# Patient Record
Sex: FEMALE | URBAN - METROPOLITAN AREA
[De-identification: names, ages, dates, MRNs, and addresses within clinical notes are randomized per-mention and may not be internally consistent; named-entity substitution may affect disease eponyms.]

---

## 2019-05-30 ENCOUNTER — APPOINTMENT (RX ONLY)
Dept: URBAN - METROPOLITAN AREA CLINIC 4 | Facility: CLINIC | Age: 84
Setting detail: DERMATOLOGY
End: 2019-05-30

## 2019-05-30 DIAGNOSIS — D22 MELANOCYTIC NEVI: ICD-10-CM

## 2019-05-30 DIAGNOSIS — L82.1 OTHER SEBORRHEIC KERATOSIS: ICD-10-CM

## 2019-05-30 DIAGNOSIS — Z71.89 OTHER SPECIFIED COUNSELING: ICD-10-CM

## 2019-05-30 DIAGNOSIS — D18.0 HEMANGIOMA: ICD-10-CM

## 2019-05-30 DIAGNOSIS — L57.0 ACTINIC KERATOSIS: ICD-10-CM

## 2019-05-30 DIAGNOSIS — L82.0 INFLAMED SEBORRHEIC KERATOSIS: ICD-10-CM

## 2019-05-30 DIAGNOSIS — Z85.828 PERSONAL HISTORY OF OTHER MALIGNANT NEOPLASM OF SKIN: ICD-10-CM

## 2019-05-30 DIAGNOSIS — L57.8 OTHER SKIN CHANGES DUE TO CHRONIC EXPOSURE TO NONIONIZING RADIATION: ICD-10-CM

## 2019-05-30 DIAGNOSIS — L81.4 OTHER MELANIN HYPERPIGMENTATION: ICD-10-CM

## 2019-05-30 PROBLEM — D18.01 HEMANGIOMA OF SKIN AND SUBCUTANEOUS TISSUE: Status: ACTIVE | Noted: 2019-05-30

## 2019-05-30 PROBLEM — D48.5 NEOPLASM OF UNCERTAIN BEHAVIOR OF SKIN: Status: ACTIVE | Noted: 2019-05-30

## 2019-05-30 PROBLEM — D22.5 MELANOCYTIC NEVI OF TRUNK: Status: ACTIVE | Noted: 2019-05-30

## 2019-05-30 PROCEDURE — ? LIQUID NITROGEN

## 2019-05-30 PROCEDURE — 17004 DESTROY PREMAL LESIONS 15/>: CPT

## 2019-05-30 PROCEDURE — 11103 TANGNTL BX SKIN EA SEP/ADDL: CPT

## 2019-05-30 PROCEDURE — 11102 TANGNTL BX SKIN SINGLE LES: CPT | Mod: 59

## 2019-05-30 PROCEDURE — 99203 OFFICE O/P NEW LOW 30 MIN: CPT | Mod: 25

## 2019-05-30 PROCEDURE — ? BIOPSY BY SHAVE METHOD

## 2019-05-30 PROCEDURE — ? COUNSELING

## 2019-05-30 ASSESSMENT — LOCATION DETAILED DESCRIPTION DERM
LOCATION DETAILED: LEFT NASAL SIDEWALL
LOCATION DETAILED: LEFT INFERIOR CENTRAL MALAR CHEEK
LOCATION DETAILED: RIGHT INFERIOR CENTRAL MALAR CHEEK
LOCATION DETAILED: LEFT DISTAL DORSAL FOREARM
LOCATION DETAILED: LEFT UPPER CUTANEOUS LIP
LOCATION DETAILED: LEFT ULNAR DORSAL HAND
LOCATION DETAILED: LEFT DISTAL PRETIBIAL REGION
LOCATION DETAILED: LEFT CENTRAL MALAR CHEEK
LOCATION DETAILED: LEFT INFERIOR LATERAL MALAR CHEEK
LOCATION DETAILED: RIGHT POSTERIOR ANKLE
LOCATION DETAILED: UPPER STERNUM
LOCATION DETAILED: RIGHT RADIAL DORSAL HAND
LOCATION DETAILED: RIGHT PROXIMAL DORSAL FOREARM
LOCATION DETAILED: LEFT INFERIOR MEDIAL MIDBACK
LOCATION DETAILED: LEFT PROXIMAL DORSAL FOREARM
LOCATION DETAILED: NASAL DORSUM
LOCATION DETAILED: INFERIOR THORACIC SPINE
LOCATION DETAILED: RIGHT CENTRAL MALAR CHEEK
LOCATION DETAILED: LEFT RADIAL DORSAL HAND
LOCATION DETAILED: RIGHT SUPERIOR LATERAL MALAR CHEEK
LOCATION DETAILED: RIGHT DISTAL DORSAL FOREARM
LOCATION DETAILED: LEFT LATERAL SUPERIOR CHEST
LOCATION DETAILED: LEFT SUPERIOR LATERAL MALAR CHEEK
LOCATION DETAILED: LEFT KNEE
LOCATION DETAILED: RIGHT SUPERIOR MEDIAL MIDBACK
LOCATION DETAILED: LEFT LATERAL PROXIMAL PRETIBIAL REGION
LOCATION DETAILED: STERNAL NOTCH
LOCATION DETAILED: RIGHT UPPER CUTANEOUS LIP
LOCATION DETAILED: LEFT MEDIAL SUPERIOR CHEST
LOCATION DETAILED: LEFT PROXIMAL PRETIBIAL REGION

## 2019-05-30 ASSESSMENT — LOCATION ZONE DERM
LOCATION ZONE: NOSE
LOCATION ZONE: ARM
LOCATION ZONE: LEG
LOCATION ZONE: FACE
LOCATION ZONE: HAND
LOCATION ZONE: LIP
LOCATION ZONE: TRUNK

## 2019-05-30 ASSESSMENT — LOCATION SIMPLE DESCRIPTION DERM
LOCATION SIMPLE: LEFT KNEE
LOCATION SIMPLE: LEFT NOSE
LOCATION SIMPLE: CHEST
LOCATION SIMPLE: RIGHT LIP
LOCATION SIMPLE: RIGHT CHEEK
LOCATION SIMPLE: UPPER BACK
LOCATION SIMPLE: LEFT HAND
LOCATION SIMPLE: NOSE
LOCATION SIMPLE: LEFT LOWER BACK
LOCATION SIMPLE: LEFT FOREARM
LOCATION SIMPLE: LEFT CHEEK
LOCATION SIMPLE: LEFT PRETIBIAL REGION
LOCATION SIMPLE: RIGHT LOWER BACK
LOCATION SIMPLE: RIGHT ANKLE
LOCATION SIMPLE: LEFT LIP
LOCATION SIMPLE: RIGHT HAND
LOCATION SIMPLE: RIGHT FOREARM

## 2019-05-30 NOTE — PROCEDURE: BIOPSY BY SHAVE METHOD
Destruction After The Procedure: No
Consent: Written consent was obtained and risks were reviewed including but not limited to scarring, infection, bleeding, scabbing, incomplete removal, nerve damage and allergy to anesthesia.
Lab: [id number]
Size Of Lesion In Cm: 1
Curettage Text: The wound bed was treated with curettage after the biopsy was performed.
Notification Instructions: Patient will be notified of biopsy results. However, patient instructed to call the office if not contacted within 2 weeks.
Biopsy Type: H and E
Electrodesiccation Text: The wound bed was treated with electrodesiccation after the biopsy was performed.
Wound Care: Aquaphor
Lab Facility: 
Anesthesia Type: 1% lidocaine with epinephrine
X Size Of Lesion In Cm: 0
Cryotherapy Text: The wound bed was treated with cryotherapy after the biopsy was performed.
Depth Of Biopsy: dermis
Was A Bandage Applied: Yes
Type Of Destruction Used: Curettage
Electrodesiccation And Curettage Text: The wound bed was treated with electrodesiccation and curettage after the biopsy was performed.
Billing Type: Third-Party Bill
Hemostasis: Aluminum Chloride and Electrocautery
Silver Nitrate Text: The wound bed was treated with silver nitrate after the biopsy was performed.
Post-Care Instructions: I reviewed with the patient in detail post-care instructions. Patient is to keep the biopsy site dry overnight, and then apply bacitracin twice daily until healed. Patient may apply hydrogen peroxide soaks to remove any crusting.
Detail Level: Detailed
Biopsy Method: 15 blade
Dressing: Band-Aid
Size Of Lesion In Cm: 1.1
Size Of Lesion In Cm: 2
Size Of Lesion In Cm: 1.4

## 2019-05-30 NOTE — PROCEDURE: LIQUID NITROGEN
Render Note In Bullet Format When Appropriate: No
Consent: The patient's consent was obtained including but not limited to risks of crusting, scabbing, blistering, scarring, darker or lighter pigmentary change, recurrence, incomplete removal and infection.
Detail Level: Detailed
Duration Of Freeze Thaw-Cycle (Seconds): 0
Post-Care Instructions: I reviewed with the patient in detail post-care instructions. Patient is to wear sunprotection, and avoid picking at any of the treated lesions. Pt may apply Vaseline  or Aquaphor to crusted or scabbing areas.

## 2019-05-30 NOTE — HPI: FULL BODY SKIN EXAMINATION
How Severe Are Your Spot(S)?: mild
What Is The Reason For Today's Visit?: Full Body Skin Examination
What Is The Reason For Today's Visit? (Being Monitored For X): concerning skin lesions on an annual basis
Additional History: Patient last seen a Dermatologist in December. History of sunburns.

## 2019-07-08 ENCOUNTER — APPOINTMENT (RX ONLY)
Dept: URBAN - METROPOLITAN AREA CLINIC 4 | Facility: CLINIC | Age: 84
Setting detail: DERMATOLOGY
End: 2019-07-08

## 2019-07-08 DIAGNOSIS — L57.0 ACTINIC KERATOSIS: ICD-10-CM

## 2019-07-08 PROBLEM — C44.722 SQUAMOUS CELL CARCINOMA OF SKIN OF RIGHT LOWER LIMB, INCLUDING HIP: Status: ACTIVE | Noted: 2019-07-08

## 2019-07-08 PROCEDURE — 17003 DESTRUCT PREMALG LES 2-14: CPT

## 2019-07-08 PROCEDURE — 12032 INTMD RPR S/A/T/EXT 2.6-7.5: CPT | Mod: 59

## 2019-07-08 PROCEDURE — 11602 EXC TR-EXT MAL+MARG 1.1-2 CM: CPT

## 2019-07-08 PROCEDURE — 17000 DESTRUCT PREMALG LESION: CPT | Mod: 59

## 2019-07-08 PROCEDURE — ? DEFER

## 2019-07-08 PROCEDURE — ? LIQUID NITROGEN

## 2019-07-08 PROCEDURE — ? EXCISION

## 2019-07-08 ASSESSMENT — LOCATION SIMPLE DESCRIPTION DERM
LOCATION SIMPLE: RIGHT FOREARM
LOCATION SIMPLE: LEFT SHOULDER

## 2019-07-08 ASSESSMENT — LOCATION ZONE DERM: LOCATION ZONE: ARM

## 2019-07-08 ASSESSMENT — LOCATION DETAILED DESCRIPTION DERM
LOCATION DETAILED: LEFT ANTERIOR SHOULDER
LOCATION DETAILED: RIGHT PROXIMAL DORSAL FOREARM

## 2019-07-08 NOTE — PROCEDURE: DEFER
Introduction Text (Please End With A Colon): The following procedure was deferred:
Detail Level: Detailed
Instructions (Optional): Excision scheduled 7/23/19 with Dr. ALBARADO.
Procedure To Be Performed At Next Visit: Excision
Instructions (Optional): MOHS scheduled with Dr. Payne for 8/5/19
Procedure To Be Performed At Next Visit: Mohs surgery

## 2019-07-08 NOTE — PROCEDURE: EXCISION
Fusiform Excision Additional Text (Leave Blank If You Do Not Want): The margin was drawn around the clinically apparent lesion.  A fusiform shape was then drawn on the skin incorporating the lesion and margins.  Incisions were then made along these lines to the appropriate tissue plane and the lesion was extirpated.
Render Path Notes In Note?: no
Biopsy Photograph Reviewed: Yes
Complex Repair And A-T Advancement Flap Text: The defect edges were debeveled with a #15 scalpel blade.  The primary defect was closed partially with a complex linear closure.  Given the location of the remaining defect, shape of the defect and the proximity to free margins an A-T advancement flap was deemed most appropriate for complete closure of the defect.  Using a sterile surgical marker, an appropriate advancement flap was drawn incorporating the defect and placing the expected incisions within the relaxed skin tension lines where possible.    The area thus outlined was incised deep to adipose tissue with a #15 scalpel blade.  The skin margins were undermined to an appropriate distance in all directions utilizing iris scissors.
Cheek Interpolation Flap Text: A decision was made to reconstruct the defect utilizing an interpolation axial flap and a staged reconstruction.  A telfa template was made of the defect.  This telfa template was then used to outline the Cheek Interpolation flap.  The donor area for the pedicle flap was then injected with anesthesia.  The flap was excised through the skin and subcutaneous tissue down to the layer of the underlying musculature.  The interpolation flap was carefully excised within this deep plane to maintain its blood supply.  The edges of the donor site were undermined.   The donor site was closed in a primary fashion.  The pedicle was then rotated into position and sutured.  Once the tube was sutured into place, adequate blood supply was confirmed with blanching and refill.  The pedicle was then wrapped with xeroform gauze and dressed appropriately with a telfa and gauze bandage to ensure continued blood supply and protect the attached pedicle.
Complex Repair And Xenograft Text: The defect edges were debeveled with a #15 scalpel blade.  The primary defect was closed partially with a complex linear closure.  Given the location of the defect, shape of the defect and the proximity to free margins a xenograft was deemed most appropriate to repair the remaining defect.  The graft was trimmed to fit the size of the remaining defect.  The graft was then placed in the primary defect, oriented appropriately, and sutured into place.
Wound Care: Vaseline
Banner Transposition Flap Text: The defect edges were debeveled with a #15 scalpel blade.  Given the location of the defect and the proximity to free margins a Banner transposition flap was deemed most appropriate.  Using a sterile surgical marker, an appropriate flap drawn around the defect. The area thus outlined was incised deep to adipose tissue with a #15 scalpel blade.  The skin margins were undermined to an appropriate distance in all directions utilizing iris scissors.
Double O-Z Flap Text: The defect edges were debeveled with a #15 scalpel blade.  Given the location of the defect, shape of the defect and the proximity to free margins a Double O-Z flap was deemed most appropriate.  Using a sterile surgical marker, an appropriate transposition flap was drawn incorporating the defect and placing the expected incisions within the relaxed skin tension lines where possible. The area thus outlined was incised deep to adipose tissue with a #15 scalpel blade.  The skin margins were undermined to an appropriate distance in all directions utilizing iris scissors.
Lab: [id number]
Curvilinear Excision Additional Text (Leave Blank If You Do Not Want): The margin was drawn around the clinically apparent lesion.  A curvilinear shape was then drawn on the skin incorporating the lesion and margins.  Incisions were then made along these lines to the appropriate tissue plane and the lesion was extirpated.
Surgeon Performing The Repair (Optional): Nika
Complex Repair And Modified Advancement Flap Text: The defect edges were debeveled with a #15 scalpel blade.  The primary defect was closed partially with a complex linear closure.  Given the location of the remaining defect, shape of the defect and the proximity to free margins a modified advancement flap was deemed most appropriate for complete closure of the defect.  Using a sterile surgical marker, an appropriate advancement flap was drawn incorporating the defect and placing the expected incisions within the relaxed skin tension lines where possible.    The area thus outlined was incised deep to adipose tissue with a #15 scalpel blade.  The skin margins were undermined to an appropriate distance in all directions utilizing iris scissors.
Z Plasty Text: The lesion was extirpated to the level of the fat with a #15 scalpel blade.  Given the location of the defect, shape of the defect and the proximity to free margins a Z-plasty was deemed most appropriate for repair.  Using a sterile surgical marker, the appropriate transposition arms of the Z-plasty were drawn incorporating the defect and placing the expected incisions within the relaxed skin tension lines where possible.    The area thus outlined was incised deep to adipose tissue with a #15 scalpel blade.  The skin margins were undermined to an appropriate distance in all directions utilizing iris scissors.  The opposing transposition arms were then transposed into place in opposite direction and anchored with interrupted buried subcutaneous sutures.
Complex Repair And Tissue Cultured Epidermal Autograft Text: The defect edges were debeveled with a #15 scalpel blade.  The primary defect was closed partially with a complex linear closure.  Given the location of the defect, shape of the defect and the proximity to free margins an tissue cultured epidermal autograft was deemed most appropriate to repair the remaining defect.  The graft was trimmed to fit the size of the remaining defect.  The graft was then placed in the primary defect, oriented appropriately, and sutured into place.
Ear Star Wedge Flap Text: The defect edges were debeveled with a #15 blade scalpel.  Given the location of the defect and the proximity to free margins (helical rim) an ear star wedge flap was deemed most appropriate.  Using a sterile surgical marker, the appropriate flap was drawn incorporating the defect and placing the expected incisions between the helical rim and antihelix where possible.  The area thus outlined was incised through and through with a #15 scalpel blade.
O-Z Flap Text: The defect edges were debeveled with a #15 scalpel blade.  Given the location of the defect, shape of the defect and the proximity to free margins an O-Z flap was deemed most appropriate.  Using a sterile surgical marker, an appropriate transposition flap was drawn incorporating the defect and placing the expected incisions within the relaxed skin tension lines where possible. The area thus outlined was incised deep to adipose tissue with a #15 scalpel blade.  The skin margins were undermined to an appropriate distance in all directions utilizing iris scissors.
Double M-Plasty Intermediate Repair Preamble Text (Leave Blank If You Do Not Want): Undermining was performed with blunt dissection.
Complex Repair And Double Advancement Flap Text: The defect edges were debeveled with a #15 scalpel blade.  The primary defect was closed partially with a complex linear closure.  Given the location of the remaining defect, shape of the defect and the proximity to free margins a double advancement flap was deemed most appropriate for complete closure of the defect.  Using a sterile surgical marker, an appropriate advancement flap was drawn incorporating the defect and placing the expected incisions within the relaxed skin tension lines where possible.    The area thus outlined was incised deep to adipose tissue with a #15 scalpel blade.  The skin margins were undermined to an appropriate distance in all directions utilizing iris scissors.
Complex Repair And Dermal Autograft Text: The defect edges were debeveled with a #15 scalpel blade.  The primary defect was closed partially with a complex linear closure.  Given the location of the defect, shape of the defect and the proximity to free margins an dermal autograft was deemed most appropriate to repair the remaining defect.  The graft was trimmed to fit the size of the remaining defect.  The graft was then placed in the primary defect, oriented appropriately, and sutured into place.
W Plasty Text: The lesion was extirpated to the level of the fat with a #15 scalpel blade.  Given the location of the defect, shape of the defect and the proximity to free margins a W-plasty was deemed most appropriate for repair.  Using a sterile surgical marker, the appropriate transposition arms of the W-plasty were drawn incorporating the defect and placing the expected incisions within the relaxed skin tension lines where possible.    The area thus outlined was incised deep to adipose tissue with a #15 scalpel blade.  The skin margins were undermined to an appropriate distance in all directions utilizing iris scissors.  The opposing transposition arms were then transposed into place in opposite direction and anchored with interrupted buried subcutaneous sutures.
Bilateral Helical Rim Advancement Flap Text: The defect edges were debeveled with a #15 blade scalpel.  Given the location of the defect and the proximity to free margins (helical rim) a bilateral helical rim advancement flap was deemed most appropriate.  Using a sterile surgical marker, the appropriate advancement flaps were drawn incorporating the defect and placing the expected incisions between the helical rim and antihelix where possible.  The area thus outlined was incised through and through with a #15 scalpel blade.  With a skin hook and iris scissors, the flaps were gently and sharply undermined and freed up.
O-L Flap Text: The defect edges were debeveled with a #15 scalpel blade.  Given the location of the defect, shape of the defect and the proximity to free margins an O-L flap was deemed most appropriate.  Using a sterile surgical marker, an appropriate advancement flap was drawn incorporating the defect and placing the expected incisions within the relaxed skin tension lines where possible.    The area thus outlined was incised deep to adipose tissue with a #15 scalpel blade.  The skin margins were undermined to an appropriate distance in all directions utilizing iris scissors.
Complex Repair And Single Advancement Flap Text: The defect edges were debeveled with a #15 scalpel blade.  The primary defect was closed partially with a complex linear closure.  Given the location of the remaining defect, shape of the defect and the proximity to free margins a single advancement flap was deemed most appropriate for complete closure of the defect.  Using a sterile surgical marker, an appropriate advancement flap was drawn incorporating the defect and placing the expected incisions within the relaxed skin tension lines where possible.    The area thus outlined was incised deep to adipose tissue with a #15 scalpel blade.  The skin margins were undermined to an appropriate distance in all directions utilizing iris scissors.
H Plasty Text: Given the location of the defect, shape of the defect and the proximity to free margins a H-plasty was deemed most appropriate for repair.  Using a sterile surgical marker, the appropriate advancement arms of the H-plasty were drawn incorporating the defect and placing the expected incisions within the relaxed skin tension lines where possible. The area thus outlined was incised deep to adipose tissue with a #15 scalpel blade. The skin margins were undermined to an appropriate distance in all directions utilizing iris scissors.  The opposing advancement arms were then advanced into place in opposite direction and anchored with interrupted buried subcutaneous sutures.
Helical Rim Advancement Flap Text: The defect edges were debeveled with a #15 blade scalpel.  Given the location of the defect and the proximity to free margins (helical rim) a double helical rim advancement flap was deemed most appropriate.  Using a sterile surgical marker, the appropriate advancement flaps were drawn incorporating the defect and placing the expected incisions between the helical rim and antihelix where possible.  The area thus outlined was incised through and through with a #15 scalpel blade.  With a skin hook and iris scissors, the flaps were gently and sharply undermined and freed up.
Complex Repair And Epidermal Autograft Text: The defect edges were debeveled with a #15 scalpel blade.  The primary defect was closed partially with a complex linear closure.  Given the location of the defect, shape of the defect and the proximity to free margins an epidermal autograft was deemed most appropriate to repair the remaining defect.  The graft was trimmed to fit the size of the remaining defect.  The graft was then placed in the primary defect, oriented appropriately, and sutured into place.
Intermediate / Complex Repair - Final Wound Length In Cm: 4.7
O-T Advancement Flap Text: The defect edges were debeveled with a #15 scalpel blade.  Given the location of the defect, shape of the defect and the proximity to free margins an O-T advancement flap was deemed most appropriate.  Using a sterile surgical marker, an appropriate advancement flap was drawn incorporating the defect and placing the expected incisions within the relaxed skin tension lines where possible.    The area thus outlined was incised deep to adipose tissue with a #15 scalpel blade.  The skin margins were undermined to an appropriate distance in all directions utilizing iris scissors.
Partial Purse String (Simple) Text: Given the location of the defect and the characteristics of the surrounding skin a simple purse string closure was deemed most appropriate.  Undermining was performed circumferentially around the surgical defect.  A purse string suture was then placed and tightened. Wound tension of the circular defect prevented complete closure of the wound.
V-Y Plasty Text: The defect edges were debeveled with a #15 scalpel blade.  Given the location of the defect, shape of the defect and the proximity to free margins an V-Y advancement flap was deemed most appropriate.  Using a sterile surgical marker, an appropriate advancement flap was drawn incorporating the defect and placing the expected incisions within the relaxed skin tension lines where possible.    The area thus outlined was incised deep to adipose tissue with a #15 scalpel blade.  The skin margins were undermined to an appropriate distance in all directions utilizing iris scissors.
Complex Repair And Split-Thickness Skin Graft Text: The defect edges were debeveled with a #15 scalpel blade.  The primary defect was closed partially with a complex linear closure.  Given the location of the defect, shape of the defect and the proximity to free margins a split thickness skin graft was deemed most appropriate to repair the remaining defect.  The graft was trimmed to fit the size of the remaining defect.  The graft was then placed in the primary defect, oriented appropriately, and sutured into place.
Bi-Rhombic Flap Text: The defect edges were debeveled with a #15 scalpel blade.  Given the location of the defect and the proximity to free margins a bi-rhombic flap was deemed most appropriate.  Using a sterile surgical marker, an appropriate rhombic flap was drawn incorporating the defect. The area thus outlined was incised deep to adipose tissue with a #15 scalpel blade.  The skin margins were undermined to an appropriate distance in all directions utilizing iris scissors.
A-T Advancement Flap Text: The defect edges were debeveled with a #15 scalpel blade.  Given the location of the defect, shape of the defect and the proximity to free margins an A-T advancement flap was deemed most appropriate.  Using a sterile surgical marker, an appropriate advancement flap was drawn incorporating the defect and placing the expected incisions within the relaxed skin tension lines where possible.    The area thus outlined was incised deep to adipose tissue with a #15 scalpel blade.  The skin margins were undermined to an appropriate distance in all directions utilizing iris scissors.
Anesthesia Type: 1% lidocaine with 1:100,000 epinephrine and a 1:10 solution of 8.4% sodium bicarbonate
Epidermal Closure: running cuticular
Partial Purse String (Intermediate) Text: Given the location of the defect and the characteristics of the surrounding skin an intermediate purse string closure was deemed most appropriate.  Undermining was performed circumferentially around the surgical defect.  A purse string suture was then placed and tightened. Wound tension of the circular defect prevented complete closure of the wound.
Billing Type: Third-Party Bill
Skin Substitute Text: The defect edges were debeveled with a #15 scalpel blade.  Given the location of the defect, shape of the defect and the proximity to free margins a skin substitute graft was deemed most appropriate.  The graft material was trimmed to fit the size of the defect. The graft was then placed in the primary defect and oriented appropriately.
Complex Repair And Ftsg Text: The defect edges were debeveled with a #15 scalpel blade.  The primary defect was closed partially with a complex linear closure.  Given the location of the defect, shape of the defect and the proximity to free margins a full thickness skin graft was deemed most appropriate to repair the remaining defect.  The graft was trimmed to fit the size of the remaining defect.  The graft was then placed in the primary defect, oriented appropriately, and sutured into place.
S Plasty Text: Given the location and shape of the defect, and the orientation of relaxed skin tension lines, an S-plasty was deemed most appropriate for repair.  Using a sterile surgical marker, the appropriate outline of the S-plasty was drawn, incorporating the defect and placing the expected incisions within the relaxed skin tension lines where possible.  The area thus outlined was incised deep to adipose tissue with a #15 scalpel blade.  The skin margins were undermined to an appropriate distance in all directions utilizing iris scissors. The skin flaps were advanced over the defect.  The opposing margins were then approximated with interrupted buried subcutaneous sutures.
Rhomboid Transposition Flap Text: The defect edges were debeveled with a #15 scalpel blade.  Given the location of the defect and the proximity to free margins a rhomboid transposition flap was deemed most appropriate.  Using a sterile surgical marker, an appropriate rhomboid flap was drawn incorporating the defect.    The area thus outlined was incised deep to adipose tissue with a #15 scalpel blade.  The skin margins were undermined to an appropriate distance in all directions utilizing iris scissors.
Crescentic Advancement Flap Text: The defect edges were debeveled with a #15 scalpel blade.  Given the location of the defect and the proximity to free margins a crescentic advancement flap was deemed most appropriate.  Using a sterile surgical marker, the appropriate advancement flap was drawn incorporating the defect and placing the expected incisions within the relaxed skin tension lines where possible.    The area thus outlined was incised deep to adipose tissue with a #15 scalpel blade.  The skin margins were undermined to an appropriate distance in all directions utilizing iris scissors.
Purse String (Simple) Text: Given the location of the defect and the characteristics of the surrounding skin a purse string simple closure was deemed most appropriate.  Undermining was performed circumferentially around the surgical defect.  A purse string suture was then placed and tightened.
Repair Type: Intermediate
Double M-Plasty Complex Repair Preamble Text (Leave Blank If You Do Not Want): Extensive wide undermining was performed.
Information: Selecting Yes will display possible errors in your note based on the variables you have selected. This validation is only offered as a suggestion for you. PLEASE NOTE THAT THE VALIDATION TEXT WILL BE REMOVED WHEN YOU FINALIZE YOUR NOTE. IF YOU WANT TO FAX A PRELIMINARY NOTE YOU WILL NEED TO TOGGLE THIS TO 'NO' IF YOU DO NOT WANT IT IN YOUR FAXED NOTE.
Double O-Z Plasty Text: The defect edges were debeveled with a #15 scalpel blade.  Given the location of the defect, shape of the defect and the proximity to free margins a Double O-Z plasty (double transposition flap) was deemed most appropriate.  Using a sterile surgical marker, the appropriate transposition flaps were drawn incorporating the defect and placing the expected incisions within the relaxed skin tension lines where possible. The area thus outlined was incised deep to adipose tissue with a #15 scalpel blade.  The skin margins were undermined to an appropriate distance in all directions utilizing iris scissors.  Hemostasis was achieved with electrocautery.  The flaps were then transposed into place, one clockwise and the other counterclockwise, and anchored with interrupted buried subcutaneous sutures.
Rhombic Flap Text: The defect edges were debeveled with a #15 scalpel blade.  Given the location of the defect and the proximity to free margins a rhombic flap was deemed most appropriate.  Using a sterile surgical marker, an appropriate rhombic flap was drawn incorporating the defect.    The area thus outlined was incised deep to adipose tissue with a #15 scalpel blade.  The skin margins were undermined to an appropriate distance in all directions utilizing iris scissors.
Complex Repair And Dorsal Nasal Flap Text: The defect edges were debeveled with a #15 scalpel blade.  The primary defect was closed partially with a complex linear closure.  Given the location of the remaining defect, shape of the defect and the proximity to free margins a dorsal nasal flap was deemed most appropriate for complete closure of the defect.  Using a sterile surgical marker, an appropriate flap was drawn incorporating the defect and placing the expected incisions within the relaxed skin tension lines where possible.    The area thus outlined was incised deep to adipose tissue with a #15 scalpel blade.  The skin margins were undermined to an appropriate distance in all directions utilizing iris scissors.
Dermal Autograft Text: The defect edges were debeveled with a #15 scalpel blade.  Given the location of the defect, shape of the defect and the proximity to free margins a dermal autograft was deemed most appropriate.  Using a sterile surgical marker, the primary defect shape was transferred to the donor site. The area thus outlined was incised deep to adipose tissue with a #15 scalpel blade.  The harvested graft was then trimmed of adipose and epidermal tissue until only dermis was left.  The skin graft was then placed in the primary defect and oriented appropriately.
Anesthesia Volume In Cc: 6
Chonodrocutaneous Helical Advancement Flap Text: The defect edges were debeveled with a #15 scalpel blade.  Given the location of the defect and the proximity to free margins a chondrocutaneous helical advancement flap was deemed most appropriate.  Using a sterile surgical marker, the appropriate advancement flap was drawn incorporating the defect and placing the expected incisions within the relaxed skin tension lines where possible.    The area thus outlined was incised deep to adipose tissue with a #15 scalpel blade.  The skin margins were undermined to an appropriate distance in all directions utilizing iris scissors.
Purse String (Intermediate) Text: Given the location of the defect and the characteristics of the surrounding skin a purse string intermediate closure was deemed most appropriate.  Undermining was performed circumfirentially around the surgical defect.  A purse string suture was then placed and tightened.
O-Z Plasty Text: The defect edges were debeveled with a #15 scalpel blade.  Given the location of the defect, shape of the defect and the proximity to free margins an O-Z plasty (double transposition flap) was deemed most appropriate.  Using a sterile surgical marker, the appropriate transposition flaps were drawn incorporating the defect and placing the expected incisions within the relaxed skin tension lines where possible.    The area thus outlined was incised deep to adipose tissue with a #15 scalpel blade.  The skin margins were undermined to an appropriate distance in all directions utilizing iris scissors.  Hemostasis was achieved with electrocautery.  The flaps were then transposed into place, one clockwise and the other counterclockwise, and anchored with interrupted buried subcutaneous sutures.
Melolabial Transposition Flap Text: The defect edges were debeveled with a #15 scalpel blade.  Given the location of the defect and the proximity to free margins a melolabial flap was deemed most appropriate.  Using a sterile surgical marker, an appropriate melolabial transposition flap was drawn incorporating the defect.    The area thus outlined was incised deep to adipose tissue with a #15 scalpel blade.  The skin margins were undermined to an appropriate distance in all directions utilizing iris scissors.
Complex Repair And Z Plasty Text: The defect edges were debeveled with a #15 scalpel blade.  The primary defect was closed partially with a complex linear closure.  Given the location of the remaining defect, shape of the defect and the proximity to free margins a Z plasty was deemed most appropriate for complete closure of the defect.  Using a sterile surgical marker, an appropriate advancement flap was drawn incorporating the defect and placing the expected incisions within the relaxed skin tension lines where possible.    The area thus outlined was incised deep to adipose tissue with a #15 scalpel blade.  The skin margins were undermined to an appropriate distance in all directions utilizing iris scissors.
Burow's Advancement Flap Text: The defect edges were debeveled with a #15 scalpel blade.  Given the location of the defect and the proximity to free margins a Burow's advancement flap was deemed most appropriate.  Using a sterile surgical marker, the appropriate advancement flap was drawn incorporating the defect and placing the expected incisions within the relaxed skin tension lines where possible.    The area thus outlined was incised deep to adipose tissue with a #15 scalpel blade.  The skin margins were undermined to an appropriate distance in all directions utilizing iris scissors.
Epidermal Autograft Text: The defect edges were debeveled with a #15 scalpel blade.  Given the location of the defect, shape of the defect and the proximity to free margins an epidermal autograft was deemed most appropriate.  Using a sterile surgical marker, the primary defect shape was transferred to the donor site. The epidermal graft was then harvested.  The skin graft was then placed in the primary defect and oriented appropriately.
Skin Substitute Units (Will Override Primary Defect Units If Greater Than 0): 0
Xenograft Text: The defect edges were debeveled with a #15 scalpel blade.  Given the location of the defect, shape of the defect and the proximity to free margins a xenograft was deemed most appropriate.  The graft was then trimmed to fit the size of the defect.  The graft was then placed in the primary defect and oriented appropriately.
Complex Repair And W Plasty Text: The defect edges were debeveled with a #15 scalpel blade.  The primary defect was closed partially with a complex linear closure.  Given the location of the remaining defect, shape of the defect and the proximity to free margins a W plasty was deemed most appropriate for complete closure of the defect.  Using a sterile surgical marker, an appropriate advancement flap was drawn incorporating the defect and placing the expected incisions within the relaxed skin tension lines where possible.    The area thus outlined was incised deep to adipose tissue with a #15 scalpel blade.  The skin margins were undermined to an appropriate distance in all directions utilizing iris scissors.
O-T Plasty Text: The defect edges were debeveled with a #15 scalpel blade.  Given the location of the defect, shape of the defect and the proximity to free margins an O-T plasty was deemed most appropriate.  Using a sterile surgical marker, an appropriate O-T plasty was drawn incorporating the defect and placing the expected incisions within the relaxed skin tension lines where possible.    The area thus outlined was incised deep to adipose tissue with a #15 scalpel blade.  The skin margins were undermined to an appropriate distance in all directions utilizing iris scissors.
Muscle Hinge Flap Text: The defect edges were debeveled with a #15 scalpel blade.  Given the size, depth and location of the defect and the proximity to free margins a muscle hinge flap was deemed most appropriate.  Using a sterile surgical marker, an appropriate hinge flap was drawn incorporating the defect. The area thus outlined was incised with a #15 scalpel blade.  The skin margins were undermined to an appropriate distance in all directions utilizing iris scissors.
Composite Graft Text: The defect edges were debeveled with a #15 scalpel blade.  Given the location of the defect, shape of the defect, the proximity to free margins and the fact the defect was full thickness a composite graft was deemed most appropriate.  The defect was outline and then transferred to the donor site.  A full thickness graft was then excised from the donor site. The graft was then placed in the primary defect, oriented appropriately and then sutured into place.  The secondary defect was then repaired using a primary closure.
Advancement Flap (Double) Text: The defect edges were debeveled with a #15 scalpel blade.  Given the location of the defect and the proximity to free margins a double advancement flap was deemed most appropriate.  Using a sterile surgical marker, the appropriate advancement flaps were drawn incorporating the defect and placing the expected incisions within the relaxed skin tension lines where possible.    The area thus outlined was incised deep to adipose tissue with a #15 scalpel blade.  The skin margins were undermined to an appropriate distance in all directions utilizing iris scissors.
Tissue Cultured Epidermal Autograft Text: The defect edges were debeveled with a #15 scalpel blade.  Given the location of the defect, shape of the defect and the proximity to free margins a tissue cultured epidermal autograft was deemed most appropriate.  The graft was then trimmed to fit the size of the defect.  The graft was then placed in the primary defect and oriented appropriately.
Epidermal Closure Graft Donor Site (Optional): simple interrupted
Excision Method: Fusiform
Complex Repair And Double M Plasty Text: The defect edges were debeveled with a #15 scalpel blade.  The primary defect was closed partially with a complex linear closure.  Given the location of the remaining defect, shape of the defect and the proximity to free margins a double M plasty was deemed most appropriate for complete closure of the defect.  Using a sterile surgical marker, an appropriate advancement flap was drawn incorporating the defect and placing the expected incisions within the relaxed skin tension lines where possible.    The area thus outlined was incised deep to adipose tissue with a #15 scalpel blade.  The skin margins were undermined to an appropriate distance in all directions utilizing iris scissors.
Keystone Flap Text: The defect edges were debeveled with a #15 scalpel blade.  Given the location of the defect, shape of the defect a keystone flap was deemed most appropriate.  Using a sterile surgical marker, an appropriate keystone flap was drawn incorporating the defect, outlining the appropriate donor tissue and placing the expected incisions within the relaxed skin tension lines where possible. The area thus outlined was incised deep to adipose tissue with a #15 scalpel blade.  The skin margins were undermined to an appropriate distance in all directions around the primary defect and laterally outward around the flap utilizing iris scissors.
Transposition Flap Text: The defect edges were debeveled with a #15 scalpel blade.  Given the location of the defect and the proximity to free margins a transposition flap was deemed most appropriate.  Using a sterile surgical marker, an appropriate transposition flap was drawn incorporating the defect.    The area thus outlined was incised deep to adipose tissue with a #15 scalpel blade.  The skin margins were undermined to an appropriate distance in all directions utilizing iris scissors.
Advancement Flap (Single) Text: The defect edges were debeveled with a #15 scalpel blade.  Given the location of the defect and the proximity to free margins a single advancement flap was deemed most appropriate.  Using a sterile surgical marker, an appropriate advancement flap was drawn incorporating the defect and placing the expected incisions within the relaxed skin tension lines where possible.    The area thus outlined was incised deep to adipose tissue with a #15 scalpel blade.  The skin margins were undermined to an appropriate distance in all directions utilizing iris scissors.
Size Of Margin In Cm: 0.3
Cartilage Graft Text: The defect edges were debeveled with a #15 scalpel blade.  Given the location of the defect, shape of the defect, the fact the defect involved a full thickness cartilage defect a cartilage graft was deemed most appropriate.  An appropriate donor site was identified, cleansed, and anesthetized. The cartilage graft was then harvested and transferred to the recipient site, oriented appropriately and then sutured into place.  The secondary defect was then repaired using a primary closure.
Anesthesia Type: 0.5% lidocaine with 1:200,000 epinephrine and a 1:10 solution of 4.2% sodium bicarbonate
Star Wedge Flap Text: The defect edges were debeveled with a #15 scalpel blade.  Given the location of the defect, shape of the defect and the proximity to free margins a star wedge flap was deemed most appropriate.  Using a sterile surgical marker, an appropriate rotation flap was drawn incorporating the defect and placing the expected incisions within the relaxed skin tension lines where possible. The area thus outlined was incised deep to adipose tissue with a #15 scalpel blade.  The skin margins were undermined to an appropriate distance in all directions utilizing iris scissors.
Complex Repair And M Plasty Text: The defect edges were debeveled with a #15 scalpel blade.  The primary defect was closed partially with a complex linear closure.  Given the location of the remaining defect, shape of the defect and the proximity to free margins an M plasty was deemed most appropriate for complete closure of the defect.  Using a sterile surgical marker, an appropriate advancement flap was drawn incorporating the defect and placing the expected incisions within the relaxed skin tension lines where possible.    The area thus outlined was incised deep to adipose tissue with a #15 scalpel blade.  The skin margins were undermined to an appropriate distance in all directions utilizing iris scissors.
No Repair - Repaired With Adjacent Surgical Defect Text (Leave Blank If You Do Not Want): After the excision the defect was repaired concurrently with another surgical defect which was in close approximation.
Split-Thickness Skin Graft Text: The defect edges were debeveled with a #15 scalpel blade.  Given the location of the defect, shape of the defect and the proximity to free margins a split thickness skin graft was deemed most appropriate.  Using a sterile surgical marker, the primary defect shape was transferred to the donor site. The split thickness graft was then harvested.  The skin graft was then placed in the primary defect and oriented appropriately.
Island Pedicle Flap-Requiring Vessel Identification Text: The defect edges were debeveled with a #15 scalpel blade.  Given the location of the defect, shape of the defect and the proximity to free margins an island pedicle advancement flap was deemed most appropriate.  Using a sterile surgical marker, an appropriate advancement flap was drawn, based on the axial vessel mentioned above, incorporating the defect, outlining the appropriate donor tissue and placing the expected incisions within the relaxed skin tension lines where possible.    The area thus outlined was incised deep to adipose tissue with a #15 scalpel blade.  The skin margins were undermined to an appropriate distance in all directions around the primary defect and laterally outward around the island pedicle utilizing iris scissors.  There was minimal undermining beneath the pedicle flap.
Suture Removal: 15 days
Detail Level: Detailed
Epidermal Sutures: 4-0 Nylon
Complex Repair And V-Y Plasty Text: The defect edges were debeveled with a #15 scalpel blade.  The primary defect was closed partially with a complex linear closure.  Given the location of the remaining defect, shape of the defect and the proximity to free margins a V-Y plasty was deemed most appropriate for complete closure of the defect.  Using a sterile surgical marker, an appropriate advancement flap was drawn incorporating the defect and placing the expected incisions within the relaxed skin tension lines where possible.    The area thus outlined was incised deep to adipose tissue with a #15 scalpel blade.  The skin margins were undermined to an appropriate distance in all directions utilizing iris scissors.
Spiral Flap Text: The defect edges were debeveled with a #15 scalpel blade.  Given the location of the defect, shape of the defect and the proximity to free margins a spiral flap was deemed most appropriate.  Using a sterile surgical marker, an appropriate rotation flap was drawn incorporating the defect and placing the expected incisions within the relaxed skin tension lines where possible. The area thus outlined was incised deep to adipose tissue with a #15 scalpel blade.  The skin margins were undermined to an appropriate distance in all directions utilizing iris scissors.
Ftsg Text: The defect edges were debeveled with a #15 scalpel blade.  Given the location of the defect, shape of the defect and the proximity to free margins a full thickness skin graft was deemed most appropriate.  Using a sterile surgical marker, the primary defect shape was transferred to the donor site. The area thus outlined was incised deep to adipose tissue with a #15 scalpel blade.  The harvested graft was then trimmed of adipose tissue until only dermis and epidermis was left.  The skin margins of the secondary defect were undermined to an appropriate distance in all directions utilizing iris scissors.  The secondary defect was closed with interrupted buried subcutaneous sutures.  The skin edges were then re-apposed with running  sutures.  The skin graft was then placed in the primary defect and oriented appropriately.
Post-Care Instructions: I reviewed with the patient in detail post-care instructions. Patient is not to engage in any heavy lifting, exercise, or swimming for the next 14 days. Should the patient develop any fevers, chills, bleeding, severe pain patient will contact the office immediately.
Repair Performed By Another Provider Text (Leave Blank If You Do Not Want): After the tissue was excised the defect was repaired by another provider.
Pre-Excision Curettage Text (Leave Blank If You Do Not Want): Prior to drawing the surgical margin the visible lesion was removed with electrodesiccation and curettage to clearly define the lesion size.
Double Island Pedicle Flap Text: The defect edges were debeveled with a #15 scalpel blade.  Given the location of the defect, shape of the defect and the proximity to free margins a double island pedicle advancement flap was deemed most appropriate.  Using a sterile surgical marker, an appropriate advancement flap was drawn incorporating the defect, outlining the appropriate donor tissue and placing the expected incisions within the relaxed skin tension lines where possible.    The area thus outlined was incised deep to adipose tissue with a #15 scalpel blade.  The skin margins were undermined to an appropriate distance in all directions around the primary defect and laterally outward around the island pedicle utilizing iris scissors.  There was minimal undermining beneath the pedicle flap.
Size Of Lesion In Cm: 1.2
Complex Repair And Transposition Flap Text: The defect edges were debeveled with a #15 scalpel blade.  The primary defect was closed partially with a complex linear closure.  Given the location of the remaining defect, shape of the defect and the proximity to free margins a transposition flap was deemed most appropriate for complete closure of the defect.  Using a sterile surgical marker, an appropriate advancement flap was drawn incorporating the defect and placing the expected incisions within the relaxed skin tension lines where possible.    The area thus outlined was incised deep to adipose tissue with a #15 scalpel blade.  The skin margins were undermined to an appropriate distance in all directions utilizing iris scissors.
Rotation Flap Text: The defect edges were debeveled with a #15 scalpel blade.  Given the location of the defect, shape of the defect and the proximity to free margins a rotation flap was deemed most appropriate.  Using a sterile surgical marker, an appropriate rotation flap was drawn incorporating the defect and placing the expected incisions within the relaxed skin tension lines where possible.    The area thus outlined was incised deep to adipose tissue with a #15 scalpel blade.  The skin margins were undermined to an appropriate distance in all directions utilizing iris scissors.
Lip Wedge Excision Repair Text: Given the location of the defect and the proximity to free margins a full thickness wedge repair was deemed most appropriate.  Using a sterile surgical marker, the appropriate repair was drawn incorporating the defect and placing the expected incisions perpendicular to the vermilion border.  The vermilion border was also meticulously outlined to ensure appropriate reapproximation during the repair.  The area thus outlined was incised through and through with a #15 scalpel blade.  The muscularis and dermis were reaproximated with deep sutures following hemostasis. Care was taken to realign the vermilion border before proceeding with the superficial closure.  Once the vermilion was realigned the superfical and mucosal closure was finished.
Positioning (Leave Blank If You Do Not Want): The patient was placed in a comfortable position exposing the surgical site.
Alar Island Pedicle Flap Text: The defect edges were debeveled with a #15 scalpel blade.  Given the location of the defect, shape of the defect and the proximity to the alar rim an island pedicle advancement flap was deemed most appropriate.  Using a sterile surgical marker, an appropriate advancement flap was drawn incorporating the defect, outlining the appropriate donor tissue and placing the expected incisions within the nasal ala running parallel to the alar rim. The area thus outlined was incised with a #15 scalpel blade.  The skin margins were undermined minimally to an appropriate distance in all directions around the primary defect and laterally outward around the island pedicle utilizing iris scissors.  There was minimal undermining beneath the pedicle flap.
Complex Repair And Rhombic Flap Text: The defect edges were debeveled with a #15 scalpel blade.  The primary defect was closed partially with a complex linear closure.  Given the location of the remaining defect, shape of the defect and the proximity to free margins a rhombic flap was deemed most appropriate for complete closure of the defect.  Using a sterile surgical marker, an appropriate advancement flap was drawn incorporating the defect and placing the expected incisions within the relaxed skin tension lines where possible.    The area thus outlined was incised deep to adipose tissue with a #15 scalpel blade.  The skin margins were undermined to an appropriate distance in all directions utilizing iris scissors.
Perilesional Excision Additional Text (Leave Blank If You Do Not Want): The margin was drawn around the clinically apparent lesion. Incisions were then made along these lines to the appropriate tissue plane and the lesion was extirpated.
Paramedian Forehead Flap Text: A decision was made to reconstruct the defect utilizing an interpolation axial flap and a staged reconstruction.  A telfa template was made of the defect.  This telfa template was then used to outline the paramedian forehead pedicle flap.  The donor area for the pedicle flap was then injected with anesthesia.  The flap was excised through the skin and subcutaneous tissue down to the layer of the underlying musculature.  The pedicle flap was carefully excised within this deep plane to maintain its blood supply.  The edges of the donor site were undermined.   The donor site was closed in a primary fashion.  The pedicle was then rotated into position and sutured.  Once the tube was sutured into place, adequate blood supply was confirmed with blanching and refill.  The pedicle was then wrapped with xeroform gauze and dressed appropriately with a telfa and gauze bandage to ensure continued blood supply and protect the attached pedicle.
Island Pedicle Flap With Canthal Suspension Text: The defect edges were debeveled with a #15 scalpel blade.  Given the location of the defect, shape of the defect and the proximity to free margins an island pedicle advancement flap was deemed most appropriate.  Using a sterile surgical marker, an appropriate advancement flap was drawn incorporating the defect, outlining the appropriate donor tissue and placing the expected incisions within the relaxed skin tension lines where possible. The area thus outlined was incised deep to adipose tissue with a #15 scalpel blade.  The skin margins were undermined to an appropriate distance in all directions around the primary defect and laterally outward around the island pedicle utilizing iris scissors.  There was minimal undermining beneath the pedicle flap. A suspension suture was placed in the canthal tendon to prevent tension and prevent ectropion.
Hatchet Flap Text: The defect edges were debeveled with a #15 scalpel blade.  Given the location of the defect, shape of the defect and the proximity to free margins a hatchet flap was deemed most appropriate.  Using a sterile surgical marker, an appropriate hatchet flap was drawn incorporating the defect and placing the expected incisions within the relaxed skin tension lines where possible.    The area thus outlined was incised deep to adipose tissue with a #15 scalpel blade.  The skin margins were undermined to an appropriate distance in all directions utilizing iris scissors.
Complex Repair And Rotation Flap Text: The defect edges were debeveled with a #15 scalpel blade.  The primary defect was closed partially with a complex linear closure.  Given the location of the remaining defect, shape of the defect and the proximity to free margins a rotation flap was deemed most appropriate for complete closure of the defect.  Using a sterile surgical marker, an appropriate advancement flap was drawn incorporating the defect and placing the expected incisions within the relaxed skin tension lines where possible.    The area thus outlined was incised deep to adipose tissue with a #15 scalpel blade.  The skin margins were undermined to an appropriate distance in all directions utilizing iris scissors.
Posterior Auricular Interpolation Flap Text: A decision was made to reconstruct the defect utilizing an interpolation axial flap and a staged reconstruction.  A telfa template was made of the defect.  This telfa template was then used to outline the posterior auricular interpolation flap.  The donor area for the pedicle flap was then injected with anesthesia.  The flap was excised through the skin and subcutaneous tissue down to the layer of the underlying musculature.  The pedicle flap was carefully excised within this deep plane to maintain its blood supply.  The edges of the donor site were undermined.   The donor site was closed in a primary fashion.  The pedicle was then rotated into position and sutured.  Once the tube was sutured into place, adequate blood supply was confirmed with blanching and refill.  The pedicle was then wrapped with xeroform gauze and dressed appropriately with a telfa and gauze bandage to ensure continued blood supply and protect the attached pedicle.
Consent was obtained from the patient. The risks and benefits to therapy were discussed in detail. Specifically, the risks of infection, scarring, bleeding, prolonged wound healing, incomplete removal, allergy to anesthesia, nerve injury and recurrence were addressed. Prior to the procedure, the treatment site was clearly identified and confirmed by the patient. All components of Universal Protocol/PAUSE Rule completed.
Excisional Biopsy Additional Text (Leave Blank If You Do Not Want): The margin was drawn around the clinically apparent lesion. An elliptical shape was then drawn on the skin incorporating the lesion and margins.  Incisions were then made along these lines to the appropriate tissue plane and the lesion was extirpated.
Number Of Deep Sutures (Optional): 2
Island Pedicle Flap Text: The defect edges were debeveled with a #15 scalpel blade.  Given the location of the defect, shape of the defect and the proximity to free margins an island pedicle advancement flap was deemed most appropriate.  Using a sterile surgical marker, an appropriate advancement flap was drawn incorporating the defect, outlining the appropriate donor tissue and placing the expected incisions within the relaxed skin tension lines where possible.    The area thus outlined was incised deep to adipose tissue with a #15 scalpel blade.  The skin margins were undermined to an appropriate distance in all directions around the primary defect and laterally outward around the island pedicle utilizing iris scissors.  There was minimal undermining beneath the pedicle flap.
Mucosal Advancement Flap Text: Given the location of the defect, shape of the defect and the proximity to free margins a mucosal advancement flap was deemed most appropriate. Incisions were made with a 15 blade scalpel in the appropriate fashion along the cutaneous vermilion border and the mucosal lip. The remaining actinically damaged mucosal tissue was excised.  The mucosal advancement flap was then elevated to the gingival sulcus with care taken to preserve the neurovascular structures and advanced into the primary defect. Care was taken to ensure that precise realignment of the vermilion border was achieved.
Complex Repair And Melolabial Flap Text: The defect edges were debeveled with a #15 scalpel blade.  The primary defect was closed partially with a complex linear closure.  Given the location of the remaining defect, shape of the defect and the proximity to free margins a melolabial flap was deemed most appropriate for complete closure of the defect.  Using a sterile surgical marker, an appropriate advancement flap was drawn incorporating the defect and placing the expected incisions within the relaxed skin tension lines where possible.    The area thus outlined was incised deep to adipose tissue with a #15 scalpel blade.  The skin margins were undermined to an appropriate distance in all directions utilizing iris scissors.
Estimated Blood Loss (Cc): minimal
Slit Excision Additional Text (Leave Blank If You Do Not Want): A linear line was drawn on the skin overlying the lesion. An incision was made slowly until the lesion was visualized.  Once visualized, the lesion was removed with blunt dissection.
Mastoid Interpolation Flap Text: A decision was made to reconstruct the defect utilizing an interpolation axial flap and a staged reconstruction.  A telfa template was made of the defect.  This telfa template was then used to outline the mastoid interpolation flap.  The donor area for the pedicle flap was then injected with anesthesia.  The flap was excised through the skin and subcutaneous tissue down to the layer of the underlying musculature.  The pedicle flap was carefully excised within this deep plane to maintain its blood supply.  The edges of the donor site were undermined.   The donor site was closed in a primary fashion.  The pedicle was then rotated into position and sutured.  Once the tube was sutured into place, adequate blood supply was confirmed with blanching and refill.  The pedicle was then wrapped with xeroform gauze and dressed appropriately with a telfa and gauze bandage to ensure continued blood supply and protect the attached pedicle.
Dorsal Nasal Flap Text: The defect edges were debeveled with a #15 scalpel blade.  Given the location of the defect and the proximity to free margins a dorsal nasal flap was deemed most appropriate.  Using a sterile surgical marker, an appropriate dorsal nasal flap was drawn around the defect.    The area thus outlined was incised deep to adipose tissue with a #15 scalpel blade.  The skin margins were undermined to an appropriate distance in all directions utilizing iris scissors.
Modified Advancement Flap Text: The defect edges were debeveled with a #15 scalpel blade.  Given the location of the defect, shape of the defect and the proximity to free margins a modified advancement flap was deemed most appropriate.  Using a sterile surgical marker, an appropriate advancement flap was drawn incorporating the defect and placing the expected incisions within the relaxed skin tension lines where possible.    The area thus outlined was incised deep to adipose tissue with a #15 scalpel blade.  The skin margins were undermined to an appropriate distance in all directions utilizing iris scissors.
Scalpel Size: 15 blade
Previous Accession (Optional): [id number]
Complex Repair And Bilobe Flap Text: The defect edges were debeveled with a #15 scalpel blade.  The primary defect was closed partially with a complex linear closure.  Given the location of the remaining defect, shape of the defect and the proximity to free margins a bilobe flap was deemed most appropriate for complete closure of the defect.  Using a sterile surgical marker, an appropriate advancement flap was drawn incorporating the defect and placing the expected incisions within the relaxed skin tension lines where possible.    The area thus outlined was incised deep to adipose tissue with a #15 scalpel blade.  The skin margins were undermined to an appropriate distance in all directions utilizing iris scissors.
Melolabial Interpolation Flap Text: A decision was made to reconstruct the defect utilizing an interpolation axial flap and a staged reconstruction.  A telfa template was made of the defect.  This telfa template was then used to outline the melolabial interpolation flap.  The donor area for the pedicle flap was then injected with anesthesia.  The flap was excised through the skin and subcutaneous tissue down to the layer of the underlying musculature.  The pedicle flap was carefully excised within this deep plane to maintain its blood supply.  The edges of the donor site were undermined.   The donor site was closed in a primary fashion.  The pedicle was then rotated into position and sutured.  Once the tube was sutured into place, adequate blood supply was confirmed with blanching and refill.  The pedicle was then wrapped with xeroform gauze and dressed appropriately with a telfa and gauze bandage to ensure continued blood supply and protect the attached pedicle.
Saucerization Excision Additional Text (Leave Blank If You Do Not Want): The margin was drawn around the clinically apparent lesion.  Incisions were then made along these lines, in a tangential fashion, to the appropriate tissue plane and the lesion was extirpated.
Trilobed Flap Text: The defect edges were debeveled with a #15 scalpel blade.  Given the location of the defect and the proximity to free margins a trilobed flap was deemed most appropriate.  Using a sterile surgical marker, an appropriate trilobed flap drawn around the defect.    The area thus outlined was incised deep to adipose tissue with a #15 scalpel blade.  The skin margins were undermined to an appropriate distance in all directions utilizing iris scissors.
Hemostasis: Electrocautery
Mercedes Flap Text: The defect edges were debeveled with a #15 scalpel blade.  Given the location of the defect, shape of the defect and the proximity to free margins a Mercedes flap was deemed most appropriate.  Using a sterile surgical marker, an appropriate advancement flap was drawn incorporating the defect and placing the expected incisions within the relaxed skin tension lines where possible. The area thus outlined was incised deep to adipose tissue with a #15 scalpel blade.  The skin margins were undermined to an appropriate distance in all directions utilizing iris scissors.
Complex Repair And O-L Flap Text: The defect edges were debeveled with a #15 scalpel blade.  The primary defect was closed partially with a complex linear closure.  Given the location of the remaining defect, shape of the defect and the proximity to free margins an O-L flap was deemed most appropriate for complete closure of the defect.  Using a sterile surgical marker, an appropriate flap was drawn incorporating the defect and placing the expected incisions within the relaxed skin tension lines where possible.    The area thus outlined was incised deep to adipose tissue with a #15 scalpel blade.  The skin margins were undermined to an appropriate distance in all directions utilizing iris scissors.
Excision Depth: adipose tissue
Dressing: pressure dressing
Interpolation Flap Text: A decision was made to reconstruct the defect utilizing an interpolation axial flap and a staged reconstruction.  A telfa template was made of the defect.  This telfa template was then used to outline the interpolation flap.  The donor area for the pedicle flap was then injected with anesthesia.  The flap was excised through the skin and subcutaneous tissue down to the layer of the underlying musculature.  The interpolation flap was carefully excised within this deep plane to maintain its blood supply.  The edges of the donor site were undermined.   The donor site was closed in a primary fashion.  The pedicle was then rotated into position and sutured.  Once the tube was sutured into place, adequate blood supply was confirmed with blanching and refill.  The pedicle was then wrapped with xeroform gauze and dressed appropriately with a telfa and gauze bandage to ensure continued blood supply and protect the attached pedicle.
Path Notes (To The Dermatopathologist): Please check margins.
Bilobed Transposition Flap Text: The defect edges were debeveled with a #15 scalpel blade.  Given the location of the defect and the proximity to free margins a bilobed transposition flap was deemed most appropriate.  Using a sterile surgical marker, an appropriate bilobe flap drawn around the defect.    The area thus outlined was incised deep to adipose tissue with a #15 scalpel blade.  The skin margins were undermined to an appropriate distance in all directions utilizing iris scissors.
Graft Donor Site Bandage (Optional-Leave Blank If You Don't Want In Note): Steri-strips and a pressure bandage were applied to the donor site.
Advancement-Rotation Flap Text: The defect edges were debeveled with a #15 scalpel blade.  Given the location of the defect, shape of the defect and the proximity to free margins an advancement-rotation flap was deemed most appropriate.  Using a sterile surgical marker, an appropriate flap was drawn incorporating the defect and placing the expected incisions within the relaxed skin tension lines where possible. The area thus outlined was incised deep to adipose tissue with a #15 scalpel blade.  The skin margins were undermined to an appropriate distance in all directions utilizing iris scissors.
Complex Repair And O-T Advancement Flap Text: The defect edges were debeveled with a #15 scalpel blade.  The primary defect was closed partially with a complex linear closure.  Given the location of the remaining defect, shape of the defect and the proximity to free margins an O-T advancement flap was deemed most appropriate for complete closure of the defect.  Using a sterile surgical marker, an appropriate advancement flap was drawn incorporating the defect and placing the expected incisions within the relaxed skin tension lines where possible.    The area thus outlined was incised deep to adipose tissue with a #15 scalpel blade.  The skin margins were undermined to an appropriate distance in all directions utilizing iris scissors.
Elliptical Excision Additional Text (Leave Blank If You Do Not Want): The margin was drawn around the clinically apparent lesion.  An elliptical shape was then drawn on the skin incorporating the lesion and margins.  Incisions were then made along these lines to the appropriate tissue plane and the lesion was extirpated.
Complex Repair And Skin Substitute Graft Text: The defect edges were debeveled with a #15 scalpel blade.  The primary defect was closed partially with a complex linear closure.  Given the location of the remaining defect, shape of the defect and the proximity to free margins a skin substitute graft was deemed most appropriate to repair the remaining defect.  The graft was trimmed to fit the size of the remaining defect.  The graft was then placed in the primary defect, oriented appropriately, and sutured into place.
Cheek-To-Nose Interpolation Flap Text: A decision was made to reconstruct the defect utilizing an interpolation axial flap and a staged reconstruction.  A telfa template was made of the defect.  This telfa template was then used to outline the Cheek-To-Nose Interpolation flap.  The donor area for the pedicle flap was then injected with anesthesia.  The flap was excised through the skin and subcutaneous tissue down to the layer of the underlying musculature.  The interpolation flap was carefully excised within this deep plane to maintain its blood supply.  The edges of the donor site were undermined.   The donor site was closed in a primary fashion.  The pedicle was then rotated into position and sutured.  Once the tube was sutured into place, adequate blood supply was confirmed with blanching and refill.  The pedicle was then wrapped with xeroform gauze and dressed appropriately with a telfa and gauze bandage to ensure continued blood supply and protect the attached pedicle.
Lab Facility: 
Bilobed Flap Text: The defect edges were debeveled with a #15 scalpel blade.  Given the location of the defect and the proximity to free margins a bilobe flap was deemed most appropriate.  Using a sterile surgical marker, an appropriate bilobe flap drawn around the defect.    The area thus outlined was incised deep to adipose tissue with a #15 scalpel blade.  The skin margins were undermined to an appropriate distance in all directions utilizing iris scissors.
Deep Sutures: 4-0 PGA
V-Y Flap Text: The defect edges were debeveled with a #15 scalpel blade.  Given the location of the defect, shape of the defect and the proximity to free margins a V-Y flap was deemed most appropriate.  Using a sterile surgical marker, an appropriate advancement flap was drawn incorporating the defect and placing the expected incisions within the relaxed skin tension lines where possible.    The area thus outlined was incised deep to adipose tissue with a #15 scalpel blade.  The skin margins were undermined to an appropriate distance in all directions utilizing iris scissors.

## 2019-07-08 NOTE — PROCEDURE: LIQUID NITROGEN
Duration Of Freeze Thaw-Cycle (Seconds): 5
Number Of Freeze-Thaw Cycles: 1 freeze-thaw cycle
Render Note In Bullet Format When Appropriate: No
Detail Level: Detailed
Post-Care Instructions: I reviewed with the patient in detail post-care instructions. Patient is to wear sunprotection, and avoid picking at any of the treated lesions. Pt may apply Vaseline to crusted or scabbing areas.
Consent: The patient's consent was obtained including but not limited to risks of crusting, scabbing, blistering, scarring, darker or lighter pigmentary change, recurrence, incomplete removal and infection.

## 2019-07-23 ENCOUNTER — APPOINTMENT (RX ONLY)
Dept: URBAN - METROPOLITAN AREA CLINIC 4 | Facility: CLINIC | Age: 84
Setting detail: DERMATOLOGY
End: 2019-07-23

## 2019-07-23 DIAGNOSIS — Z48.02 ENCOUNTER FOR REMOVAL OF SUTURES: ICD-10-CM

## 2019-07-23 PROBLEM — C44.722 SQUAMOUS CELL CARCINOMA OF SKIN OF RIGHT LOWER LIMB, INCLUDING HIP: Status: ACTIVE | Noted: 2019-07-23

## 2019-07-23 PROCEDURE — ? DEFER

## 2019-07-23 PROCEDURE — ? EXCISION

## 2019-07-23 PROCEDURE — 12032 INTMD RPR S/A/T/EXT 2.6-7.5: CPT

## 2019-07-23 PROCEDURE — ? SUTURE REMOVAL (NO GLOBAL PERIOD)

## 2019-07-23 PROCEDURE — 11602 EXC TR-EXT MAL+MARG 1.1-2 CM: CPT

## 2019-07-23 ASSESSMENT — LOCATION DETAILED DESCRIPTION DERM: LOCATION DETAILED: RIGHT MEDIAL POPLITEAL SKIN

## 2019-07-23 ASSESSMENT — LOCATION SIMPLE DESCRIPTION DERM: LOCATION SIMPLE: RIGHT POPLITEAL SKIN

## 2019-07-23 ASSESSMENT — LOCATION ZONE DERM: LOCATION ZONE: LEG

## 2019-07-23 NOTE — PROCEDURE: EXCISION
Second Skin Substitute Units (Will Override Primary Defect Units If Greater Than 0): 0
Complex Repair And Dermal Autograft Text: The defect edges were debeveled with a #15 scalpel blade.  The primary defect was closed partially with a complex linear closure.  Given the location of the defect, shape of the defect and the proximity to free margins an dermal autograft was deemed most appropriate to repair the remaining defect.  The graft was trimmed to fit the size of the remaining defect.  The graft was then placed in the primary defect, oriented appropriately, and sutured into place.
Intermediate Repair Preamble Text (Leave Blank If You Do Not Want): Undermining was performed with blunt dissection.
Melolabial Interpolation Flap Text: A decision was made to reconstruct the defect utilizing an interpolation axial flap and a staged reconstruction.  A telfa template was made of the defect.  This telfa template was then used to outline the melolabial interpolation flap.  The donor area for the pedicle flap was then injected with anesthesia.  The flap was excised through the skin and subcutaneous tissue down to the layer of the underlying musculature.  The pedicle flap was carefully excised within this deep plane to maintain its blood supply.  The edges of the donor site were undermined.   The donor site was closed in a primary fashion.  The pedicle was then rotated into position and sutured.  Once the tube was sutured into place, adequate blood supply was confirmed with blanching and refill.  The pedicle was then wrapped with xeroform gauze and dressed appropriately with a telfa and gauze bandage to ensure continued blood supply and protect the attached pedicle.
Complex Repair And Double Advancement Flap Text: The defect edges were debeveled with a #15 scalpel blade.  The primary defect was closed partially with a complex linear closure.  Given the location of the remaining defect, shape of the defect and the proximity to free margins a double advancement flap was deemed most appropriate for complete closure of the defect.  Using a sterile surgical marker, an appropriate advancement flap was drawn incorporating the defect and placing the expected incisions within the relaxed skin tension lines where possible.    The area thus outlined was incised deep to adipose tissue with a #15 scalpel blade.  The skin margins were undermined to an appropriate distance in all directions utilizing iris scissors.
Biopsy Photograph Reviewed: Yes
Scalpel Size: 15 blade
Mercedes Flap Text: The defect edges were debeveled with a #15 scalpel blade.  Given the location of the defect, shape of the defect and the proximity to free margins a Mercedes flap was deemed most appropriate.  Using a sterile surgical marker, an appropriate advancement flap was drawn incorporating the defect and placing the expected incisions within the relaxed skin tension lines where possible. The area thus outlined was incised deep to adipose tissue with a #15 scalpel blade.  The skin margins were undermined to an appropriate distance in all directions utilizing iris scissors.
Trilobed Flap Text: The defect edges were debeveled with a #15 scalpel blade.  Given the location of the defect and the proximity to free margins a trilobed flap was deemed most appropriate.  Using a sterile surgical marker, an appropriate trilobed flap drawn around the defect.    The area thus outlined was incised deep to adipose tissue with a #15 scalpel blade.  The skin margins were undermined to an appropriate distance in all directions utilizing iris scissors.
Complex Repair Preamble Text (Leave Blank If You Do Not Want): Extensive wide undermining was performed.
Saucerization Excision Additional Text (Leave Blank If You Do Not Want): The margin was drawn around the clinically apparent lesion.  Incisions were then made along these lines, in a tangential fashion, to the appropriate tissue plane and the lesion was extirpated.
Excisional Biopsy Additional Text (Leave Blank If You Do Not Want): The margin was drawn around the clinically apparent lesion. An elliptical shape was then drawn on the skin incorporating the lesion and margins.  Incisions were then made along these lines to the appropriate tissue plane and the lesion was extirpated.
Render The Repair Note As A Separate Paragraph: No
Mucosal Advancement Flap Text: Given the location of the defect, shape of the defect and the proximity to free margins a mucosal advancement flap was deemed most appropriate. Incisions were made with a 15 blade scalpel in the appropriate fashion along the cutaneous vermilion border and the mucosal lip. The remaining actinically damaged mucosal tissue was excised.  The mucosal advancement flap was then elevated to the gingival sulcus with care taken to preserve the neurovascular structures and advanced into the primary defect. Care was taken to ensure that precise realignment of the vermilion border was achieved.
Complex Repair And Modified Advancement Flap Text: The defect edges were debeveled with a #15 scalpel blade.  The primary defect was closed partially with a complex linear closure.  Given the location of the remaining defect, shape of the defect and the proximity to free margins a modified advancement flap was deemed most appropriate for complete closure of the defect.  Using a sterile surgical marker, an appropriate advancement flap was drawn incorporating the defect and placing the expected incisions within the relaxed skin tension lines where possible.    The area thus outlined was incised deep to adipose tissue with a #15 scalpel blade.  The skin margins were undermined to an appropriate distance in all directions utilizing iris scissors.
Complex Repair And Tissue Cultured Epidermal Autograft Text: The defect edges were debeveled with a #15 scalpel blade.  The primary defect was closed partially with a complex linear closure.  Given the location of the defect, shape of the defect and the proximity to free margins an tissue cultured epidermal autograft was deemed most appropriate to repair the remaining defect.  The graft was trimmed to fit the size of the remaining defect.  The graft was then placed in the primary defect, oriented appropriately, and sutured into place.
Dorsal Nasal Flap Text: The defect edges were debeveled with a #15 scalpel blade.  Given the location of the defect and the proximity to free margins a dorsal nasal flap was deemed most appropriate.  Using a sterile surgical marker, an appropriate dorsal nasal flap was drawn around the defect.    The area thus outlined was incised deep to adipose tissue with a #15 scalpel blade.  The skin margins were undermined to an appropriate distance in all directions utilizing iris scissors.
Mastoid Interpolation Flap Text: A decision was made to reconstruct the defect utilizing an interpolation axial flap and a staged reconstruction.  A telfa template was made of the defect.  This telfa template was then used to outline the mastoid interpolation flap.  The donor area for the pedicle flap was then injected with anesthesia.  The flap was excised through the skin and subcutaneous tissue down to the layer of the underlying musculature.  The pedicle flap was carefully excised within this deep plane to maintain its blood supply.  The edges of the donor site were undermined.   The donor site was closed in a primary fashion.  The pedicle was then rotated into position and sutured.  Once the tube was sutured into place, adequate blood supply was confirmed with blanching and refill.  The pedicle was then wrapped with xeroform gauze and dressed appropriately with a telfa and gauze bandage to ensure continued blood supply and protect the attached pedicle.
Slit Excision Additional Text (Leave Blank If You Do Not Want): A linear line was drawn on the skin overlying the lesion. An incision was made slowly until the lesion was visualized.  Once visualized, the lesion was removed with blunt dissection.
Modified Advancement Flap Text: The defect edges were debeveled with a #15 scalpel blade.  Given the location of the defect, shape of the defect and the proximity to free margins a modified advancement flap was deemed most appropriate.  Using a sterile surgical marker, an appropriate advancement flap was drawn incorporating the defect and placing the expected incisions within the relaxed skin tension lines where possible.    The area thus outlined was incised deep to adipose tissue with a #15 scalpel blade.  The skin margins were undermined to an appropriate distance in all directions utilizing iris scissors.
Hatchet Flap Text: The defect edges were debeveled with a #15 scalpel blade.  Given the location of the defect, shape of the defect and the proximity to free margins a hatchet flap was deemed most appropriate.  Using a sterile surgical marker, an appropriate hatchet flap was drawn incorporating the defect and placing the expected incisions within the relaxed skin tension lines where possible.    The area thus outlined was incised deep to adipose tissue with a #15 scalpel blade.  The skin margins were undermined to an appropriate distance in all directions utilizing iris scissors.
Previous Accession (Optional): [id number]
Island Pedicle Flap With Canthal Suspension Text: The defect edges were debeveled with a #15 scalpel blade.  Given the location of the defect, shape of the defect and the proximity to free margins an island pedicle advancement flap was deemed most appropriate.  Using a sterile surgical marker, an appropriate advancement flap was drawn incorporating the defect, outlining the appropriate donor tissue and placing the expected incisions within the relaxed skin tension lines where possible. The area thus outlined was incised deep to adipose tissue with a #15 scalpel blade.  The skin margins were undermined to an appropriate distance in all directions around the primary defect and laterally outward around the island pedicle utilizing iris scissors.  There was minimal undermining beneath the pedicle flap. A suspension suture was placed in the canthal tendon to prevent tension and prevent ectropion.
Perilesional Excision Additional Text (Leave Blank If You Do Not Want): The margin was drawn around the clinically apparent lesion. Incisions were then made along these lines to the appropriate tissue plane and the lesion was extirpated.
Complex Repair And A-T Advancement Flap Text: The defect edges were debeveled with a #15 scalpel blade.  The primary defect was closed partially with a complex linear closure.  Given the location of the remaining defect, shape of the defect and the proximity to free margins an A-T advancement flap was deemed most appropriate for complete closure of the defect.  Using a sterile surgical marker, an appropriate advancement flap was drawn incorporating the defect and placing the expected incisions within the relaxed skin tension lines where possible.    The area thus outlined was incised deep to adipose tissue with a #15 scalpel blade.  The skin margins were undermined to an appropriate distance in all directions utilizing iris scissors.
Complex Repair And Xenograft Text: The defect edges were debeveled with a #15 scalpel blade.  The primary defect was closed partially with a complex linear closure.  Given the location of the defect, shape of the defect and the proximity to free margins a xenograft was deemed most appropriate to repair the remaining defect.  The graft was trimmed to fit the size of the remaining defect.  The graft was then placed in the primary defect, oriented appropriately, and sutured into place.
Island Pedicle Flap Text: The defect edges were debeveled with a #15 scalpel blade.  Given the location of the defect, shape of the defect and the proximity to free margins an island pedicle advancement flap was deemed most appropriate.  Using a sterile surgical marker, an appropriate advancement flap was drawn incorporating the defect, outlining the appropriate donor tissue and placing the expected incisions within the relaxed skin tension lines where possible.    The area thus outlined was incised deep to adipose tissue with a #15 scalpel blade.  The skin margins were undermined to an appropriate distance in all directions around the primary defect and laterally outward around the island pedicle utilizing iris scissors.  There was minimal undermining beneath the pedicle flap.
Posterior Auricular Interpolation Flap Text: A decision was made to reconstruct the defect utilizing an interpolation axial flap and a staged reconstruction.  A telfa template was made of the defect.  This telfa template was then used to outline the posterior auricular interpolation flap.  The donor area for the pedicle flap was then injected with anesthesia.  The flap was excised through the skin and subcutaneous tissue down to the layer of the underlying musculature.  The pedicle flap was carefully excised within this deep plane to maintain its blood supply.  The edges of the donor site were undermined.   The donor site was closed in a primary fashion.  The pedicle was then rotated into position and sutured.  Once the tube was sutured into place, adequate blood supply was confirmed with blanching and refill.  The pedicle was then wrapped with xeroform gauze and dressed appropriately with a telfa and gauze bandage to ensure continued blood supply and protect the attached pedicle.
Alar Island Pedicle Flap Text: The defect edges were debeveled with a #15 scalpel blade.  Given the location of the defect, shape of the defect and the proximity to the alar rim an island pedicle advancement flap was deemed most appropriate.  Using a sterile surgical marker, an appropriate advancement flap was drawn incorporating the defect, outlining the appropriate donor tissue and placing the expected incisions within the nasal ala running parallel to the alar rim. The area thus outlined was incised with a #15 scalpel blade.  The skin margins were undermined minimally to an appropriate distance in all directions around the primary defect and laterally outward around the island pedicle utilizing iris scissors.  There was minimal undermining beneath the pedicle flap.
Lip Wedge Excision Repair Text: Given the location of the defect and the proximity to free margins a full thickness wedge repair was deemed most appropriate.  Using a sterile surgical marker, the appropriate repair was drawn incorporating the defect and placing the expected incisions perpendicular to the vermilion border.  The vermilion border was also meticulously outlined to ensure appropriate reapproximation during the repair.  The area thus outlined was incised through and through with a #15 scalpel blade.  The muscularis and dermis were reaproximated with deep sutures following hemostasis. Care was taken to realign the vermilion border before proceeding with the superficial closure.  Once the vermilion was realigned the superfical and mucosal closure was finished.
Rotation Flap Text: The defect edges were debeveled with a #15 scalpel blade.  Given the location of the defect, shape of the defect and the proximity to free margins a rotation flap was deemed most appropriate.  Using a sterile surgical marker, an appropriate rotation flap was drawn incorporating the defect and placing the expected incisions within the relaxed skin tension lines where possible.    The area thus outlined was incised deep to adipose tissue with a #15 scalpel blade.  The skin margins were undermined to an appropriate distance in all directions utilizing iris scissors.
Complex Repair And Skin Substitute Graft Text: The defect edges were debeveled with a #15 scalpel blade.  The primary defect was closed partially with a complex linear closure.  Given the location of the remaining defect, shape of the defect and the proximity to free margins a skin substitute graft was deemed most appropriate to repair the remaining defect.  The graft was trimmed to fit the size of the remaining defect.  The graft was then placed in the primary defect, oriented appropriately, and sutured into place.
Paramedian Forehead Flap Text: A decision was made to reconstruct the defect utilizing an interpolation axial flap and a staged reconstruction.  A telfa template was made of the defect.  This telfa template was then used to outline the paramedian forehead pedicle flap.  The donor area for the pedicle flap was then injected with anesthesia.  The flap was excised through the skin and subcutaneous tissue down to the layer of the underlying musculature.  The pedicle flap was carefully excised within this deep plane to maintain its blood supply.  The edges of the donor site were undermined.   The donor site was closed in a primary fashion.  The pedicle was then rotated into position and sutured.  Once the tube was sutured into place, adequate blood supply was confirmed with blanching and refill.  The pedicle was then wrapped with xeroform gauze and dressed appropriately with a telfa and gauze bandage to ensure continued blood supply and protect the attached pedicle.
Complex Repair And O-T Advancement Flap Text: The defect edges were debeveled with a #15 scalpel blade.  The primary defect was closed partially with a complex linear closure.  Given the location of the remaining defect, shape of the defect and the proximity to free margins an O-T advancement flap was deemed most appropriate for complete closure of the defect.  Using a sterile surgical marker, an appropriate advancement flap was drawn incorporating the defect and placing the expected incisions within the relaxed skin tension lines where possible.    The area thus outlined was incised deep to adipose tissue with a #15 scalpel blade.  The skin margins were undermined to an appropriate distance in all directions utilizing iris scissors.
Complex Repair And Bilobe Flap Text: The defect edges were debeveled with a #15 scalpel blade.  The primary defect was closed partially with a complex linear closure.  Given the location of the remaining defect, shape of the defect and the proximity to free margins a bilobe flap was deemed most appropriate for complete closure of the defect.  Using a sterile surgical marker, an appropriate advancement flap was drawn incorporating the defect and placing the expected incisions within the relaxed skin tension lines where possible.    The area thus outlined was incised deep to adipose tissue with a #15 scalpel blade.  The skin margins were undermined to an appropriate distance in all directions utilizing iris scissors.
Epidermal Closure: running cuticular
Double Island Pedicle Flap Text: The defect edges were debeveled with a #15 scalpel blade.  Given the location of the defect, shape of the defect and the proximity to free margins a double island pedicle advancement flap was deemed most appropriate.  Using a sterile surgical marker, an appropriate advancement flap was drawn incorporating the defect, outlining the appropriate donor tissue and placing the expected incisions within the relaxed skin tension lines where possible.    The area thus outlined was incised deep to adipose tissue with a #15 scalpel blade.  The skin margins were undermined to an appropriate distance in all directions around the primary defect and laterally outward around the island pedicle utilizing iris scissors.  There was minimal undermining beneath the pedicle flap.
Ftsg Text: The defect edges were debeveled with a #15 scalpel blade.  Given the location of the defect, shape of the defect and the proximity to free margins a full thickness skin graft was deemed most appropriate.  Using a sterile surgical marker, the primary defect shape was transferred to the donor site. The area thus outlined was incised deep to adipose tissue with a #15 scalpel blade.  The harvested graft was then trimmed of adipose tissue until only dermis and epidermis was left.  The skin margins of the secondary defect were undermined to an appropriate distance in all directions utilizing iris scissors.  The secondary defect was closed with interrupted buried subcutaneous sutures.  The skin edges were then re-apposed with running  sutures.  The skin graft was then placed in the primary defect and oriented appropriately.
Repair Performed By Another Provider Text (Leave Blank If You Do Not Want): After the tissue was excised the defect was repaired by another provider.
Path Notes (To The Dermatopathologist): Please check margins.
Suture Removal: 14 days
Spiral Flap Text: The defect edges were debeveled with a #15 scalpel blade.  Given the location of the defect, shape of the defect and the proximity to free margins a spiral flap was deemed most appropriate.  Using a sterile surgical marker, an appropriate rotation flap was drawn incorporating the defect and placing the expected incisions within the relaxed skin tension lines where possible. The area thus outlined was incised deep to adipose tissue with a #15 scalpel blade.  The skin margins were undermined to an appropriate distance in all directions utilizing iris scissors.
Complex Repair And O-L Flap Text: The defect edges were debeveled with a #15 scalpel blade.  The primary defect was closed partially with a complex linear closure.  Given the location of the remaining defect, shape of the defect and the proximity to free margins an O-L flap was deemed most appropriate for complete closure of the defect.  Using a sterile surgical marker, an appropriate flap was drawn incorporating the defect and placing the expected incisions within the relaxed skin tension lines where possible.    The area thus outlined was incised deep to adipose tissue with a #15 scalpel blade.  The skin margins were undermined to an appropriate distance in all directions utilizing iris scissors.
Advancement Flap (Single) Text: The defect edges were debeveled with a #15 scalpel blade.  Given the location of the defect and the proximity to free margins a single advancement flap was deemed most appropriate.  Using a sterile surgical marker, an appropriate advancement flap was drawn incorporating the defect and placing the expected incisions within the relaxed skin tension lines where possible.    The area thus outlined was incised deep to adipose tissue with a #15 scalpel blade.  The skin margins were undermined to an appropriate distance in all directions utilizing iris scissors.
Split-Thickness Skin Graft Text: The defect edges were debeveled with a #15 scalpel blade.  Given the location of the defect, shape of the defect and the proximity to free margins a split thickness skin graft was deemed most appropriate.  Using a sterile surgical marker, the primary defect shape was transferred to the donor site. The split thickness graft was then harvested.  The skin graft was then placed in the primary defect and oriented appropriately.
Complex Repair And Melolabial Flap Text: The defect edges were debeveled with a #15 scalpel blade.  The primary defect was closed partially with a complex linear closure.  Given the location of the remaining defect, shape of the defect and the proximity to free margins a melolabial flap was deemed most appropriate for complete closure of the defect.  Using a sterile surgical marker, an appropriate advancement flap was drawn incorporating the defect and placing the expected incisions within the relaxed skin tension lines where possible.    The area thus outlined was incised deep to adipose tissue with a #15 scalpel blade.  The skin margins were undermined to an appropriate distance in all directions utilizing iris scissors.
Epidermal Closure Graft Donor Site (Optional): simple interrupted
Star Wedge Flap Text: The defect edges were debeveled with a #15 scalpel blade.  Given the location of the defect, shape of the defect and the proximity to free margins a star wedge flap was deemed most appropriate.  Using a sterile surgical marker, an appropriate rotation flap was drawn incorporating the defect and placing the expected incisions within the relaxed skin tension lines where possible. The area thus outlined was incised deep to adipose tissue with a #15 scalpel blade.  The skin margins were undermined to an appropriate distance in all directions utilizing iris scissors.
Anesthesia Type: 0.5% lidocaine with 1:200,000 epinephrine and a 1:10 solution of 8.4% sodium bicarbonate
Island Pedicle Flap-Requiring Vessel Identification Text: The defect edges were debeveled with a #15 scalpel blade.  Given the location of the defect, shape of the defect and the proximity to free margins an island pedicle advancement flap was deemed most appropriate.  Using a sterile surgical marker, an appropriate advancement flap was drawn, based on the axial vessel mentioned above, incorporating the defect, outlining the appropriate donor tissue and placing the expected incisions within the relaxed skin tension lines where possible.    The area thus outlined was incised deep to adipose tissue with a #15 scalpel blade.  The skin margins were undermined to an appropriate distance in all directions around the primary defect and laterally outward around the island pedicle utilizing iris scissors.  There was minimal undermining beneath the pedicle flap.
No Repair - Repaired With Adjacent Surgical Defect Text (Leave Blank If You Do Not Want): After the excision the defect was repaired concurrently with another surgical defect which was in close approximation.
Advancement Flap (Double) Text: The defect edges were debeveled with a #15 scalpel blade.  Given the location of the defect and the proximity to free margins a double advancement flap was deemed most appropriate.  Using a sterile surgical marker, the appropriate advancement flaps were drawn incorporating the defect and placing the expected incisions within the relaxed skin tension lines where possible.    The area thus outlined was incised deep to adipose tissue with a #15 scalpel blade.  The skin margins were undermined to an appropriate distance in all directions utilizing iris scissors.
Anesthesia Volume In Cc: 9
Muscle Hinge Flap Text: The defect edges were debeveled with a #15 scalpel blade.  Given the size, depth and location of the defect and the proximity to free margins a muscle hinge flap was deemed most appropriate.  Using a sterile surgical marker, an appropriate hinge flap was drawn incorporating the defect. The area thus outlined was incised with a #15 scalpel blade.  The skin margins were undermined to an appropriate distance in all directions utilizing iris scissors.
Consent was obtained from the patient. The risks and benefits to therapy were discussed in detail. Specifically, the risks of infection, scarring, bleeding, prolonged wound healing, incomplete removal, allergy to anesthesia, nerve injury and recurrence were addressed. Prior to the procedure, the treatment site was clearly identified and confirmed by the patient. All components of Universal Protocol/PAUSE Rule completed.
Cartilage Graft Text: The defect edges were debeveled with a #15 scalpel blade.  Given the location of the defect, shape of the defect, the fact the defect involved a full thickness cartilage defect a cartilage graft was deemed most appropriate.  An appropriate donor site was identified, cleansed, and anesthetized. The cartilage graft was then harvested and transferred to the recipient site, oriented appropriately and then sutured into place.  The secondary defect was then repaired using a primary closure.
Complex Repair And Rotation Flap Text: The defect edges were debeveled with a #15 scalpel blade.  The primary defect was closed partially with a complex linear closure.  Given the location of the remaining defect, shape of the defect and the proximity to free margins a rotation flap was deemed most appropriate for complete closure of the defect.  Using a sterile surgical marker, an appropriate advancement flap was drawn incorporating the defect and placing the expected incisions within the relaxed skin tension lines where possible.    The area thus outlined was incised deep to adipose tissue with a #15 scalpel blade.  The skin margins were undermined to an appropriate distance in all directions utilizing iris scissors.
Transposition Flap Text: The defect edges were debeveled with a #15 scalpel blade.  Given the location of the defect and the proximity to free margins a transposition flap was deemed most appropriate.  Using a sterile surgical marker, an appropriate transposition flap was drawn incorporating the defect.    The area thus outlined was incised deep to adipose tissue with a #15 scalpel blade.  The skin margins were undermined to an appropriate distance in all directions utilizing iris scissors.
Keystone Flap Text: The defect edges were debeveled with a #15 scalpel blade.  Given the location of the defect, shape of the defect a keystone flap was deemed most appropriate.  Using a sterile surgical marker, an appropriate keystone flap was drawn incorporating the defect, outlining the appropriate donor tissue and placing the expected incisions within the relaxed skin tension lines where possible. The area thus outlined was incised deep to adipose tissue with a #15 scalpel blade.  The skin margins were undermined to an appropriate distance in all directions around the primary defect and laterally outward around the flap utilizing iris scissors.
Size Of Lesion In Cm: 0.8
Melolabial Transposition Flap Text: The defect edges were debeveled with a #15 scalpel blade.  Given the location of the defect and the proximity to free margins a melolabial flap was deemed most appropriate.  Using a sterile surgical marker, an appropriate melolabial transposition flap was drawn incorporating the defect.    The area thus outlined was incised deep to adipose tissue with a #15 scalpel blade.  The skin margins were undermined to an appropriate distance in all directions utilizing iris scissors.
Size Of Margin In Cm: 0.3
O-Z Plasty Text: The defect edges were debeveled with a #15 scalpel blade.  Given the location of the defect, shape of the defect and the proximity to free margins an O-Z plasty (double transposition flap) was deemed most appropriate.  Using a sterile surgical marker, the appropriate transposition flaps were drawn incorporating the defect and placing the expected incisions within the relaxed skin tension lines where possible.    The area thus outlined was incised deep to adipose tissue with a #15 scalpel blade.  The skin margins were undermined to an appropriate distance in all directions utilizing iris scissors.  Hemostasis was achieved with electrocautery.  The flaps were then transposed into place, one clockwise and the other counterclockwise, and anchored with interrupted buried subcutaneous sutures.
Burow's Advancement Flap Text: The defect edges were debeveled with a #15 scalpel blade.  Given the location of the defect and the proximity to free margins a Burow's advancement flap was deemed most appropriate.  Using a sterile surgical marker, the appropriate advancement flap was drawn incorporating the defect and placing the expected incisions within the relaxed skin tension lines where possible.    The area thus outlined was incised deep to adipose tissue with a #15 scalpel blade.  The skin margins were undermined to an appropriate distance in all directions utilizing iris scissors.
Complex Repair And Rhombic Flap Text: The defect edges were debeveled with a #15 scalpel blade.  The primary defect was closed partially with a complex linear closure.  Given the location of the remaining defect, shape of the defect and the proximity to free margins a rhombic flap was deemed most appropriate for complete closure of the defect.  Using a sterile surgical marker, an appropriate advancement flap was drawn incorporating the defect and placing the expected incisions within the relaxed skin tension lines where possible.    The area thus outlined was incised deep to adipose tissue with a #15 scalpel blade.  The skin margins were undermined to an appropriate distance in all directions utilizing iris scissors.
O-T Plasty Text: The defect edges were debeveled with a #15 scalpel blade.  Given the location of the defect, shape of the defect and the proximity to free margins an O-T plasty was deemed most appropriate.  Using a sterile surgical marker, an appropriate O-T plasty was drawn incorporating the defect and placing the expected incisions within the relaxed skin tension lines where possible.    The area thus outlined was incised deep to adipose tissue with a #15 scalpel blade.  The skin margins were undermined to an appropriate distance in all directions utilizing iris scissors.
Composite Graft Text: The defect edges were debeveled with a #15 scalpel blade.  Given the location of the defect, shape of the defect, the proximity to free margins and the fact the defect was full thickness a composite graft was deemed most appropriate.  The defect was outline and then transferred to the donor site.  A full thickness graft was then excised from the donor site. The graft was then placed in the primary defect, oriented appropriately and then sutured into place.  The secondary defect was then repaired using a primary closure.
Dermal Autograft Text: The defect edges were debeveled with a #15 scalpel blade.  Given the location of the defect, shape of the defect and the proximity to free margins a dermal autograft was deemed most appropriate.  Using a sterile surgical marker, the primary defect shape was transferred to the donor site. The area thus outlined was incised deep to adipose tissue with a #15 scalpel blade.  The harvested graft was then trimmed of adipose and epidermal tissue until only dermis was left.  The skin graft was then placed in the primary defect and oriented appropriately.
Complex Repair And V-Y Plasty Text: The defect edges were debeveled with a #15 scalpel blade.  The primary defect was closed partially with a complex linear closure.  Given the location of the remaining defect, shape of the defect and the proximity to free margins a V-Y plasty was deemed most appropriate for complete closure of the defect.  Using a sterile surgical marker, an appropriate advancement flap was drawn incorporating the defect and placing the expected incisions within the relaxed skin tension lines where possible.    The area thus outlined was incised deep to adipose tissue with a #15 scalpel blade.  The skin margins were undermined to an appropriate distance in all directions utilizing iris scissors.
Excision Method: Fusiform
Rhombic Flap Text: The defect edges were debeveled with a #15 scalpel blade.  Given the location of the defect and the proximity to free margins a rhombic flap was deemed most appropriate.  Using a sterile surgical marker, an appropriate rhombic flap was drawn incorporating the defect.    The area thus outlined was incised deep to adipose tissue with a #15 scalpel blade.  The skin margins were undermined to an appropriate distance in all directions utilizing iris scissors.
Double O-Z Plasty Text: The defect edges were debeveled with a #15 scalpel blade.  Given the location of the defect, shape of the defect and the proximity to free margins a Double O-Z plasty (double transposition flap) was deemed most appropriate.  Using a sterile surgical marker, the appropriate transposition flaps were drawn incorporating the defect and placing the expected incisions within the relaxed skin tension lines where possible. The area thus outlined was incised deep to adipose tissue with a #15 scalpel blade.  The skin margins were undermined to an appropriate distance in all directions utilizing iris scissors.  Hemostasis was achieved with electrocautery.  The flaps were then transposed into place, one clockwise and the other counterclockwise, and anchored with interrupted buried subcutaneous sutures.
Wound Care: Vaseline
Chonodrocutaneous Helical Advancement Flap Text: The defect edges were debeveled with a #15 scalpel blade.  Given the location of the defect and the proximity to free margins a chondrocutaneous helical advancement flap was deemed most appropriate.  Using a sterile surgical marker, the appropriate advancement flap was drawn incorporating the defect and placing the expected incisions within the relaxed skin tension lines where possible.    The area thus outlined was incised deep to adipose tissue with a #15 scalpel blade.  The skin margins were undermined to an appropriate distance in all directions utilizing iris scissors.
Epidermal Autograft Text: The defect edges were debeveled with a #15 scalpel blade.  Given the location of the defect, shape of the defect and the proximity to free margins an epidermal autograft was deemed most appropriate.  Using a sterile surgical marker, the primary defect shape was transferred to the donor site. The epidermal graft was then harvested.  The skin graft was then placed in the primary defect and oriented appropriately.
Complex Repair And Transposition Flap Text: The defect edges were debeveled with a #15 scalpel blade.  The primary defect was closed partially with a complex linear closure.  Given the location of the remaining defect, shape of the defect and the proximity to free margins a transposition flap was deemed most appropriate for complete closure of the defect.  Using a sterile surgical marker, an appropriate advancement flap was drawn incorporating the defect and placing the expected incisions within the relaxed skin tension lines where possible.    The area thus outlined was incised deep to adipose tissue with a #15 scalpel blade.  The skin margins were undermined to an appropriate distance in all directions utilizing iris scissors.
S Plasty Text: Given the location and shape of the defect, and the orientation of relaxed skin tension lines, an S-plasty was deemed most appropriate for repair.  Using a sterile surgical marker, the appropriate outline of the S-plasty was drawn, incorporating the defect and placing the expected incisions within the relaxed skin tension lines where possible.  The area thus outlined was incised deep to adipose tissue with a #15 scalpel blade.  The skin margins were undermined to an appropriate distance in all directions utilizing iris scissors. The skin flaps were advanced over the defect.  The opposing margins were then approximated with interrupted buried subcutaneous sutures.
Information: Selecting Yes will display possible errors in your note based on the variables you have selected. This validation is only offered as a suggestion for you. PLEASE NOTE THAT THE VALIDATION TEXT WILL BE REMOVED WHEN YOU FINALIZE YOUR NOTE. IF YOU WANT TO FAX A PRELIMINARY NOTE YOU WILL NEED TO TOGGLE THIS TO 'NO' IF YOU DO NOT WANT IT IN YOUR FAXED NOTE.
Dressing: pressure dressing
Curvilinear Excision Additional Text (Leave Blank If You Do Not Want): The margin was drawn around the clinically apparent lesion.  A curvilinear shape was then drawn on the skin incorporating the lesion and margins.  Incisions were then made along these lines to the appropriate tissue plane and the lesion was extirpated.
Crescentic Advancement Flap Text: The defect edges were debeveled with a #15 scalpel blade.  Given the location of the defect and the proximity to free margins a crescentic advancement flap was deemed most appropriate.  Using a sterile surgical marker, the appropriate advancement flap was drawn incorporating the defect and placing the expected incisions within the relaxed skin tension lines where possible.    The area thus outlined was incised deep to adipose tissue with a #15 scalpel blade.  The skin margins were undermined to an appropriate distance in all directions utilizing iris scissors.
Deep Sutures: 4-0 Polysorb
Billing Type: Third-Party Bill
Rhomboid Transposition Flap Text: The defect edges were debeveled with a #15 scalpel blade.  Given the location of the defect and the proximity to free margins a rhomboid transposition flap was deemed most appropriate.  Using a sterile surgical marker, an appropriate rhomboid flap was drawn incorporating the defect.    The area thus outlined was incised deep to adipose tissue with a #15 scalpel blade.  The skin margins were undermined to an appropriate distance in all directions utilizing iris scissors.
Post-Care Instructions: I reviewed with the patient in detail post-care instructions. Patient is not to engage in any heavy lifting, exercise, or swimming for the next 14 days. Should the patient develop any fevers, chills, bleeding, severe pain patient will contact the office immediately.
V-Y Plasty Text: The defect edges were debeveled with a #15 scalpel blade.  Given the location of the defect, shape of the defect and the proximity to free margins an V-Y advancement flap was deemed most appropriate.  Using a sterile surgical marker, an appropriate advancement flap was drawn incorporating the defect and placing the expected incisions within the relaxed skin tension lines where possible.    The area thus outlined was incised deep to adipose tissue with a #15 scalpel blade.  The skin margins were undermined to an appropriate distance in all directions utilizing iris scissors.
Tissue Cultured Epidermal Autograft Text: The defect edges were debeveled with a #15 scalpel blade.  Given the location of the defect, shape of the defect and the proximity to free margins a tissue cultured epidermal autograft was deemed most appropriate.  The graft was then trimmed to fit the size of the defect.  The graft was then placed in the primary defect and oriented appropriately.
Hemostasis: Electrocautery
Bi-Rhombic Flap Text: The defect edges were debeveled with a #15 scalpel blade.  Given the location of the defect and the proximity to free margins a bi-rhombic flap was deemed most appropriate.  Using a sterile surgical marker, an appropriate rhombic flap was drawn incorporating the defect. The area thus outlined was incised deep to adipose tissue with a #15 scalpel blade.  The skin margins were undermined to an appropriate distance in all directions utilizing iris scissors.
Anesthesia Volume In Cc: 6
Fusiform Excision Additional Text (Leave Blank If You Do Not Want): The margin was drawn around the clinically apparent lesion.  A fusiform shape was then drawn on the skin incorporating the lesion and margins.  Incisions were then made along these lines to the appropriate tissue plane and the lesion was extirpated.
Skin Substitute Text: The defect edges were debeveled with a #15 scalpel blade.  Given the location of the defect, shape of the defect and the proximity to free margins a skin substitute graft was deemed most appropriate.  The graft material was trimmed to fit the size of the defect. The graft was then placed in the primary defect and oriented appropriately.
Complex Repair And M Plasty Text: The defect edges were debeveled with a #15 scalpel blade.  The primary defect was closed partially with a complex linear closure.  Given the location of the remaining defect, shape of the defect and the proximity to free margins an M plasty was deemed most appropriate for complete closure of the defect.  Using a sterile surgical marker, an appropriate advancement flap was drawn incorporating the defect and placing the expected incisions within the relaxed skin tension lines where possible.    The area thus outlined was incised deep to adipose tissue with a #15 scalpel blade.  The skin margins were undermined to an appropriate distance in all directions utilizing iris scissors.
A-T Advancement Flap Text: The defect edges were debeveled with a #15 scalpel blade.  Given the location of the defect, shape of the defect and the proximity to free margins an A-T advancement flap was deemed most appropriate.  Using a sterile surgical marker, an appropriate advancement flap was drawn incorporating the defect and placing the expected incisions within the relaxed skin tension lines where possible.    The area thus outlined was incised deep to adipose tissue with a #15 scalpel blade.  The skin margins were undermined to an appropriate distance in all directions utilizing iris scissors.
Complex Repair And W Plasty Text: The defect edges were debeveled with a #15 scalpel blade.  The primary defect was closed partially with a complex linear closure.  Given the location of the remaining defect, shape of the defect and the proximity to free margins a W plasty was deemed most appropriate for complete closure of the defect.  Using a sterile surgical marker, an appropriate advancement flap was drawn incorporating the defect and placing the expected incisions within the relaxed skin tension lines where possible.    The area thus outlined was incised deep to adipose tissue with a #15 scalpel blade.  The skin margins were undermined to an appropriate distance in all directions utilizing iris scissors.
H Plasty Text: Given the location of the defect, shape of the defect and the proximity to free margins a H-plasty was deemed most appropriate for repair.  Using a sterile surgical marker, the appropriate advancement arms of the H-plasty were drawn incorporating the defect and placing the expected incisions within the relaxed skin tension lines where possible. The area thus outlined was incised deep to adipose tissue with a #15 scalpel blade. The skin margins were undermined to an appropriate distance in all directions utilizing iris scissors.  The opposing advancement arms were then advanced into place in opposite direction and anchored with interrupted buried subcutaneous sutures.
Xenograft Text: The defect edges were debeveled with a #15 scalpel blade.  Given the location of the defect, shape of the defect and the proximity to free margins a xenograft was deemed most appropriate.  The graft was then trimmed to fit the size of the defect.  The graft was then placed in the primary defect and oriented appropriately.
O-T Advancement Flap Text: The defect edges were debeveled with a #15 scalpel blade.  Given the location of the defect, shape of the defect and the proximity to free margins an O-T advancement flap was deemed most appropriate.  Using a sterile surgical marker, an appropriate advancement flap was drawn incorporating the defect and placing the expected incisions within the relaxed skin tension lines where possible.    The area thus outlined was incised deep to adipose tissue with a #15 scalpel blade.  The skin margins were undermined to an appropriate distance in all directions utilizing iris scissors.
Helical Rim Advancement Flap Text: The defect edges were debeveled with a #15 blade scalpel.  Given the location of the defect and the proximity to free margins (helical rim) a double helical rim advancement flap was deemed most appropriate.  Using a sterile surgical marker, the appropriate advancement flaps were drawn incorporating the defect and placing the expected incisions between the helical rim and antihelix where possible.  The area thus outlined was incised through and through with a #15 scalpel blade.  With a skin hook and iris scissors, the flaps were gently and sharply undermined and freed up.
Repair Type: Intermediate
Complex Repair And Double M Plasty Text: The defect edges were debeveled with a #15 scalpel blade.  The primary defect was closed partially with a complex linear closure.  Given the location of the remaining defect, shape of the defect and the proximity to free margins a double M plasty was deemed most appropriate for complete closure of the defect.  Using a sterile surgical marker, an appropriate advancement flap was drawn incorporating the defect and placing the expected incisions within the relaxed skin tension lines where possible.    The area thus outlined was incised deep to adipose tissue with a #15 scalpel blade.  The skin margins were undermined to an appropriate distance in all directions utilizing iris scissors.
Elliptical Excision Additional Text (Leave Blank If You Do Not Want): The margin was drawn around the clinically apparent lesion.  An elliptical shape was then drawn on the skin incorporating the lesion and margins.  Incisions were then made along these lines to the appropriate tissue plane and the lesion was extirpated.
Lab: [id number]
Intermediate / Complex Repair - Final Wound Length In Cm: 3.3
Estimated Blood Loss (Cc): minimal
Purse String (Intermediate) Text: Given the location of the defect and the characteristics of the surrounding skin a purse string intermediate closure was deemed most appropriate.  Undermining was performed circumfirentially around the surgical defect.  A purse string suture was then placed and tightened.
Number Of Deep Sutures (Optional): 2
Complex Repair And Z Plasty Text: The defect edges were debeveled with a #15 scalpel blade.  The primary defect was closed partially with a complex linear closure.  Given the location of the remaining defect, shape of the defect and the proximity to free margins a Z plasty was deemed most appropriate for complete closure of the defect.  Using a sterile surgical marker, an appropriate advancement flap was drawn incorporating the defect and placing the expected incisions within the relaxed skin tension lines where possible.    The area thus outlined was incised deep to adipose tissue with a #15 scalpel blade.  The skin margins were undermined to an appropriate distance in all directions utilizing iris scissors.
Bilateral Helical Rim Advancement Flap Text: The defect edges were debeveled with a #15 blade scalpel.  Given the location of the defect and the proximity to free margins (helical rim) a bilateral helical rim advancement flap was deemed most appropriate.  Using a sterile surgical marker, the appropriate advancement flaps were drawn incorporating the defect and placing the expected incisions between the helical rim and antihelix where possible.  The area thus outlined was incised through and through with a #15 scalpel blade.  With a skin hook and iris scissors, the flaps were gently and sharply undermined and freed up.
W Plasty Text: The lesion was extirpated to the level of the fat with a #15 scalpel blade.  Given the location of the defect, shape of the defect and the proximity to free margins a W-plasty was deemed most appropriate for repair.  Using a sterile surgical marker, the appropriate transposition arms of the W-plasty were drawn incorporating the defect and placing the expected incisions within the relaxed skin tension lines where possible.    The area thus outlined was incised deep to adipose tissue with a #15 scalpel blade.  The skin margins were undermined to an appropriate distance in all directions utilizing iris scissors.  The opposing transposition arms were then transposed into place in opposite direction and anchored with interrupted buried subcutaneous sutures.
O-L Flap Text: The defect edges were debeveled with a #15 scalpel blade.  Given the location of the defect, shape of the defect and the proximity to free margins an O-L flap was deemed most appropriate.  Using a sterile surgical marker, an appropriate advancement flap was drawn incorporating the defect and placing the expected incisions within the relaxed skin tension lines where possible.    The area thus outlined was incised deep to adipose tissue with a #15 scalpel blade.  The skin margins were undermined to an appropriate distance in all directions utilizing iris scissors.
Double O-Z Flap Text: The defect edges were debeveled with a #15 scalpel blade.  Given the location of the defect, shape of the defect and the proximity to free margins a Double O-Z flap was deemed most appropriate.  Using a sterile surgical marker, an appropriate transposition flap was drawn incorporating the defect and placing the expected incisions within the relaxed skin tension lines where possible. The area thus outlined was incised deep to adipose tissue with a #15 scalpel blade.  The skin margins were undermined to an appropriate distance in all directions utilizing iris scissors.
Banner Transposition Flap Text: The defect edges were debeveled with a #15 scalpel blade.  Given the location of the defect and the proximity to free margins a Banner transposition flap was deemed most appropriate.  Using a sterile surgical marker, an appropriate flap drawn around the defect. The area thus outlined was incised deep to adipose tissue with a #15 scalpel blade.  The skin margins were undermined to an appropriate distance in all directions utilizing iris scissors.
Lab Facility: 
Complex Repair And Dorsal Nasal Flap Text: The defect edges were debeveled with a #15 scalpel blade.  The primary defect was closed partially with a complex linear closure.  Given the location of the remaining defect, shape of the defect and the proximity to free margins a dorsal nasal flap was deemed most appropriate for complete closure of the defect.  Using a sterile surgical marker, an appropriate flap was drawn incorporating the defect and placing the expected incisions within the relaxed skin tension lines where possible.    The area thus outlined was incised deep to adipose tissue with a #15 scalpel blade.  The skin margins were undermined to an appropriate distance in all directions utilizing iris scissors.
Z Plasty Text: The lesion was extirpated to the level of the fat with a #15 scalpel blade.  Given the location of the defect, shape of the defect and the proximity to free margins a Z-plasty was deemed most appropriate for repair.  Using a sterile surgical marker, the appropriate transposition arms of the Z-plasty were drawn incorporating the defect and placing the expected incisions within the relaxed skin tension lines where possible.    The area thus outlined was incised deep to adipose tissue with a #15 scalpel blade.  The skin margins were undermined to an appropriate distance in all directions utilizing iris scissors.  The opposing transposition arms were then transposed into place in opposite direction and anchored with interrupted buried subcutaneous sutures.
Purse String (Simple) Text: Given the location of the defect and the characteristics of the surrounding skin a purse string simple closure was deemed most appropriate.  Undermining was performed circumferentially around the surgical defect.  A purse string suture was then placed and tightened.
O-Z Flap Text: The defect edges were debeveled with a #15 scalpel blade.  Given the location of the defect, shape of the defect and the proximity to free margins an O-Z flap was deemed most appropriate.  Using a sterile surgical marker, an appropriate transposition flap was drawn incorporating the defect and placing the expected incisions within the relaxed skin tension lines where possible. The area thus outlined was incised deep to adipose tissue with a #15 scalpel blade.  The skin margins were undermined to an appropriate distance in all directions utilizing iris scissors.
Ear Star Wedge Flap Text: The defect edges were debeveled with a #15 blade scalpel.  Given the location of the defect and the proximity to free margins (helical rim) an ear star wedge flap was deemed most appropriate.  Using a sterile surgical marker, the appropriate flap was drawn incorporating the defect and placing the expected incisions between the helical rim and antihelix where possible.  The area thus outlined was incised through and through with a #15 scalpel blade.
Bilobed Flap Text: The defect edges were debeveled with a #15 scalpel blade.  Given the location of the defect and the proximity to free margins a bilobe flap was deemed most appropriate.  Using a sterile surgical marker, an appropriate bilobe flap drawn around the defect.    The area thus outlined was incised deep to adipose tissue with a #15 scalpel blade.  The skin margins were undermined to an appropriate distance in all directions utilizing iris scissors.
Cheek-To-Nose Interpolation Flap Text: A decision was made to reconstruct the defect utilizing an interpolation axial flap and a staged reconstruction.  A telfa template was made of the defect.  This telfa template was then used to outline the Cheek-To-Nose Interpolation flap.  The donor area for the pedicle flap was then injected with anesthesia.  The flap was excised through the skin and subcutaneous tissue down to the layer of the underlying musculature.  The interpolation flap was carefully excised within this deep plane to maintain its blood supply.  The edges of the donor site were undermined.   The donor site was closed in a primary fashion.  The pedicle was then rotated into position and sutured.  Once the tube was sutured into place, adequate blood supply was confirmed with blanching and refill.  The pedicle was then wrapped with xeroform gauze and dressed appropriately with a telfa and gauze bandage to ensure continued blood supply and protect the attached pedicle.
V-Y Flap Text: The defect edges were debeveled with a #15 scalpel blade.  Given the location of the defect, shape of the defect and the proximity to free margins a V-Y flap was deemed most appropriate.  Using a sterile surgical marker, an appropriate advancement flap was drawn incorporating the defect and placing the expected incisions within the relaxed skin tension lines where possible.    The area thus outlined was incised deep to adipose tissue with a #15 scalpel blade.  The skin margins were undermined to an appropriate distance in all directions utilizing iris scissors.
Complex Repair And Ftsg Text: The defect edges were debeveled with a #15 scalpel blade.  The primary defect was closed partially with a complex linear closure.  Given the location of the defect, shape of the defect and the proximity to free margins a full thickness skin graft was deemed most appropriate to repair the remaining defect.  The graft was trimmed to fit the size of the remaining defect.  The graft was then placed in the primary defect, oriented appropriately, and sutured into place.
Positioning (Leave Blank If You Do Not Want): The patient was placed in a comfortable position exposing the surgical site.
Cheek Interpolation Flap Text: A decision was made to reconstruct the defect utilizing an interpolation axial flap and a staged reconstruction.  A telfa template was made of the defect.  This telfa template was then used to outline the Cheek Interpolation flap.  The donor area for the pedicle flap was then injected with anesthesia.  The flap was excised through the skin and subcutaneous tissue down to the layer of the underlying musculature.  The interpolation flap was carefully excised within this deep plane to maintain its blood supply.  The edges of the donor site were undermined.   The donor site was closed in a primary fashion.  The pedicle was then rotated into position and sutured.  Once the tube was sutured into place, adequate blood supply was confirmed with blanching and refill.  The pedicle was then wrapped with xeroform gauze and dressed appropriately with a telfa and gauze bandage to ensure continued blood supply and protect the attached pedicle.
Excision Depth: adipose tissue
Graft Donor Site Bandage (Optional-Leave Blank If You Don't Want In Note): Steri-strips and a pressure bandage were applied to the donor site.
Partial Purse String (Intermediate) Text: Given the location of the defect and the characteristics of the surrounding skin an intermediate purse string closure was deemed most appropriate.  Undermining was performed circumferentially around the surgical defect.  A purse string suture was then placed and tightened. Wound tension of the circular defect prevented complete closure of the wound.
Complex Repair And Single Advancement Flap Text: The defect edges were debeveled with a #15 scalpel blade.  The primary defect was closed partially with a complex linear closure.  Given the location of the remaining defect, shape of the defect and the proximity to free margins a single advancement flap was deemed most appropriate for complete closure of the defect.  Using a sterile surgical marker, an appropriate advancement flap was drawn incorporating the defect and placing the expected incisions within the relaxed skin tension lines where possible.    The area thus outlined was incised deep to adipose tissue with a #15 scalpel blade.  The skin margins were undermined to an appropriate distance in all directions utilizing iris scissors.
Detail Level: Detailed
Complex Repair And Epidermal Autograft Text: The defect edges were debeveled with a #15 scalpel blade.  The primary defect was closed partially with a complex linear closure.  Given the location of the defect, shape of the defect and the proximity to free margins an epidermal autograft was deemed most appropriate to repair the remaining defect.  The graft was trimmed to fit the size of the remaining defect.  The graft was then placed in the primary defect, oriented appropriately, and sutured into place.
Epidermal Sutures: 4-0 Nylon
Bilobed Transposition Flap Text: The defect edges were debeveled with a #15 scalpel blade.  Given the location of the defect and the proximity to free margins a bilobed transposition flap was deemed most appropriate.  Using a sterile surgical marker, an appropriate bilobe flap drawn around the defect.    The area thus outlined was incised deep to adipose tissue with a #15 scalpel blade.  The skin margins were undermined to an appropriate distance in all directions utilizing iris scissors.
Surgeon Performing The Repair (Optional): Nika
Interpolation Flap Text: A decision was made to reconstruct the defect utilizing an interpolation axial flap and a staged reconstruction.  A telfa template was made of the defect.  This telfa template was then used to outline the interpolation flap.  The donor area for the pedicle flap was then injected with anesthesia.  The flap was excised through the skin and subcutaneous tissue down to the layer of the underlying musculature.  The interpolation flap was carefully excised within this deep plane to maintain its blood supply.  The edges of the donor site were undermined.   The donor site was closed in a primary fashion.  The pedicle was then rotated into position and sutured.  Once the tube was sutured into place, adequate blood supply was confirmed with blanching and refill.  The pedicle was then wrapped with xeroform gauze and dressed appropriately with a telfa and gauze bandage to ensure continued blood supply and protect the attached pedicle.
Pre-Excision Curettage Text (Leave Blank If You Do Not Want): Prior to drawing the surgical margin the visible lesion was removed with electrodesiccation and curettage to clearly define the lesion size.
Advancement-Rotation Flap Text: The defect edges were debeveled with a #15 scalpel blade.  Given the location of the defect, shape of the defect and the proximity to free margins an advancement-rotation flap was deemed most appropriate.  Using a sterile surgical marker, an appropriate flap was drawn incorporating the defect and placing the expected incisions within the relaxed skin tension lines where possible. The area thus outlined was incised deep to adipose tissue with a #15 scalpel blade.  The skin margins were undermined to an appropriate distance in all directions utilizing iris scissors.
Partial Purse String (Simple) Text: Given the location of the defect and the characteristics of the surrounding skin a simple purse string closure was deemed most appropriate.  Undermining was performed circumferentially around the surgical defect.  A purse string suture was then placed and tightened. Wound tension of the circular defect prevented complete closure of the wound.
Complex Repair And Split-Thickness Skin Graft Text: The defect edges were debeveled with a #15 scalpel blade.  The primary defect was closed partially with a complex linear closure.  Given the location of the defect, shape of the defect and the proximity to free margins a split thickness skin graft was deemed most appropriate to repair the remaining defect.  The graft was trimmed to fit the size of the remaining defect.  The graft was then placed in the primary defect, oriented appropriately, and sutured into place.

## 2019-07-23 NOTE — PROCEDURE: DEFER
Detail Level: Detailed
Procedure To Be Performed At Next Visit: Mohs surgery
Introduction Text (Please End With A Colon): The following procedure was deferred:
Instructions (Optional): MOHS scheduled with Dr. Payne for 8/5/19. \\nS/R can be done at this time.

## 2019-08-05 ENCOUNTER — APPOINTMENT (RX ONLY)
Dept: URBAN - METROPOLITAN AREA CLINIC 36 | Facility: CLINIC | Age: 84
Setting detail: DERMATOLOGY
End: 2019-08-05

## 2019-08-05 DIAGNOSIS — Z48.02 ENCOUNTER FOR REMOVAL OF SUTURES: ICD-10-CM

## 2019-08-05 PROBLEM — D48.5 NEOPLASM OF UNCERTAIN BEHAVIOR OF SKIN: Status: ACTIVE | Noted: 2019-08-05

## 2019-08-05 PROBLEM — C44.722 SQUAMOUS CELL CARCINOMA OF SKIN OF RIGHT LOWER LIMB, INCLUDING HIP: Status: ACTIVE | Noted: 2019-08-05

## 2019-08-05 PROCEDURE — ? MOHS SURGERY

## 2019-08-05 PROCEDURE — ? SUTURE REMOVAL (GLOBAL PERIOD)

## 2019-08-05 PROCEDURE — ? BIOPSY BY SHAVE METHOD

## 2019-08-05 PROCEDURE — ? ADDITIONAL NOTES

## 2019-08-05 PROCEDURE — 11102 TANGNTL BX SKIN SINGLE LES: CPT | Mod: 59

## 2019-08-05 PROCEDURE — 17313 MOHS 1 STAGE T/A/L: CPT

## 2019-08-05 PROCEDURE — 11103 TANGNTL BX SKIN EA SEP/ADDL: CPT

## 2019-08-05 PROCEDURE — 13121 CMPLX RPR S/A/L 2.6-7.5 CM: CPT | Mod: 59

## 2019-08-05 PROCEDURE — 17314 MOHS ADDL STAGE T/A/L: CPT

## 2019-08-05 PROCEDURE — 99024 POSTOP FOLLOW-UP VISIT: CPT

## 2019-08-05 ASSESSMENT — LOCATION ZONE DERM: LOCATION ZONE: LEG

## 2019-08-05 ASSESSMENT — LOCATION SIMPLE DESCRIPTION DERM: LOCATION SIMPLE: RIGHT THIGH

## 2019-08-05 ASSESSMENT — LOCATION DETAILED DESCRIPTION DERM: LOCATION DETAILED: RIGHT ANTERIOR LATERAL DISTAL THIGH

## 2019-08-05 NOTE — PROCEDURE: BIOPSY BY SHAVE METHOD
Biopsy Type: H and E
X Size Of Lesion In Cm: 1
Depth Of Biopsy: dermis
Anesthesia Volume In Cc: 0.5
Electrodesiccation And Curettage Text: The wound bed was treated with electrodesiccation and curettage after the biopsy was performed.
Consent: Written consent was obtained and risks were reviewed including but not limited to scarring, infection, bleeding, scabbing, incomplete removal, nerve damage and allergy to anesthesia.
Type Of Destruction Used: Curettage
Render Post-Care Instructions In Note?: yes
Dressing: bandage
Biopsy Method: Personna blade
Hemostasis: Drysol
Bill 01377 For Specimen Handling/Conveyance To Laboratory?: no
Silver Nitrate Text: The wound bed was treated with silver nitrate after the biopsy was performed.
Lab: [id number]
Billing Type: Third-Party Bill
Detail Level: Detailed
Curettage Text: The wound bed was treated with curettage after the biopsy was performed.
Additional Anesthesia Volume In Cc (Will Not Render If 0): 0
Size Of Lesion In Cm: 1.5
Anesthesia Type: 1% lidocaine with epinephrine
Lab Facility: 
Cryotherapy Text: The wound bed was treated with cryotherapy after the biopsy was performed.
Post-Care Instructions: I reviewed with the patient in detail post-care instructions. Patient is to keep the biopsy site dry overnight, and then apply bacitracin twice daily until healed. Patient may apply hydrogen peroxide soaks to remove any crusting.
Notification Instructions: Patient will be notified of biopsy results. However, patient instructed to call the office if not contacted within 2 weeks.
Electrodesiccation Text: The wound bed was treated with electrodesiccation after the biopsy was performed.
Wound Care: Vaseline
X Size Of Lesion In Cm: 0.9
X Size Of Lesion In Cm: 0.8

## 2019-08-05 NOTE — PROCEDURE: ADDITIONAL NOTES
Detail Level: Zone
Additional Notes: Patient has multiple lesions suspicious for squamous cell carcinoma. I would like for her to do pdt to he legs ASAP to try to at least minimize the size of these lesions. Plan to do photodynamic therapy with three hour incubation next week and then see her back in a month to assess need for further surgical interventlon

## 2019-08-05 NOTE — HPI: SKIN LESIONS
Is This A New Presentation, Or A Follow-Up?: Growths
How Severe Is Your Skin Lesion?: moderate
Have Your Skin Lesions Been Treated?: not been treated
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 Lives in [place]. Lesions are tender and enlarging. Currently staying with her sister in law in [place]

## 2019-08-05 NOTE — PROCEDURE: SUTURE REMOVAL (GLOBAL PERIOD)
Detail Level: Detailed
Add 89219 Cpt? (Important Note: In 2017 The Use Of 57081 Is Being Tracked By Cms To Determine Future Global Period Reimbursement For Global Periods): yes

## 2019-08-05 NOTE — PROCEDURE: MOHS SURGERY
Graft Donor Site Bandage (Optional-Leave Blank If You Don't Want In Note): Aquaplast was fitted to the graft site and sewn into place. A pressure bandage were applied to the donor site and over the aquaplast bolster.
Mercedes Flap Text: The defect edges were debeveled with a #15 scalpel blade.  Given the location of the defect, shape of the defect and the proximity to free margins a Mercedes flap was deemed most appropriate.  Using a sterile surgical marker, an appropriate advancement flap was drawn incorporating the defect and placing the expected incisions within the relaxed skin tension lines where possible. The area thus outlined was incised deep to adipose tissue with a #15 scalpel blade.  The skin margins were undermined to an appropriate distance in all directions utilizing iris scissors.
Show Mohs Case Number Variable: Yes
Cartilage Fenestration Performed For Additional Graft?: No
Anesthesia Volume In Cc: 9
Double M-Plasty Complex Repair Preamble Text (Leave Blank If You Do Not Want): Extensive wide undermining was performed.
Ear Star Wedge Flap Text: The defect edges were debeveled with a #15 blade scalpel.  Given the location of the defect and the proximity to free margins (helical rim) an ear star wedge flap was deemed most appropriate.  Using a sterile surgical marker, the appropriate flap was drawn incorporating the defect and placing the expected incisions between the helical rim and antihelix where possible.  The area thus outlined was incised through and through with a #15 scalpel blade.
Quadrants Reporting?: 0
Transposition Flap Text: The defect edges were debeveled with a #15 scalpel blade.  Given the location of the defect and the proximity to free margins a transposition flap was deemed most appropriate.  Using a sterile surgical marker, an appropriate transposition flap was drawn incorporating the defect.    The area thus outlined was incised deep to adipose tissue with a #15 scalpel blade.  The skin margins were undermined to an appropriate distance in all directions utilizing iris scissors.
Dressing: pressure dressing with telfa
Post-Care Instructions: I reviewed with the patient in detail post-care instructions. Patient is not to engage in any heavy lifting, exercise, or swimming for the next 14 days. Should the patient develop any fevers, chills, bleeding, severe pain patient will contact the office immediately.
Surgeon: Seda Payne MD
Consent (Marginal Mandibular)/Introductory Paragraph: The rationale for Mohs was explained to the patient and consent was obtained. The risks, benefits and alternatives to therapy were discussed in detail. Specifically, the risks of damage to the marginal mandibular branch of the facial nerve, infection, scarring, bleeding, prolonged wound healing, incomplete removal, allergy to anesthesia, and recurrence were addressed. Prior to the procedure, the treatment site was clearly identified and confirmed by the patient. All components of Universal Protocol/PAUSE Rule completed.
Consent (Near Eyelid Margin)/Introductory Paragraph: The rationale for Mohs was explained to the patient and consent was obtained. The risks, benefits and alternatives to therapy were discussed in detail. Specifically, the risks of ectropion or eyelid deformity, infection, scarring, bleeding, prolonged wound healing, incomplete removal, allergy to anesthesia, nerve injury and recurrence were addressed. Prior to the procedure, the treatment site was clearly identified and confirmed by the patient. All components of Universal Protocol/PAUSE Rule completed.
Helical Rim Advancement Flap Text: The defect edges were debeveled with a #15 blade scalpel.  Given the location of the defect and the proximity to free margins (helical rim) a double helical rim advancement flap was deemed most appropriate.  Using a sterile surgical marker, the appropriate advancement flaps were drawn incorporating the defect and placing the expected incisions between the helical rim and antihelix where possible.  The area thus outlined was incised through and through with a #15 scalpel blade.  With a skin hook and iris scissors, the flaps were gently and sharply undermined and freed up.
Otolaryngologist Procedure Text (F): After obtaining clear surgical margins the patient was sent to otolaryngology for surgical repair.  The patient understands they will receive post-surgical care and follow-up from the referring physician's office.
Stage 7: Additional Anesthesia Type: 1% lidocaine with epinephrine
Plastic Surgeon Procedure Text (E): After obtaining clear surgical margins the patient was sent to plastics for surgical repair.  The patient understands they will receive post-surgical care and follow-up from the referring physician's office.
Keystone Flap Text: The defect edges were debeveled with a #15 scalpel blade.  Given the location of the defect, shape of the defect a keystone flap was deemed most appropriate.  Using a sterile surgical marker, an appropriate keystone flap was drawn incorporating the defect, outlining the appropriate donor tissue and placing the expected incisions within the relaxed skin tension lines where possible. The area thus outlined was incised deep to adipose tissue with a #15 scalpel blade.  The skin margins were undermined to an appropriate distance in all directions around the primary defect and laterally outward around the flap utilizing iris scissors.
Epidermal Autograft Text: The defect edges were debeveled with a #15 scalpel blade.  Given the location of the defect, shape of the defect and the proximity to free margins an epidermal autograft was deemed most appropriate.  Using a sterile surgical marker, the primary defect shape was transferred to the donor site. The epidermal graft was then harvested.  The skin graft was then placed in the primary defect and oriented appropriately.
Consent 3/Introductory Paragraph: I gave the patient a chance to ask questions they had about the procedure.  Following this I explained the Mohs procedure and consent was obtained. The risks, benefits and alternatives to therapy were discussed in detail. Specifically, the risks of infection, scarring, bleeding, prolonged wound healing, incomplete removal, allergy to anesthesia, nerve injury and recurrence were addressed. Prior to the procedure, the treatment site was clearly identified and confirmed by the patient. All components of Universal Protocol/PAUSE Rule completed.
Purse String (Intermediate) Text: Given the location of the defect and the characteristics of the surrounding skin a purse string intermediate closure was deemed most appropriate.  Undermining was performed circumfirentially around the surgical defect.  A purse string suture was then placed and tightened.
Surgical Defect Length In Cm (Optional): 1.6
Modified Advancement Flap Text: The defect edges were debeveled with a #15 scalpel blade.  Given the location of the defect, shape of the defect and the proximity to free margins a modified advancement flap was deemed most appropriate.  Using a sterile surgical marker, an appropriate advancement flap was drawn incorporating the defect and placing the expected incisions within the relaxed skin tension lines where possible.    The area thus outlined was incised deep to adipose tissue with a #15 scalpel blade.  The skin margins were undermined to an appropriate distance in all directions utilizing iris scissors.
Double M-Plasty Intermediate Repair Preamble Text (Leave Blank If You Do Not Want): Undermining was performed with blunt dissection.
Skin Substitute Text: The defect edges were debeveled with a #15 scalpel blade.  Given the location of the defect, shape of the defect and the proximity to free margins a skin substitute graft was deemed most appropriate.  The graft material was trimmed to fit the size of the defect. The graft was then placed in the primary defect and oriented appropriately.
Wound Check: 6 weeks
Tissue Cultured Epidermal Autograft Text: The defect edges were debeveled with a #15 scalpel blade.  Given the location of the defect, shape of the defect and the proximity to free margins a tissue cultured epidermal autograft was deemed most appropriate.  The graft was then trimmed to fit the size of the defect.  The graft was then placed in the primary defect and oriented appropriately.
Island Pedicle Flap-Requiring Vessel Identification Text: The defect edges were debeveled with a #15 scalpel blade.  Given the location of the defect, shape of the defect and the proximity to free margins an island pedicle advancement flap was deemed most appropriate.  Using a sterile surgical marker, an appropriate advancement flap was drawn, based on the axial vessel mentioned above, incorporating the defect, outlining the appropriate donor tissue and placing the expected incisions within the relaxed skin tension lines where possible.    The area thus outlined was incised deep to adipose tissue with a #15 scalpel blade.  The skin margins were undermined to an appropriate distance in all directions around the primary defect and laterally outward around the island pedicle utilizing iris scissors.  There was minimal undermining beneath the pedicle flap.
Provider Procedure Text (C): After obtaining clear surgical margins the defect was repaired by another provider.
Island Pedicle Flap With Canthal Suspension Text: The defect edges were debeveled with a #15 scalpel blade.  Given the location of the defect, shape of the defect and the proximity to free margins an island pedicle advancement flap was deemed most appropriate.  Using a sterile surgical marker, an appropriate advancement flap was drawn incorporating the defect, outlining the appropriate donor tissue and placing the expected incisions within the relaxed skin tension lines where possible. The area thus outlined was incised deep to adipose tissue with a #15 scalpel blade.  The skin margins were undermined to an appropriate distance in all directions around the primary defect and laterally outward around the island pedicle utilizing iris scissors.  There was minimal undermining beneath the pedicle flap. A suspension suture was placed in the canthal tendon to prevent tension and prevent ectropion.
Area M Indication Text: Tumors in this location are included in Area M (cheek, forehead, scalp, neck, jawline and pretibial skin).  Mohs surgery is indicated for tumors in these anatomic locations.
Melolabial Transposition Flap Text: The defect edges were debeveled with a #15 scalpel blade.  Given the location of the defect and the proximity to free margins a melolabial flap was deemed most appropriate.  Using a sterile surgical marker, an appropriate melolabial transposition flap was drawn incorporating the defect.    The area thus outlined was incised deep to adipose tissue with a #15 scalpel blade.  The skin margins were undermined to an appropriate distance in all directions utilizing iris scissors.
Oculoplastic Surgeon Procedure Text (C): After obtaining clear surgical margins the patient was sent to oculoplastics for surgical repair.  The patient understands they will receive post-surgical care and follow-up from the referring physician's office.
Undermining Location (Optional): in the superficial subcutaneous fat
Bilobed Transposition Flap Text: The defect edges were debeveled with a #15 scalpel blade.  Given the location of the defect and the proximity to free margins a bilobed transposition flap was deemed most appropriate.  Using a sterile surgical marker, an appropriate bilobe flap drawn around the defect.    The area thus outlined was incised deep to adipose tissue with a #15 scalpel blade.  The skin margins were undermined to an appropriate distance in all directions utilizing iris scissors.
Tarsorrhaphy Text: A tarsorrhaphy was performed using Frost sutures.
Island Pedicle Flap Text: The defect edges were debeveled with a #15 scalpel blade.  Given the location of the defect, shape of the defect and the proximity to free margins an island pedicle advancement flap was deemed most appropriate.  Using a sterile surgical marker, an appropriate advancement flap was drawn incorporating the defect, outlining the appropriate donor tissue and placing the expected incisions within the relaxed skin tension lines where possible.    The area thus outlined was incised deep to adipose tissue with a #15 scalpel blade.  The skin margins were undermined to an appropriate distance in all directions around the primary defect and laterally outward around the island pedicle utilizing iris scissors.  There was minimal undermining beneath the pedicle flap.
Mucosal Advancement Flap Text: Given the location of the defect, shape of the defect and the proximity to free margins a mucosal advancement flap was deemed most appropriate. Incisions were made with a 15 blade scalpel in the appropriate fashion along the cutaneous vermilion border and the mucosal lip. The remaining actinically damaged mucosal tissue was excised.  The mucosal advancement flap was then elevated to the gingival sulcus with care taken to preserve the neurovascular structures and advanced into the primary defect. Care was taken to ensure that precise realignment of the vermilion border was achieved.
Donor Site Anesthesia Type: same as repair anesthesia
Mid-Level Procedure Text (B): After obtaining clear surgical margins the patient was sent to a mid-level provider for surgical repair.  The patient understands they will receive post-surgical care and follow-up from the mid-level provider.
Wound Care: Vaseline
Non-Graft Cartilage Fenestration Text: The cartilage was fenestrated with a 2mm punch biopsy to help facilitate healing.
Asc Procedure Text (F): After obtaining clear surgical margins the patient was sent to an ASC for surgical repair.  The patient understands they will receive post-surgical care and follow-up from the ASC physician.
Localized Dermabrasion With Wire Brush Text: The patient was draped in routine manner.  Localized dermabrasion using 3 x 17 mm wire brush was performed in routine manner to papillary dermis. This spot dermabrasion is being performed to complete skin cancer reconstruction. It also will eliminate the other sun damaged precancerous cells that are known to be part of the regional effect of a lifetime's worth of sun exposure. This localized dermabrasion is therapeutic and should not be considered cosmetic in any regard.
Postop Diagnosis: same
Repair Type: Complex Repair
Number Of Stages: 2
Closure 2 Information: This tab is for additional flaps and grafts, including complex repair and grafts and complex repair and flaps. You can also specify a different location for the additional defect, if the location is the same you do not need to select a new one. We will insert the automated text for the repair you select below just as we do for solitary flaps and grafts. Please note that at this time if you select a location with a different insurance zone you will need to override the ICD10 and CPT if appropriate.
Simple / Intermediate / Complex Repair - Final Wound Length In Cm: 2.7
Same Histology In Subsequent Stages Text: The pattern and morphology of the tumor is as described in the first stage.
Purse String (Simple) Text: Given the location of the defect and the characteristics of the surrounding skin a purse string closure was deemed most appropriate.  Undermining was performed circumfirentially around the surgical defect.  A purse string suture was then placed and tightened.
Surgeon/Pathologist Verbiage (Will Incorporate Name Of Surgeon From Intro If Not Blank): operated in two distinct and integrated capacities as the surgeon and pathologist.
Epidermal Closure Graft Donor Site (Optional): running
Complex Repair And Flap Additional Text (Will Appearing After The Standard Complex Repair Text): The complex repair was not sufficient to completely close the primary defect. The remaining additional defect was repaired with the flap mentioned below.
Mauc Instructions: By selecting yes to the question below the MAUC number will be added into the note.  This will be calculated automatically based on the diagnosis chosen, the size entered, the body zone selected (H,M,L) and the specific indications you chose. You will also have the option to override the Mohs AUC if you disagree with the automatically calculated number and this option is found in the Case Summary tab.
Epidermal Sutures: 3-0 Surgipro
V-Y Plasty Text: The defect edges were debeveled with a #15 scalpel blade.  Given the location of the defect, shape of the defect and the proximity to free margins an V-Y advancement flap was deemed most appropriate.  Using a sterile surgical marker, an appropriate advancement flap was drawn incorporating the defect and placing the expected incisions within the relaxed skin tension lines where possible.    The area thus outlined was incised deep to adipose tissue with a #15 scalpel blade.  The skin margins were undermined to an appropriate distance in all directions utilizing iris scissors.
O-Z Flap Text: The defect edges were debeveled with a #15 scalpel blade.  Given the location of the defect, shape of the defect and the proximity to free margins an O-Z flap was deemed most appropriate.  Using a sterile surgical marker, an appropriate transposition flap was drawn incorporating the defect and placing the expected incisions within the relaxed skin tension lines where possible. The area thus outlined was incised deep to adipose tissue with a #15 scalpel blade.  The skin margins were undermined to an appropriate distance in all directions utilizing iris scissors.
Advancement-Rotation Flap Text: The defect edges were debeveled with a #15 scalpel blade.  Given the location of the defect, shape of the defect and the proximity to free margins an advancement-rotation flap was deemed most appropriate.  Using a sterile surgical marker, an appropriate flap was drawn incorporating the defect and placing the expected incisions within the relaxed skin tension lines where possible. The area thus outlined was incised deep to adipose tissue with a #15 scalpel blade.  The skin margins were undermined to an appropriate distance in all directions utilizing iris scissors.
Tumor Debulked?: curette
Hemostasis: Electrocautery
Xenograft Text: The defect edges were debeveled with a #15 scalpel blade.  Given the location of the defect, shape of the defect and the proximity to free margins a xenograft was deemed most appropriate.  The graft was then trimmed to fit the size of the defect.  The graft was then placed in the primary defect and oriented appropriately.
Initial Size Of Lesion: 1.4
Consent (Lip)/Introductory Paragraph: The rationale for Mohs was explained to the patient and consent was obtained. The risks, benefits and alternatives to therapy were discussed in detail. Specifically, the risks of lip deformity, changes in the oral aperture, infection, scarring, bleeding, prolonged wound healing, incomplete removal, allergy to anesthesia, nerve injury and recurrence were addressed. Prior to the procedure, the treatment site was clearly identified and confirmed by the patient. All components of Universal Protocol/PAUSE Rule completed.
W Plasty Text: The lesion was extirpated to the level of the fat with a #15 scalpel blade.  Given the location of the defect, shape of the defect and the proximity to free margins a W-plasty was deemed most appropriate for repair.  Using a sterile surgical marker, the appropriate transposition arms of the W-plasty were drawn incorporating the defect and placing the expected incisions within the relaxed skin tension lines where possible.    The area thus outlined was incised deep to adipose tissue with a #15 scalpel blade.  The skin margins were undermined to an appropriate distance in all directions utilizing iris scissors.  The opposing transposition arms were then transposed into place in opposite direction and anchored with interrupted buried subcutaneous sutures.
Consent Type: Consent 1 (Standard)
No Residual Tumor Seen Histology Text: There were no malignant cells seen in the sections examined.
Chonodrocutaneous Helical Advancement Flap Text: The defect edges were debeveled with a #15 scalpel blade.  Given the location of the defect and the proximity to free margins a chondrocutaneous helical advancement flap was deemed most appropriate.  Using a sterile surgical marker, the appropriate advancement flap was drawn incorporating the defect and placing the expected incisions within the relaxed skin tension lines where possible.    The area thus outlined was incised deep to adipose tissue with a #15 scalpel blade.  The skin margins were undermined to an appropriate distance in all directions utilizing iris scissors.
Bi-Rhombic Flap Text: The defect edges were debeveled with a #15 scalpel blade.  Given the location of the defect and the proximity to free margins a bi-rhombic flap was deemed most appropriate.  Using a sterile surgical marker, an appropriate rhombic flap was drawn incorporating the defect. The area thus outlined was incised deep to adipose tissue with a #15 scalpel blade.  The skin margins were undermined to an appropriate distance in all directions utilizing iris scissors.
Repair Anesthesia Method: local infiltration
Double Island Pedicle Flap Text: The defect edges were debeveled with a #15 scalpel blade.  Given the location of the defect, shape of the defect and the proximity to free margins a double island pedicle advancement flap was deemed most appropriate.  Using a sterile surgical marker, an appropriate advancement flap was drawn incorporating the defect, outlining the appropriate donor tissue and placing the expected incisions within the relaxed skin tension lines where possible.    The area thus outlined was incised deep to adipose tissue with a #15 scalpel blade.  The skin margins were undermined to an appropriate distance in all directions around the primary defect and laterally outward around the island pedicle utilizing iris scissors.  There was minimal undermining beneath the pedicle flap.
Bcc Histology Text: There were numerous aggregates of basaloid cells.
Medical Necessity Statement: Based on my medical judgement, Mohs surgery is the most appropriate treatment for this cancer compared to other treatments.
Cartilage Graft Text: The defect edges were debeveled with a #15 scalpel blade.  Given the location of the defect, shape of the defect, the fact the defect involved a full thickness cartilage defect a cartilage graft was deemed most appropriate.  An appropriate donor site was identified, cleansed, and anesthetized. The cartilage graft was then harvested and transferred to the recipient site, oriented appropriately and then sutured into place.  The secondary defect was then repaired using a primary closure.
Rhombic Flap Text: The defect edges were debeveled with a #15 scalpel blade.  Given the location of the defect and the proximity to free margins a rhombic flap was deemed most appropriate.  Using a sterile surgical marker, an appropriate rhombic flap was drawn incorporating the defect.    The area thus outlined was incised deep to adipose tissue with a #15 scalpel blade.  The skin margins were undermined to an appropriate distance in all directions utilizing iris scissors.
Suturegard Intro: Intraoperative tissue expansion was performed, utilizing the SUTUREGARD device, in order to reduce wound tension.
Banner Transposition Flap Text: The defect edges were debeveled with a #15 scalpel blade.  Given the location of the defect and the proximity to free margins a Banner transposition flap was deemed most appropriate.  Using a sterile surgical marker, an appropriate flap drawn around the defect. The area thus outlined was incised deep to adipose tissue with a #15 scalpel blade.  The skin margins were undermined to an appropriate distance in all directions utilizing iris scissors.
Muscle Hinge Flap Text: The defect edges were debeveled with a #15 scalpel blade.  Given the size, depth and location of the defect and the proximity to free margins a muscle hinge flap was deemed most appropriate.  Using a sterile surgical marker, an appropriate hinge flap was drawn incorporating the defect. The area thus outlined was incised with a #15 scalpel blade.  The skin margins were undermined to an appropriate distance in all directions utilizing iris scissors.
Consent (Spinal Accessory)/Introductory Paragraph: The rationale for Mohs was explained to the patient and consent was obtained. The risks, benefits and alternatives to therapy were discussed in detail. Specifically, the risks of damage to the spinal accessory nerve, infection, scarring, bleeding, prolonged wound healing, incomplete removal, allergy to anesthesia, and recurrence were addressed. Prior to the procedure, the treatment site was clearly identified and confirmed by the patient. All components of Universal Protocol/PAUSE Rule completed.
Consent 2/Introductory Paragraph: Mohs surgery was explained to the patient and consent was obtained. The risks, benefits and alternatives to therapy were discussed in detail. Specifically, the risks of infection, scarring, bleeding, prolonged wound healing, incomplete removal, allergy to anesthesia, nerve injury and recurrence were addressed. Prior to the procedure, the treatment site was clearly identified and confirmed by the patient. All components of Universal Protocol/PAUSE Rule completed.
Cheiloplasty (Less Than 50%) Text: A decision was made to reconstruct the defect with a  cheiloplasty.  The defect was undermined extensively.  Additional obicularis oris muscle was excised with a 15 blade scalpel.  The defect was converted into a full thickness wedge, of less than 50% of the vertical height of the lip, to facilite a better cosmetic result.  Small vessels were then tied off with 5-0 monocyrl. The obicularis oris, superficial fascia, adipose and dermis were then reapproximated.  After the deeper layers were approximated the epidermis was reapproximated with particular care given to realign the vermilion border.
Previous Accession (Optional): [id number]
Advancement Flap (Single) Text: The defect edges were debeveled with a #15 scalpel blade.  Given the location of the defect and the proximity to free margins a single advancement flap was deemed most appropriate.  Using a sterile surgical marker, an appropriate advancement flap was drawn incorporating the defect and placing the expected incisions within the relaxed skin tension lines where possible.    The area thus outlined was incised deep to adipose tissue with a #15 scalpel blade.  The skin margins were undermined to an appropriate distance in all directions utilizing iris scissors.
Surgical Defect Length In Cm (Optional): 1.8
Closure 3 Information: This tab is for additional flaps and grafts above and beyond our usual structured repairs.  Please note if you enter information here it will not currently bill and you will need to add the billing information manually.
Mohs Rapid Report Verbiage: The area of clinically evident tumor was marked with skin marking ink and appropriately hatched.  The initial incision was made following the Mohs approach through the skin.  The specimen was taken to the lab, divided into the necessary number of pieces, chromacoded and processed according to the Mohs protocol.  This was repeated in successive stages until a tumor free defect was achieved.
No Repair - Repaired With Adjacent Surgical Defect Text (Leave Blank If You Do Not Want): After obtaining clear surgical margins the defect was repaired concurrently with another surgical defect which was in close approximation.
Wound Care (No Sutures): Petrolatum
Partial Purse String (Simple) Text: Given the location of the defect and the characteristics of the surrounding skin a simple purse string closure was deemed most appropriate.  Undermining was performed circumfirentially around the surgical defect.  A purse string suture was then placed and tightened. Wound tension only allowed a partial closure of the circular defect.
Bcc Infiltrative Histology Text: There were numerous aggregates of basaloid cells demonstrating an infiltrative pattern.
Cheek Interpolation Flap Text: A decision was made to reconstruct the defect utilizing an interpolation axial flap and a staged reconstruction.  A telfa template was made of the defect.  This telfa template was then used to outline the Cheek Interpolation flap.  The donor area for the pedicle flap was then injected with anesthesia.  The flap was excised through the skin and subcutaneous tissue down to the layer of the underlying musculature.  The interpolation flap was carefully excised within this deep plane to maintain its blood supply.  The edges of the donor site were undermined.   The donor site was closed in a primary fashion.  The pedicle was then rotated into position and sutured.  Once the tube was sutured into place, adequate blood supply was confirmed with blanching and refill.  The pedicle was then wrapped with xeroform gauze and dressed appropriately with a telfa and gauze bandage to ensure continued blood supply and protect the attached pedicle.
Double O-Z Flap Text: The defect edges were debeveled with a #15 scalpel blade.  Given the location of the defect, shape of the defect and the proximity to free margins a Double O-Z flap was deemed most appropriate.  Using a sterile surgical marker, an appropriate transposition flap was drawn incorporating the defect and placing the expected incisions within the relaxed skin tension lines where possible. The area thus outlined was incised deep to adipose tissue with a #15 scalpel blade.  The skin margins were undermined to an appropriate distance in all directions utilizing iris scissors.
Crescentic Advancement Flap Text: The defect edges were debeveled with a #15 scalpel blade.  Given the location of the defect and the proximity to free margins a crescentic advancement flap was deemed most appropriate.  Using a sterile surgical marker, the appropriate advancement flap was drawn incorporating the defect and placing the expected incisions within the relaxed skin tension lines where possible.    The area thus outlined was incised deep to adipose tissue with a #15 scalpel blade.  The skin margins were undermined to an appropriate distance in all directions utilizing iris scissors.
Stage 2: Number Of Blocks?: 1
Partial Purse String (Intermediate) Text: Given the location of the defect and the characteristics of the surrounding skin an intermediate purse string closure was deemed most appropriate.  Undermining was performed circumfirentially around the surgical defect.  A purse string suture was then placed and tightened. Wound tension only allowed a partial closure of the circular defect.
Rhomboid Transposition Flap Text: The defect edges were debeveled with a #15 scalpel blade.  Given the location of the defect and the proximity to free margins a rhomboid transposition flap was deemed most appropriate.  Using a sterile surgical marker, an appropriate rhomboid flap was drawn incorporating the defect.    The area thus outlined was incised deep to adipose tissue with a #15 scalpel blade.  The skin margins were undermined to an appropriate distance in all directions utilizing iris scissors.
Bilobed Flap Text: The defect edges were debeveled with a #15 scalpel blade.  Given the location of the defect and the proximity to free margins a bilobe flap was deemed most appropriate.  Using a sterile surgical marker, an appropriate bilobe flap drawn around the defect.    The area thus outlined was incised deep to adipose tissue with a #15 scalpel blade.  The skin margins were undermined to an appropriate distance in all directions utilizing iris scissors.
Trilobed Flap Text: The defect edges were debeveled with a #15 scalpel blade.  Given the location of the defect and the proximity to free margins a trilobed flap was deemed most appropriate.  Using a sterile surgical marker, an appropriate trilobed flap drawn around the defect.    The area thus outlined was incised deep to adipose tissue with a #15 scalpel blade.  The skin margins were undermined to an appropriate distance in all directions utilizing iris scissors.
Mohs Method Verbiage: An incision at a 45 degree angle following the standard Mohs approach was done and the specimen was harvested as a microscopic controlled layer.
Z Plasty Text: The lesion was extirpated to the level of the fat with a #15 scalpel blade.  Given the location of the defect, shape of the defect and the proximity to free margins a Z-plasty was deemed most appropriate for repair.  Using a sterile surgical marker, the appropriate transposition arms of the Z-plasty were drawn incorporating the defect and placing the expected incisions within the relaxed skin tension lines where possible.    The area thus outlined was incised deep to adipose tissue with a #15 scalpel blade.  The skin margins were undermined to an appropriate distance in all directions utilizing iris scissors.  The opposing transposition arms were then transposed into place in opposite direction and anchored with interrupted buried subcutaneous sutures.
O-T Plasty Text: The defect edges were debeveled with a #15 scalpel blade.  Given the location of the defect, shape of the defect and the proximity to free margins an O-T plasty was deemed most appropriate.  Using a sterile surgical marker, an appropriate O-T plasty was drawn incorporating the defect and placing the expected incisions within the relaxed skin tension lines where possible.    The area thus outlined was incised deep to adipose tissue with a #15 scalpel blade.  The skin margins were undermined to an appropriate distance in all directions utilizing iris scissors.
Repair Hemostasis (Optional): Pinpoint electrocautery
Deep Sutures: 3-0 Maxon
Secondary Intention Text (Leave Blank If You Do Not Want): The defect will heal with secondary intention.
Consent (Scalp)/Introductory Paragraph: The rationale for Mohs was explained to the patient and consent was obtained. The risks, benefits and alternatives to therapy were discussed in detail. Specifically, the risks of changes in hair growth pattern secondary to repair, infection, scarring, bleeding, prolonged wound healing, incomplete removal, allergy to anesthesia, nerve injury and recurrence were addressed. Prior to the procedure, the treatment site was clearly identified and confirmed by the patient. All components of Universal Protocol/PAUSE Rule completed.
Pain Refusal Text: I offered to prescribe pain medication but the patient refused to take this medication.
Star Wedge Flap Text: The defect edges were debeveled with a #15 scalpel blade.  Given the location of the defect, shape of the defect and the proximity to free margins a star wedge flap was deemed most appropriate.  Using a sterile surgical marker, an appropriate rotation flap was drawn incorporating the defect and placing the expected incisions within the relaxed skin tension lines where possible. The area thus outlined was incised deep to adipose tissue with a #15 scalpel blade.  The skin margins were undermined to an appropriate distance in all directions utilizing iris scissors.
Suturegard Body: The suture ends were repeatedly re-tightened and re-clamped to achieve the desired tissue expansion.
Information: Selecting Yes will display possible errors in your note based on the variables you have selected. This validation is only offered as a suggestion for you. PLEASE NOTE THAT THE VALIDATION TEXT WILL BE REMOVED WHEN YOU FINALIZE YOUR NOTE. IF YOU WANT TO FAX A PRELIMINARY NOTE YOU WILL NEED TO TOGGLE THIS TO 'NO' IF YOU DO NOT WANT IT IN YOUR FAXED NOTE.
Inflammation Suggestive Of Cancer Camouflage Histology Text: There was a dense lymphocytic infiltrate which prevented adequate histologic evaluation of adjacent structures.
Cheiloplasty (Complex) Text: A decision was made to reconstruct the defect with a  cheiloplasty.  The defect was undermined extensively.  Additional obicularis oris muscle was excised with a 15 blade scalpel.  The defect was converted into a full thickness wedge to facilite a better cosmetic result.  Small vessels were then tied off with 5-0 monocyrl. The obicularis oris, superficial fascia, adipose and dermis were then reapproximated.  After the deeper layers were approximated the epidermis was reapproximated with particular care given to realign the vermilion border.
Interpolation Flap Text: A decision was made to reconstruct the defect utilizing an interpolation axial flap and a staged reconstruction.  A telfa template was made of the defect.  This telfa template was then used to outline the interpolation flap.  The donor area for the pedicle flap was then injected with anesthesia.  The flap was excised through the skin and subcutaneous tissue down to the layer of the underlying musculature.  The interpolation flap was carefully excised within this deep plane to maintain its blood supply.  The edges of the donor site were undermined.   The donor site was closed in a primary fashion.  The pedicle was then rotated into position and sutured.  Once the tube was sutured into place, adequate blood supply was confirmed with blanching and refill.  The pedicle was then wrapped with xeroform gauze and dressed appropriately with a telfa and gauze bandage to ensure continued blood supply and protect the attached pedicle.
Split-Thickness Skin Graft Text: The defect edges were debeveled with a #15 scalpel blade.  Given the location of the defect, shape of the defect and the proximity to free margins a split thickness skin graft was deemed most appropriate.  Using a sterile surgical marker, the primary defect shape was transferred to the donor site. The split thickness graft was then harvested.  The skin graft was then placed in the primary defect and oriented appropriately.
Manual Repair Warning Statement: We plan on removing the manually selected variable below in favor of our much easier automatic structured text blocks found in the previous tab. We decided to do this to help make the flow better and give you the full power of structured data. Manual selection is never going to be ideal in our platform and I would encourage you to avoid using manual selection from this point on, especially since I will be sunsetting this feature. It is important that you do one of two things with the customized text below. First, you can save all of the text in a word file so you can have it for future reference. Second, transfer the text to the appropriate area in the Library tab. Lastly, if there is a flap or graft type which we do not have you need to let us know right away so I can add it in before the variable is hidden. No need to panic, we plan to give you roughly 6 months to make the change.
Advancement Flap (Double) Text: The defect edges were debeveled with a #15 scalpel blade.  Given the location of the defect and the proximity to free margins a double advancement flap was deemed most appropriate.  Using a sterile surgical marker, the appropriate advancement flaps were drawn incorporating the defect and placing the expected incisions within the relaxed skin tension lines where possible.    The area thus outlined was incised deep to adipose tissue with a #15 scalpel blade.  The skin margins were undermined to an appropriate distance in all directions utilizing iris scissors.
Burow's Advancement Flap Text: The defect edges were debeveled with a #15 scalpel blade.  Given the location of the defect and the proximity to free margins a Burow's advancement flap was deemed most appropriate.  Using a sterile surgical marker, the appropriate advancement flap was drawn incorporating the defect and placing the expected incisions within the relaxed skin tension lines where possible.    The area thus outlined was incised deep to adipose tissue with a #15 scalpel blade.  The skin margins were undermined to an appropriate distance in all directions utilizing iris scissors.
Double O-Z Plasty Text: The defect edges were debeveled with a #15 scalpel blade.  Given the location of the defect, shape of the defect and the proximity to free margins a Double O-Z plasty (double transposition flap) was deemed most appropriate.  Using a sterile surgical marker, the appropriate transposition flaps were drawn incorporating the defect and placing the expected incisions within the relaxed skin tension lines where possible. The area thus outlined was incised deep to adipose tissue with a #15 scalpel blade.  The skin margins were undermined to an appropriate distance in all directions utilizing iris scissors.  Hemostasis was achieved with electrocautery.  The flaps were then transposed into place, one clockwise and the other counterclockwise, and anchored with interrupted buried subcutaneous sutures.
Paramedian Forehead Flap Text: A decision was made to reconstruct the defect utilizing an interpolation axial flap and a staged reconstruction.  A telfa template was made of the defect.  This telfa template was then used to outline the paramedian forehead pedicle flap.  The donor area for the pedicle flap was then injected with anesthesia.  The flap was excised through the skin and subcutaneous tissue down to the layer of the underlying musculature.  The pedicle flap was carefully excised within this deep plane to maintain its blood supply.  The edges of the donor site were undermined.   The donor site was closed in a primary fashion.  The pedicle was then rotated into position and sutured.  Once the tube was sutured into place, adequate blood supply was confirmed with blanching and refill.  The pedicle was then wrapped with xeroform gauze and dressed appropriately with a telfa and gauze bandage to ensure continued blood supply and protect the attached pedicle.
Ftsg Text: The defect edges were debeveled with a #15 scalpel blade.  Given the location of the defect, shape of the defect and the proximity to free margins a full thickness skin graft was deemed most appropriate.  Using a sterile surgical marker, the primary defect shape was transferred to the donor site. The area thus outlined was incised deep to adipose tissue with a #15 scalpel blade.  The harvested graft was then trimmed of adipose tissue until only dermis and epidermis was left.  The skin margins of the secondary defect were undermined to an appropriate distance in all directions utilizing iris scissors.  The secondary defect was closed with interrupted buried subcutaneous sutures.  The skin edges were then re-apposed with running  sutures.  The skin graft was then placed in the primary defect and oriented appropriately.
Rotation Flap Text: The defect edges were debeveled with a #15 scalpel blade.  Given the location of the defect, shape of the defect and the proximity to free margins a rotation flap was deemed most appropriate.  Using a sterile surgical marker, an appropriate rotation flap was drawn incorporating the defect and placing the expected incisions within the relaxed skin tension lines where possible.    The area thus outlined was incised deep to adipose tissue with a #15 scalpel blade.  The skin margins were undermined to an appropriate distance in all directions utilizing iris scissors.
Alternatives Discussed Intro (Do Not Add Period): I discussed alternative treatments to Mohs surgery and specifically discussed the risks and benefits of
Graft Donor Site Epidermal Sutures (Optional): 5-0 Surgipro
Estimated Blood Loss (Cc): minimal
Mohs Histo Method Verbiage: Each section was then chromacoded and processed in the Mohs lab using the Mohs protocol and submitted for frozen section.
O-L Flap Text: The defect edges were debeveled with a #15 scalpel blade.  Given the location of the defect, shape of the defect and the proximity to free margins an O-L flap was deemed most appropriate.  Using a sterile surgical marker, an appropriate advancement flap was drawn incorporating the defect and placing the expected incisions within the relaxed skin tension lines where possible.    The area thus outlined was incised deep to adipose tissue with a #15 scalpel blade.  The skin margins were undermined to an appropriate distance in all directions utilizing iris scissors.
Detail Level: Detailed
Consent (Ear)/Introductory Paragraph: The rationale for Mohs was explained to the patient and consent was obtained. The risks, benefits and alternatives to therapy were discussed in detail. Specifically, the risks of ear deformity, infection, scarring, bleeding, prolonged wound healing, incomplete removal, allergy to anesthesia, nerve injury and recurrence were addressed. Prior to the procedure, the treatment site was clearly identified and confirmed by the patient. All components of Universal Protocol/PAUSE Rule completed.
O-T Advancement Flap Text: The defect edges were debeveled with a #15 scalpel blade.  Given the location of the defect, shape of the defect and the proximity to free margins an O-T advancement flap was deemed most appropriate.  Using a sterile surgical marker, an appropriate advancement flap was drawn incorporating the defect and placing the expected incisions within the relaxed skin tension lines where possible.    The area thus outlined was incised deep to adipose tissue with a #15 scalpel blade.  The skin margins were undermined to an appropriate distance in all directions utilizing iris scissors.
V-Y Flap Text: The defect edges were debeveled with a #15 scalpel blade.  Given the location of the defect, shape of the defect and the proximity to free margins a V-Y flap was deemed most appropriate.  Using a sterile surgical marker, an appropriate advancement flap was drawn incorporating the defect and placing the expected incisions within the relaxed skin tension lines where possible.    The area thus outlined was incised deep to adipose tissue with a #15 scalpel blade.  The skin margins were undermined to an appropriate distance in all directions utilizing iris scissors.
Unna Boot Text: An Unna boot was placed to help immobilize the limb and facilitate more rapid healing.
Consent (Temporal Branch)/Introductory Paragraph: The rationale for Mohs was explained to the patient and consent was obtained. The risks, benefits and alternatives to therapy were discussed in detail. Specifically, the risks of damage to the temporal branch of the facial nerve, infection, scarring, bleeding, prolonged wound healing, incomplete removal, allergy to anesthesia, and recurrence were addressed. Prior to the procedure, the treatment site was clearly identified and confirmed by the patient. All components of Universal Protocol/PAUSE Rule completed.
Full Thickness Lip Wedge Repair (Flap) Text: Given the location of the defect and the proximity to free margins a full thickness wedge repair was deemed most appropriate.  Using a sterile surgical marker, the appropriate repair was drawn incorporating the defect and placing the expected incisions perpendicular to the vermilion border.  The vermilion border was also meticulously outlined to ensure appropriate reapproximation during the repair.  The area thus outlined was incised through and through with a #15 scalpel blade.  The muscularis and dermis were reaproximated with deep sutures following hemostasis. Care was taken to realign the vermilion border before proceeding with the superficial closure.  Once the vermilion was realigned the superfical and mucosal closure was finished.
Posterior Auricular Interpolation Flap Text: A decision was made to reconstruct the defect utilizing an interpolation axial flap and a staged reconstruction.  A telfa template was made of the defect.  This telfa template was then used to outline the posterior auricular interpolation flap.  The donor area for the pedicle flap was then injected with anesthesia.  The flap was excised through the skin and subcutaneous tissue down to the layer of the underlying musculature.  The pedicle flap was carefully excised within this deep plane to maintain its blood supply.  The edges of the donor site were undermined.   The donor site was closed in a primary fashion.  The pedicle was then rotated into position and sutured.  Once the tube was sutured into place, adequate blood supply was confirmed with blanching and refill.  The pedicle was then wrapped with xeroform gauze and dressed appropriately with a telfa and gauze bandage to ensure continued blood supply and protect the attached pedicle.
Epidermal Closure: running cuticular
Spiral Flap Text: The defect edges were debeveled with a #15 scalpel blade.  Given the location of the defect, shape of the defect and the proximity to free margins a spiral flap was deemed most appropriate.  Using a sterile surgical marker, an appropriate rotation flap was drawn incorporating the defect and placing the expected incisions within the relaxed skin tension lines where possible. The area thus outlined was incised deep to adipose tissue with a #15 scalpel blade.  The skin margins were undermined to an appropriate distance in all directions utilizing iris scissors.
Area L Indication Text: Tumors in this location are included in Area L (trunk and extremities).  Mohs surgery is indicated for larger tumors, or tumors with aggressive histologic features, in these anatomic locations.
Bilateral Helical Rim Advancement Flap Text: The defect edges were debeveled with a #15 blade scalpel.  Given the location of the defect and the proximity to free margins (helical rim) a bilateral helical rim advancement flap was deemed most appropriate.  Using a sterile surgical marker, the appropriate advancement flaps were drawn incorporating the defect and placing the expected incisions between the helical rim and antihelix where possible.  The area thus outlined was incised through and through with a #15 scalpel blade.  With a skin hook and iris scissors, the flaps were gently and sharply undermined and freed up.
Dorsal Nasal Flap Text: The defect edges were debeveled with a #15 scalpel blade.  Given the location of the defect and the proximity to free margins a dorsal nasal flap was deemed most appropriate.  Using a sterile surgical marker, an appropriate dorsal nasal flap was drawn around the defect.    The area thus outlined was incised deep to adipose tissue with a #15 scalpel blade.  The skin margins were undermined to an appropriate distance in all directions utilizing iris scissors.
O-Z Plasty Text: The defect edges were debeveled with a #15 scalpel blade.  Given the location of the defect, shape of the defect and the proximity to free margins an O-Z plasty (double transposition flap) was deemed most appropriate.  Using a sterile surgical marker, the appropriate transposition flaps were drawn incorporating the defect and placing the expected incisions within the relaxed skin tension lines where possible.    The area thus outlined was incised deep to adipose tissue with a #15 scalpel blade.  The skin margins were undermined to an appropriate distance in all directions utilizing iris scissors.  Hemostasis was achieved with electrocautery.  The flaps were then transposed into place, one clockwise and the other counterclockwise, and anchored with interrupted buried subcutaneous sutures.
Suture Removal: 14 days
Mastoid Interpolation Flap Text: A decision was made to reconstruct the defect utilizing an interpolation axial flap and a staged reconstruction.  A telfa template was made of the defect.  This telfa template was then used to outline the mastoid interpolation flap.  The donor area for the pedicle flap was then injected with anesthesia.  The flap was excised through the skin and subcutaneous tissue down to the layer of the underlying musculature.  The pedicle flap was carefully excised within this deep plane to maintain its blood supply.  The edges of the donor site were undermined.   The donor site was closed in a primary fashion.  The pedicle was then rotated into position and sutured.  Once the tube was sutured into place, adequate blood supply was confirmed with blanching and refill.  The pedicle was then wrapped with xeroform gauze and dressed appropriately with a telfa and gauze bandage to ensure continued blood supply and protect the attached pedicle.
Home Suture Removal Text: Patient was provided instructions on removing sutures and will remove their sutures at home.  If they have any questions or difficulties they will call the office.
H Plasty Text: Given the location of the defect, shape of the defect and the proximity to free margins a H-plasty was deemed most appropriate for repair.  Using a sterile surgical marker, the appropriate advancement arms of the H-plasty were drawn incorporating the defect and placing the expected incisions within the relaxed skin tension lines where possible. The area thus outlined was incised deep to adipose tissue with a #15 scalpel blade. The skin margins were undermined to an appropriate distance in all directions utilizing iris scissors.  The opposing advancement arms were then advanced into place in opposite direction and anchored with interrupted buried subcutaneous sutures.
Hatchet Flap Text: The defect edges were debeveled with a #15 scalpel blade.  Given the location of the defect, shape of the defect and the proximity to free margins a hatchet flap was deemed most appropriate.  Using a sterile surgical marker, an appropriate hatchet flap was drawn incorporating the defect and placing the expected incisions within the relaxed skin tension lines where possible.    The area thus outlined was incised deep to adipose tissue with a #15 scalpel blade.  The skin margins were undermined to an appropriate distance in all directions utilizing iris scissors.
Retention Suture Bite Size: 1 mm
Primary Defect Width In Cm (Final Defect Size - Required For Flaps/Grafts): 1.7
Graft Donor Site Dermal Sutures (Optional): 5-0 Polysorb
Composite Graft Text: The defect edges were debeveled with a #15 scalpel blade.  Given the location of the defect, shape of the defect, the proximity to free margins and the fact the defect was full thickness a composite graft was deemed most appropriate.  The defect was outline and then transferred to the donor site.  A full thickness graft was then excised from the donor site. The graft was then placed in the primary defect, oriented appropriately and then sutured into place.  The secondary defect was then repaired using a primary closure.
Area H Indication Text: Tumors in this location are included in Area H (eyelids, eyebrows, nose, lips, chin, ear, pre-auricular, post-auricular, temple, genitalia, hands, feet, ankles and areola).  Tissue conservation is critical in these anatomic locations.
Additional Anesthesia Volume In Cc: 6
Cheek-To-Nose Interpolation Flap Text: A decision was made to reconstruct the defect utilizing an interpolation axial flap and a staged reconstruction.  A telfa template was made of the defect.  This telfa template was then used to outline the Cheek-To-Nose Interpolation flap.  The donor area for the pedicle flap was then injected with anesthesia.  The flap was excised through the skin and subcutaneous tissue down to the layer of the underlying musculature.  The interpolation flap was carefully excised within this deep plane to maintain its blood supply.  The edges of the donor site were undermined.   The donor site was closed in a primary fashion.  The pedicle was then rotated into position and sutured.  Once the tube was sutured into place, adequate blood supply was confirmed with blanching and refill.  The pedicle was then wrapped with xeroform gauze and dressed appropriately with a telfa and gauze bandage to ensure continued blood supply and protect the attached pedicle.
Dermal Autograft Text: The defect edges were debeveled with a #15 scalpel blade.  Given the location of the defect, shape of the defect and the proximity to free margins a dermal autograft was deemed most appropriate.  Using a sterile surgical marker, the primary defect shape was transferred to the donor site. The area thus outlined was incised deep to adipose tissue with a #15 scalpel blade.  The harvested graft was then trimmed of adipose and epidermal tissue until only dermis was left.  The skin graft was then placed in the primary defect and oriented appropriately.
Mohs Case Number: [id number]
Eye Protection Verbiage: Before proceeding with the stage, a plastic scleral shield was inserted. The globe was anesthetized with a few drops of 1% lidocaine with 1:100,000 epinephrine. Then, an appropriate sized scleral shield was chosen and coated with lacrilube ointment. The shield was gently inserted and left in place for the duration of each stage. After the stage was completed, the shield was gently removed.
S Plasty Text: Given the location and shape of the defect, and the orientation of relaxed skin tension lines, an S-plasty was deemed most appropriate for repair.  Using a sterile surgical marker, the appropriate outline of the S-plasty was drawn, incorporating the defect and placing the expected incisions within the relaxed skin tension lines where possible.  The area thus outlined was incised deep to adipose tissue with a #15 scalpel blade.  The skin margins were undermined to an appropriate distance in all directions utilizing iris scissors. The skin flaps were advanced over the defect.  The opposing margins were then approximated with interrupted buried subcutaneous sutures.
Consent 1/Introductory Paragraph: The rationale for Mohs was explained to the patient and consent was obtained. The risks, benefits and alternatives to therapy were discussed in detail. Specifically, the risks of infection, scarring, bleeding, prolonged wound healing, incomplete removal, allergy to anesthesia, nerve injury and recurrence were addressed. Prior to the procedure, the treatment site was clearly identified and confirmed by the patient. All components of Universal Protocol/PAUSE Rule completed.
Ear Wedge Repair Text: A wedge excision was completed by carrying down an excision through the full thickness of the ear and cartilage with an inward facing Burow's triangle. The wound was then closed in a layered fashion.
Melolabial Interpolation Flap Text: A decision was made to reconstruct the defect utilizing an interpolation axial flap and a staged reconstruction.  A telfa template was made of the defect.  This telfa template was then used to outline the melolabial interpolation flap.  The donor area for the pedicle flap was then injected with anesthesia.  The flap was excised through the skin and subcutaneous tissue down to the layer of the underlying musculature.  The pedicle flap was carefully excised within this deep plane to maintain its blood supply.  The edges of the donor site were undermined.   The donor site was closed in a primary fashion.  The pedicle was then rotated into position and sutured.  Once the tube was sutured into place, adequate blood supply was confirmed with blanching and refill.  The pedicle was then wrapped with xeroform gauze and dressed appropriately with a telfa and gauze bandage to ensure continued blood supply and protect the attached pedicle.
Complex Repair And Graft Additional Text (Will Appearing After The Standard Complex Repair Text): The complex repair was not sufficient to completely close the primary defect. The remaining additional defect was repaired with the graft mentioned below.
Suturegard Retention Suture: 0-0 Nylon
A-T Advancement Flap Text: The defect edges were debeveled with a #15 scalpel blade.  Given the location of the defect, shape of the defect and the proximity to free margins an A-T advancement flap was deemed most appropriate.  Using a sterile surgical marker, an appropriate advancement flap was drawn incorporating the defect and placing the expected incisions within the relaxed skin tension lines where possible.    The area thus outlined was incised deep to adipose tissue with a #15 scalpel blade.  The skin margins were undermined to an appropriate distance in all directions utilizing iris scissors.
Alar Island Pedicle Flap Text: The defect edges were debeveled with a #15 scalpel blade.  Given the location of the defect, shape of the defect and the proximity to the alar rim an island pedicle advancement flap was deemed most appropriate.  Using a sterile surgical marker, an appropriate advancement flap was drawn incorporating the defect, outlining the appropriate donor tissue and placing the expected incisions within the nasal ala running parallel to the alar rim. The area thus outlined was incised with a #15 scalpel blade.  The skin margins were undermined minimally to an appropriate distance in all directions around the primary defect and laterally outward around the island pedicle utilizing iris scissors.  There was minimal undermining beneath the pedicle flap.
Referring Physician (Optional): Arabella Whitten
Graft Cartilage Fenestration Text: The cartilage was fenestrated with a 2mm punch biopsy to help facilitate graft survival and healing.
Consent (Nose)/Introductory Paragraph: The rationale for Mohs was explained to the patient and consent was obtained. The risks, benefits and alternatives to therapy were discussed in detail. Specifically, the risks of nasal deformity, changes in the flow of air through the nose, infection, scarring, bleeding, prolonged wound healing, incomplete removal, allergy to anesthesia, nerve injury and recurrence were addressed. Prior to the procedure, the treatment site was clearly identified and confirmed by the patient. All components of Universal Protocol/PAUSE Rule completed.
Subsequent Stages Histo Method Verbiage: Using a similar technique to that described above, a thin layer of tissue was removed from all areas where tumor was visible on the previous stage.  The tissue was again oriented, mapped, dyed, and processed as above.
Location Indication Override (Is Already Calculated Based On Selected Body Location): Area M

## 2019-08-08 ENCOUNTER — RX ONLY (OUTPATIENT)
Age: 84
Setting detail: RX ONLY
End: 2019-08-08

## 2019-08-08 RX ORDER — DOXYCYCLINE 100 MG/1
CAPSULE ORAL
Qty: 20 | Refills: 0 | Status: ERX

## 2019-08-12 ENCOUNTER — APPOINTMENT (RX ONLY)
Dept: URBAN - METROPOLITAN AREA CLINIC 36 | Facility: CLINIC | Age: 84
Setting detail: DERMATOLOGY
End: 2019-08-12

## 2019-08-12 DIAGNOSIS — Z48.817 ENCOUNTER FOR SURGICAL AFTERCARE FOLLOWING SURGERY ON THE SKIN AND SUBCUTANEOUS TISSUE: ICD-10-CM

## 2019-08-12 DIAGNOSIS — L57.0 ACTINIC KERATOSIS: ICD-10-CM

## 2019-08-12 PROCEDURE — ? ADDITIONAL NOTES

## 2019-08-12 PROCEDURE — 96574 DBRDMT PRMLG LES W/PDT: CPT

## 2019-08-12 PROCEDURE — ? PHOTODYNAMIC THERAPY COUNSELING

## 2019-08-12 PROCEDURE — 99024 POSTOP FOLLOW-UP VISIT: CPT

## 2019-08-12 PROCEDURE — ? PDT: RED

## 2019-08-12 PROCEDURE — ? POST-OP WOUND CHECK

## 2019-08-12 ASSESSMENT — LOCATION SIMPLE DESCRIPTION DERM
LOCATION SIMPLE: RIGHT PRETIBIAL REGION
LOCATION SIMPLE: LEFT PRETIBIAL REGION

## 2019-08-12 ASSESSMENT — LOCATION DETAILED DESCRIPTION DERM
LOCATION DETAILED: RIGHT PROXIMAL PRETIBIAL REGION
LOCATION DETAILED: LEFT DISTAL PRETIBIAL REGION

## 2019-08-12 ASSESSMENT — LOCATION ZONE DERM: LOCATION ZONE: LEG

## 2019-08-12 NOTE — PROCEDURE: PDT: RED
Illumination Time: 7 min 7 sec
Ndc# (Optional): [id number]
Detail Level: Zone
Who Performed The Pdt?: Performed by MD JOE, JANNETH or TAMIR with Pre-Procedure Debridement of Hyperkeratotic Lesions ([id number])
Total Number Of Aks Treated (Optional To Report): 0
Lot # (Optional): [id number]
Number Of Kerasticks/Tubes Of Metvixia/Tubes Of Ameluz Used: 1
Photosensitizer: Ameluz
Debridement Text (Will Only Render In Visit Note If You Select Debridement Option Under Who Performed The Pdt Field): Prior to application of the photodynamic medication the hyperkeratotic lesions were curetted to make them more amenable to therapy.
Consent: Written consent obtained.  The risks were reviewed with the patient including but not limited to: pigmentary changes, pain, blistering, scabbing, redness, and the remote possibility of scarring.
Application Method: under occlusion
Expiration Date (Optional): 06.2020
Post-Care Instructions: I reviewed with the patient in detail post-care instructions. Patient is to avoid sunlight for the next 2 days, and wear sun protection. Patients may expect sunburn like redness, discomfort and scabbing.
Incubation Time (Set To 00:00:00 If Not Wanted): 02:00
Anesthesia Type: 0.5% lidocaine with 1:200,000 epinephrine

## 2019-08-12 NOTE — PROCEDURE: ADDITIONAL NOTES
Detail Level: Zone
Additional Notes: Patient has three biopsy proven squamous cell carcinoma. If they do not resolve with photodynamic therapy may consider excision or mohs.

## 2019-08-12 NOTE — PROCEDURE: POST-OP WOUND CHECK
Detail Level: Simple
Add 26302 Cpt? (Important Note: In 2017 The Use Of 37355 Is Being Tracked By Cms To Determine Future Global Period Reimbursement For Global Periods): yes
Wound Evaluated By: Seda Payne md

## 2019-08-13 ENCOUNTER — APPOINTMENT (RX ONLY)
Dept: URBAN - METROPOLITAN AREA CLINIC 4 | Facility: CLINIC | Age: 84
Setting detail: DERMATOLOGY
End: 2019-08-13

## 2019-08-13 DIAGNOSIS — Z85.828 PERSONAL HISTORY OF OTHER MALIGNANT NEOPLASM OF SKIN: ICD-10-CM

## 2019-08-13 DIAGNOSIS — L57.0 ACTINIC KERATOSIS: ICD-10-CM

## 2019-08-13 DIAGNOSIS — L82.0 INFLAMED SEBORRHEIC KERATOSIS: ICD-10-CM

## 2019-08-13 PROBLEM — C44.722 SQUAMOUS CELL CARCINOMA OF SKIN OF RIGHT LOWER LIMB, INCLUDING HIP: Status: ACTIVE | Noted: 2019-08-13

## 2019-08-13 PROBLEM — C44.729 SQUAMOUS CELL CARCINOMA OF SKIN OF LEFT LOWER LIMB, INCLUDING HIP: Status: ACTIVE | Noted: 2019-08-13

## 2019-08-13 PROCEDURE — ? OBSERVATION

## 2019-08-13 PROCEDURE — 99213 OFFICE O/P EST LOW 20 MIN: CPT | Mod: 24

## 2019-08-13 PROCEDURE — ? COUNSELING

## 2019-08-13 PROCEDURE — ? PRESCRIPTION

## 2019-08-13 PROCEDURE — ? MEDICATION COUNSELING

## 2019-08-13 RX ORDER — IMIQUIMOD 50 MG/G
CREAM TOPICAL DAILY
Qty: 1 | Refills: 1 | Status: ERX | COMMUNITY
Start: 2019-08-13

## 2019-08-13 RX ADMIN — IMIQUIMOD ONE: 50 CREAM TOPICAL at 00:00

## 2019-08-13 ASSESSMENT — LOCATION DETAILED DESCRIPTION DERM
LOCATION DETAILED: RIGHT ANTERIOR LATERAL DISTAL THIGH
LOCATION DETAILED: RIGHT PROXIMAL DORSAL FOREARM
LOCATION DETAILED: PHILTRUM
LOCATION DETAILED: RIGHT LATERAL PROXIMAL PRETIBIAL REGION
LOCATION DETAILED: RIGHT MEDIAL POPLITEAL SKIN
LOCATION DETAILED: LEFT PROXIMAL DORSAL FOREARM

## 2019-08-13 ASSESSMENT — LOCATION SIMPLE DESCRIPTION DERM
LOCATION SIMPLE: LEFT FOREARM
LOCATION SIMPLE: RIGHT FOREARM
LOCATION SIMPLE: RIGHT PRETIBIAL REGION
LOCATION SIMPLE: RIGHT THIGH
LOCATION SIMPLE: RIGHT POPLITEAL SKIN
LOCATION SIMPLE: UPPER LIP

## 2019-08-13 ASSESSMENT — LOCATION ZONE DERM
LOCATION ZONE: LIP
LOCATION ZONE: ARM
LOCATION ZONE: LEG

## 2019-08-13 NOTE — PROCEDURE: MEDICATION COUNSELING
Minoxidil Counseling: Minoxidil is a topical medication which can increase blood flow where it is applied. It is uncertain how this medication increases hair growth. Side effects are uncommon and include stinging and allergic reactions.
High Dose Vitamin A Pregnancy And Lactation Text: High dose vitamin A therapy is contraindicated during pregnancy and breast feeding.
SSKI Counseling:  I discussed with the patient the risks of SSKI including but not limited to thyroid abnormalities, metallic taste, GI upset, fever, headache, acne, arthralgias, paraesthesias, lymphadenopathy, easy bleeding, arrhythmias, and allergic reaction.
Fluconazole Pregnancy And Lactation Text: This medication is Pregnancy Category C and it isn't know if it is safe during pregnancy. It is also excreted in breast milk.
Cellcept Counseling:  I discussed with the patient the risks of mycophenolate mofetil including but not limited to infection/immunosuppression, GI upset, hypokalemia, hypercholesterolemia, bone marrow suppression, lymphoproliferative disorders, malignancy, GI ulceration/bleed/perforation, colitis, interstitial lung disease, kidney failure, progressive multifocal leukoencephalopathy, and birth defects.  The patient understands that monitoring is required including a baseline creatinine and regular CBC testing. In addition, patient must alert us immediately if symptoms of infection or other concerning signs are noted.
Glycopyrrolate Pregnancy And Lactation Text: This medication is Pregnancy Category B and is considered safe during pregnancy. It is unknown if it is excreted breast milk.
Ivermectin Counseling:  Patient instructed to take medication on an empty stomach with a full glass of water.  Patient informed of potential adverse effects including but not limited to nausea, diarrhea, dizziness, itching, and swelling of the extremities or lymph nodes.  The patient verbalized understanding of the proper use and possible adverse effects of ivermectin.  All of the patient's questions and concerns were addressed.
Cephalexin Pregnancy And Lactation Text: This medication is Pregnancy Category B and considered safe during pregnancy.  It is also excreted in breast milk but can be used safely for shorter doses.
Rituxan Counseling:  I discussed with the patient the risks of Rituxan infusions. Side effects can include infusion reactions, severe drug rashes including mucocutaneous reactions, reactivation of latent hepatitis and other infections and rarely progressive multifocal leukoencephalopathy.  All of the patient's questions and concerns were addressed.
Ivermectin Pregnancy And Lactation Text: This medication is Pregnancy Category C and it isn't known if it is safe during pregnancy. It is also excreted in breast milk.
Rituxan Pregnancy And Lactation Text: This medication is Pregnancy Category C and it isn't know if it is safe during pregnancy. It is unknown if this medication is excreted in breast milk but similar antibodies are known to be excreted.
Clindamycin Counseling: I counseled the patient regarding use of clindamycin as an antibiotic for prophylactic and/or therapeutic purposes. Clindamycin is active against numerous classes of bacteria, including skin bacteria. Side effects may include nausea, diarrhea, gastrointestinal upset, rash, hives, yeast infections, and in rare cases, colitis.
Hydroxychloroquine Counseling:  I discussed with the patient that a baseline ophthalmologic exam is needed at the start of therapy and every year thereafter while on therapy. A CBC may also be warranted for monitoring.  The side effects of this medication were discussed with the patient, including but not limited to agranulocytosis, aplastic anemia, seizures, rashes, retinopathy, and liver toxicity. Patient instructed to call the office should any adverse effect occur.  The patient verbalized understanding of the proper use and possible adverse effects of Plaquenil.  All the patient's questions and concerns were addressed.
Minoxidil Pregnancy And Lactation Text: This medication has not been assigned a Pregnancy Risk Category but animal studies failed to show danger with the topical medication. It is unknown if the medication is excreted in breast milk.
Opioid Counseling: I discussed with the patient the potential side effects of opioids including but not limited to addiction, altered mental status, and depression. I stressed avoiding alcohol, benzodiazepines, muscle relaxants and sleep aids unless specifically okayed by a physician. The patient verbalized understanding of the proper use and possible adverse effects of opioids. All of the patient's questions and concerns were addressed. They were instructed to flush the remaining pills down the toilet if they did not need them for pain.
Mirvaso Counseling: Mirvaso is a topical medication which can decrease superficial blood flow where applied. Side effects are uncommon and include stinging, redness and allergic reactions.
Sski Pregnancy And Lactation Text: This medication is Pregnancy Category D and isn't considered safe during pregnancy. It is excreted in breast milk.
Griseofulvin Counseling:  I discussed with the patient the risks of griseofulvin including but not limited to photosensitivity, cytopenia, liver damage, nausea/vomiting and severe allergy.  The patient understands that this medication is best absorbed when taken with a fatty meal (e.g., ice cream or french fries).
Cellcept Pregnancy And Lactation Text: This medication is Pregnancy Category D and isn't considered safe during pregnancy. It is unknown if this medication is excreted in breast milk.
Clindamycin Pregnancy And Lactation Text: This medication can be used in pregnancy if certain situations. Clindamycin is also present in breast milk.
Hydroxychloroquine Pregnancy And Lactation Text: This medication has been shown to cause fetal harm but it isn't assigned a Pregnancy Risk Category. There are small amounts excreted in breast milk.
Siliq Counseling:  I discussed with the patient the risks of Siliq including but not limited to new or worsening depression, suicidal thoughts and behavior, immunosuppression, malignancy, posterior leukoencephalopathy syndrome, and serious infections.  The patient understands that monitoring is required including a PPD at baseline and must alert us or the primary physician if symptoms of infection or other concerning signs are noted. There is also a special program designed to monitor depression which is required with Siliq.
Opioid Pregnancy And Lactation Text: These medications can lead to premature delivery and should be avoided during pregnancy. These medications are also present in breast milk in small amounts.
Cyclophosphamide Counseling:  I discussed with the patient the risks of cyclophosphamide including but not limited to hair loss, hormonal abnormalities, decreased fertility, abdominal pain, diarrhea, nausea and vomiting, bone marrow suppression and infection. The patient understands that monitoring is required while taking this medication.
Griseofulvin Pregnancy And Lactation Text: This medication is Pregnancy Category X and is known to cause serious birth defects. It is unknown if this medication is excreted in breast milk but breast feeding should be avoided.
Thalidomide Counseling: I discussed with the patient the risks of thalidomide including but not limited to birth defects, anxiety, weakness, chest pain, dizziness, cough and severe allergy.
Mirvaso Pregnancy And Lactation Text: This medication has not been assigned a Pregnancy Risk Category. It is unknown if the medication is excreted in breast milk.
Benzoyl Peroxide Counseling: Patient counseled that medicine may cause skin irritation and bleach clothing.  In the event of skin irritation, the patient was advised to reduce the amount of the drug applied or use it less frequently.   The patient verbalized understanding of the proper use and possible adverse effects of benzoyl peroxide.  All of the patient's questions and concerns were addressed.
Rhofade Counseling: Rhofade is a topical medication which can decrease superficial blood flow where applied. Side effects are uncommon and include stinging, redness and allergic reactions.
Itraconazole Counseling:  I discussed with the patient the risks of itraconazole including but not limited to liver damage, nausea/vomiting, neuropathy, and severe allergy.  The patient understands that this medication is best absorbed when taken with acidic beverages such as non-diet cola or ginger ale.  The patient understands that monitoring is required including baseline LFTs and repeat LFTs at intervals.  The patient understands that they are to contact us or the primary physician if concerning signs are noted.
Thalidomide Pregnancy And Lactation Text: This medication is Pregnancy Category X and is absolutely contraindicated during pregnancy. It is unknown if it is excreted in breast milk.
Picato Counseling:  I discussed with the patient the risks of Picato including but not limited to erythema, scaling, itching, weeping, crusting, and pain.
Niacinamide Counseling: I recommended taking niacin or niacinamide, also know as vitamin B3, twice daily. Recent evidence suggests that taking vitamin B3 (500 mg twice daily) can reduce the risk of actinic keratoses and non-melanoma skin cancers. Side effects of vitamin B3 include flushing and headache.
Benzoyl Peroxide Pregnancy And Lactation Text: This medication is Pregnancy Category C. It is unknown if benzoyl peroxide is excreted in breast milk.
Siliq Pregnancy And Lactation Text: The risk during pregnancy and breastfeeding is uncertain with this medication.
Detail Level: Detailed
Cyclophosphamide Pregnancy And Lactation Text: This medication is Pregnancy Category D and it isn't considered safe during pregnancy. This medication is excreted in breast milk.
Doxycycline Counseling:  Patient counseled regarding possible photosensitivity and increased risk for sunburn.  Patient instructed to avoid sunlight, if possible.  When exposed to sunlight, patients should wear protective clothing, sunglasses, and sunscreen.  The patient was instructed to call the office immediately if the following severe adverse effects occur:  hearing changes, easy bruising/bleeding, severe headache, or vision changes.  The patient verbalized understanding of the proper use and possible adverse effects of doxycycline.  All of the patient's questions and concerns were addressed.
Simponi Counseling:  I discussed with the patient the risks of golimumab including but not limited to myelosuppression, immunosuppression, autoimmune hepatitis, demyelinating diseases, lymphoma, and serious infections.  The patient understands that monitoring is required including a PPD at baseline and must alert us or the primary physician if symptoms of infection or other concerning signs are noted.
Doxycycline Pregnancy And Lactation Text: This medication is Pregnancy Category D and not consider safe during pregnancy. It is also excreted in breast milk but is considered safe for shorter treatment courses.
Niacinamide Pregnancy And Lactation Text: These medications are considered safe during pregnancy.
Arava Counseling:  Patient counseled regarding adverse effects of Arava including but not limited to nausea, vomiting, abnormalities in liver function tests. Patients may develop mouth sores, rash, diarrhea, and abnormalities in blood counts. The patient understands that monitoring is required including LFTs and blood counts.  There is a rare possibility of scarring of the liver and lung problems that can occur when taking methotrexate. Persistent nausea, loss of appetite, pale stools, dark urine, cough, and shortness of breath should be reported immediately. Patient advised to discontinue Arava treatment and consult with a physician prior to attempting conception. The patient will have to undergo a treatment to eliminate Arava from the body prior to conception.
Picato Pregnancy And Lactation Text: This medication is Pregnancy Category C. It is unknown if this medication is excreted in breast milk.
Valtrex Counseling: I discussed with the patient the risks of valacyclovir including but not limited to kidney damage, nausea, vomiting and severe allergy.  The patient understands that if the infection seems to be worsening or is not improving, they are to call.
Solaraze Counseling:  I discussed with the patient the risks of Solaraze including but not limited to erythema, scaling, itching, weeping, crusting, and pain.
Cyclosporine Counseling:  I discussed with the patient the risks of cyclosporine including but not limited to hypertension, gingival hyperplasia,myelosuppression, immunosuppression, liver damage, kidney damage, neurotoxicity, lymphoma, and serious infections. The patient understands that monitoring is required including baseline blood pressure, CBC, CMP, lipid panel and uric acid, and then 1-2 times monthly CMP and blood pressure.
Carac Counseling:  I discussed with the patient the risks of Carac including but not limited to erythema, scaling, itching, weeping, crusting, and pain.
Erythromycin Counseling:  I discussed with the patient the risks of erythromycin including but not limited to GI upset, allergic reaction, drug rash, diarrhea, increase in liver enzymes, and yeast infections.
Nsaids Counseling: NSAID Counseling: I discussed with the patient that NSAIDs should be taken with food. Prolonged use of NSAIDs can result in the development of stomach ulcers.  Patient advised to stop taking NSAIDs if abdominal pain occurs.  The patient verbalized understanding of the proper use and possible adverse effects of NSAIDs.  All of the patient's questions and concerns were addressed.
Ketoconazole Counseling:   Patient counseled regarding improving absorption with orange juice.  Adverse effects include but are not limited to breast enlargement, headache, diarrhea, nausea, upset stomach, liver function test abnormalities, taste disturbance, and stomach pain.  There is a rare possibility of liver failure that can occur when taking ketoconazole. The patient understands that monitoring of LFTs may be required, especially at baseline. The patient verbalized understanding of the proper use and possible adverse effects of ketoconazole.  All of the patient's questions and concerns were addressed.
Cyclosporine Pregnancy And Lactation Text: This medication is Pregnancy Category C and it isn't know if it is safe during pregnancy. This medication is excreted in breast milk.
Include Pregnancy/Lactation Warning?: No
Valtrex Pregnancy And Lactation Text: this medication is Pregnancy Category B and is considered safe during pregnancy. This medication is not directly found in breast milk but it's metabolite acyclovir is present.
Protopic Counseling: Patient may experience a mild burning sensation during topical application. Protopic is not approved in children less than 2 years of age. There have been case reports of hematologic and skin malignancies in patients using topical calcineurin inhibitors although causality is questionable.
Clofazimine Counseling:  I discussed with the patient the risks of clofazimine including but not limited to skin and eye pigmentation, liver damage, nausea/vomiting, gastrointestinal bleeding and allergy.
Cimzia Counseling:  I discussed with the patient the risks of Cimzia including but not limited to immunosuppression, allergic reactions and infections.  The patient understands that monitoring is required including a PPD at baseline and must alert us or the primary physician if symptoms of infection or other concerning signs are noted.
Carac Pregnancy And Lactation Text: This medication is Pregnancy Category X and contraindicated in pregnancy and in women who may become pregnant. It is unknown if this medication is excreted in breast milk.
Solaraze Pregnancy And Lactation Text: This medication is Pregnancy Category B and is considered safe. There is some data to suggest avoiding during the third trimester. It is unknown if this medication is excreted in breast milk.
Cimzia Pregnancy And Lactation Text: This medication crosses the placenta but can be considered safe in certain situations. Cimzia may be excreted in breast milk.
Protopic Pregnancy And Lactation Text: This medication is Pregnancy Category C. It is unknown if this medication is excreted in breast milk when applied topically.
Stelara Counseling:  I discussed with the patient the risks of ustekinumab including but not limited to immunosuppression, malignancy, posterior leukoencephalopathy syndrome, and serious infections.  The patient understands that monitoring is required including a PPD at baseline and must alert us or the primary physician if symptoms of infection or other concerning signs are noted.
Nsaids Pregnancy And Lactation Text: These medications are considered safe up to 30 weeks gestation. It is excreted in breast milk.
5-Fu Counseling: 5-Fluorouracil Counseling:  I discussed with the patient the risks of 5-fluorouracil including but not limited to erythema, scaling, itching, weeping, crusting, and pain.
Erythromycin Pregnancy And Lactation Text: This medication is Pregnancy Category B and is considered safe during pregnancy. It is also excreted in breast milk.
Methotrexate Counseling:  Patient counseled regarding adverse effects of methotrexate including but not limited to nausea, vomiting, abnormalities in liver function tests. Patients may develop mouth sores, rash, diarrhea, and abnormalities in blood counts. The patient understands that monitoring is required including LFT's and blood counts.  There is a rare possibility of scarring of the liver and lung problems that can occur when taking methotrexate. Persistent nausea, loss of appetite, pale stools, dark urine, cough, and shortness of breath should be reported immediately. Patient advised to discontinue methotrexate treatment at least three months before attempting to become pregnant.  I discussed the need for folate supplements while taking methotrexate.  These supplements can decrease side effects during methotrexate treatment. The patient verbalized understanding of the proper use and possible adverse effects of methotrexate.  All of the patient's questions and concerns were addressed.
Ketoconazole Pregnancy And Lactation Text: This medication is Pregnancy Category C and it isn't know if it is safe during pregnancy. It is also excreted in breast milk and breast feeding isn't recommended.
Clofazimine Pregnancy And Lactation Text: This medication is Pregnancy Category C and isn't considered safe during pregnancy. It is excreted in breast milk.
Topical Retinoid counseling:  Patient advised to apply a pea-sized amount only at bedtime and wait 30 minutes after washing their face before applying.  If too drying, patient may add a non-comedogenic moisturizer. The patient verbalized understanding of the proper use and possible adverse effects of retinoids.  All of the patient's questions and concerns were addressed.
Metronidazole Counseling:  I discussed with the patient the risks of metronidazole including but not limited to seizures, nausea/vomiting, a metallic taste in the mouth, nausea/vomiting and severe allergy.
Odomzo Counseling- I discussed with the patient the risks of Odomzo including but not limited to nausea, vomiting, diarrhea, constipation, weight loss, changes in the sense of taste, decreased appetite, muscle spasms, and hair loss.  The patient verbalized understanding of the proper use and possible adverse effects of Odomzo.  All of the patient's questions and concerns were addressed.
Stelara Pregnancy And Lactation Text: This medication is Pregnancy Category B and is considered safe during pregnancy. It is unknown if this medication is excreted in breast milk.
Terbinafine Counseling: Patient counseling regarding adverse effects of terbinafine including but not limited to headache, diarrhea, rash, upset stomach, liver function test abnormalities, itching, taste/smell disturbance, nausea, abdominal pain, and flatulence.  There is a rare possibility of liver failure that can occur when taking terbinafine.  The patient understands that a baseline LFT and kidney function test may be required. The patient verbalized understanding of the proper use and possible adverse effects of terbinafine.  All of the patient's questions and concerns were addressed.
Methotrexate Pregnancy And Lactation Text: This medication is Pregnancy Category X and is known to cause fetal harm. This medication is excreted in breast milk.
Cosentyx Counseling:  I discussed with the patient the risks of Cosentyx including but not limited to worsening of Crohn's disease, immunosuppression, allergic reactions and infections.  The patient understands that monitoring is required including a PPD at baseline and must alert us or the primary physician if symptoms of infection or other concerning signs are noted.
Metronidazole Pregnancy And Lactation Text: This medication is Pregnancy Category B and considered safe during pregnancy.  It is also excreted in breast milk.
Colchicine Counseling:  Patient counseled regarding adverse effects including but not limited to stomach upset (nausea, vomiting, stomach pain, or diarrhea).  Patient instructed to limit alcohol consumption while taking this medication.  Colchicine may reduce blood counts especially with prolonged use.  The patient understands that monitoring of kidney function and blood counts may be required, especially at baseline. The patient verbalized understanding of the proper use and possible adverse effects of colchicine.  All of the patient's questions and concerns were addressed.
Terbinafine Pregnancy And Lactation Text: This medication is Pregnancy Category B and is considered safe during pregnancy. It is also excreted in breast milk and breast feeding isn't recommended.
Prednisone Counseling:  I discussed with the patient the risks of prolonged use of prednisone including but not limited to weight gain, insomnia, osteoporosis, mood changes, diabetes, susceptibility to infection, glaucoma and high blood pressure.  In cases where prednisone use is prolonged, patients should be monitored with blood pressure checks, serum glucose levels and an eye exam.  Additionally, the patient may need to be placed on GI prophylaxis, PCP prophylaxis, and calcium and vitamin D supplementation and/or a bisphosphonate.  The patient verbalized understanding of the proper use and the possible adverse effects of prednisone.  All of the patient's questions and concerns were addressed.
Drysol Counseling:  I discussed with the patient the risks of drysol/aluminum chloride including but not limited to skin rash, itching, irritation, burning.
Tazorac Counseling:  Patient advised that medication is irritating and drying.  Patient may need to apply sparingly and wash off after an hour before eventually leaving it on overnight.  The patient verbalized understanding of the proper use and possible adverse effects of tazorac.  All of the patient's questions and concerns were addressed.
Taltz Counseling: I discussed with the patient the risks of ixekizumab including but not limited to immunosuppression, serious infections, worsening of inflammatory bowel disease and drug reactions.  The patient understands that monitoring is required including a PPD at baseline and must alert us or the primary physician if symptoms of infection or other concerning signs are noted.
Dupixent Counseling: I discussed with the patient the risks of dupilumab including but not limited to eye infection and irritation, cold sores, injection site reactions, worsening of asthma, allergic reactions and increased risk of parasitic infection.  Live vaccines should be avoided while taking dupilumab. Dupilumab will also interact with certain medications such as warfarin and cyclosporine. The patient understands that monitoring is required and they must alert us or the primary physician if symptoms of infection or other concerning signs are noted.
Drysol Pregnancy And Lactation Text: This medication is considered safe during pregnancy and breast feeding.
Otezla Counseling: The side effects of Otezla were discussed with the patient, including but not limited to worsening or new depression, weight loss, diarrhea, nausea, upper respiratory tract infection, and headache. Patient instructed to call the office should any adverse effect occur.  The patient verbalized understanding of the proper use and possible adverse effects of Otezla.  All the patient's questions and concerns were addressed.
Minocycline Counseling: Patient advised regarding possible photosensitivity and discoloration of the teeth, skin, lips, tongue and gums.  Patient instructed to avoid sunlight, if possible.  When exposed to sunlight, patients should wear protective clothing, sunglasses, and sunscreen.  The patient was instructed to call the office immediately if the following severe adverse effects occur:  hearing changes, easy bruising/bleeding, severe headache, or vision changes.  The patient verbalized understanding of the proper use and possible adverse effects of minocycline.  All of the patient's questions and concerns were addressed.
Dapsone Counseling: I discussed with the patient the risks of dapsone including but not limited to hemolytic anemia, agranulocytosis, rashes, methemoglobinemia, kidney failure, peripheral neuropathy, headaches, GI upset, and liver toxicity.  Patients who start dapsone require monitoring including baseline LFTs and weekly CBCs for the first month, then every month thereafter.  The patient verbalized understanding of the proper use and possible adverse effects of dapsone.  All of the patient's questions and concerns were addressed.
Tazorac Pregnancy And Lactation Text: This medication is not safe during pregnancy. It is unknown if this medication is excreted in breast milk.
Minocycline Pregnancy And Lactation Text: This medication is Pregnancy Category D and not consider safe during pregnancy. It is also excreted in breast milk.
Otezla Pregnancy And Lactation Text: This medication is Pregnancy Category C and it isn't known if it is safe during pregnancy. It is unknown if it is excreted in breast milk.
Tremfya Counseling: I discussed with the patient the risks of guselkumab including but not limited to immunosuppression, serious infections, worsening of inflammatory bowel disease and drug reactions.  The patient understands that monitoring is required including a PPD at baseline and must alert us or the primary physician if symptoms of infection or other concerning signs are noted.
Elidel Counseling: Patient may experience a mild burning sensation during topical application. Elidel is not approved in children less than 2 years of age. There have been case reports of hematologic and skin malignancies in patients using topical calcineurin inhibitors although causality is questionable.
Cimetidine Counseling:  I discussed with the patient the risks of Cimetidine including but not limited to gynecomastia, headache, diarrhea, nausea, drowsiness, arrhythmias, pancreatitis, skin rashes, psychosis, bone marrow suppression and kidney toxicity.
Dupixent Pregnancy And Lactation Text: This medication likely crosses the placenta but the risk for the fetus is uncertain. This medication is excreted in breast milk.
Quinolones Counseling:  I discussed with the patient the risks of fluoroquinolones including but not limited to GI upset, allergic reaction, drug rash, diarrhea, dizziness, photosensitivity, yeast infections, liver function test abnormalities, tendonitis/tendon rupture.
Oxybutynin Counseling:  I discussed with the patient the risks of oxybutynin including but not limited to skin rash, drowsiness, dry mouth, difficulty urinating, and blurred vision.
Dapsone Pregnancy And Lactation Text: This medication is Pregnancy Category C and is not considered safe during pregnancy or breast feeding.
Topical Clindamycin Counseling: Patient counseled that this medication may cause skin irritation or allergic reactions.  In the event of skin irritation, the patient was advised to reduce the amount of the drug applied or use it less frequently.   The patient verbalized understanding of the proper use and possible adverse effects of clindamycin.  All of the patient's questions and concerns were addressed.
Acitretin Counseling:  I discussed with the patient the risks of acitretin including but not limited to hair loss, dry lips/skin/eyes, liver damage, hyperlipidemia, depression/suicidal ideation, photosensitivity.  Serious rare side effects can include but are not limited to pancreatitis, pseudotumor cerebri, bony changes, clot formation/stroke/heart attack.  Patient understands that alcohol is contraindicated since it can result in liver toxicity and significantly prolong the elimination of the drug by many years.
Enbrel Counseling:  I discussed with the patient the risks of etanercept including but not limited to myelosuppression, immunosuppression, autoimmune hepatitis, demyelinating diseases, lymphoma, and infections.  The patient understands that monitoring is required including a PPD at baseline and must alert us or the primary physician if symptoms of infection or other concerning signs are noted.
Erivedge Counseling- I discussed with the patient the risks of Erivedge including but not limited to nausea, vomiting, diarrhea, constipation, weight loss, changes in the sense of taste, decreased appetite, muscle spasms, and hair loss.  The patient verbalized understanding of the proper use and possible adverse effects of Erivedge.  All of the patient's questions and concerns were addressed.
Topical Clindamycin Pregnancy And Lactation Text: This medication is Pregnancy Category B and is considered safe during pregnancy. It is unknown if it is excreted in breast milk.
Xeljanz Counseling: I discussed with the patient the risks of Xeljanz therapy including increased risk of infection, liver issues, headache, diarrhea, or cold symptoms. Live vaccines should be avoided. They were instructed to call if they have any problems.
Doxepin Counseling:  Patient advised that the medication is sedating and not to drive a car after taking this medication. Patient informed of potential adverse effects including but not limited to dry mouth, urinary retention, and blurry vision.  The patient verbalized understanding of the proper use and possible adverse effects of doxepin.  All of the patient's questions and concerns were addressed.
Rifampin Counseling: I discussed with the patient the risks of rifampin including but not limited to liver damage, kidney damage, red-orange body fluids, nausea/vomiting and severe allergy.
Acitretin Pregnancy And Lactation Text: This medication is Pregnancy Category X and should not be given to women who are pregnant or may become pregnant in the future. This medication is excreted in breast milk.
Topical Sulfur Applications Counseling: Topical Sulfur Counseling: Patient counseled that this medication may cause skin irritation or allergic reactions.  In the event of skin irritation, the patient was advised to reduce the amount of the drug applied or use it less frequently.   The patient verbalized understanding of the proper use and possible adverse effects of topical sulfur application.  All of the patient's questions and concerns were addressed.
Eucrisa Counseling: Patient may experience a mild burning sensation during topical application. Eucrisa is not approved in children less than 2 years of age.
Propranolol Counseling:  I discussed with the patient the risks of propranolol including but not limited to low heart rate, low blood pressure, low blood sugar, restlessness and increased cold sensitivity. They should call the office if they experience any of these side effects.
Xelomerz Pregnancy And Lactation Text: This medication is Pregnancy Category D and is not considered safe during pregnancy.  The risk during breast feeding is also uncertain.
Bexarotene Counseling:  I discussed with the patient the risks of bexarotene including but not limited to hair loss, dry lips/skin/eyes, liver abnormalities, hyperlipidemia, pancreatitis, depression/suicidal ideation, photosensitivity, drug rash/allergic reactions, hypothyroidism, anemia, leukopenia, infection, cataracts, and teratogenicity.  Patient understands that they will need regular blood tests to check lipid profile, liver function tests, white blood cell count, thyroid function tests and pregnancy test if applicable.
Doxepin Pregnancy And Lactation Text: This medication is Pregnancy Category C and it isn't known if it is safe during pregnancy. It is also excreted in breast milk and breast feeding isn't recommended.
Humira Counseling:  I discussed with the patient the risks of adalimumab including but not limited to myelosuppression, immunosuppression, autoimmune hepatitis, demyelinating diseases, lymphoma, and serious infections.  The patient understands that monitoring is required including a PPD at baseline and must alert us or the primary physician if symptoms of infection or other concerning signs are noted.
Hydroxyzine Counseling: Patient advised that the medication is sedating and not to drive a car after taking this medication.  Patient informed of potential adverse effects including but not limited to dry mouth, urinary retention, and blurry vision.  The patient verbalized understanding of the proper use and possible adverse effects of hydroxyzine.  All of the patient's questions and concerns were addressed.
Propranolol Pregnancy And Lactation Text: This medication is Pregnancy Category C and it isn't known if it is safe during pregnancy. It is excreted in breast milk.
Finasteride Male Counseling: Finasteride Counseling:  I discussed with the patient the risks of use of finasteride including but not limited to decreased libido, decreased ejaculate volume, gynecomastia, and depression. Women should not handle medication.  All of the patient's questions and concerns were addressed.
Rifampin Pregnancy And Lactation Text: This medication is Pregnancy Category C and it isn't know if it is safe during pregnancy. It is also excreted in breast milk and should not be used if you are breast feeding.
Topical Sulfur Applications Pregnancy And Lactation Text: This medication is Pregnancy Category C and has an unknown safety profile during pregnancy. It is unknown if this topical medication is excreted in breast milk.
Hydroquinone Counseling:  Patient advised that medication may result in skin irritation, lightening (hypopigmentation), dryness, and burning.  In the event of skin irritation, the patient was advised to reduce the amount of the drug applied or use it less frequently.  Rarely, spots that are treated with hydroquinone can become darker (pseudoochronosis).  Should this occur, patient instructed to stop medication and call the office. The patient verbalized understanding of the proper use and possible adverse effects of hydroquinone.  All of the patient's questions and concerns were addressed.
Birth Control Pills Counseling: Birth Control Pill Counseling: I discussed with the patient the potential side effects of OCPs including but not limited to increased risk of stroke, heart attack, thrombophlebitis, deep venous thrombosis, hepatic adenomas, breast changes, GI upset, headaches, and depression.  The patient verbalized understanding of the proper use and possible adverse effects of OCPs. All of the patient's questions and concerns were addressed.
Xolair Counseling:  Patient informed of potential adverse effects including but not limited to fever, muscle aches, rash and allergic reactions.  The patient verbalized understanding of the proper use and possible adverse effects of Xolair.  All of the patient's questions and concerns were addressed.
Azithromycin Counseling:  I discussed with the patient the risks of azithromycin including but not limited to GI upset, allergic reaction, drug rash, diarrhea, and yeast infections.
Bexarotene Pregnancy And Lactation Text: This medication is Pregnancy Category X and should not be given to women who are pregnant or may become pregnant. This medication should not be used if you are breast feeding.
Ilumya Counseling: I discussed with the patient the risks of tildrakizumab including but not limited to immunosuppression, malignancy, posterior leukoencephalopathy syndrome, and serious infections.  The patient understands that monitoring is required including a PPD at baseline and must alert us or the primary physician if symptoms of infection or other concerning signs are noted.
Wartpeel Counseling:  I discussed with the patient the risks of Wartpeel including but not limited to erythema, scaling, itching, weeping, crusting, and pain.
Azithromycin Pregnancy And Lactation Text: This medication is considered safe during pregnancy and is also secreted in breast milk.
Xolair Pregnancy And Lactation Text: This medication is Pregnancy Category B and is considered safe during pregnancy. This medication is excreted in breast milk.
Tetracycline Counseling: Patient counseled regarding possible photosensitivity and increased risk for sunburn.  Patient instructed to avoid sunlight, if possible.  When exposed to sunlight, patients should wear protective clothing, sunglasses, and sunscreen.  The patient was instructed to call the office immediately if the following severe adverse effects occur:  hearing changes, easy bruising/bleeding, severe headache, or vision changes.  The patient verbalized understanding of the proper use and possible adverse effects of tetracycline.  All of the patient's questions and concerns were addressed. Patient understands to avoid pregnancy while on therapy due to potential birth defects.
Hydroxyzine Pregnancy And Lactation Text: This medication is not safe during pregnancy and should not be taken. It is also excreted in breast milk and breast feeding isn't recommended.
Finasteride Pregnancy And Lactation Text: This medication is absolutely contraindicated during pregnancy. It is unknown if it is excreted in breast milk.
Isotretinoin Counseling: Patient should get monthly blood tests, not donate blood, not drive at night if vision affected, not share medication, and not undergo elective surgery for 6 months after tx completed. Side effects reviewed, pt to contact office should one occur.
Birth Control Pills Pregnancy And Lactation Text: This medication should be avoided if pregnant and for the first 30 days post-partum.
Gabapentin Counseling: I discussed with the patient the risks of gabapentin including but not limited to dizziness, somnolence, fatigue and ataxia.
Isotretinoin Pregnancy And Lactation Text: This medication is Pregnancy Category X and is considered extremely dangerous during pregnancy. It is unknown if it is excreted in breast milk.
Bactrim Counseling:  I discussed with the patient the risks of sulfa antibiotics including but not limited to GI upset, allergic reaction, drug rash, diarrhea, dizziness, photosensitivity, and yeast infections.  Rarely, more serious reactions can occur including but not limited to aplastic anemia, agranulocytosis, methemoglobinemia, blood dyscrasias, liver or kidney failure, lung infiltrates or desquamative/blistering drug rashes.
Albendazole Counseling:  I discussed with the patient the risks of albendazole including but not limited to cytopenia, kidney damage, nausea/vomiting and severe allergy.  The patient understands that this medication is being used in an off-label manner.
Infliximab Counseling:  I discussed with the patient the risks of infliximab including but not limited to myelosuppression, immunosuppression, autoimmune hepatitis, demyelinating diseases, lymphoma, and serious infections.  The patient understands that monitoring is required including a PPD at baseline and must alert us or the primary physician if symptoms of infection or other concerning signs are noted.
Bactrim Pregnancy And Lactation Text: This medication is Pregnancy Category D and is known to cause fetal risk.  It is also excreted in breast milk.
Imiquimod Counseling:  I discussed with the patient the risks of imiquimod including but not limited to erythema, scaling, itching, weeping, crusting, and pain.  Patient understands that the inflammatory response to imiquimod is variable from person to person and was educated regarded proper titration schedule.  If flu-like symptoms develop, patient knows to discontinue the medication and contact us.
Zyclara Counseling:  I discussed with the patient the risks of imiquimod including but not limited to erythema, scaling, itching, weeping, crusting, and pain.  Patient understands that the inflammatory response to imiquimod is variable from person to person and was educated regarded proper titration schedule.  If flu-like symptoms develop, patient knows to discontinue the medication and contact us.
Spironolactone Counseling: Patient advised regarding risks of diarrhea, abdominal pain, hyperkalemia, birth defects (for female patients), liver toxicity and renal toxicity. The patient may need blood work to monitor liver and kidney function and potassium levels while on therapy. The patient verbalized understanding of the proper use and possible adverse effects of spironolactone.  All of the patient's questions and concerns were addressed.
Azathioprine Counseling:  I discussed with the patient the risks of azathioprine including but not limited to myelosuppression, immunosuppression, hepatotoxicity, lymphoma, and infections.  The patient understands that monitoring is required including baseline LFTs, Creatinine, possible TPMP genotyping and weekly CBCs for the first month and then every 2 weeks thereafter.  The patient verbalized understanding of the proper use and possible adverse effects of azathioprine.  All of the patient's questions and concerns were addressed.
High Dose Vitamin A Counseling: Side effects reviewed, pt to contact office should one occur.
Glycopyrrolate Counseling:  I discussed with the patient the risks of glycopyrrolate including but not limited to skin rash, drowsiness, dry mouth, difficulty urinating, and blurred vision.
Cephalexin Counseling: I counseled the patient regarding use of cephalexin as an antibiotic for prophylactic and/or therapeutic purposes. Cephalexin (commonly prescribed under brand name Keflex) is a cephalosporin antibiotic which is active against numerous classes of bacteria, including most skin bacteria. Side effects may include nausea, diarrhea, gastrointestinal upset, rash, hives, yeast infections, and in rare cases, hepatitis, kidney disease, seizures, fever, confusion, neurologic symptoms, and others. Patients with severe allergies to penicillin medications are cautioned that there is about a 10% incidence of cross-reactivity with cephalosporins. When possible, patients with penicillin allergies should use alternatives to cephalosporins for antibiotic therapy.
Fluconazole Counseling:  Patient counseled regarding adverse effects of fluconazole including but not limited to headache, diarrhea, nausea, upset stomach, liver function test abnormalities, taste disturbance, and stomach pain.  There is a rare possibility of liver failure that can occur when taking fluconazole.  The patient understands that monitoring of LFTs and kidney function test may be required, especially at baseline. The patient verbalized understanding of the proper use and possible adverse effects of fluconazole.  All of the patient's questions and concerns were addressed.
Spironolactone Pregnancy And Lactation Text: This medication can cause feminization of the male fetus and should be avoided during pregnancy. The active metabolite is also found in breast milk.

## 2019-08-13 NOTE — PROCEDURE: COUNSELING
Patient Specific Counseling (Will Not Stick From Patient To Patient): She has triamcinolone at home which she will use on these areas for itching.
Detail Level: Detailed
Patient Specific Counseling (Will Not Stick From Patient To Patient): This area has previously been treated with liquid nitrogen and did not resolve. We will have her treat with imiquimod as well.
Detail Level: Simple

## 2019-08-19 ENCOUNTER — APPOINTMENT (RX ONLY)
Dept: URBAN - METROPOLITAN AREA CLINIC 36 | Facility: CLINIC | Age: 84
Setting detail: DERMATOLOGY
End: 2019-08-19

## 2019-08-19 ENCOUNTER — RX ONLY (OUTPATIENT)
Age: 84
Setting detail: RX ONLY
End: 2019-08-19

## 2019-08-19 DIAGNOSIS — L57.0 ACTINIC KERATOSIS: ICD-10-CM

## 2019-08-19 PROCEDURE — ? COUNSELING

## 2019-08-19 PROCEDURE — 99024 POSTOP FOLLOW-UP VISIT: CPT

## 2019-08-19 PROCEDURE — ? ADDITIONAL NOTES

## 2019-08-19 RX ORDER — FLUOCINOLONE ACETONIDE 0.25 MG/G
CREAM TOPICAL
Qty: 1 | Refills: 1 | Status: ERX

## 2019-08-19 NOTE — PROCEDURE: ADDITIONAL NOTES
Additional Notes: Pt presented with red, irritated, ulcerations on bilateral lower legs.  Pt states that her pain level is between a four and a ten on a 1-10 pain scale. Wounds look clean, no signs of infection present. Mohs site was redressed with Vaseline and telfa. Pt will follow up with her primary dermatologist in one month.
Detail Level: Simple

## 2019-08-19 NOTE — HPI: BODY LOCATION - LEG
How Severe Is Your Condition?: severe
Additional History: Pt presents with red, irritated, sores in bilateral lower legs following [place] PDT. The patient states that the pain continues to be between a 4 and 7 on a 1 to 10 pain scale.  \\n

## 2019-09-25 ENCOUNTER — APPOINTMENT (RX ONLY)
Dept: URBAN - METROPOLITAN AREA CLINIC 4 | Facility: CLINIC | Age: 84
Setting detail: DERMATOLOGY
End: 2019-09-25

## 2019-09-25 DIAGNOSIS — L57.0 ACTINIC KERATOSIS: ICD-10-CM

## 2019-09-25 DIAGNOSIS — Z85.828 PERSONAL HISTORY OF OTHER MALIGNANT NEOPLASM OF SKIN: ICD-10-CM

## 2019-09-25 PROBLEM — D48.5 NEOPLASM OF UNCERTAIN BEHAVIOR OF SKIN: Status: ACTIVE | Noted: 2019-09-25

## 2019-09-25 PROBLEM — C44.729 SQUAMOUS CELL CARCINOMA OF SKIN OF LEFT LOWER LIMB, INCLUDING HIP: Status: ACTIVE | Noted: 2019-09-25

## 2019-09-25 PROBLEM — C44.722 SQUAMOUS CELL CARCINOMA OF SKIN OF RIGHT LOWER LIMB, INCLUDING HIP: Status: ACTIVE | Noted: 2019-09-25

## 2019-09-25 PROCEDURE — 99213 OFFICE O/P EST LOW 20 MIN: CPT | Mod: 25

## 2019-09-25 PROCEDURE — ? LIQUID NITROGEN

## 2019-09-25 PROCEDURE — ? OBSERVATION

## 2019-09-25 PROCEDURE — 17003 DESTRUCT PREMALG LES 2-14: CPT

## 2019-09-25 PROCEDURE — 17000 DESTRUCT PREMALG LESION: CPT

## 2019-09-25 PROCEDURE — ? DIAGNOSIS COMMENT

## 2019-09-25 PROCEDURE — ? ADDITIONAL NOTES

## 2019-09-25 ASSESSMENT — LOCATION DETAILED DESCRIPTION DERM
LOCATION DETAILED: LEFT NASAL SIDEWALL
LOCATION DETAILED: RIGHT LATERAL PROXIMAL PRETIBIAL REGION
LOCATION DETAILED: LEFT DISTAL DORSAL FOREARM
LOCATION DETAILED: PHILTRUM

## 2019-09-25 ASSESSMENT — LOCATION SIMPLE DESCRIPTION DERM
LOCATION SIMPLE: LEFT NOSE
LOCATION SIMPLE: UPPER LIP
LOCATION SIMPLE: RIGHT PRETIBIAL REGION
LOCATION SIMPLE: LEFT FOREARM

## 2019-09-25 ASSESSMENT — LOCATION ZONE DERM
LOCATION ZONE: NOSE
LOCATION ZONE: LEG
LOCATION ZONE: ARM
LOCATION ZONE: LIP

## 2019-09-25 NOTE — PROCEDURE: ADDITIONAL NOTES
Detail Level: Detailed
Additional Notes: She also has 2 lesions that are suspicious for skin cancer that she will treat with imiquimod as well. Photos taken today.

## 2019-09-25 NOTE — PROCEDURE: LIQUID NITROGEN
Render Note In Bullet Format When Appropriate: No
Consent: The patient's consent was obtained including but not limited to risks of crusting, scabbing, blistering, scarring, darker or lighter pigmentary change, recurrence, incomplete removal and infection.
Post-Care Instructions: I reviewed with the patient in detail post-care instructions. Patient is to wear sunprotection, and avoid picking at any of the treated lesions. Pt may apply Vaseline to crusted or scabbing areas.
Detail Level: Detailed
Duration Of Freeze Thaw-Cycle (Seconds): 5
Number Of Freeze-Thaw Cycles: 1 freeze-thaw cycle

## 2019-09-25 NOTE — HPI: SKIN LESION (ACTINIC KERATOSES)
Is This A New Presentation, Or A Follow-Up?: Actinic Keratosis
Additional History: She was advised to treat with imiquimod but she did not. She also states previously treated with liquid nitrogen.

## 2019-11-20 ENCOUNTER — APPOINTMENT (RX ONLY)
Dept: URBAN - METROPOLITAN AREA CLINIC 4 | Facility: CLINIC | Age: 84
Setting detail: DERMATOLOGY
End: 2019-11-20

## 2019-11-20 DIAGNOSIS — L57.0 ACTINIC KERATOSIS: ICD-10-CM

## 2019-11-20 DIAGNOSIS — Z09 ENCOUNTER FOR FOLLOW-UP EXAMINATION AFTER COMPLETED TREATMENT FOR CONDITIONS OTHER THAN MALIGNANT NEOPLASM: ICD-10-CM

## 2019-11-20 PROBLEM — D48.5 NEOPLASM OF UNCERTAIN BEHAVIOR OF SKIN: Status: ACTIVE | Noted: 2019-11-20

## 2019-11-20 PROCEDURE — 11102 TANGNTL BX SKIN SINGLE LES: CPT

## 2019-11-20 PROCEDURE — ? OBSERVATION

## 2019-11-20 PROCEDURE — 11103 TANGNTL BX SKIN EA SEP/ADDL: CPT

## 2019-11-20 PROCEDURE — 99212 OFFICE O/P EST SF 10 MIN: CPT | Mod: 25

## 2019-11-20 PROCEDURE — ? LIQUID NITROGEN

## 2019-11-20 PROCEDURE — ? BIOPSY BY SHAVE METHOD

## 2019-11-20 PROCEDURE — 17000 DESTRUCT PREMALG LESION: CPT | Mod: 59

## 2019-11-20 ASSESSMENT — LOCATION SIMPLE DESCRIPTION DERM
LOCATION SIMPLE: RIGHT PRETIBIAL REGION
LOCATION SIMPLE: LEFT PRETIBIAL REGION
LOCATION SIMPLE: LEFT PRETIBIAL REGION

## 2019-11-20 ASSESSMENT — LOCATION DETAILED DESCRIPTION DERM
LOCATION DETAILED: RIGHT PROXIMAL PRETIBIAL REGION
LOCATION DETAILED: RIGHT MEDIAL DISTAL PRETIBIAL REGION
LOCATION DETAILED: LEFT DISTAL PRETIBIAL REGION
LOCATION DETAILED: LEFT DISTAL PRETIBIAL REGION
LOCATION DETAILED: LEFT LATERAL DISTAL PRETIBIAL REGION

## 2019-11-20 ASSESSMENT — LOCATION ZONE DERM
LOCATION ZONE: LEG
LOCATION ZONE: LEG

## 2019-11-20 NOTE — PROCEDURE: BIOPSY BY SHAVE METHOD
Lab: [id number]
Billing Type: Third-Party Bill
Curettage Text: The wound bed was treated with curettage after the biopsy was performed.
Biopsy Method: Personna blade
Detail Level: Detailed
Render In Bullet Format When Appropriate: No
Hemostasis: Drysol and Electrocautery
Notification Instructions: Patient will be notified of biopsy results. However, patient instructed to call the office if not contacted within 2 weeks.
Wound Care: Vaseline
Lab Facility: 
Additional Anesthesia Volume In Cc (Will Not Render If 0): 0
Anesthesia Type: 1% lidocaine with epinephrine and a 1:10 solution of 8.4% sodium bicarbonate
Post-Care Instructions: I reviewed with the patient in detail post-care instructions. Patient is to keep the biopsy site dry overnight, and then apply vasaline twice daily until healed.
Biopsy Type: H and E
Consent: Written consent was obtained and risks were reviewed including but not limited to scarring, infection, bleeding, scabbing, incomplete removal, nerve damage and allergy to anesthesia.
Electrodesiccation Text: The wound bed was treated with electrodesiccation after the biopsy was performed.
Depth Of Biopsy: dermis
Anticipated Plan (Based On Presumed Biopsy Results): Rechk in 2 mos if +
Was A Bandage Applied: Yes
Dressing: Band-Aid
Electrodesiccation And Curettage Text: The wound bed was treated with electrodesiccation and curettage after the biopsy was performed.
Type Of Destruction Used: Curettage
Anesthesia Volume In Cc: 0.5
Anticipated Plan (Based On Presumed Biopsy Results): recheck in 2 months if +

## 2019-11-20 NOTE — PROCEDURE: LIQUID NITROGEN
Duration Of Freeze Thaw-Cycle (Seconds): 5
Number Of Freeze-Thaw Cycles: 1 freeze-thaw cycle
Consent: The patient's consent was obtained including but not limited to risks of crusting, scabbing, blistering, scarring, darker or lighter pigmentary change, recurrence, incomplete removal and infection.
Post-Care Instructions: I reviewed with the patient in detail post-care instructions. Patient is to wear sunprotection, and avoid picking at any of the treated lesions. Pt may apply Vaseline to crusted or scabbing areas.
Detail Level: Detailed
Render Note In Bullet Format When Appropriate: No

## 2020-01-29 ENCOUNTER — APPOINTMENT (RX ONLY)
Dept: URBAN - METROPOLITAN AREA CLINIC 4 | Facility: CLINIC | Age: 85
Setting detail: DERMATOLOGY
End: 2020-01-29

## 2020-01-29 DIAGNOSIS — L81.4 OTHER MELANIN HYPERPIGMENTATION: ICD-10-CM

## 2020-01-29 DIAGNOSIS — Z85.828 PERSONAL HISTORY OF OTHER MALIGNANT NEOPLASM OF SKIN: ICD-10-CM

## 2020-01-29 DIAGNOSIS — L57.0 ACTINIC KERATOSIS: ICD-10-CM

## 2020-01-29 PROBLEM — C44.729 SQUAMOUS CELL CARCINOMA OF SKIN OF LEFT LOWER LIMB, INCLUDING HIP: Status: ACTIVE | Noted: 2020-01-29

## 2020-01-29 PROBLEM — C44.722 SQUAMOUS CELL CARCINOMA OF SKIN OF RIGHT LOWER LIMB, INCLUDING HIP: Status: ACTIVE | Noted: 2020-01-29

## 2020-01-29 PROCEDURE — 17000 DESTRUCT PREMALG LESION: CPT

## 2020-01-29 PROCEDURE — 99213 OFFICE O/P EST LOW 20 MIN: CPT | Mod: 25

## 2020-01-29 PROCEDURE — ? OBSERVATION

## 2020-01-29 PROCEDURE — 17003 DESTRUCT PREMALG LES 2-14: CPT

## 2020-01-29 PROCEDURE — ? LIQUID NITROGEN

## 2020-01-29 ASSESSMENT — LOCATION SIMPLE DESCRIPTION DERM
LOCATION SIMPLE: LEFT POSTERIOR THIGH
LOCATION SIMPLE: LEFT LOWER LEG
LOCATION SIMPLE: RIGHT POSTERIOR THIGH
LOCATION SIMPLE: RIGHT ANKLE
LOCATION SIMPLE: LEFT ANTERIOR NECK
LOCATION SIMPLE: LEFT CHEEK

## 2020-01-29 ASSESSMENT — LOCATION DETAILED DESCRIPTION DERM
LOCATION DETAILED: RIGHT PROXIMAL LATERAL POSTERIOR THIGH
LOCATION DETAILED: RIGHT DISTAL POSTERIOR THIGH
LOCATION DETAILED: RIGHT POSTERIOR ANKLE
LOCATION DETAILED: LEFT DISTAL POSTERIOR THIGH
LOCATION DETAILED: LEFT LATERAL DISTAL CALF
LOCATION DETAILED: LEFT CENTRAL MANDIBULAR CHEEK
LOCATION DETAILED: LEFT INFERIOR CENTRAL MALAR CHEEK
LOCATION DETAILED: LEFT INFERIOR ANTERIOR NECK

## 2020-01-29 ASSESSMENT — LOCATION ZONE DERM
LOCATION ZONE: LEG
LOCATION ZONE: NECK
LOCATION ZONE: FACE

## 2020-05-18 ENCOUNTER — RX ONLY (OUTPATIENT)
Age: 85
Setting detail: RX ONLY
End: 2020-05-18

## 2020-05-18 RX ORDER — TRIAMCINOLONE ACETONIDE 1 MG/G
ONE CREAM TOPICAL BID
Qty: 1 | Refills: 3 | Status: ERX | COMMUNITY
Start: 2020-05-18

## 2020-05-19 ENCOUNTER — APPOINTMENT (OUTPATIENT)
Dept: RADIOLOGY | Facility: MEDICAL CENTER | Age: 85
End: 2020-05-19
Attending: EMERGENCY MEDICINE
Payer: MEDICARE

## 2020-05-19 ENCOUNTER — HOSPITAL ENCOUNTER (OUTPATIENT)
Facility: MEDICAL CENTER | Age: 85
End: 2020-05-21
Attending: EMERGENCY MEDICINE | Admitting: HOSPITALIST
Payer: MEDICARE

## 2020-05-19 DIAGNOSIS — I48.91 ATRIAL FIBRILLATION WITH RAPID VENTRICULAR RESPONSE (HCC): ICD-10-CM

## 2020-05-19 DIAGNOSIS — R06.02 SOB (SHORTNESS OF BREATH): ICD-10-CM

## 2020-05-19 DIAGNOSIS — R60.0 BILATERAL LOWER EXTREMITY EDEMA: ICD-10-CM

## 2020-05-19 PROBLEM — R79.89 RENAL AZOTEMIA: Status: ACTIVE | Noted: 2020-05-19

## 2020-05-19 PROBLEM — E87.1 HYPONATREMIA: Status: ACTIVE | Noted: 2020-05-19

## 2020-05-19 PROBLEM — R73.9 HYPERGLYCEMIA: Status: ACTIVE | Noted: 2020-05-19

## 2020-05-19 PROBLEM — I50.21 ACUTE SYSTOLIC HEART FAILURE (HCC): Status: ACTIVE | Noted: 2020-05-19

## 2020-05-19 PROBLEM — J44.9 COPD (CHRONIC OBSTRUCTIVE PULMONARY DISEASE) (HCC): Status: ACTIVE | Noted: 2020-05-19

## 2020-05-19 PROBLEM — F32.A DEPRESSION: Status: ACTIVE | Noted: 2020-05-19

## 2020-05-19 LAB
ALBUMIN SERPL BCP-MCNC: 4.1 G/DL (ref 3.2–4.9)
ALBUMIN/GLOB SERPL: 1.4 G/DL
ALP SERPL-CCNC: 97 U/L (ref 30–99)
ALT SERPL-CCNC: 17 U/L (ref 2–50)
ANION GAP SERPL CALC-SCNC: 11 MMOL/L (ref 7–16)
APTT PPP: 31.4 SEC (ref 24.7–36)
AST SERPL-CCNC: 23 U/L (ref 12–45)
BASOPHILS # BLD AUTO: 0.2 % (ref 0–1.8)
BASOPHILS # BLD: 0.02 K/UL (ref 0–0.12)
BILIRUB SERPL-MCNC: 0.3 MG/DL (ref 0.1–1.5)
BUN SERPL-MCNC: 16 MG/DL (ref 8–22)
CALCIUM SERPL-MCNC: 9.9 MG/DL (ref 8.5–10.5)
CHLORIDE SERPL-SCNC: 97 MMOL/L (ref 96–112)
CO2 SERPL-SCNC: 24 MMOL/L (ref 20–33)
COVID ORDER STATUS COVID19: NORMAL
CREAT SERPL-MCNC: 1.21 MG/DL (ref 0.5–1.4)
EKG IMPRESSION: NORMAL
EOSINOPHIL # BLD AUTO: 0.32 K/UL (ref 0–0.51)
EOSINOPHIL NFR BLD: 3.5 % (ref 0–6.9)
ERYTHROCYTE [DISTWIDTH] IN BLOOD BY AUTOMATED COUNT: 39.8 FL (ref 35.9–50)
GLOBULIN SER CALC-MCNC: 3 G/DL (ref 1.9–3.5)
GLUCOSE SERPL-MCNC: 154 MG/DL (ref 65–99)
HCT VFR BLD AUTO: 40 % (ref 37–47)
HGB BLD-MCNC: 13 G/DL (ref 12–16)
IMM GRANULOCYTES # BLD AUTO: 0.02 K/UL (ref 0–0.11)
IMM GRANULOCYTES NFR BLD AUTO: 0.2 % (ref 0–0.9)
INR PPP: 0.99 (ref 0.87–1.13)
LYMPHOCYTES # BLD AUTO: 2.21 K/UL (ref 1–4.8)
LYMPHOCYTES NFR BLD: 24.4 % (ref 22–41)
MCH RBC QN AUTO: 28.8 PG (ref 27–33)
MCHC RBC AUTO-ENTMCNC: 32.5 G/DL (ref 33.6–35)
MCV RBC AUTO: 88.7 FL (ref 81.4–97.8)
MONOCYTES # BLD AUTO: 0.4 K/UL (ref 0–0.85)
MONOCYTES NFR BLD AUTO: 4.4 % (ref 0–13.4)
NEUTROPHILS # BLD AUTO: 6.1 K/UL (ref 2–7.15)
NEUTROPHILS NFR BLD: 67.3 % (ref 44–72)
NRBC # BLD AUTO: 0 K/UL
NRBC BLD-RTO: 0 /100 WBC
NT-PROBNP SERPL IA-MCNC: 1051 PG/ML (ref 0–125)
NT-PROBNP SERPL IA-MCNC: 1312 PG/ML (ref 0–125)
PLATELET # BLD AUTO: 238 K/UL (ref 164–446)
PMV BLD AUTO: 10.3 FL (ref 9–12.9)
POTASSIUM SERPL-SCNC: 4.1 MMOL/L (ref 3.6–5.5)
PROT SERPL-MCNC: 7.1 G/DL (ref 6–8.2)
PROTHROMBIN TIME: 13.3 SEC (ref 12–14.6)
RBC # BLD AUTO: 4.51 M/UL (ref 4.2–5.4)
SARS-COV-2 RNA RESP QL NAA+PROBE: NOTDETECTED
SODIUM SERPL-SCNC: 132 MMOL/L (ref 135–145)
SPECIMEN SOURCE: NORMAL
TROPONIN T SERPL-MCNC: 26 NG/L (ref 6–19)
TROPONIN T SERPL-MCNC: 29 NG/L (ref 6–19)
TROPONIN T SERPL-MCNC: 32 NG/L (ref 6–19)
WBC # BLD AUTO: 9.1 K/UL (ref 4.8–10.8)

## 2020-05-19 PROCEDURE — 93971 EXTREMITY STUDY: CPT | Mod: LT

## 2020-05-19 PROCEDURE — 93971 EXTREMITY STUDY: CPT | Mod: 26,LT | Performed by: INTERNAL MEDICINE

## 2020-05-19 PROCEDURE — 36415 COLL VENOUS BLD VENIPUNCTURE: CPT | Mod: XU

## 2020-05-19 PROCEDURE — 93005 ELECTROCARDIOGRAM TRACING: CPT | Performed by: EMERGENCY MEDICINE

## 2020-05-19 PROCEDURE — U0004 COV-19 TEST NON-CDC HGH THRU: HCPCS

## 2020-05-19 PROCEDURE — G0378 HOSPITAL OBSERVATION PER HR: HCPCS

## 2020-05-19 PROCEDURE — 99285 EMERGENCY DEPT VISIT HI MDM: CPT

## 2020-05-19 PROCEDURE — C9803 HOPD COVID-19 SPEC COLLECT: HCPCS | Performed by: EMERGENCY MEDICINE

## 2020-05-19 PROCEDURE — 85025 COMPLETE CBC W/AUTO DIFF WBC: CPT

## 2020-05-19 PROCEDURE — 96374 THER/PROPH/DIAG INJ IV PUSH: CPT

## 2020-05-19 PROCEDURE — 99218 PR INITIAL OBSERVATION CARE,LEVL I: CPT | Performed by: HOSPITALIST

## 2020-05-19 PROCEDURE — 700102 HCHG RX REV CODE 250 W/ 637 OVERRIDE(OP): Performed by: HOSPITALIST

## 2020-05-19 PROCEDURE — A9270 NON-COVERED ITEM OR SERVICE: HCPCS | Performed by: HOSPITALIST

## 2020-05-19 PROCEDURE — 700111 HCHG RX REV CODE 636 W/ 250 OVERRIDE (IP): Performed by: EMERGENCY MEDICINE

## 2020-05-19 PROCEDURE — 71045 X-RAY EXAM CHEST 1 VIEW: CPT

## 2020-05-19 PROCEDURE — 36415 COLL VENOUS BLD VENIPUNCTURE: CPT

## 2020-05-19 PROCEDURE — 85610 PROTHROMBIN TIME: CPT

## 2020-05-19 PROCEDURE — 80053 COMPREHEN METABOLIC PANEL: CPT

## 2020-05-19 PROCEDURE — 84484 ASSAY OF TROPONIN QUANT: CPT | Mod: 91

## 2020-05-19 PROCEDURE — 83880 ASSAY OF NATRIURETIC PEPTIDE: CPT

## 2020-05-19 PROCEDURE — 85730 THROMBOPLASTIN TIME PARTIAL: CPT

## 2020-05-19 RX ORDER — ONDANSETRON 4 MG/1
4 TABLET, ORALLY DISINTEGRATING ORAL EVERY 4 HOURS PRN
Status: DISCONTINUED | OUTPATIENT
Start: 2020-05-19 | End: 2020-05-19

## 2020-05-19 RX ORDER — ONDANSETRON 2 MG/ML
4 INJECTION INTRAMUSCULAR; INTRAVENOUS EVERY 4 HOURS PRN
Status: DISCONTINUED | OUTPATIENT
Start: 2020-05-19 | End: 2020-05-21 | Stop reason: HOSPADM

## 2020-05-19 RX ORDER — GABAPENTIN 100 MG/1
100 CAPSULE ORAL 3 TIMES DAILY
Status: DISCONTINUED | OUTPATIENT
Start: 2020-05-19 | End: 2020-05-21 | Stop reason: HOSPADM

## 2020-05-19 RX ORDER — FUROSEMIDE 10 MG/ML
40 INJECTION INTRAMUSCULAR; INTRAVENOUS ONCE
Status: COMPLETED | OUTPATIENT
Start: 2020-05-19 | End: 2020-05-19

## 2020-05-19 RX ORDER — ACETAMINOPHEN 325 MG/1
650 TABLET ORAL EVERY 6 HOURS PRN
Status: DISCONTINUED | OUTPATIENT
Start: 2020-05-19 | End: 2020-05-21 | Stop reason: HOSPADM

## 2020-05-19 RX ORDER — GABAPENTIN 300 MG/1
300-600 CAPSULE ORAL 2 TIMES DAILY
Status: ON HOLD | COMMUNITY
End: 2023-11-20 | Stop reason: SDUPTHER

## 2020-05-19 RX ORDER — AMOXICILLIN 250 MG
2 CAPSULE ORAL 2 TIMES DAILY
Status: DISCONTINUED | OUTPATIENT
Start: 2020-05-19 | End: 2020-05-21 | Stop reason: HOSPADM

## 2020-05-19 RX ORDER — METOPROLOL SUCCINATE 50 MG/1
50 TABLET, EXTENDED RELEASE ORAL
Status: DISCONTINUED | OUTPATIENT
Start: 2020-05-19 | End: 2020-05-21 | Stop reason: HOSPADM

## 2020-05-19 RX ORDER — CELECOXIB 200 MG/1
200 CAPSULE ORAL
Status: ON HOLD | COMMUNITY
End: 2020-05-20

## 2020-05-19 RX ORDER — BUSPIRONE HYDROCHLORIDE 10 MG/1
7.5 TABLET ORAL 2 TIMES DAILY PRN
COMMUNITY
End: 2023-11-03

## 2020-05-19 RX ORDER — ATENOLOL 25 MG/1
25 TABLET ORAL DAILY
COMMUNITY
End: 2020-05-19

## 2020-05-19 RX ORDER — HYDROCODONE BITARTRATE AND ACETAMINOPHEN 5; 325 MG/1; MG/1
1 TABLET ORAL EVERY 8 HOURS PRN
Status: DISCONTINUED | OUTPATIENT
Start: 2020-05-19 | End: 2020-05-21 | Stop reason: HOSPADM

## 2020-05-19 RX ORDER — POLYETHYLENE GLYCOL 3350 17 G/17G
1 POWDER, FOR SOLUTION ORAL
Status: DISCONTINUED | OUTPATIENT
Start: 2020-05-19 | End: 2020-05-21 | Stop reason: HOSPADM

## 2020-05-19 RX ORDER — SPIRONOLACTONE 50 MG/1
50 TABLET, FILM COATED ORAL DAILY
COMMUNITY
End: 2023-11-03

## 2020-05-19 RX ORDER — SPIRONOLACTONE 25 MG/1
50 TABLET ORAL DAILY
Status: DISCONTINUED | OUTPATIENT
Start: 2020-05-20 | End: 2020-05-21 | Stop reason: HOSPADM

## 2020-05-19 RX ORDER — POLYETHYLENE GLYCOL 3350 17 G/17G
1 POWDER, FOR SOLUTION ORAL PRN
Status: DISCONTINUED | OUTPATIENT
Start: 2020-05-19 | End: 2020-05-19

## 2020-05-19 RX ORDER — CHOLECALCIFEROL (VITAMIN D3) 125 MCG
1000 CAPSULE ORAL DAILY
COMMUNITY
End: 2020-05-19

## 2020-05-19 RX ORDER — CHLORAL HYDRATE 500 MG
1000 CAPSULE ORAL DAILY
Status: DISCONTINUED | OUTPATIENT
Start: 2020-05-20 | End: 2020-05-21 | Stop reason: HOSPADM

## 2020-05-19 RX ORDER — ONDANSETRON 4 MG/1
4 TABLET, ORALLY DISINTEGRATING ORAL EVERY 4 HOURS PRN
Status: DISCONTINUED | OUTPATIENT
Start: 2020-05-19 | End: 2020-05-21 | Stop reason: HOSPADM

## 2020-05-19 RX ORDER — CELECOXIB 200 MG/1
200 CAPSULE ORAL
Status: DISCONTINUED | OUTPATIENT
Start: 2020-05-19 | End: 2020-05-20

## 2020-05-19 RX ORDER — BUSPIRONE HYDROCHLORIDE 5 MG/1
7.5 TABLET ORAL 2 TIMES DAILY
Status: DISCONTINUED | OUTPATIENT
Start: 2020-05-19 | End: 2020-05-21 | Stop reason: HOSPADM

## 2020-05-19 RX ORDER — POLYETHYLENE GLYCOL 3350 17 G/17G
17 POWDER, FOR SOLUTION ORAL PRN
COMMUNITY
End: 2023-11-03

## 2020-05-19 RX ORDER — MULTIVIT WITH MINERALS/LUTEIN
TABLET ORAL
COMMUNITY
End: 2020-05-29

## 2020-05-19 RX ORDER — BISACODYL 10 MG
10 SUPPOSITORY, RECTAL RECTAL
Status: DISCONTINUED | OUTPATIENT
Start: 2020-05-19 | End: 2020-05-21 | Stop reason: HOSPADM

## 2020-05-19 RX ORDER — ACETAMINOPHEN AND CODEINE PHOSPHATE 300; 60 MG/1; MG/1
1 TABLET ORAL 3 TIMES DAILY PRN
COMMUNITY

## 2020-05-19 RX ORDER — CHLORAL HYDRATE 500 MG
1280 CAPSULE ORAL DAILY
COMMUNITY
End: 2023-11-03

## 2020-05-19 RX ADMIN — RIVAROXABAN 15 MG: 15 TABLET, FILM COATED ORAL at 20:35

## 2020-05-19 RX ADMIN — CELECOXIB 200 MG: 200 CAPSULE ORAL at 20:35

## 2020-05-19 RX ADMIN — DOCUSATE SODIUM 50 MG AND SENNOSIDES 8.6 MG 2 TABLET: 8.6; 5 TABLET, FILM COATED ORAL at 17:39

## 2020-05-19 RX ADMIN — FUROSEMIDE 40 MG: 10 INJECTION, SOLUTION INTRAMUSCULAR; INTRAVENOUS at 14:09

## 2020-05-19 RX ADMIN — ACETAMINOPHEN 650 MG: 325 TABLET, FILM COATED ORAL at 22:31

## 2020-05-19 RX ADMIN — METOPROLOL SUCCINATE 50 MG: 50 TABLET, EXTENDED RELEASE ORAL at 17:39

## 2020-05-19 RX ADMIN — GABAPENTIN 100 MG: 100 CAPSULE ORAL at 17:39

## 2020-05-19 SDOH — HEALTH STABILITY: MENTAL HEALTH: HOW OFTEN DO YOU HAVE A DRINK CONTAINING ALCOHOL?: NEVER

## 2020-05-19 ASSESSMENT — CHA2DS2 SCORE
SEX: FEMALE
HYPERTENSION: YES
AGE 65 TO 74: NO
VASCULAR DISEASE: NO
CHF OR LEFT VENTRICULAR DYSFUNCTION: YES
DIABETES: NO
CHA2DS2 VASC SCORE: 5
PRIOR STROKE OR TIA OR THROMBOEMBOLISM: NO
AGE 75 OR GREATER: YES

## 2020-05-19 ASSESSMENT — PATIENT HEALTH QUESTIONNAIRE - PHQ9
1. LITTLE INTEREST OR PLEASURE IN DOING THINGS: NOT AT ALL
2. FEELING DOWN, DEPRESSED, IRRITABLE, OR HOPELESS: NOT AT ALL
SUM OF ALL RESPONSES TO PHQ9 QUESTIONS 1 AND 2: 0

## 2020-05-19 ASSESSMENT — ENCOUNTER SYMPTOMS
HEARTBURN: 0
ROS SKIN COMMENTS: BRUISING
VOMITING: 0
DOUBLE VISION: 0
DIZZINESS: 0
DIAPHORESIS: 0
PALPITATIONS: 1
BRUISES/BLEEDS EASILY: 0
FOCAL WEAKNESS: 0
LOSS OF CONSCIOUSNESS: 0
NAUSEA: 0
CHILLS: 0
HEADACHES: 0
COUGH: 0
EYES NEGATIVE: 1
ABDOMINAL PAIN: 0
WHEEZING: 0
NEUROLOGICAL NEGATIVE: 1
GASTROINTESTINAL NEGATIVE: 1
CONSTIPATION: 0
BLOOD IN STOOL: 0
SEIZURES: 0
DEPRESSION: 0
DIARRHEA: 0
FEVER: 0
PSYCHIATRIC NEGATIVE: 1
SHORTNESS OF BREATH: 1
NERVOUS/ANXIOUS: 0
HEMOPTYSIS: 0
MUSCULOSKELETAL NEGATIVE: 1

## 2020-05-19 ASSESSMENT — LIFESTYLE VARIABLES: SUBSTANCE_ABUSE: 0

## 2020-05-19 ASSESSMENT — FIBROSIS 4 INDEX: FIB4 SCORE: 1.92

## 2020-05-19 NOTE — ASSESSMENT & PLAN NOTE
Patient's hyponatremia is mild at 132 this most likely because of the heart failure.  We will at this point optimize fluid balance and monitor sodium levels.  At this point I am hesitant to give her any extra fluid as she is already fluid overloaded.  Instead we need diuresis and hopefully with the diuresis her sodium levels will be optimized.

## 2020-05-19 NOTE — H&P
Hospital Medicine History & Physical Note    Date of Service  5/19/2020    Primary Care Physician  Pcp Pt States None    Code Status  Full Code    Chief Complaint  Chief Complaint   Patient presents with   • Shortness of Breath   • Peripheral Edema       History of Presenting Illness  84 y.o. female who presented 5/19/2020 with worsening shortness of breath.  The patient says that her shortness of breath has been there for the past 2 weeks.  She says it might of been longer before that but she did not notice it.  Since she has been short of breath she has been putting a pulse oximeter on her finger to evaluate her oxygen saturation as well as incidentally she discovered that her heart rates been in the 140 range.  She says it is not always there but it periodically jumps up to that high.  The patient with that came to the emergency room because of worsening symptoms and at this point she is found to have atrial fibrillation with occasional rapid ventricular response thus at this point it is paroxysmal atrial fibrillation.  I have discussed with her the necessity for rate control as well as anticoagulation she agrees.  We will get an echocardiogram.  Depending on results will need a cardiology consultation.  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  For now patient will be admitted to the telemetric unit.  Will monitor cardiac markers.  We will also monitor telemetry monitoring of her heart rate.  We will also monitor intake and output.  We will also then optimize rate control as well as anticoagulation.    Review of Systems  Review of Systems   Constitutional: Positive for malaise/fatigue. Negative for chills, diaphoresis and fever.   HENT: Negative.    Eyes: Negative.  Negative for double vision.   Respiratory: Positive for shortness of breath. Negative for cough, hemoptysis and wheezing.    Cardiovascular: Positive for chest pain, palpitations and leg  swelling.   Gastrointestinal: Negative.  Negative for abdominal pain, blood in stool, constipation, diarrhea, heartburn, nausea and vomiting.   Genitourinary: Negative.  Negative for frequency, hematuria and urgency.   Musculoskeletal: Negative.  Negative for joint pain.   Skin: Negative for itching and rash.        Bruising   Neurological: Negative.  Negative for dizziness, focal weakness, seizures, loss of consciousness and headaches.   Endo/Heme/Allergies: Negative.  Does not bruise/bleed easily.   Psychiatric/Behavioral: Negative.  Negative for depression, substance abuse and suicidal ideas. The patient is not nervous/anxious.    All other systems reviewed and are negative.      Past Medical History   has a past medical history of Hypertension.    Surgical History   has a past surgical history that includes other.     Family History  family history is not on file.     Social History   reports that she has never smoked. She does not have any smokeless tobacco history on file. She reports that she does not drink alcohol or use drugs.    Allergies  No Known Allergies    Medications  Prior to Admission Medications   Prescriptions Last Dose Informant Patient Reported? Taking?   ALBUTEROL INH   Yes Yes   Sig: Inhale  by mouth.   Ascorbic Acid (VITAMIN C) 1000 MG Tab 5/18/2020 at Unknown time  Yes Yes   Sig: Take  by mouth.   Magnesium 500 MG Cap 5/18/2020 at Unknown time  Yes Yes   Sig: Take  by mouth 2 Times a Day.   Omega-3 1000 MG Cap 5/19/2020 at Unknown time  Yes Yes   Sig: Take 1,280 mg by mouth every day.   acetaminophen-codeine (TYLENOL/CODEINE #4) 300-60 MG per tablet 5/19/2020 at Unknown time  Yes Yes   Sig: Take  by mouth.   busPIRone (BUSPAR) 10 MG Tab tablet 5/19/2020 at Unknown time  Yes Yes   Sig: Take 7.5 mg by mouth 2 times a day.   celecoxib (CELEBREX) 200 MG Cap 5/18/2020 at Unknown time  Yes Yes   Sig: Take 200 mg by mouth every bedtime.   gabapentin (NEURONTIN) 100 MG Cap 5/18/2020 at Unknown time   Yes Yes   Sig: Take 100 mg by mouth every day.   polyethylene glycol/lytes (MIRALAX) Pack 5/18/2020 at Unknown time  Yes Yes   Sig: Take 17 g by mouth as needed.   spironolactone (ALDACTONE) 50 MG Tab 5/19/2020 at Unknown time  Yes Yes   Sig: Take 50 mg by mouth every day.      Facility-Administered Medications: None       Physical Exam  Temp:  [36.5 °C (97.7 °F)] 36.5 °C (97.7 °F)  Pulse:  [61-96] 96  Resp:  [16-35] 18  BP: (136-174)/(63-75) 143/63  SpO2:  [91 %-97 %] 91 %    Physical Exam  Vitals signs and nursing note reviewed.   Constitutional:       Appearance: Normal appearance. She is well-developed and normal weight.   HENT:      Head: Normocephalic and atraumatic.      Right Ear: External ear normal.      Left Ear: External ear normal.      Nose: Nose normal.      Mouth/Throat:      Mouth: Mucous membranes are moist.      Pharynx: Oropharynx is clear.   Eyes:      General:         Right eye: No discharge.         Left eye: No discharge.      Extraocular Movements: Extraocular movements intact.      Conjunctiva/sclera: Conjunctivae normal.      Pupils: Pupils are equal, round, and reactive to light.   Neck:      Musculoskeletal: Normal range of motion and neck supple.      Thyroid: No thyromegaly.      Vascular: No JVD.   Cardiovascular:      Rate and Rhythm: Normal rate and regular rhythm.      Pulses: Normal pulses.      Heart sounds: Normal heart sounds. No murmur.   Pulmonary:      Effort: No accessory muscle usage or prolonged expiration.      Breath sounds: Examination of the right-middle field reveals decreased breath sounds. Examination of the left-middle field reveals decreased breath sounds. Examination of the right-lower field reveals decreased breath sounds. Examination of the left-lower field reveals decreased breath sounds. Decreased breath sounds present. No wheezing or rales.   Chest:      Chest wall: No tenderness.   Abdominal:      General: Abdomen is flat. Bowel sounds are normal. There  is no distension.      Palpations: Abdomen is soft. There is no mass.      Tenderness: There is no abdominal tenderness. There is no guarding or rebound.   Musculoskeletal: Normal range of motion.         General: No tenderness.      Right lower leg: Edema present.      Left lower leg: Edema present.   Lymphadenopathy:      Cervical: No cervical adenopathy.   Skin:     General: Skin is warm and dry.      Capillary Refill: Capillary refill takes 2 to 3 seconds.      Findings: Bruising present. No erythema or rash.   Neurological:      General: No focal deficit present.      Mental Status: She is alert and oriented to person, place, and time. Mental status is at baseline.      GCS: GCS eye subscore is 4. GCS verbal subscore is 5. GCS motor subscore is 6.      Cranial Nerves: No cranial nerve deficit.      Deep Tendon Reflexes: Reflexes are normal and symmetric.   Psychiatric:         Mood and Affect: Mood normal.         Behavior: Behavior normal.         Thought Content: Thought content normal.         Judgment: Judgment normal.         Laboratory:  Recent Labs     05/19/20  1230   WBC 9.1   RBC 4.51   HEMOGLOBIN 13.0   HEMATOCRIT 40.0   MCV 88.7   MCH 28.8   MCHC 32.5*   RDW 39.8   PLATELETCT 238   MPV 10.3     Recent Labs     05/19/20  1230   SODIUM 132*   POTASSIUM 4.1   CHLORIDE 97   CO2 24   GLUCOSE 154*   BUN 16   CREATININE 1.21   CALCIUM 9.9     Recent Labs     05/19/20  1230   ALTSGPT 17   ASTSGOT 23   ALKPHOSPHAT 97   TBILIRUBIN 0.3   GLUCOSE 154*         Recent Labs     05/19/20  1230   NTPROBNP 1051*         Recent Labs     05/19/20  1230   TROPONINT 26*       Imaging:  US-EXTREMITY VENOUS LOWER UNILAT LEFT   Final Result      DX-CHEST-PORTABLE (1 VIEW)   Final Result      No acute cardiopulmonary process is seen.      Atherosclerotic plaque.      EC-ECHOCARDIOGRAM COMPLETE W/O CONT    (Results Pending)         Assessment/Plan:      * Atrial fibrillation with rapid ventricular response (HCC)  Assessment &  Plan  The patient over the past several weeks has been having irregular heartbeat with really fast rhythms and palpitations.  The patient has had episodes of atrial fibrillation  Patient will be placed on rate control with beta-blockers  Patient will be initiated on anticoagulation which I discussed with her and she is agreeable.  Cardiology consultation will be requested  Echocardiogram has been requested.    Renal azotemia  Assessment & Plan  She has mild renal azotemia with a GFR of only 43.  BUN at this point is normal at 16 with a creatinine 1.21.  We will continue to monitor renal functions while we are increasing diuresis.    Acute systolic heart failure (HCC)  Assessment & Plan  Patient is acute systolic heart failure that is been ongoing for several weeks.  The patient has been short of breath extremely fatigued even with simple activities like sitting up.  She has a hard time just going to the bathroom.  She has developed peripheral edema.  The patient at this point has also entered into atrial fibrillation with rapid ventricular response.  The patient at this point most likely has systolic heart failure secondary to atrial fibrillation which needs to be well controlled.  She does feel palpitations she says however most of the time the only reason she noticed that she was in atrial fibrillation is because she took the pulse oximeter put her on her finger because she was short of breath and she would notice that her heart rate would be in the 140 range.  The patient at this point needs optimization of her fluid status.  She needs an echocardiogram.  She needs possible cardiology consultation.    Hyponatremia  Assessment & Plan  Patient's hyponatremia is mild at 132 this most likely because of the heart failure.  We will at this point optimize fluid balance and monitor sodium levels.  At this point I am hesitant to give her any extra fluid as she is already fluid overloaded.  Instead we need diuresis and  hopefully with the diuresis her sodium levels will be optimized.    COPD (chronic obstructive pulmonary disease) (HCC)  Assessment & Plan  Patient is currently not in COPD exacerbation.  Continue with nebulizer treatments and optimize oxygen support as well as RT protocol.    Depression  Assessment & Plan  Patient is on BuSpar 7.5 mg twice daily.  Currently she has no signs of depression.  Patient is otherwise optimized with her management.  Patient currently is not suicidal or homicidal.    Hyperglycemia  Assessment & Plan  Patient has mild hyperglycemia.  We will check a hemoglobin A1c.  She has no history of diabetes.  May be a stress response versus just eating recently not having a fasting blood sugar level.

## 2020-05-19 NOTE — ED PROVIDER NOTES
ED Provider Note    CHIEF COMPLAINT  Chief Complaint   Patient presents with   • Shortness of Breath   • Peripheral Edema       HPI  Anita Beard is a 84 y.o. female with a history of hypertension who presents complaining of tachycardia and shortness of breath.    Patient states that for the last month she has noted increasing shortness of breath and tachycardia.  She owns a pulse ox and when feeling more short of breath recently she measured her heart rate vacillating between the 110s and the 40s.  She states her shortness of breath is worse with ambulation.  She is also noted bilateral leg swelling, increased on the left.  She feels she has a firm area on the medial side of the left calf that is tender when she pushes on it.  Patient takes Aldactone occasionally for leg swelling and has been doing so for the last several weeks without effect.  She denies orthopnea, PND, fever, chills, cough, history of PE/DVT, chest pain, hemoptysis, history of similar symptoms.      ALLERGIES  No Known Allergies    CURRENT MEDICATIONS  Home Medications     Reviewed by Taryn Grayson R.N. (Registered Nurse) on 05/19/20 at 1315  Med List Status: Partial   Medication Last Dose Status   acetaminophen-codeine (TYLENOL/CODEINE #4) 300-60 MG per tablet 5/19/2020 Active   ALBUTEROL INH  Active   Ascorbic Acid (VITAMIN C) 1000 MG Tab 5/18/2020 Active   atenolol (TENORMIN) 25 MG Tab 5/18/2020 Active   busPIRone (BUSPAR) 10 MG Tab tablet 5/19/2020 Active   celecoxib (CELEBREX) 200 MG Cap 5/18/2020 Active   cyanocobalamin (VITAMIN B-12) 500 MCG Tab 5/18/2020 Active   gabapentin (NEURONTIN) 100 MG Cap 5/18/2020 Active   Magnesium 500 MG Cap 5/18/2020 Active   Omega-3 1000 MG Cap 5/19/2020 Active   polyethylene glycol/lytes (MIRALAX) Pack 5/18/2020 Active   spironolactone (ALDACTONE) 50 MG Tab 5/19/2020 Active                PAST MEDICAL HISTORY   has a past medical history of Hypertension.    SURGICAL HISTORY   has a past surgical history  that includes other.    SOCIAL HISTORY  Social History     Tobacco Use   • Smoking status: Never Smoker   Substance and Sexual Activity   • Alcohol use: Never     Frequency: Never   • Drug use: Never   • Sexual activity: Not on file     Lives in [place] but staying here in [place] with her sister-in-law    REVIEW OF SYSTEMS  See HPI for further details.  All other systems are negative except as above in HPI.    PHYSICAL EXAM  VITAL SIGNS: BP (!) 174/75   Pulse 87   Temp 36.5 °C (97.7 °F) (Oral)   Resp 16   Ht 1.524 m (5')   Wt 62.5 kg (137 lb 12.6 oz)   SpO2 97%   BMI 26.91 kg/m²     General:  WDWN, elderly, chronically ill-appearing in NAD; A+Ox3; V/S as above; hypertensive  Skin: warm and dry; good color; no rash  HEENT: NCAT; EOMs intact; PERRL; no scleral icterus   Neck: FROM; trachea midline, no JVD  Cardiovascular: Regular heart rate and rhythm.  No murmurs, rubs, or gallops; pulses 2+ bilaterally radially and DP areas  Lungs: Clear to auscultation except for faint crackles at the bases with good air movement bilaterally.  No wheezes or rhonchi  Abdomen: BS present; soft; NTND; no rebound, guarding, or rigidity.  No organomegaly or pulsatile mass  Extremities: ALVAREZ x 4; no e/o trauma; no pedal edema; neg Andrew's; faint pink discoloration to the medial aspect of the left lower leg; no palpable cords or streaking; no crepitus  Neurologic: CNs III-XII grossly intact; speech clear; distal sensation intact; strength 5/5 UE/LEs  Psychiatric: Appropriate affect, normal mood    LABS  Results for orders placed or performed during the hospital encounter of 05/19/20   CBC WITH DIFFERENTIAL   Result Value Ref Range    WBC 9.1 4.8 - 10.8 K/uL    RBC 4.51 4.20 - 5.40 M/uL    Hemoglobin 13.0 12.0 - 16.0 g/dL    Hematocrit 40.0 37.0 - 47.0 %    MCV 88.7 81.4 - 97.8 fL    MCH 28.8 27.0 - 33.0 pg    MCHC 32.5 (L) 33.6 - 35.0 g/dL    RDW 39.8 35.9 - 50.0 fL    Platelet Count 238 164 - 446 K/uL    MPV 10.3 9.0 - 12.9 fL     Neutrophils-Polys 67.30 44.00 - 72.00 %    Lymphocytes 24.40 22.00 - 41.00 %    Monocytes 4.40 0.00 - 13.40 %    Eosinophils 3.50 0.00 - 6.90 %    Basophils 0.20 0.00 - 1.80 %    Immature Granulocytes 0.20 0.00 - 0.90 %    Nucleated RBC 0.00 /100 WBC    Neutrophils (Absolute) 6.10 2.00 - 7.15 K/uL    Lymphs (Absolute) 2.21 1.00 - 4.80 K/uL    Monos (Absolute) 0.40 0.00 - 0.85 K/uL    Eos (Absolute) 0.32 0.00 - 0.51 K/uL    Baso (Absolute) 0.02 0.00 - 0.12 K/uL    Immature Granulocytes (abs) 0.02 0.00 - 0.11 K/uL    NRBC (Absolute) 0.00 K/uL   CMP   Result Value Ref Range    Sodium 132 (L) 135 - 145 mmol/L    Potassium 4.1 3.6 - 5.5 mmol/L    Chloride 97 96 - 112 mmol/L    Co2 24 20 - 33 mmol/L    Anion Gap 11.0 7.0 - 16.0    Glucose 154 (H) 65 - 99 mg/dL    Bun 16 8 - 22 mg/dL    Creatinine 1.21 0.50 - 1.40 mg/dL    Calcium 9.9 8.5 - 10.5 mg/dL    AST(SGOT) 23 12 - 45 U/L    ALT(SGPT) 17 2 - 50 U/L    Alkaline Phosphatase 97 30 - 99 U/L    Total Bilirubin 0.3 0.1 - 1.5 mg/dL    Albumin 4.1 3.2 - 4.9 g/dL    Total Protein 7.1 6.0 - 8.2 g/dL    Globulin 3.0 1.9 - 3.5 g/dL    A-G Ratio 1.4 g/dL   TROPONIN   Result Value Ref Range    Troponin T 26 (H) 6 - 19 ng/L   proBrain Natriuretic Peptide, NT   Result Value Ref Range    NT-proBNP 1051 (H) 0 - 125 pg/mL   ESTIMATED GFR   Result Value Ref Range    GFR If  51 (A) >60 mL/min/1.73 m 2    GFR If Non  43 (A) >60 mL/min/1.73 m 2   EKG   Result Value Ref Range    Report       [facility] Emergency Dept.    Test Date:  2020  Pt Name:    AV BOYD                  Department: ER  MRN:        [id number]                      Room:        12  Gender:     Female                       Technician: [id number]  :        [id number]                   Requested By:ZACH BYRD  Order #:    [id number]                    Reading MD: ZACH BYRD MD    Measurements  Intervals                                Axis  Rate:       74                            P:          48  KY:         220                          QRS:        7  QRSD:       84                           T:          41  QT:         404  QTc:        449    Interpretive Statements  SINUS RHYTHM  FIRST DEGREE AV BLOCK  No previous ECG available for comparison  Electronically Signed On 5- 12:46:49 PDT by VALARIE PICKERING MD         IMAGING  DX-CHEST-PORTABLE (1 VIEW)   Final Result      No acute cardiopulmonary process is seen.      Atherosclerotic plaque.      US-EXTREMITY VENOUS LOWER UNILAT LEFT    (Results Pending)       MEDICAL RECORD  I have reviewed patient's medical record and pertinent results are listed below.      COURSE & MEDICAL DECISION MAKING  I have reviewed any medical record information, laboratory studies and radiographic results as noted.    Anita Beard is a 84 y.o. female who presents complaining of shortness of breath and tachycardia.  Patient is not hypoxic or in any respiratory distress at this time.      Differential diagnosis includes arrhythmia, ACS, CHF, PE, pneumonia.    EKG demonstrates sinus rhythm.  No ST changes or arrhythmia are detected.    Chest x-ray demonstrates no acute pathology.    Labs demonstrate a BNP of 1051 and a troponin of 26.  Patient sodium is 132 and glucose 154.    US of the left lower extremity negative per tech report.    Lasix 40 mg IV ordered    1:39 PM  Paging [facility] internal medicine for admission    [facility] agree to admit the patient but when she needed covid testing they deferred to the hospitalist.  I discussed the case with hospitalist who agrees to admit the patient.      FINAL IMPRESSION  1. SOB (shortness of breath)     2. Bilateral lower extremity edema       Electronically signed by: Valarie Pickering M.D., 5/19/2020 12:22 PM

## 2020-05-19 NOTE — ED TRIAGE NOTES
Pt comes in reporting BLE edema for approx 1 month and a painful bump to the left lower leg. Pt then adding on SOB. Pt stating home vital machines is reporting lower o2 sat then normal and tachycardia.

## 2020-05-19 NOTE — PROGRESS NOTES
D/W Dr. Pickering  PCP none listed    83yo female with sob and peripheral edema over last month  From [place] but comes here for pain mgmt issues, chronic back pain  BNP 1000  Trop indeterminate  HTN when arrived 170s  ASA administered  No covid type sx  HR 40s-110s on her pulse ox  B leg swelling, more on left, with tenderness, US negative  Occasionally takes aldactone for swelling    Will place obs admission order to tele floor, Dr. Sanches admitting.

## 2020-05-19 NOTE — PROGRESS NOTES
2 RN skin check complete.   Devices in place None.  Skin assessed under devices N/A.  Confirmed pressure ulcers found on none.  New potential pressure ulcers noted on none.     Skin intact but fragile with bruising to BUE and BLE.

## 2020-05-19 NOTE — ASSESSMENT & PLAN NOTE
Echo normal  Does have h/o HENRY, didn't tolerate CPAP  No etoh  TSH normal  Rate controlled on metoprolol 50mg daily  AC with Xarelto

## 2020-05-19 NOTE — ASSESSMENT & PLAN NOTE
Patient is on BuSpar 7.5 mg twice daily.  Currently she has no signs of depression.  Patient is otherwise optimized with her management.  Patient currently is not suicidal or homicidal.

## 2020-05-19 NOTE — PROGRESS NOTES
Assumed care of patient, report received from ER RN. Pt transported to floor on [facility], tele box on and monitor room notified. Transferred ambulated to bed from [facility]. VSS, assessment complete. Oriented to room and call light, educated on the importance of calling before getting out of bed. Verbalized understanding and no additional questions. Bed locked and in lowest position, treaded socks on. Reviewed orders and labs.

## 2020-05-19 NOTE — ASSESSMENT & PLAN NOTE
Patient is currently not in COPD exacerbation.  Continue with nebulizer treatments and optimize oxygen support as well as RT protocol.

## 2020-05-20 ENCOUNTER — PATIENT OUTREACH (OUTPATIENT)
Dept: HEALTH INFORMATION MANAGEMENT | Facility: OTHER | Age: 85
End: 2020-05-20

## 2020-05-20 ENCOUNTER — APPOINTMENT (OUTPATIENT)
Dept: CARDIOLOGY | Facility: MEDICAL CENTER | Age: 85
End: 2020-05-20
Attending: HOSPITALIST
Payer: MEDICARE

## 2020-05-20 PROBLEM — E87.1 HYPONATREMIA: Status: RESOLVED | Noted: 2020-05-19 | Resolved: 2020-05-20

## 2020-05-20 PROBLEM — I50.21 ACUTE SYSTOLIC HEART FAILURE (HCC): Status: RESOLVED | Noted: 2020-05-19 | Resolved: 2020-05-20

## 2020-05-20 PROBLEM — R06.02 SHORTNESS OF BREATH: Status: ACTIVE | Noted: 2020-05-19

## 2020-05-20 PROBLEM — N17.9 AKI (ACUTE KIDNEY INJURY) (HCC): Status: ACTIVE | Noted: 2020-05-19

## 2020-05-20 LAB
ANION GAP SERPL CALC-SCNC: 12 MMOL/L (ref 7–16)
BUN SERPL-MCNC: 21 MG/DL (ref 8–22)
CALCIUM SERPL-MCNC: 9.9 MG/DL (ref 8.5–10.5)
CHLORIDE SERPL-SCNC: 100 MMOL/L (ref 96–112)
CHOLEST SERPL-MCNC: 182 MG/DL (ref 100–199)
CO2 SERPL-SCNC: 26 MMOL/L (ref 20–33)
CREAT SERPL-MCNC: 1.04 MG/DL (ref 0.5–1.4)
GLUCOSE SERPL-MCNC: 123 MG/DL (ref 65–99)
HDLC SERPL-MCNC: 50 MG/DL
LDLC SERPL CALC-MCNC: 117 MG/DL
LV EJECT FRACT  99904: 65
LV EJECT FRACT MOD 2C 99903: 60
LV EJECT FRACT MOD 4C 99902: 57.07
LV EJECT FRACT MOD BP 99901: 53.55
POTASSIUM SERPL-SCNC: 5.3 MMOL/L (ref 3.6–5.5)
SODIUM SERPL-SCNC: 138 MMOL/L (ref 135–145)
TRIGL SERPL-MCNC: 74 MG/DL (ref 0–149)
TROPONIN T SERPL-MCNC: 25 NG/L (ref 6–19)

## 2020-05-20 PROCEDURE — 97161 PT EVAL LOW COMPLEX 20 MIN: CPT

## 2020-05-20 PROCEDURE — 93306 TTE W/DOPPLER COMPLETE: CPT | Mod: 26 | Performed by: INTERNAL MEDICINE

## 2020-05-20 PROCEDURE — 84484 ASSAY OF TROPONIN QUANT: CPT

## 2020-05-20 PROCEDURE — 700102 HCHG RX REV CODE 250 W/ 637 OVERRIDE(OP): Performed by: INTERNAL MEDICINE

## 2020-05-20 PROCEDURE — A9270 NON-COVERED ITEM OR SERVICE: HCPCS | Performed by: HOSPITALIST

## 2020-05-20 PROCEDURE — 97165 OT EVAL LOW COMPLEX 30 MIN: CPT

## 2020-05-20 PROCEDURE — 93306 TTE W/DOPPLER COMPLETE: CPT

## 2020-05-20 PROCEDURE — A9270 NON-COVERED ITEM OR SERVICE: HCPCS | Performed by: INTERNAL MEDICINE

## 2020-05-20 PROCEDURE — 99225 PR SUBSEQUENT OBSERVATION CARE,LEVEL II: CPT | Performed by: INTERNAL MEDICINE

## 2020-05-20 PROCEDURE — 80048 BASIC METABOLIC PNL TOTAL CA: CPT

## 2020-05-20 PROCEDURE — G0378 HOSPITAL OBSERVATION PER HR: HCPCS

## 2020-05-20 PROCEDURE — 36415 COLL VENOUS BLD VENIPUNCTURE: CPT

## 2020-05-20 PROCEDURE — 94760 N-INVAS EAR/PLS OXIMETRY 1: CPT

## 2020-05-20 PROCEDURE — 80061 LIPID PANEL: CPT

## 2020-05-20 PROCEDURE — 700102 HCHG RX REV CODE 250 W/ 637 OVERRIDE(OP): Performed by: HOSPITALIST

## 2020-05-20 RX ORDER — TRIAMCINOLONE ACETONIDE 1 MG/G
OINTMENT TOPICAL 2 TIMES DAILY
Status: DISCONTINUED | OUTPATIENT
Start: 2020-05-20 | End: 2020-05-21 | Stop reason: HOSPADM

## 2020-05-20 RX ORDER — METOPROLOL SUCCINATE 50 MG/1
50 TABLET, EXTENDED RELEASE ORAL DAILY
Qty: 30 TAB | Refills: 0 | Status: SHIPPED | OUTPATIENT
Start: 2020-05-21 | End: 2020-05-29 | Stop reason: SDUPTHER

## 2020-05-20 RX ADMIN — HYDROCODONE BITARTRATE AND ACETAMINOPHEN 1 TABLET: 5; 325 TABLET ORAL at 22:33

## 2020-05-20 RX ADMIN — OMEGA-3 FATTY ACIDS CAP 1000 MG 1000 MG: 1000 CAP at 06:16

## 2020-05-20 RX ADMIN — METOPROLOL SUCCINATE 50 MG: 50 TABLET, EXTENDED RELEASE ORAL at 06:16

## 2020-05-20 RX ADMIN — TRIAMCINOLONE ACETONIDE: 1 OINTMENT TOPICAL at 19:31

## 2020-05-20 RX ADMIN — GABAPENTIN 100 MG: 100 CAPSULE ORAL at 12:05

## 2020-05-20 RX ADMIN — SPIRONOLACTONE 50 MG: 25 TABLET ORAL at 06:16

## 2020-05-20 RX ADMIN — HYDROCODONE BITARTRATE AND ACETAMINOPHEN 1 TABLET: 5; 325 TABLET ORAL at 14:23

## 2020-05-20 RX ADMIN — GABAPENTIN 100 MG: 100 CAPSULE ORAL at 06:16

## 2020-05-20 RX ADMIN — BUSPIRONE HYDROCHLORIDE 7.5 MG: 5 TABLET ORAL at 17:01

## 2020-05-20 RX ADMIN — GABAPENTIN 100 MG: 100 CAPSULE ORAL at 17:01

## 2020-05-20 RX ADMIN — RIVAROXABAN 15 MG: 15 TABLET, FILM COATED ORAL at 17:01

## 2020-05-20 RX ADMIN — HYDROCODONE BITARTRATE AND ACETAMINOPHEN 1 TABLET: 5; 325 TABLET ORAL at 00:00

## 2020-05-20 ASSESSMENT — LIFESTYLE VARIABLES
TOTAL SCORE: 0
ALCOHOL_USE: NO
CONSUMPTION TOTAL: NEGATIVE
HAVE YOU EVER FELT YOU SHOULD CUT DOWN ON YOUR DRINKING: NO
TOTAL SCORE: 0
ON A TYPICAL DAY WHEN YOU DRINK ALCOHOL HOW MANY DRINKS DO YOU HAVE: 0
AVERAGE NUMBER OF DAYS PER WEEK YOU HAVE A DRINK CONTAINING ALCOHOL: 0
EVER HAD A DRINK FIRST THING IN THE MORNING TO STEADY YOUR NERVES TO GET RID OF A HANGOVER: NO
EVER FELT BAD OR GUILTY ABOUT YOUR DRINKING: NO
HOW MANY TIMES IN THE PAST YEAR HAVE YOU HAD 5 OR MORE DRINKS IN A DAY: 0
HAVE PEOPLE ANNOYED YOU BY CRITICIZING YOUR DRINKING: NO
TOTAL SCORE: 0

## 2020-05-20 ASSESSMENT — GAIT ASSESSMENTS
ASSISTIVE DEVICE: OTHER (COMMENTS)
DEVIATION: NO DEVIATION
DISTANCE (FEET): 300
GAIT LEVEL OF ASSIST: MODIFIED INDEPENDENT

## 2020-05-20 ASSESSMENT — ENCOUNTER SYMPTOMS
FEVER: 0
FALLS: 0
VOMITING: 0
CHILLS: 0
SHORTNESS OF BREATH: 0
BLURRED VISION: 0
DIZZINESS: 0
WEAKNESS: 0
ABDOMINAL PAIN: 0
NAUSEA: 0

## 2020-05-20 ASSESSMENT — COGNITIVE AND FUNCTIONAL STATUS - GENERAL
SUGGESTED CMS G CODE MODIFIER DAILY ACTIVITY: CH
SUGGESTED CMS G CODE MODIFIER MOBILITY: CH
MOBILITY SCORE: 24
MOBILITY SCORE: 24
DAILY ACTIVITIY SCORE: 24
SUGGESTED CMS G CODE MODIFIER DAILY ACTIVITY: CH
SUGGESTED CMS G CODE MODIFIER MOBILITY: CH
DAILY ACTIVITIY SCORE: 24

## 2020-05-20 ASSESSMENT — COPD QUESTIONNAIRES
COPD SCREENING SCORE: 2
DURING THE PAST 4 WEEKS HOW MUCH DID YOU FEEL SHORT OF BREATH: NONE/LITTLE OF THE TIME
DO YOU EVER COUGH UP ANY MUCUS OR PHLEGM?: NO/ONLY WITH OCCASIONAL COLDS OR INFECTIONS
HAVE YOU SMOKED AT LEAST 100 CIGARETTES IN YOUR ENTIRE LIFE: NO/DON'T KNOW

## 2020-05-20 ASSESSMENT — ACTIVITIES OF DAILY LIVING (ADL): TOILETING: INDEPENDENT

## 2020-05-20 NOTE — THERAPY
Physical Therapy   Initial Evaluation     Patient Name: Anita Beard  Age:  84 y.o., Sex:  female  Medical Record #: [id number]  Today's Date: 5/20/2020     Precautions  Comments: (P) none noted    Assessment  Patient is 84 y.o. female was recently admitted for SOB, peripheral edema, A-fib, and acute systolic heart failure. Pt was able to demonstrate near baseline functional mobility with no magnolia LOB or over fatigue/distress with activity. Pt was able to demonstrate an appropriate response to activity hemodynamically and did not require any rest break during ambulation. Pt educated on pacing, rest breaks, heart failure signs/symptoms, and self monitoring with activity. Pt is in no acute skilled PT needs at this time, anticipate pt to d/c home once medically clear.      05/20/20 1230   Prior Living Situation   Prior Services None   Housing / Facility Mobile Home   Steps Into Home 4   Steps In Home 0   Rail Both Rail (Steps into Home)   Equipment Owned Other (Comments)  (3 wheeled walker )   Lives with - Patient's Self Care Capacity Alone and Able to Care For Self   Comments pt reports dtr and son in law live on same property about a 200ft away, can assist intermittently if needed   Prior Level of Functional Mobility   Bed Mobility Independent   Transfer Status Independent   Ambulation Independent   Distance Ambulation (Feet)   (community )   Assistive Devices Used None   Stairs Independent   Comments pt reports of an IPLOF; primarily uses 3 wheeled walker during community ambulation    Gait Analysis   Gait Level Of Assist Modified Independent   Assistive Device Other (Comments)  (3 wheeled walker )   Distance (Feet) 300   # of Times Distance was Traveled 1   Deviation No deviation   # of Stairs Climbed 4   Level of Assist with Stairs Modified Independent   Weight Bearing Status fwb   Comments no apparent deficits noted; appears to be near her baseline   Bed Mobility    Supine to Sit Modified Independent   Sit to Supine  Modified Independent   Scooting Modified Independent   Rolling Modified Independent   Functional Mobility   Sit to Stand Modified Independent   Bed, Chair, Wheelchair Transfer Modified Independent   Anticipated Discharge Equipment   DC Equipment None     Plan    Recommend Physical Therapy for Evaluation only     Discharge recommendations: Anticipate that the patient will have no further physical therapy needs after discharge from the hospital.

## 2020-05-20 NOTE — THERAPY
"Occupational Therapy   Initial Evaluation     Patient Name: Anita Beard  Age:  84 y.o., Sex:  female  Medical Record #: [id number]  Today's Date: 5/20/2020     Precautions  Comments: none noted    Subjective    \"I haven't felt short of breath since the swelling has come down.\"     Objective       05/20/20 0950   Prior Living Situation   Prior Services None   Housing / Facility 1 Story House   Steps Into Home 0   Steps In Home 2   Equipment Owned   (3WW)   Lives with - Patient's Self Care Capacity Alone and Able to Care For Self   Comments Pt lives on the same property as her children, however she is independent with everything.    Prior Level of ADL Function   Comments Independent all ADL   Prior Level of IADL Function   Comments Independent for IADL, drives.   Cognition    Cognition / Consciousness WDL   Balance Assessment   Sitting Balance (Static) Good   Sitting Balance (Dynamic) Good   Standing Balance (Static) Fair +   Standing Balance (Dynamic) Fair +   Weight Shift Sitting Good   Weight Shift Standing Good   Comments No AD in room   Bed Mobility    Supine to Sit Supervised   Sit to Supine Supervised   ADL Assessment   Grooming Standing;Supervision   Lower Body Dressing Supervision   Toileting Supervision   Functional Mobility   Sit to Stand Supervised   Bed, Chair, Wheelchair Transfer Supervised   Toilet Transfers Supervised   Transfer Method Stand Pivot   Mobility sup><sit, STS, toilet xfer       Assessment  Patient is 84 y.o. female with a diagnosis of SOB, peripheral edema, and tachycardia.  Additional factors influencing patient status / progress: HTN. Pt doing well at this time, reports that she no longer is experiencing shortness of breath. Demonstrates all functional mobility and ADL at supv level. No further needs.       Plan    Recommend Occupational Therapy for Evaluation only.    Discharge recommendations:  Patient will not be actively followed for occupational therapy services at this time, however " may be seen if requested by physician for 1 more visit within 30 days to address any discharge or equipment needs.

## 2020-05-20 NOTE — DISCHARGE PLANNING
Care Transition Team Assessment    RN CHERYL attempted to reach patient x2 w/ no success.  Called and spoke with her daughter, Cata, who provided information.  Patient lives on her own in a travel trailer behind her daughter and LESLI's home in [facility].  She uses a 3WW when she is out in the community, but is ambulatory and independent for her I/ADLs.  She is currently staying with her sister in [place] b/c of COVID situation and she has a follow up appointment to get her pain pump refilled next week.  She will return home to her trailer after her pain pump is refilled.  On dc from hospital, the plan is for her to go back to her sister's house.  Her daughter confirmed information on the face sheet was correct and denied any needs on dc or issues getting patient's medications.    DC Plan:  Home w/ no needs.    Information Source  Orientation : Oriented x 4  Information Given By: Other (Comments)(Daughter)  Informant's Name: Cata Morton  Who is responsible for making decisions for patient? : Patient    Readmission Evaluation  Is this a readmission?: No    Elopement Risk  Legal Hold: No  Ambulatory or Self Mobile in Wheelchair: Yes  Disoriented: No  Psychiatric Symptoms: None    Interdisciplinary Discharge Planning  Lives with - Patient's Self Care Capacity: Alone and Able to Care For Self  Patient or legal guardian wants to designate a caregiver (see row info): No  Support Systems: Family Member(s)  Housing / Facility: Motor Home  Do You Take your Prescribed Medications Regularly: Yes  Able to Return to Previous ADL's: Yes  Mobility Issues: Yes  Prior Services: None  Patient Expects to be Discharged to:: home  Assistance Needed: No  Durable Medical Equipment: Walker    Discharge Preparedness  What is your plan after discharge?: Home with help  What are your discharge supports?: Child, Sibling  Prior Functional Level: Ambulatory, Drives Self, Independent with Activities of Daily Living, Independent with Medication  Management  Difficulity with ADLs: None  Difficulity with IADLs: None    Functional Assesment  Prior Functional Level: Ambulatory, Drives Self, Independent with Activities of Daily Living, Independent with Medication Management    Finances  Financial Barriers to Discharge: No  Prescription Coverage: Yes    Vision / Hearing Impairment  Right Eye Vision: Impaired, Wears Glasses  Left Eye Vision: Impaired, Wears Glasses  Hearing Impairment : No         Advance Directive  Advance Directive?: None  Advance Directive offered?: AD Booklet refused    Domestic Abuse  Have you ever been the victim of abuse or violence?: No  Physical Abuse or Sexual Abuse: No  Verbal Abuse or Emotional Abuse: No  Possible Abuse Reported to:: Not Applicable    Psychological Assessment  History of Substance Abuse: None  History of Psychiatric Problems: No  Non-compliant with Treatment: No    Discharge Risks or Barriers  Discharge risks or barriers?: No    Anticipated Discharge Information  Anticipated discharge disposition: Home  Discharge Address: [address] [place] [address]  [id number]  Discharge Contact Phone Number: [phone]

## 2020-05-20 NOTE — CARE PLAN
Problem: Safety  Goal: Will remain free from injury  Outcome: PROGRESSING AS EXPECTED  Goal: Will remain free from falls  Outcome: PROGRESSING AS EXPECTED    Pt resting in bed with call light and personal belongings within reach. Non skid socks on and bed locked and in low position. Pt is clean and dry with no acute needs at this time. Will continue to monitor for safety and needs.      Problem: Pain Management  Goal: Pain level will decrease to patient's comfort goal  Outcome: PROGRESSING AS EXPECTED     Pt c/o intermittent back pain. Denies need for pain management at this time. Order in MAR for PRN Norco q8h. Will continue to monitor for pain management.

## 2020-05-20 NOTE — RESPIRATORY CARE
COPD EDUCATION by COPD CLINICAL EDUCATOR  5/20/2020 at 7:30 AM by Sonya Kunz, RRT     Patient reviewed by COPD education team. Patient does not have a formal diagnosis of COPD and has never smoked, therefore does not qualify for the COPD program.

## 2020-05-20 NOTE — PROGRESS NOTES
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  Patient states the wound has been present for several weeks.  Patient says he has had previous ulcers on the right lower extremity.  Patient denies any previous history of a deep vein thrombosis.  Patient denies any lower extremity swelling. Patient says he has been putting over-the-counter cream on the wound for the past several weeks.  Patient denies any significant drainage.  He denies any significant pain fever or chills.  Patient gives a history of blockages in the lower extremities.  Patient says he had a stent placed in the right lower extremity February of 2019.  Patient does smoke daily.  Sharp debridement performed. Enluxtra and 2 layer compression.  SYLVIA is 0.93 on the left lower extremity. Based on his SYLVIA, arterial circulation is adequate for compression.         Current Assessment & Plan     Wound slowly improving.  Wound superficial.  Wound mostly granulating.  Epithelialization of the wound edges.  Continue collagen dressing and compression.               See wound doc progress notes. Documents will be scanned.        Travis Galvan  [facility]

## 2020-05-21 VITALS
DIASTOLIC BLOOD PRESSURE: 69 MMHG | RESPIRATION RATE: 18 BRPM | HEIGHT: 64 IN | TEMPERATURE: 97.2 F | SYSTOLIC BLOOD PRESSURE: 137 MMHG | HEART RATE: 70 BPM | BODY MASS INDEX: 22.36 KG/M2 | OXYGEN SATURATION: 91 % | WEIGHT: 130.95 LBS

## 2020-05-21 PROCEDURE — 99217 PR OBSERVATION CARE DISCHARGE: CPT | Performed by: INTERNAL MEDICINE

## 2020-05-21 PROCEDURE — A9270 NON-COVERED ITEM OR SERVICE: HCPCS | Performed by: HOSPITALIST

## 2020-05-21 PROCEDURE — A9270 NON-COVERED ITEM OR SERVICE: HCPCS | Performed by: INTERNAL MEDICINE

## 2020-05-21 PROCEDURE — 700102 HCHG RX REV CODE 250 W/ 637 OVERRIDE(OP): Performed by: HOSPITALIST

## 2020-05-21 PROCEDURE — G0378 HOSPITAL OBSERVATION PER HR: HCPCS

## 2020-05-21 PROCEDURE — 700102 HCHG RX REV CODE 250 W/ 637 OVERRIDE(OP): Performed by: INTERNAL MEDICINE

## 2020-05-21 RX ADMIN — TRIAMCINOLONE ACETONIDE: 1 OINTMENT TOPICAL at 06:00

## 2020-05-21 RX ADMIN — SPIRONOLACTONE 50 MG: 25 TABLET ORAL at 05:00

## 2020-05-21 RX ADMIN — HYDROCODONE BITARTRATE AND ACETAMINOPHEN 1 TABLET: 5; 325 TABLET ORAL at 08:26

## 2020-05-21 RX ADMIN — GABAPENTIN 100 MG: 100 CAPSULE ORAL at 05:00

## 2020-05-21 RX ADMIN — OMEGA-3 FATTY ACIDS CAP 1000 MG 1000 MG: 1000 CAP at 05:00

## 2020-05-21 RX ADMIN — BUSPIRONE HYDROCHLORIDE 7.5 MG: 5 TABLET ORAL at 04:59

## 2020-05-21 RX ADMIN — METOPROLOL SUCCINATE 50 MG: 50 TABLET, EXTENDED RELEASE ORAL at 05:00

## 2020-05-21 ASSESSMENT — FIBROSIS 4 INDEX: FIB4 SCORE: 1.92

## 2020-05-21 NOTE — DISCHARGE INSTRUCTIONS
Discharge Instructions per Elsi Kimball M.D.    Ms. Beard,    You were admitted with shortness of breath and found to have atrial fibrillation with very fast heart rate.  You were started on a beta blocker (metoprolol) which controlled your rate.  You were also started on a blood thinner to prevent blood clots/strokes.  We recommend you avoid NSAIDs to prevent further risk of bleeding.  Please STOP your atenolol as well.  You had a kidney injury which is improving.  You need to see your primary care provider within 1 week to recheck your kidney function.      Return to ER if you develop worsening chest pain, shortness of breath, fevers, bleeding, lightheadedness/dizziness/syncope or any other concerning symptoms.  We recommend you follow up with your primary care provider within 1 week of hospital discharge.             Atrial Fibrillation  Atrial fibrillation is a type of irregular or rapid heartbeat (arrhythmia). In atrial fibrillation, the heart quivers continuously in a chaotic pattern. This occurs when parts of the heart receive disorganized signals that make the heart unable to pump blood normally. This can increase the risk for stroke, heart failure, and other heart-related conditions. There are different types of atrial fibrillation, including:  · Paroxysmal atrial fibrillation. This type starts suddenly, and it usually stops on its own shortly after it starts.  · Persistent atrial fibrillation. This type often lasts longer than a week. It may stop on its own or with treatment.  · Long-lasting persistent atrial fibrillation. This type lasts longer than 12 months.  · Permanent atrial fibrillation. This type does not go away.  Talk with your health care provider to learn about the type of atrial fibrillation that you have.  What are the causes?  This condition is caused by some heart-related conditions or procedures, including:  · A heart attack.  · Coronary artery disease.  · Heart  failure.  · Heart valve conditions.  · High blood pressure.  · Inflammation of the sac that surrounds the heart (pericarditis).  · Heart surgery.  · Certain heart rhythm disorders, such as Mcdonald-Parkinson-White syndrome.  Other causes include:  · Pneumonia.  · Obstructive sleep apnea.  · Blockage of an artery in the lungs (pulmonary embolism, or PE).  · Lung cancer.  · Chronic lung disease.  · Thyroid problems, especially if the thyroid is overactive (hyperthyroidism).  · Caffeine.  · Excessive alcohol use or illegal drug use.  · Use of some medicines, including certain decongestants and diet pills.  Sometimes, the cause cannot be found.  What increases the risk?  This condition is more likely to develop in:  · People who are older in age.  · People who smoke.  · People who have diabetes mellitus.  · People who are overweight (obese).  · Athletes who exercise vigorously.  What are the signs or symptoms?  Symptoms of this condition include:  · A feeling that your heart is beating rapidly or irregularly.  · A feeling of discomfort or pain in your chest.  · Shortness of breath.  · Sudden light-headedness or weakness.  · Getting tired easily during exercise.  In some cases, there are no symptoms.  How is this diagnosed?  Your health care provider may be able to detect atrial fibrillation when taking your pulse. If detected, this condition may be diagnosed with:  · An electrocardiogram (ECG).  · A Holter monitor test that records your heartbeat patterns over a 24-hour period.  · Transthoracic echocardiogram (TTE) to evaluate how blood flows through your heart.  · Transesophageal echocardiogram (MATT) to view more detailed images of your heart.  · A stress test.  · Imaging tests, such as a CT scan or chest X-ray.  · Blood tests.  How is this treated?  The main goals of treatment are to prevent blood clots from forming and to keep your heart beating at a normal rate and rhythm. The type of treatment that you receive depends  on many factors, such as your underlying medical conditions and how you feel when you are experiencing atrial fibrillation.  This condition may be treated with:  · Medicine to slow down the heart rate, bring the heart’s rhythm back to normal, or prevent clots from forming.  · Electrical cardioversion. This is a procedure that resets your heart’s rhythm by delivering a controlled, low-energy shock to the heart through your skin.  · Different types of ablation, such as catheter ablation, catheter ablation with pacemaker, or surgical ablation. These procedures destroy the heart tissues that send abnormal signals. When the pacemaker is used, it is placed under your skin to help your heart beat in a regular rhythm.  Follow these instructions at home:  · Take over-the counter and prescription medicines only as told by your health care provider.  · If your health care provider prescribed a blood-thinning medicine (anticoagulant), take it exactly as told. Taking too much blood-thinning medicine can cause bleeding. If you do not take enough blood-thinning medicine, you will not have the protection that you need against stroke and other problems.  · Do not use tobacco products, including cigarettes, chewing tobacco, and e-cigarettes. If you need help quitting, ask your health care provider.  · If you have obstructive sleep apnea, manage your condition as told by your health care provider.  · Do not drink alcohol.  · Do not drink beverages that contain caffeine, such as coffee, soda, and tea.  · Maintain a healthy weight. Do not use diet pills unless your health care provider approves. Diet pills may make heart problems worse.  · Follow diet instructions as told by your health care provider.  · Exercise regularly as told by your health care provider.  · Keep all follow-up visits as told by your health care provider. This is important.  How is this prevented?  · Avoid drinking beverages that contain caffeine or  alcohol.  · Avoid certain medicines, especially medicines that are used for breathing problems.  · Avoid certain herbs and herbal medicines, such as those that contain ephedra or ginseng.  · Do not use illegal drugs, such as cocaine and amphetamines.  · Do not smoke.  · Manage your high blood pressure.  Contact a health care provider if:  · You notice a change in the rate, rhythm, or strength of your heartbeat.  · You are taking an anticoagulant and you notice increased bruising.  · You tire more easily when you exercise or exert yourself.  Get help right away if:  · You have chest pain, abdominal pain, sweating, or weakness.  · You feel nauseous.  · You notice blood in your vomit, bowel movement, or urine.  · You have shortness of breath.  · You suddenly have swollen feet and ankles.  · You feel dizzy.  · You have sudden weakness or numbness of the face, arm, or leg, especially on one side of the body.  · You have trouble speaking, trouble understanding, or both (aphasia).  · Your face or your eyelid droops on one side.  These symptoms may represent a serious problem that is an emergency. Do not wait to see if the symptoms will go away. Get medical help right away. Call your local emergency services (911 in the [place] Do not drive yourself to the hospital.   This information is not intended to replace advice given to you by your health care provider. Make sure you discuss any questions you have with your health care provider.  Document Released: 12/18/2006 Document Revised: 04/26/2017 Document Reviewed: 04/13/2016  Elsevier Interactive Patient Education © 2017 [employer]  Discharge Instructions    Discharged to home by car with relative. Discharged via walking, hospital escort: Refused.  Special equipment needed: Not Applicable    Be sure to schedule a follow-up appointment with your primary care doctor or any specialists as instructed.     Discharge Plan:        I understand that a diet low in cholesterol, fat,  and sodium is recommended for good health. Unless I have been given specific instructions below for another diet, I accept this instruction as my diet prescription.   Other diet: Cardiac    Special Instructions:     Is patient discharged on Warfarin / Coumadin?   No     Depression / Suicide Risk    As you are discharged from this [facility] facility, it is important to learn how to keep safe from harming yourself.    Recognize the warning signs:  Abrupt changes in personality, positive or negative- including increase in energy   Giving away possessions  Change in eating patterns- significant weight changes-  positive or negative  Change in sleeping patterns- unable to sleep or sleeping all the time   Unwillingness or inability to communicate  Depression  Unusual sadness, discouragement and loneliness  Talk of wanting to die  Neglect of personal appearance   Rebelliousness- reckless behavior  Withdrawal from people/activities they love  Confusion- inability to concentrate     If you or a loved one observes any of these behaviors or has concerns about self-harm, here's what you can do:  Talk about it- your feelings and reasons for harming yourself  Remove any means that you might use to hurt yourself (examples: pills, rope, extension cords, firearm)  Get professional help from the community (Mental Health, Substance Abuse, psychological counseling)  Do not be alone:Call your Safe Contact- someone whom you trust who will be there for you.  Call your local CRISIS HOTLINE 167-8265 or 168-782-3120  Call your local Children's Mobile Crisis Response Team [place] (402) 189-1358 or www.[employer]  Call the toll free National Suicide Prevention Hotlines   National Suicide Prevention Lifeline 774-928-DJSJ (5024)  National Hope Line Network 800-SUICIDE (072-6362)

## 2020-05-21 NOTE — PROGRESS NOTES
Cardiovascular Nurse Navigator () Advanced Heart Failure Program Inpatient Progress Note:    Per my discussion with Dr. Kimball on 5/21/20, HF is ruled out as a diagnosis for this patient. Her symptoms were caused by AF c RVR.    Dr. Kimball said that she will address this in her note.    Therefore, a seven day HF f/u appt and heart failure education are not indicated.    Attending provider will need to clearly state in note that HF is ruled out, or patient will still code for it.    Many thanks, Pennie, Cardiovascular Nurse Navigator, RN, CHFN [phone], & [employer] M-F (excluding holidays).

## 2020-05-21 NOTE — PROGRESS NOTES
[facility] Medicine Daily Progress Note    Date of Service  5/20/2020    Chief Complaint  84 y.o. female admitted 5/19/2020 with shortness of breath.    Hospital Course    83 yo F with h/o HTN admitted with shortness of breath and HR on pulse ox up to 140s as well as LE edema.  She was found to be in afib with occasional RVR.  She was given dose of IV lasix and started on metoprolol and Xarelto.  Her rates were controlled by HD#2 and her symptoms had resolved.  She had an echo that showed normal EF and mild mitral regurgitation.  US of LE negative for DVT.  CXR negative.   COVID negative.  Troponins mildly elevated but downtrended after rate control 26-->29--->32 -->25.  She did not have any more chest pain.  She did have LINUS on admission, Cr 1.21 which improved on HD#2 1.04.  This will need to be monitored outpatient.  She will need cardiology referral (placed at time of discharge).         Interval Problem Update  - rate controlled  - back to baseline, no SOB, palpitations, chest pain  - ambulating normally  - started on Xarelto  - on RA  - afebrile  - Cr improving  - troponin improving    Consultants/Specialty  None    Code Status  FULL      Disposition  Home in am (10 by 10)    Review of Systems  Review of Systems   Constitutional: Negative for chills, fever and malaise/fatigue.   HENT: Negative for nosebleeds.    Eyes: Negative for blurred vision.   Respiratory: Negative for shortness of breath.    Cardiovascular: Negative for chest pain and leg swelling.   Gastrointestinal: Negative for abdominal pain, nausea and vomiting.   Genitourinary: Negative for hematuria.   Musculoskeletal: Negative for falls.   Skin: Positive for itching (left forearm irritation and itching).   Neurological: Negative for dizziness and weakness.        Physical Exam  Temp:  [36.3 °C (97.3 °F)-36.7 °C (98.1 °F)] 36.7 °C (98.1 °F)  Pulse:  [60-68] 68  Resp:  [16-18] 16  BP: (104-152)/(50-75) 152/64  SpO2:  [93 %-98 %] 98 %    Physical  Exam  Vitals signs and nursing note reviewed.   Constitutional:       General: She is not in acute distress.     Appearance: Normal appearance. She is not ill-appearing or toxic-appearing.   HENT:      Head: Normocephalic and atraumatic.      Nose: Nose normal.      Mouth/Throat:      Mouth: Mucous membranes are moist.   Eyes:      General: No scleral icterus.     Conjunctiva/sclera: Conjunctivae normal.   Neck:      Musculoskeletal: Normal range of motion and neck supple.   Cardiovascular:      Rate and Rhythm: Normal rate and regular rhythm.      Pulses: Normal pulses.      Heart sounds: No murmur. No friction rub. No gallop.    Pulmonary:      Effort: Pulmonary effort is normal.      Breath sounds: Normal breath sounds.   Abdominal:      General: Abdomen is flat. Bowel sounds are normal. There is no distension.      Palpations: Abdomen is soft.      Tenderness: There is no abdominal tenderness.   Musculoskeletal:      Right lower leg: No edema.      Left lower leg: No edema.   Skin:     General: Skin is warm and dry.      Findings: Erythema (erythema of left forearm with pruritis, no hives, steroid cream applied) present.   Neurological:      General: No focal deficit present.      Mental Status: She is alert and oriented to person, place, and time. Mental status is at baseline.   Psychiatric:         Mood and Affect: Mood normal.         Behavior: Behavior normal.         Fluids    Intake/Output Summary (Last 24 hours) at 5/20/2020 2204  Last data filed at 5/20/2020 1600  Gross per 24 hour   Intake 240 ml   Output --   Net 240 ml       Laboratory  Recent Labs     05/19/20  1230   WBC 9.1   RBC 4.51   HEMOGLOBIN 13.0   HEMATOCRIT 40.0   MCV 88.7   MCH 28.8   MCHC 32.5*   RDW 39.8   PLATELETCT 238   MPV 10.3     Recent Labs     05/19/20  1230 05/20/20  1730   SODIUM 132* 138   POTASSIUM 4.1 5.3   CHLORIDE 97 100   CO2 24 26   GLUCOSE 154* 123*   BUN 16 21   CREATININE 1.21 1.04   CALCIUM 9.9 9.9     Recent Labs      05/19/20  1700   APTT 31.4   INR 0.99         Recent Labs     05/20/20  0239   TRIGLYCERIDE 74   HDL 50   *       Imaging  EC-ECHOCARDIOGRAM COMPLETE W/O CONT   Final Result      US-EXTREMITY VENOUS LOWER UNILAT LEFT   Final Result      DX-CHEST-PORTABLE (1 VIEW)   Final Result      No acute cardiopulmonary process is seen.      Atherosclerotic plaque.           Assessment/Plan  * Atrial fibrillation with rapid ventricular response ([facility])  Assessment & Plan  Echo normal  Does have h/o HENRY, didn't tolerate CPAP  No etoh  TSH normal  Rate controlled on metoprolol 50mg daily  AC with Xarelto    LINUS (acute kidney injury) ([facility])  Assessment & Plan  Improved with diuresis  CTM outpatient    Shortness of breath  Assessment & Plan  Resolved  - in the setting of afib with RVR  - echo with normal EF  - did receive lasix x 1    COPD (chronic obstructive pulmonary disease) ([facility])  Assessment & Plan  Patient is currently not in COPD exacerbation.  Continue with nebulizer treatments and optimize oxygen support as well as RT protocol.    Depression  Assessment & Plan  Patient is on BuSpar 7.5 mg twice daily.  Currently she has no signs of depression.  Patient is otherwise optimized with her management.  Patient currently is not suicidal or homicidal.    Hyperglycemia  Assessment & Plan  Patient has mild hyperglycemia.    She has no history of diabetes.         VTE prophylaxis: Xarelto

## 2020-05-21 NOTE — CARE PLAN
Problem: Communication  Goal: The ability to communicate needs accurately and effectively will improve  Outcome: PROGRESSING AS EXPECTED     Problem: Safety  Goal: Will remain free from injury  Outcome: PROGRESSING AS EXPECTED     Problem: Infection  Goal: Will remain free from infection  Outcome: PROGRESSING AS EXPECTED     Problem: Bowel/Gastric:  Goal: Normal bowel function is maintained or improved  Outcome: PROGRESSING AS EXPECTED

## 2020-05-22 ENCOUNTER — TELEPHONE (OUTPATIENT)
Dept: CARDIOLOGY | Facility: MEDICAL CENTER | Age: 85
End: 2020-05-22

## 2020-05-22 NOTE — DISCHARGE SUMMARY
Discharge Summary    CHIEF COMPLAINT ON ADMISSION  Chief Complaint   Patient presents with   • Shortness of Breath   • Peripheral Edema       Reason for Admission  leg edema    Admission Date  5/19/2020    CODE STATUS  FULL    HPI & HOSPITAL COURSE    85 yo F with h/o HTN admitted with shortness of breath and HR on pulse ox up to 140s as well as LE edema.  She was found to be in afib with occasional RVR.  She was given dose of IV lasix and started on metoprolol and Xarelto.  Her rates were controlled by HD#2 and her symptoms had resolved.  She had an echo that showed normal EF and mild mitral regurgitation.  US of LE negative for DVT.  CXR negative.   COVID negative.  Troponins mildly elevated but downtrended after rate control 26-->29--->32 -->25.  She did not have any more chest pain.  She did have LINUS on admission, Cr 1.21 which improved on HD#2 1.04.  This will need to be monitored outpatient.  She has close cardiology follow up scheduled.         Therefore, she is discharged in good and stable condition to home with close outpatient follow-up.    The patient met 2-midnight criteria for an inpatient stay at the time of discharge.    Discharge Date  5/21/2020    FOLLOW UP ITEMS POST DISCHARGE  Cardiology follow up  Monitor Cr  Mild hyperglycemia, consider checking A1c    DISCHARGE DIAGNOSES  Principal Problem:    Atrial fibrillation with rapid ventricular response (HCC) POA: Unknown  Active Problems:    Shortness of breath POA: Unknown    LINUS (acute kidney injury) (HCC) POA: Unknown    COPD (chronic obstructive pulmonary disease) (HCC) POA: Unknown    Hyperglycemia POA: Unknown    Depression POA: Unknown  Resolved Problems:    Hyponatremia POA: Unknown      FOLLOW UP  Future Appointments   Date Time Provider Department Center   5/29/2020  1:20 PM Todd Mckay M.D. [facility] None     Primary Care Physician    In 1 week      Pcp Pt States None            MEDICATIONS ON DISCHARGE     Medication List      START taking  these medications      Instructions   metoprolol SR 50 MG Tb24  Commonly known as:  TOPROL XL  Notes to patient:  For heart   Take 1 Tab by mouth every day.  Dose:  50 mg     rivaroxaban 15 MG Tabs tablet  Commonly known as:  XARELTO  Notes to patient:  Blood thinner   Take 1 Tab by mouth with dinner.  Dose:  15 mg        CONTINUE taking these medications      Instructions   ALBUTEROL INH   Inhale  by mouth 2 Times a Day.     busPIRone 10 MG Tabs tablet  Commonly known as:  BUSPAR   Take 7.5 mg by mouth 2 times a day as needed.  Dose:  7.5 mg     gabapentin 100 MG Caps  Commonly known as:  NEURONTIN   Take 100 mg by mouth every day.  Dose:  100 mg     Magnesium 500 MG Caps   Take  by mouth 2 Times a Day.     Omega-3 1000 MG Caps   Take 1,280 mg by mouth every day.  Dose:  1,280 mg     polyethylene glycol/lytes Pack  Commonly known as:  MIRALAX   Take 17 g by mouth as needed.  Dose:  17 g     spironolactone 50 MG Tabs  Commonly known as:  ALDACTONE   Take 50 mg by mouth every day.  Dose:  50 mg     TYLENOL/CODEINE #4 300-60 MG per tablet  Generic drug:  acetaminophen-codeine   Take  by mouth 3 times a day.     Vitamin C 1000 MG Tabs   Take  by mouth.        STOP taking these medications    CeleBREX 200 MG Caps  Generic drug:  celecoxib            Allergies  No Known Allergies    DIET  No orders of the defined types were placed in this encounter.      ACTIVITY  As tolerated.  Weight bearing as tolerated    CONSULTATIONS  None    PROCEDURES  None    Discharge Exam:  Physical Exam   Constitutional: She is oriented to person, place, and time and well-developed, well-nourished, and in no distress.   HENT:   Head: Normocephalic and atraumatic.   Mouth/Throat: Oropharynx is clear and moist.   Eyes: Conjunctivae are normal. No scleral icterus.   Neck: Normal range of motion. Neck supple.   Cardiovascular: Normal rate, regular rhythm and normal heart sounds.   Pulmonary/Chest: Effort normal and breath sounds normal.    Abdominal: Soft. Bowel sounds are normal. She exhibits no distension. There is no abdominal tenderness.   Musculoskeletal:         General: No edema.   Neurological: She is alert and oriented to person, place, and time. Gait normal.   Skin: Skin is warm and dry.   Erythematous patch left forearm, pruritic   Psychiatric: Mood and affect normal.         LABORATORY  Lab Results   Component Value Date    SODIUM 138 2020    POTASSIUM 5.3 2020    CHLORIDE 100 2020    CO2 26 2020    GLUCOSE 123 (H) 2020    BUN 21 2020    CREATININE 1.04 2020        Lab Results   Component Value Date    WBC 9.1 2020    HEMOGLOBIN 13.0 2020    HEMATOCRIT 40.0 2020    PLATELETCT 238 2020      EC-ECHOCARDIOGRAM COMPLETE W/O CONT  Transthoracic  Echo Report    Echocardiography Laboratory    CONCLUSIONS  No prior study is available for comparison.   Mild mitral regurgitation.  Aortic sclerosis without stenosis.  Normal left ventricular size, wall thickness, and systolic function.  Left ventricular ejection fraction is visually estimated to be 65%.  Normal regional wall motion.  Normal left atrial size.  Normal right ventricular size and systolic function.  Normal pericardium without effusion.    AV BOYD  Exam Date:         2020                      12:31  Exam Location:     Inpatient  Priority:          Routine    Ordering Physician:        ADALBERTO NOVOA  Referring Physician:       SOMMER Self  Sonographer:               Nevin Rodriguez RDCS    Age:    84     Gender:    F  MRN:    [id number]  :    1935  BSA:    1.59   Ht (in):    60     Wt (lb):    137  Exam Type:     Complete    Indications:     Atrial Fibrillation  ICD Codes:       427.31    CPT Codes:       74833    BP:   174    /   75     HR:   58  Technical Quality:       Fair    MEASUREMENTS  (Male / Female) Normal Values  2D ECHO  LV Diastolic Diameter PLAX        4.5 cm                4.2 - 5.9 / 3.9  - 5.3   cm  LV Systolic Diameter PLAX         3.3 cm                2.1 - 4.0 cm  IVS Diastolic Thickness           0.54 cm                 LVPW Diastolic Thickness          0.66 cm                 LVOT Diameter                     1.9 cm                  Estimated LV Ejection Fraction    65 %                    LV Ejection Fraction MOD BP       53.6 %                >= 55  %  LV Ejection Fraction MOD 4C       57.1 %                  LV Ejection Fraction MOD 2C       60 %                      DOPPLER  AV Peak Velocity                  1.4 m/s                 AV Peak Gradient                  7.4 mmHg                AV Mean Gradient                  4.1 mmHg                LVOT Peak Velocity                1 m/s                   AV Area Cont Eq vti               2.1 cm2                 Mitral E Point Velocity           1 m/s                   Mitral E to A Ratio               0.97                    MV Pressure Half Time             52 ms                   MV Area PHT                       4.2 cm2                 MV Deceleration Time              179 ms                  TR Peak Velocity                  225 cm/s                RVOT Peak Velocity                0.69 m/s                  * Indicates values subject to auto-interpretation  LV EF:  65    %    FINDINGS  Left Ventricle  Normal left ventricular size, wall thickness, and systolic function.   Left ventricular ejection fraction is visually estimated to be 65%.   Normal regional wall motion. Normal diastolic function.    Right Ventricle  Normal right ventricular size and systolic function.    Right Atrium  Normal right atrial size. Normal inferior vena cava size and   inspiratory collapse.    Left Atrium  Normal left atrial size. Left atrial volume index is  26 mL/sq m.    Mitral Valve  Structurally normal mitral valve. Mild mitral regurgitation.    Aortic Valve  Aortic sclerosis without stenosis. No aortic insufficiency.    Tricuspid Valve  Structurally normal  tricuspid valve. Trace tricuspid regurgitation.   Estimated right ventricular systolic pressure  is 25 mmHg.    Pulmonic Valve  Structurally normal pulmonic valve without significant stenosis or   regurgitation.    Pericardium  Normal pericardium without effusion.    Aorta  Ascending aorta diameter is 3 cm. Normal aortic root for body surface   area.    Erum Lemons M.D.  (Electronically Signed)  Final Date:     20 May 2020 14:07        Total time of the discharge process exceeds 30 minutes.

## 2020-05-22 NOTE — TELEPHONE ENCOUNTER
Called patient to see if she has followed with a cardiologist in the past to get records prior to new patient appt with [facility]. Woman answered, however stated I have the wrong number.

## 2020-05-29 ENCOUNTER — OFFICE VISIT (OUTPATIENT)
Dept: CARDIOLOGY | Facility: MEDICAL CENTER | Age: 85
End: 2020-05-29
Payer: MEDICARE

## 2020-05-29 VITALS
OXYGEN SATURATION: 91 % | HEART RATE: 82 BPM | RESPIRATION RATE: 14 BRPM | DIASTOLIC BLOOD PRESSURE: 72 MMHG | BODY MASS INDEX: 22.68 KG/M2 | WEIGHT: 132.83 LBS | SYSTOLIC BLOOD PRESSURE: 120 MMHG | HEIGHT: 64 IN

## 2020-05-29 DIAGNOSIS — I48.91 ATRIAL FIBRILLATION WITH RAPID VENTRICULAR RESPONSE (HCC): ICD-10-CM

## 2020-05-29 DIAGNOSIS — I70.0 AORTIC ATHEROSCLEROSIS (HCC): ICD-10-CM

## 2020-05-29 DIAGNOSIS — N17.9 AKI (ACUTE KIDNEY INJURY) (HCC): ICD-10-CM

## 2020-05-29 DIAGNOSIS — I50.30 HEART FAILURE WITH PRESERVED EJECTION FRACTION, UNSPECIFIED HF CHRONICITY (HCC): ICD-10-CM

## 2020-05-29 DIAGNOSIS — J44.9 CHRONIC OBSTRUCTIVE PULMONARY DISEASE, UNSPECIFIED COPD TYPE (HCC): ICD-10-CM

## 2020-05-29 PROCEDURE — 99203 OFFICE O/P NEW LOW 30 MIN: CPT | Performed by: INTERNAL MEDICINE

## 2020-05-29 RX ORDER — ROSUVASTATIN CALCIUM 5 MG/1
5 TABLET, COATED ORAL
Qty: 30 TAB | Refills: 3 | Status: SHIPPED | OUTPATIENT
Start: 2020-05-29 | End: 2023-11-03

## 2020-05-29 RX ORDER — FUROSEMIDE 20 MG/1
20 TABLET ORAL
Qty: 30 TAB | Refills: 6 | Status: SHIPPED | OUTPATIENT
Start: 2020-05-29 | End: 2023-11-03

## 2020-05-29 RX ORDER — METOPROLOL SUCCINATE 50 MG/1
75 TABLET, EXTENDED RELEASE ORAL DAILY
Qty: 30 TAB | Refills: 5 | Status: SHIPPED | OUTPATIENT
Start: 2020-05-29 | End: 2023-11-03

## 2020-05-29 ASSESSMENT — FIBROSIS 4 INDEX: FIB4 SCORE: 1.92

## 2020-05-29 ASSESSMENT — ENCOUNTER SYMPTOMS
DYSPNEA ON EXERTION: 1
FEVER: 0
ALTERED MENTAL STATUS: 0
WEIGHT LOSS: 0
DECREASED APPETITE: 0
ABDOMINAL PAIN: 0
BACK PAIN: 0
ORTHOPNEA: 1
NEAR-SYNCOPE: 0
SYNCOPE: 0
FLANK PAIN: 0
WEIGHT GAIN: 0
CLAUDICATION: 0
IRREGULAR HEARTBEAT: 0
NAUSEA: 0
DEPRESSION: 0
BLURRED VISION: 0
CONSTIPATION: 0
HEARTBURN: 0
DIARRHEA: 0
SHORTNESS OF BREATH: 0
PND: 0
DIZZINESS: 0
COUGH: 0
PALPITATIONS: 0
VOMITING: 0

## 2020-05-29 NOTE — PATIENT INSTRUCTIONS
Heart failure preserved ejection fraction.    Atrial Fibrillation  Atrial fibrillation is a type of irregular or rapid heartbeat (arrhythmia). In atrial fibrillation, the heart quivers continuously in a chaotic pattern. This occurs when parts of the heart receive disorganized signals that make the heart unable to pump blood normally. This can increase the risk for stroke, heart failure, and other heart-related conditions. There are different types of atrial fibrillation, including:  · Paroxysmal atrial fibrillation. This type starts suddenly, and it usually stops on its own shortly after it starts.  · Persistent atrial fibrillation. This type often lasts longer than a week. It may stop on its own or with treatment.  · Long-lasting persistent atrial fibrillation. This type lasts longer than 12 months.  · Permanent atrial fibrillation. This type does not go away.  Talk with your health care provider to learn about the type of atrial fibrillation that you have.  What are the causes?  This condition is caused by some heart-related conditions or procedures, including:  · A heart attack.  · Coronary artery disease.  · Heart failure.  · Heart valve conditions.  · High blood pressure.  · Inflammation of the sac that surrounds the heart (pericarditis).  · Heart surgery.  · Certain heart rhythm disorders, such as Mcdonald-Parkinson-White syndrome.  Other causes include:  · Pneumonia.  · Obstructive sleep apnea.  · Blockage of an artery in the lungs (pulmonary embolism, or PE).  · Lung cancer.  · Chronic lung disease.  · Thyroid problems, especially if the thyroid is overactive (hyperthyroidism).  · Caffeine.  · Excessive alcohol use or illegal drug use.  · Use of some medicines, including certain decongestants and diet pills.  Sometimes, the cause cannot be found.  What increases the risk?  This condition is more likely to develop in:  · People who are older in age.  · People who smoke.  · People who have diabetes  mellitus.  · People who are overweight (obese).  · Athletes who exercise vigorously.  What are the signs or symptoms?  Symptoms of this condition include:  · A feeling that your heart is beating rapidly or irregularly.  · A feeling of discomfort or pain in your chest.  · Shortness of breath.  · Sudden light-headedness or weakness.  · Getting tired easily during exercise.  In some cases, there are no symptoms.  How is this diagnosed?  Your health care provider may be able to detect atrial fibrillation when taking your pulse. If detected, this condition may be diagnosed with:  · An electrocardiogram (ECG).  · A Holter monitor test that records your heartbeat patterns over a 24-hour period.  · Transthoracic echocardiogram (TTE) to evaluate how blood flows through your heart.  · Transesophageal echocardiogram (MATT) to view more detailed images of your heart.  · A stress test.  · Imaging tests, such as a CT scan or chest X-ray.  · Blood tests.  How is this treated?  The main goals of treatment are to prevent blood clots from forming and to keep your heart beating at a normal rate and rhythm. The type of treatment that you receive depends on many factors, such as your underlying medical conditions and how you feel when you are experiencing atrial fibrillation.  This condition may be treated with:  · Medicine to slow down the heart rate, bring the heart’s rhythm back to normal, or prevent clots from forming.  · Electrical cardioversion. This is a procedure that resets your heart’s rhythm by delivering a controlled, low-energy shock to the heart through your skin.  · Different types of ablation, such as catheter ablation, catheter ablation with pacemaker, or surgical ablation. These procedures destroy the heart tissues that send abnormal signals. When the pacemaker is used, it is placed under your skin to help your heart beat in a regular rhythm.  Follow these instructions at home:  · Take over-the counter and prescription  medicines only as told by your health care provider.  · If your health care provider prescribed a blood-thinning medicine (anticoagulant), take it exactly as told. Taking too much blood-thinning medicine can cause bleeding. If you do not take enough blood-thinning medicine, you will not have the protection that you need against stroke and other problems.  · Do not use tobacco products, including cigarettes, chewing tobacco, and e-cigarettes. If you need help quitting, ask your health care provider.  · If you have obstructive sleep apnea, manage your condition as told by your health care provider.  · Do not drink alcohol.  · Do not drink beverages that contain caffeine, such as coffee, soda, and tea.  · Maintain a healthy weight. Do not use diet pills unless your health care provider approves. Diet pills may make heart problems worse.  · Follow diet instructions as told by your health care provider.  · Exercise regularly as told by your health care provider.  · Keep all follow-up visits as told by your health care provider. This is important.  How is this prevented?  · Avoid drinking beverages that contain caffeine or alcohol.  · Avoid certain medicines, especially medicines that are used for breathing problems.  · Avoid certain herbs and herbal medicines, such as those that contain ephedra or ginseng.  · Do not use illegal drugs, such as cocaine and amphetamines.  · Do not smoke.  · Manage your high blood pressure.  Contact a health care provider if:  · You notice a change in the rate, rhythm, or strength of your heartbeat.  · You are taking an anticoagulant and you notice increased bruising.  · You tire more easily when you exercise or exert yourself.  Get help right away if:  · You have chest pain, abdominal pain, sweating, or weakness.  · You feel nauseous.  · You notice blood in your vomit, bowel movement, or urine.  · You have shortness of breath.  · You suddenly have swollen feet and ankles.  · You feel  dizzy.  · You have sudden weakness or numbness of the face, arm, or leg, especially on one side of the body.  · You have trouble speaking, trouble understanding, or both (aphasia).  · Your face or your eyelid droops on one side.  These symptoms may represent a serious problem that is an emergency. Do not wait to see if the symptoms will go away. Get medical help right away. Call your local emergency services (911 in the [place] Do not drive yourself to the hospital.   This information is not intended to replace advice given to you by your health care provider. Make sure you discuss any questions you have with your health care provider.  Document Released: 12/18/2006 Document Revised: 04/26/2017 Document Reviewed: 04/13/2016  [employer] Interactive Patient Education © 2017 [employer]

## 2020-05-29 NOTE — PROGRESS NOTES
Cardiology Note    Chief Complaint   Patient presents with   • Atrial Fibrillation     NP       History of Present Illness: Anita Beard is a 84 y.o. female PMH HTN, pain pump due to failed back surgery 2006 who presents for initial visit.    ED / admit 5/19/20 with leg swelling and new onset AF RVR. Started on rate control and doac for cva prevention. Required lasix for HFpEF / LE edema / elevated pBNP.    Prior to admit was complaining of leg swelling for about a month. Did not feel palpitations. Since discharge still struggles with leg swelling. Last night went for walk and developed shortness of breath after walking a block. Noticed associated with tachycardia (uses pulse ox). Has some orthopnea as well. She previously was told she has leah but could not tolerate cpap. Also was diagnosed with copd. At one point could no longer afford maintenance inhalers so in conjunction with pulmonologist at the time elected to take bid standing albuterol.    Review of Systems   Constitution: Positive for malaise/fatigue. Negative for decreased appetite, fever, weight gain and weight loss.   HENT: Negative for congestion and nosebleeds.    Eyes: Negative for blurred vision.   Cardiovascular: Positive for dyspnea on exertion, leg swelling and orthopnea. Negative for chest pain, claudication, irregular heartbeat, near-syncope, palpitations, paroxysmal nocturnal dyspnea and syncope.   Respiratory: Negative for cough and shortness of breath.    Endocrine: Negative for cold intolerance and heat intolerance.   Skin: Negative for rash.   Musculoskeletal: Negative for back pain.   Gastrointestinal: Negative for abdominal pain, constipation, diarrhea, heartburn, melena, nausea and vomiting.   Genitourinary: Negative for dysuria, flank pain and hematuria.   Neurological: Negative for dizziness.   Psychiatric/Behavioral: Negative for altered mental status and depression.         Past Medical History:   Diagnosis Date   • Hypertension           Past Surgical History:   Procedure Laterality Date   • OTHER      2006 back surgery          Current Outpatient Medications   Medication Sig Dispense Refill   • metoprolol SR (TOPROL XL) 50 MG TABLET SR 24 HR Take 1.5 Tabs by mouth every day. 30 Tab 5   • furosemide (LASIX) 20 MG Tab Take 1 Tab by mouth 1 time daily as needed. 30 Tab 6   • rivaroxaban (XARELTO) 20 MG Tab tablet Take 1 Tab by mouth with dinner. 90 Tab 3   • rosuvastatin (CRESTOR) 5 MG Tab Take 1 Tab by mouth 3 times a week. 30 Tab 3   • spironolactone (ALDACTONE) 50 MG Tab Take 50 mg by mouth every day.     • acetaminophen-codeine (TYLENOL/CODEINE #4) 300-60 MG per tablet Take  by mouth 3 times a day.     • busPIRone (BUSPAR) 10 MG Tab tablet Take 7.5 mg by mouth 2 times a day as needed.     • Omega-3 1000 MG Cap Take 1,280 mg by mouth every day.     • Magnesium 500 MG Cap Take  by mouth 2 Times a Day.     • gabapentin (NEURONTIN) 100 MG Cap Take 100 mg by mouth every day.     • polyethylene glycol/lytes (MIRALAX) Pack Take 17 g by mouth as needed.     • ALBUTEROL INH Inhale  by mouth 2 Times a Day.       No current facility-administered medications for this visit.          No Known Allergies      No family history on file.      Social History     Socioeconomic History   • Marital status:      Spouse name: Not on file   • Number of children: Not on file   • Years of education: Not on file   • Highest education level: Not on file   Occupational History   • Not on file   Social Needs   • Financial resource strain: Not on file   • Food insecurity     Worry: Not on file     Inability: Not on file   • Transportation needs     Medical: Not on file     Non-medical: Not on file   Tobacco Use   • Smoking status: Never Smoker   • Smokeless tobacco: Never Used   Substance and Sexual Activity   • Alcohol use: Never     Frequency: Never   • Drug use: Never   • Sexual activity: Not on file   Lifestyle   • Physical activity     Days per week: Not on  "file     Minutes per session: Not on file   • Stress: Not on file   Relationships   • Social connections     Talks on phone: Not on file     Gets together: Not on file     Attends [employer] service: Not on file     Active member of club or organization: Not on file     Attends meetings of clubs or organizations: Not on file     Relationship status: Not on file   • Intimate partner violence     Fear of current or ex partner: Not on file     Emotionally abused: Not on file     Physically abused: Not on file     Forced sexual activity: Not on file   Other Topics Concern   • Not on file   Social History Narrative   • Not on file         Physical Exam:  Ambulatory Vitals  /72 (BP Location: Right arm, Patient Position: Sitting, BP Cuff Size: Adult)   Pulse 82   Resp 14   Ht 1.626 m (5' 4\")   Wt 60.2 kg (132 lb 13.2 oz)   SpO2 91%    BP Readings from Last 4 Encounters:   05/29/20 120/72   05/21/20 137/69     Weight/BMI:   Vitals:    05/29/20 1318   BP: 120/72   Weight: 60.2 kg (132 lb 13.2 oz)   Height: 1.626 m (5' 4\")    Body mass index is 22.8 kg/m².  Wt Readings from Last 4 Encounters:   05/29/20 60.2 kg (132 lb 13.2 oz)   05/21/20 59.4 kg (130 lb 15.3 oz)       Physical Exam   Constitutional: She is oriented to person, place, and time and well-developed, well-nourished, and in no distress. No distress.   HENT:   Head: Normocephalic and atraumatic.   Eyes: Pupils are equal, round, and reactive to light. Conjunctivae are normal.   Neck: Normal range of motion. Neck supple. No JVD present.   Cardiovascular: Normal rate, regular rhythm, normal heart sounds and intact distal pulses. Exam reveals no gallop and no friction rub.   No murmur heard.  Pulmonary/Chest: Effort normal and breath sounds normal. No respiratory distress. She has no wheezes. She has no rales. She exhibits no tenderness.   Abdominal: Soft. Bowel sounds are normal. She exhibits no distension.   Musculoskeletal:         General: No edema. "   Neurological: She is alert and oriented to person, place, and time.   Skin: Skin is warm and dry.   Psychiatric: Affect and judgment normal.       Lab Data Review:  Lab Results   Component Value Date/Time    CHOLSTRLTOT 182 05/20/2020 02:39 AM     (H) 05/20/2020 02:39 AM    HDL 50 05/20/2020 02:39 AM    TRIGLYCERIDE 74 05/20/2020 02:39 AM       Lab Results   Component Value Date/Time    SODIUM 138 05/20/2020 05:30 PM    POTASSIUM 5.3 05/20/2020 05:30 PM    CHLORIDE 100 05/20/2020 05:30 PM    CO2 26 05/20/2020 05:30 PM    GLUCOSE 123 (H) 05/20/2020 05:30 PM    BUN 21 05/20/2020 05:30 PM    CREATININE 1.04 05/20/2020 05:30 PM     CrCl cannot be calculated (Patient's most recent lab result is older than the maximum 7 days allowed.).  Lab Results   Component Value Date/Time    ALKPHOSPHAT 97 05/19/2020 12:30 PM    ASTSGOT 23 05/19/2020 12:30 PM    ALTSGPT 17 05/19/2020 12:30 PM    TBILIRUBIN 0.3 05/19/2020 12:30 PM      Lab Results   Component Value Date/Time    WBC 9.1 05/19/2020 12:30 PM     No results found for: HBA1C  No components found for: TROP      Cardiac Imaging and Procedures Review:      EKG 5/19/20 sinus with 1st deg avb    TTE 5/20/20  CONCLUSIONS  No prior study is available for comparison.   Mild mitral regurgitation.  Aortic sclerosis without stenosis.  Normal left ventricular size, wall thickness, and systolic function.  Left ventricular ejection fraction is visually estimated to be 65%.  Normal regional wall motion.  Normal left atrial size.  Normal right ventricular size and systolic function.  Normal pericardium without effusion.  Left Ventricle  Normal left ventricular size, wall thickness, and systolic function.   Left ventricular ejection fraction is visually estimated to be 65%.   Normal regional wall motion. Normal diastolic function.    Medical Decision Making:  Problem List Items Addressed This Visit     LINUS (acute kidney injury) (HCC)    Atrial fibrillation with rapid ventricular  response (HCC)    Relevant Medications    metoprolol SR (TOPROL XL) 50 MG TABLET SR 24 HR    furosemide (LASIX) 20 MG Tab    rivaroxaban (XARELTO) 20 MG Tab tablet    rosuvastatin (CRESTOR) 5 MG Tab    COPD (chronic obstructive pulmonary disease) (HCC)    Relevant Orders    REFERRAL TO PULMONOLOGY    Aortic atherosclerosis (HCC)    Relevant Medications    metoprolol SR (TOPROL XL) 50 MG TABLET SR 24 HR    furosemide (LASIX) 20 MG Tab    rivaroxaban (XARELTO) 20 MG Tab tablet    rosuvastatin (CRESTOR) 5 MG Tab    Heart failure with preserved ejection fraction (HCC)    Relevant Medications    metoprolol SR (TOPROL XL) 50 MG TABLET SR 24 HR    furosemide (LASIX) 20 MG Tab    rivaroxaban (XARELTO) 20 MG Tab tablet    rosuvastatin (CRESTOR) 5 MG Tab    Other Relevant Orders    Basic Metabolic Panel        Paroxysmal AF - chadsvasc 5 - xarelto 20mg daily for cva prevention. Titrate up rate control with metoprolol. Repeat BMP.    HFpEF (H2FPEF score 84% probability; E/e' = 16) - add lasix 20mg daily prn. Continue spironolactone    Aortic atherosclerosis / ascvd - no need for additional antiplatelet while on doac. Add statin.     Refer to pulm for COPD for optimization.    It was my pleasure to meet with Ms. Beard.

## 2020-06-02 SDOH — ECONOMIC STABILITY: FOOD INSECURITY: WITHIN THE PAST 12 MONTHS, YOU WORRIED THAT YOUR FOOD WOULD RUN OUT BEFORE YOU GOT MONEY TO BUY MORE.: NEVER TRUE

## 2020-06-02 SDOH — ECONOMIC STABILITY: FOOD INSECURITY: WITHIN THE PAST 12 MONTHS, THE FOOD YOU BOUGHT JUST DIDN'T LAST AND YOU DIDN'T HAVE MONEY TO GET MORE.: NEVER TRUE

## 2020-06-02 SDOH — ECONOMIC STABILITY: TRANSPORTATION INSECURITY
IN THE PAST 12 MONTHS, HAS LACK OF TRANSPORTATION KEPT YOU FROM MEETINGS, WORK, OR FROM GETTING THINGS NEEDED FOR DAILY LIVING?: NO

## 2020-06-02 SDOH — ECONOMIC STABILITY: TRANSPORTATION INSECURITY
IN THE PAST 12 MONTHS, HAS THE LACK OF TRANSPORTATION KEPT YOU FROM MEDICAL APPOINTMENTS OR FROM GETTING MEDICATIONS?: NO

## 2020-06-02 SDOH — ECONOMIC STABILITY: INCOME INSECURITY: HOW HARD IS IT FOR YOU TO PAY FOR THE VERY BASICS LIKE FOOD, HOUSING, MEDICAL CARE, AND HEATING?: NOT HARD AT ALL

## 2020-06-02 NOTE — PROGRESS NOTES
6/2/2020   CHW Called and spoke to daughter as I was having trouble trying to contact patient. Daughter informed me patient mobile number was not accurate.     6/2/2020  CHW Marcos contacted patient via mobile phone. Introduced CCM services. Completed outpatient assessment and SDOH screening. Patient stated she does have a follow up appointment with PCP Dr. Lopez in [place], [place] , where the patient lives. She is currently living in her travel trailer with her  behind her daughter's house. As of more recent, she is staying with her sister in [place], [place] until she completes her follow up with the cardiologist as recommended. Patient stated she is very unhappy with not being able to get in contact with Cardiologist and not receiving call from physician assistant. Patient was wanting to be sure her up to date medical records will be sent to her PCP in [place], [place]. Patient declined CCM services and did not want my contact information.     Community Health Worker Intake  • Social determinates of health intake completed.   • Identified barriers to health. None   • Contact information provided to Anita Beard. No   • Has PCP appointment scheduled. Yes, in [facility] with Dr. Lopez.    Plan:   Discharge patient from CCM services due to patient declining.

## 2020-06-03 ENCOUNTER — TELEPHONE (OUTPATIENT)
Dept: CARDIOLOGY | Facility: MEDICAL CENTER | Age: 85
End: 2020-06-03

## 2020-06-03 NOTE — TELEPHONE ENCOUNTER
LVM with pt at [phone] notifying that nothing additional is needed for lab orders, just BMP.

## 2020-06-03 NOTE — TELEPHONE ENCOUNTER
"VR     Pt called re: lab orders, she says she wants \"levels\" added to the orders. [phone]   "

## 2020-06-03 NOTE — TELEPHONE ENCOUNTER
Myrtle      Pt calling back asking for you to add other particular lab testing to the lab order you discussed earlier.  Pt states she wants BNP and Troponin as well as the BMP you have ordered.  Please call Anita if questions

## 2020-06-05 NOTE — TELEPHONE ENCOUNTER
You  Todd Mckay M.D. 2 days ago       Pt requesting additional labs. When do you think BNP and troponin needs to be repeated?      Todd Mckay M.D.  You 55 minutes ago (7:34 AM)       We don't need to repeat the troponin and pBNP. Thanks Myrtle!      Called pt and notified that we don't need to repeat troponin and BNP.

## 2020-06-10 ENCOUNTER — TELEPHONE (OUTPATIENT)
Dept: CARDIOLOGY | Facility: MEDICAL CENTER | Age: 85
End: 2020-06-10

## 2020-06-10 NOTE — TELEPHONE ENCOUNTER
Returned call. Pt explains that Xarelto was started on 5/19/20 while she was in the hospital, but yesterday she noticed her forearms started itching and she has a spotty red rash on the underside of her forearm. The itching and rash feels a little better when she puts Benadryl cream on. She believes it is the Xarelto causing this. She denies any recent changes to her diet, detergents, soaps, or any topical creams. She said she called her PCP and they told her to notify her cardiologist.     To VAIBHAV

## 2020-06-10 NOTE — TELEPHONE ENCOUNTER
VR    Pt called to report xarelto issues, it is making her itch on arms. Benadryl gel is helping a bit, [phone].

## 2020-06-11 NOTE — TELEPHONE ENCOUNTER
You  Todd Mckay M.D. 21 hours ago (4:00 PM)       Pt reporting itching and rash on her forearms that started yesterday. Do you think it could be the Xarelto? Do you recommend any changes?      Todd Mckay M.D.  You 1 hour ago (12:19 PM)       Not likely. If not improved in a week can offer to switch xarelto to eliquis 2.5 mg bid. Thanks Myrtle!      LVM with pt at [phone] notifying that  recommends monitoring rash for a week, and if not resolved to please let us know and we can switch blood thinner.

## 2020-07-27 ENCOUNTER — APPOINTMENT (OUTPATIENT)
Dept: PULMONOLOGY | Facility: HOSPICE | Age: 85
End: 2020-07-27
Payer: MEDICARE

## 2021-03-31 ENCOUNTER — APPOINTMENT (RX ONLY)
Dept: URBAN - METROPOLITAN AREA CLINIC 4 | Facility: CLINIC | Age: 86
Setting detail: DERMATOLOGY
End: 2021-03-31

## 2021-03-31 DIAGNOSIS — L57.0 ACTINIC KERATOSIS: ICD-10-CM

## 2021-03-31 DIAGNOSIS — L81.4 OTHER MELANIN HYPERPIGMENTATION: ICD-10-CM

## 2021-03-31 DIAGNOSIS — L82.0 INFLAMED SEBORRHEIC KERATOSIS: ICD-10-CM

## 2021-03-31 DIAGNOSIS — L82.1 OTHER SEBORRHEIC KERATOSIS: ICD-10-CM

## 2021-03-31 PROCEDURE — 99213 OFFICE O/P EST LOW 20 MIN: CPT | Mod: 25

## 2021-03-31 PROCEDURE — ? LIQUID NITROGEN (COSMETIC)

## 2021-03-31 PROCEDURE — ? COUNSELING

## 2021-03-31 PROCEDURE — 17000 DESTRUCT PREMALG LESION: CPT

## 2021-03-31 PROCEDURE — ? ADDITIONAL NOTES

## 2021-03-31 PROCEDURE — ? LIQUID NITROGEN

## 2021-03-31 PROCEDURE — 17003 DESTRUCT PREMALG LES 2-14: CPT

## 2021-03-31 ASSESSMENT — LOCATION ZONE DERM
LOCATION ZONE: ARM
LOCATION ZONE: NOSE
LOCATION ZONE: LEG
LOCATION ZONE: FACE

## 2021-03-31 ASSESSMENT — LOCATION SIMPLE DESCRIPTION DERM
LOCATION SIMPLE: LEFT NOSE
LOCATION SIMPLE: LEFT FOREARM
LOCATION SIMPLE: LEFT ELBOW
LOCATION SIMPLE: RIGHT FOREARM
LOCATION SIMPLE: RIGHT PRETIBIAL REGION
LOCATION SIMPLE: LEFT CHEEK

## 2021-03-31 ASSESSMENT — LOCATION DETAILED DESCRIPTION DERM
LOCATION DETAILED: LEFT PROXIMAL DORSAL FOREARM
LOCATION DETAILED: LEFT INFERIOR NASAL CHEEK
LOCATION DETAILED: RIGHT PROXIMAL DORSAL FOREARM
LOCATION DETAILED: LEFT ELBOW
LOCATION DETAILED: LEFT DISTAL DORSAL FOREARM
LOCATION DETAILED: RIGHT DISTAL PRETIBIAL REGION
LOCATION DETAILED: LEFT NASAL ALAR GROOVE
LOCATION DETAILED: RIGHT PROXIMAL PRETIBIAL REGION

## 2021-03-31 NOTE — PROCEDURE: ADDITIONAL NOTES
Additional Notes: Due to patient persistently scratching arms, inflammation present.\\nRecommend Aveeno cream. Advise patient to apply lotion more frequently through out the day.\\nAdvise patient to use Triamcinolone 0.1% cream to affected areas. \\nPatient counseled on steroid use, particularly thinning of the skin if overused.
Detail Level: Zone
Render Risk Assessment In Note?: no

## 2021-09-28 ENCOUNTER — APPOINTMENT (RX ONLY)
Dept: URBAN - METROPOLITAN AREA CLINIC 4 | Facility: CLINIC | Age: 86
Setting detail: DERMATOLOGY
End: 2021-09-28

## 2021-09-28 DIAGNOSIS — L82.1 OTHER SEBORRHEIC KERATOSIS: ICD-10-CM

## 2021-09-28 DIAGNOSIS — L57.0 ACTINIC KERATOSIS: ICD-10-CM

## 2021-09-28 DIAGNOSIS — Z85.828 PERSONAL HISTORY OF OTHER MALIGNANT NEOPLASM OF SKIN: ICD-10-CM

## 2021-09-28 DIAGNOSIS — D18.0 HEMANGIOMA: ICD-10-CM

## 2021-09-28 DIAGNOSIS — L81.4 OTHER MELANIN HYPERPIGMENTATION: ICD-10-CM

## 2021-09-28 PROBLEM — D18.01 HEMANGIOMA OF SKIN AND SUBCUTANEOUS TISSUE: Status: ACTIVE | Noted: 2021-09-28

## 2021-09-28 PROBLEM — D48.5 NEOPLASM OF UNCERTAIN BEHAVIOR OF SKIN: Status: ACTIVE | Noted: 2021-09-28

## 2021-09-28 PROCEDURE — 11102 TANGNTL BX SKIN SINGLE LES: CPT

## 2021-09-28 PROCEDURE — 17000 DESTRUCT PREMALG LESION: CPT | Mod: 59

## 2021-09-28 PROCEDURE — 99213 OFFICE O/P EST LOW 20 MIN: CPT | Mod: 25

## 2021-09-28 PROCEDURE — ? BIOPSY BY SHAVE METHOD

## 2021-09-28 PROCEDURE — ? LIQUID NITROGEN

## 2021-09-28 ASSESSMENT — LOCATION ZONE DERM
LOCATION ZONE: TRUNK
LOCATION ZONE: NECK
LOCATION ZONE: FACE

## 2021-09-28 ASSESSMENT — LOCATION DETAILED DESCRIPTION DERM
LOCATION DETAILED: LEFT MEDIAL UPPER BACK
LOCATION DETAILED: RIGHT INFERIOR UPPER BACK
LOCATION DETAILED: RIGHT CENTRAL BUCCAL CHEEK
LOCATION DETAILED: LEFT INFERIOR CENTRAL MALAR CHEEK
LOCATION DETAILED: LEFT SUPERIOR LATERAL NECK
LOCATION DETAILED: RIGHT INFERIOR MEDIAL UPPER BACK

## 2021-09-28 ASSESSMENT — LOCATION SIMPLE DESCRIPTION DERM
LOCATION SIMPLE: RIGHT UPPER BACK
LOCATION SIMPLE: RIGHT CHEEK
LOCATION SIMPLE: LEFT ANTERIOR NECK
LOCATION SIMPLE: LEFT CHEEK
LOCATION SIMPLE: LEFT UPPER BACK

## 2021-09-28 NOTE — PROCEDURE: BIOPSY BY SHAVE METHOD
Detail Level: Detailed
Depth Of Biopsy: dermis
Was A Bandage Applied: Yes
Size Of Lesion In Cm: 0
Anticipated Plan (Based On Presumed Biopsy Results): Imiquimod. Information given today.
Biopsy Type: H and E
Biopsy Method: Personna blade
Anesthesia Type: 1% lidocaine with epinephrine and a 1:10 solution of 8.4% sodium bicarbonate
Anesthesia Volume In Cc: 2
Hemostasis: Drysol and Electrocautery
Wound Care: Vaseline
Dressing: Band-Aid
Destruction After The Procedure: No
Type Of Destruction Used: Curettage
Curettage Text: The wound bed was treated with curettage after the biopsy was performed.
Electrodesiccation Text: The wound bed was treated with electrodesiccation after the biopsy was performed.
Electrodesiccation And Curettage Text: The wound bed was treated with electrodesiccation and curettage after the biopsy was performed.
Lab: [id number]
Lab Facility: 
Consent: Verbal consent was obtained and risks were reviewed including but not limited to scarring, infection, bleeding, scabbing, incomplete removal, nerve damage and allergy to anesthesia.
Post-Care Instructions: I reviewed with the patient in detail post-care instructions. Patient is to keep the biopsy site dry overnight, and then apply vasaline twice daily until healed.
Notification Instructions: Patient will be notified of biopsy results. However, patient instructed to call the office if not contacted within 2 weeks.
Billing Type: Third-Party Bill
Information: Selecting Yes will display possible errors in your note based on the variables you have selected. This validation is only offered as a suggestion for you. PLEASE NOTE THAT THE VALIDATION TEXT WILL BE REMOVED WHEN YOU FINALIZE YOUR NOTE. IF YOU WANT TO FAX A PRELIMINARY NOTE YOU WILL NEED TO TOGGLE THIS TO 'NO' IF YOU DO NOT WANT IT IN YOUR FAXED NOTE.

## 2021-09-28 NOTE — PROCEDURE: LIQUID NITROGEN

## 2021-10-04 ENCOUNTER — RX ONLY (OUTPATIENT)
Age: 86
Setting detail: RX ONLY
End: 2021-10-04

## 2021-10-04 RX ORDER — IMIQUIMOD 50 MG/G
1 CREAM TOPICAL QD
Qty: 6 | Refills: 1 | Status: ERX | COMMUNITY
Start: 2021-10-04

## 2021-11-15 ENCOUNTER — RX ONLY (OUTPATIENT)
Age: 86
Setting detail: RX ONLY
End: 2021-11-15

## 2021-11-15 RX ORDER — TRIAMCINOLONE ACETONIDE 1 MG/G
1 CREAM TOPICAL BID
Qty: 80 | Refills: 3 | Status: ERX

## 2022-06-15 ENCOUNTER — APPOINTMENT (RX ONLY)
Dept: URBAN - METROPOLITAN AREA CLINIC 4 | Facility: CLINIC | Age: 87
Setting detail: DERMATOLOGY
End: 2022-06-15

## 2022-06-15 DIAGNOSIS — D22 MELANOCYTIC NEVI: ICD-10-CM

## 2022-06-15 DIAGNOSIS — L82.1 OTHER SEBORRHEIC KERATOSIS: ICD-10-CM

## 2022-06-15 DIAGNOSIS — L82.0 INFLAMED SEBORRHEIC KERATOSIS: ICD-10-CM

## 2022-06-15 DIAGNOSIS — L57.0 ACTINIC KERATOSIS: ICD-10-CM

## 2022-06-15 DIAGNOSIS — Z85.828 PERSONAL HISTORY OF OTHER MALIGNANT NEOPLASM OF SKIN: ICD-10-CM

## 2022-06-15 DIAGNOSIS — D18.0 HEMANGIOMA: ICD-10-CM

## 2022-06-15 DIAGNOSIS — L81.4 OTHER MELANIN HYPERPIGMENTATION: ICD-10-CM

## 2022-06-15 PROBLEM — D22.5 MELANOCYTIC NEVI OF TRUNK: Status: ACTIVE | Noted: 2022-06-15

## 2022-06-15 PROBLEM — D18.01 HEMANGIOMA OF SKIN AND SUBCUTANEOUS TISSUE: Status: ACTIVE | Noted: 2022-06-15

## 2022-06-15 PROBLEM — C44.319 BASAL CELL CARCINOMA OF SKIN OF OTHER PARTS OF FACE: Status: ACTIVE | Noted: 2022-06-15

## 2022-06-15 PROCEDURE — 99213 OFFICE O/P EST LOW 20 MIN: CPT | Mod: 25

## 2022-06-15 PROCEDURE — ? COUNSELING

## 2022-06-15 PROCEDURE — 17000 DESTRUCT PREMALG LESION: CPT

## 2022-06-15 PROCEDURE — ? LIQUID NITROGEN

## 2022-06-15 PROCEDURE — ? ADDITIONAL NOTES

## 2022-06-15 PROCEDURE — ? OBSERVATION

## 2022-06-15 ASSESSMENT — LOCATION ZONE DERM
LOCATION ZONE: TRUNK
LOCATION ZONE: LIP
LOCATION ZONE: LEG
LOCATION ZONE: FACE

## 2022-06-15 ASSESSMENT — LOCATION SIMPLE DESCRIPTION DERM
LOCATION SIMPLE: LEFT CHEEK
LOCATION SIMPLE: UPPER LIP
LOCATION SIMPLE: RIGHT UPPER BACK
LOCATION SIMPLE: LEFT PRETIBIAL REGION
LOCATION SIMPLE: LEFT UPPER BACK

## 2022-06-15 ASSESSMENT — LOCATION DETAILED DESCRIPTION DERM
LOCATION DETAILED: LEFT MEDIAL MALAR CHEEK
LOCATION DETAILED: LEFT SUPERIOR MEDIAL UPPER BACK
LOCATION DETAILED: RIGHT MEDIAL UPPER BACK
LOCATION DETAILED: LEFT PROXIMAL PRETIBIAL REGION
LOCATION DETAILED: LEFT MEDIAL UPPER BACK
LOCATION DETAILED: PHILTRUM

## 2022-06-15 NOTE — PROCEDURE: LIQUID NITROGEN
Render Note In Bullet Format When Appropriate: No
Consent: The patient's consent was obtained including but not limited to risks of crusting, scabbing, blistering, scarring, darker or lighter pigmentary change, recurrence, incomplete removal and infection.
Show Aperture Variable?: Yes
Number Of Freeze-Thaw Cycles: 1 freeze-thaw cycle
Duration Of Freeze Thaw-Cycle (Seconds): 5
Post-Care Instructions: I reviewed with the patient in detail post-care instructions. Patient is to wear sunprotection, and avoid picking at any of the treated lesions. Pt may apply Vaseline to crusted or scabbing areas.
Detail Level: Detailed

## 2022-06-15 NOTE — PROCEDURE: ADDITIONAL NOTES
Render Risk Assessment In Note?: no
Additional Notes: Recommended to apply Triamcinolone cream to itchy areas. Patient states she has cream at home.
Detail Level: Generalized

## 2022-12-15 ENCOUNTER — APPOINTMENT (RX ONLY)
Dept: URBAN - METROPOLITAN AREA CLINIC 4 | Facility: CLINIC | Age: 87
Setting detail: DERMATOLOGY
End: 2022-12-15

## 2022-12-15 DIAGNOSIS — L21.8 OTHER SEBORRHEIC DERMATITIS: ICD-10-CM

## 2022-12-15 PROBLEM — D48.5 NEOPLASM OF UNCERTAIN BEHAVIOR OF SKIN: Status: ACTIVE | Noted: 2022-12-15

## 2022-12-15 PROCEDURE — ? COUNSELING

## 2022-12-15 PROCEDURE — ? ADDITIONAL NOTES

## 2022-12-15 PROCEDURE — 99213 OFFICE O/P EST LOW 20 MIN: CPT

## 2022-12-15 PROCEDURE — ? PRESCRIPTION

## 2022-12-15 RX ORDER — KETOCONAZOLE 20 MG/G
CREAM TOPICAL BID
Qty: 30 | Refills: 0 | Status: ERX | COMMUNITY
Start: 2022-12-15

## 2022-12-15 RX ORDER — KETOCONAZOLE 20 MG/ML
1 SHAMPOO, SUSPENSION TOPICAL
Qty: 120 | Refills: 2 | Status: CANCELLED

## 2022-12-15 RX ORDER — FLUOROURACIL 2 G/40G
1 CREAM TOPICAL QD
Qty: 40 | Refills: 0 | Status: ERX | COMMUNITY
Start: 2022-12-15

## 2022-12-15 RX ORDER — HYDROCORTISONE 25 MG/G
CREAM TOPICAL QHS
Qty: 30 | Refills: 0 | Status: ERX | COMMUNITY
Start: 2022-12-15

## 2022-12-15 RX ADMIN — KETOCONAZOLE: 20 CREAM TOPICAL at 00:00

## 2022-12-15 RX ADMIN — FLUOROURACIL 1: 2 CREAM TOPICAL at 00:00

## 2022-12-15 RX ADMIN — HYDROCORTISONE: 25 CREAM TOPICAL at 00:00

## 2022-12-15 ASSESSMENT — LOCATION DETAILED DESCRIPTION DERM
LOCATION DETAILED: LEFT CENTRAL MALAR CHEEK
LOCATION DETAILED: RIGHT CENTRAL MALAR CHEEK

## 2022-12-15 ASSESSMENT — LOCATION ZONE DERM: LOCATION ZONE: FACE

## 2022-12-15 ASSESSMENT — LOCATION SIMPLE DESCRIPTION DERM
LOCATION SIMPLE: LEFT CHEEK
LOCATION SIMPLE: RIGHT CHEEK

## 2022-12-15 NOTE — PROCEDURE: ADDITIONAL NOTES
Detail Level: Simple
Render Risk Assessment In Note?: no
Additional Notes: We discussed bx vs a trial of Efudex. She wished the later. Discussed expected course & usage of the medication.
Additional Notes: Discussed with the patient that I favor actinic keratoses for the areas on the face which are bothering her. She does not feel this is the case. She feels these are due to her C-Pap mask though she denies pruritus which mitigates against ACD. We decided on a trial of HC + ketoconazole cream and proceed from there.

## 2022-12-15 NOTE — PROCEDURE: MIPS QUALITY
Quality 431: Preventive Care And Screening: Unhealthy Alcohol Use - Screening: Patient not identified as an unhealthy alcohol user when screened for unhealthy alcohol use using a systematic screening method
Quality 110: Preventive Care And Screening: Influenza Immunization: Influenza Immunization Administered during Influenza season
Quality 226: Preventive Care And Screening: Tobacco Use: Screening And Cessation Intervention: Patient screened for tobacco use and is an ex/non-smoker
Detail Level: Detailed
Quality 130: Documentation Of Current Medications In The Medical Record: Current Medications Documented
Quality 111:Pneumonia Vaccination Status For Older Adults: Pneumococcal vaccine (PPSV23) administered on or after patient’s 60th birthday and before the end of the measurement period

## 2023-10-04 PROCEDURE — 84295 ASSAY OF SERUM SODIUM: CPT

## 2023-11-03 ENCOUNTER — HOSPITAL ENCOUNTER (INPATIENT)
Facility: MEDICAL CENTER | Age: 88
LOS: 6 days | DRG: 025 | End: 2023-11-09
Attending: STUDENT IN AN ORGANIZED HEALTH CARE EDUCATION/TRAINING PROGRAM | Admitting: STUDENT IN AN ORGANIZED HEALTH CARE EDUCATION/TRAINING PROGRAM
Payer: MEDICARE

## 2023-11-03 ENCOUNTER — HOSPITAL ENCOUNTER (OUTPATIENT)
Dept: RADIOLOGY | Facility: MEDICAL CENTER | Age: 88
End: 2023-11-03

## 2023-11-03 DIAGNOSIS — I50.30 HEART FAILURE WITH PRESERVED EJECTION FRACTION, UNSPECIFIED HF CHRONICITY (HCC): ICD-10-CM

## 2023-11-03 DIAGNOSIS — S06.5XAA SDH (SUBDURAL HEMATOMA) (HCC): ICD-10-CM

## 2023-11-03 LAB
ALBUMIN SERPL BCP-MCNC: 4 G/DL (ref 3.2–4.9)
ALBUMIN/GLOB SERPL: 1.4 G/DL
ALP SERPL-CCNC: 90 U/L (ref 30–99)
ALT SERPL-CCNC: 18 U/L (ref 2–50)
ANION GAP SERPL CALC-SCNC: 9 MMOL/L (ref 7–16)
APTT PPP: 30.5 SEC (ref 24.7–36)
AST SERPL-CCNC: 28 U/L (ref 12–45)
BASOPHILS # BLD AUTO: 0.4 % (ref 0–1.8)
BASOPHILS # BLD: 0.03 K/UL (ref 0–0.12)
BILIRUB SERPL-MCNC: 0.3 MG/DL (ref 0.1–1.5)
BUN SERPL-MCNC: 17 MG/DL (ref 8–22)
CALCIUM ALBUM COR SERPL-MCNC: 9.7 MG/DL (ref 8.5–10.5)
CALCIUM SERPL-MCNC: 9.7 MG/DL (ref 8.5–10.5)
CHLORIDE SERPL-SCNC: 105 MMOL/L (ref 96–112)
CHOLEST SERPL-MCNC: 215 MG/DL (ref 100–199)
CO2 SERPL-SCNC: 26 MMOL/L (ref 20–33)
CREAT SERPL-MCNC: 0.77 MG/DL (ref 0.5–1.4)
EKG IMPRESSION: NORMAL
EOSINOPHIL # BLD AUTO: 0.16 K/UL (ref 0–0.51)
EOSINOPHIL NFR BLD: 2.2 % (ref 0–6.9)
ERYTHROCYTE [DISTWIDTH] IN BLOOD BY AUTOMATED COUNT: 40.6 FL (ref 35.9–50)
GFR SERPLBLD CREATININE-BSD FMLA CKD-EPI: 75 ML/MIN/1.73 M 2
GLOBULIN SER CALC-MCNC: 2.9 G/DL (ref 1.9–3.5)
GLUCOSE SERPL-MCNC: 97 MG/DL (ref 65–99)
HCT VFR BLD AUTO: 40 % (ref 37–47)
HDLC SERPL-MCNC: 61 MG/DL
HGB BLD-MCNC: 12.8 G/DL (ref 12–16)
IMM GRANULOCYTES # BLD AUTO: 0.01 K/UL (ref 0–0.11)
IMM GRANULOCYTES NFR BLD AUTO: 0.1 % (ref 0–0.9)
INR PPP: 1.03 (ref 0.87–1.13)
LDLC SERPL CALC-MCNC: 142 MG/DL
LYMPHOCYTES # BLD AUTO: 2.83 K/UL (ref 1–4.8)
LYMPHOCYTES NFR BLD: 39 % (ref 22–41)
MAGNESIUM SERPL-MCNC: 2.1 MG/DL (ref 1.5–2.5)
MCH RBC QN AUTO: 28.2 PG (ref 27–33)
MCHC RBC AUTO-ENTMCNC: 32 G/DL (ref 32.2–35.5)
MCV RBC AUTO: 88.1 FL (ref 81.4–97.8)
MONOCYTES # BLD AUTO: 0.48 K/UL (ref 0–0.85)
MONOCYTES NFR BLD AUTO: 6.6 % (ref 0–13.4)
NEUTROPHILS # BLD AUTO: 3.75 K/UL (ref 1.82–7.42)
NEUTROPHILS NFR BLD: 51.7 % (ref 44–72)
NRBC # BLD AUTO: 0 K/UL
NRBC BLD-RTO: 0 /100 WBC (ref 0–0.2)
PLATELET # BLD AUTO: 241 K/UL (ref 164–446)
PMV BLD AUTO: 10.8 FL (ref 9–12.9)
POTASSIUM SERPL-SCNC: 4.7 MMOL/L (ref 3.6–5.5)
PROT SERPL-MCNC: 6.9 G/DL (ref 6–8.2)
PROTHROMBIN TIME: 13.6 SEC (ref 12–14.6)
RBC # BLD AUTO: 4.54 M/UL (ref 4.2–5.4)
SODIUM SERPL-SCNC: 140 MMOL/L (ref 135–145)
TRIGL SERPL-MCNC: 60 MG/DL (ref 0–149)
WBC # BLD AUTO: 7.3 K/UL (ref 4.8–10.8)

## 2023-11-03 PROCEDURE — 83735 ASSAY OF MAGNESIUM: CPT

## 2023-11-03 PROCEDURE — 99285 EMERGENCY DEPT VISIT HI MDM: CPT

## 2023-11-03 PROCEDURE — 99497 ADVNCD CARE PLAN 30 MIN: CPT | Performed by: STUDENT IN AN ORGANIZED HEALTH CARE EDUCATION/TRAINING PROGRAM

## 2023-11-03 PROCEDURE — 99223 1ST HOSP IP/OBS HIGH 75: CPT | Mod: 25,AI | Performed by: STUDENT IN AN ORGANIZED HEALTH CARE EDUCATION/TRAINING PROGRAM

## 2023-11-03 PROCEDURE — 80053 COMPREHEN METABOLIC PANEL: CPT

## 2023-11-03 PROCEDURE — 85025 COMPLETE CBC W/AUTO DIFF WBC: CPT

## 2023-11-03 PROCEDURE — 36415 COLL VENOUS BLD VENIPUNCTURE: CPT

## 2023-11-03 PROCEDURE — 93005 ELECTROCARDIOGRAM TRACING: CPT | Performed by: STUDENT IN AN ORGANIZED HEALTH CARE EDUCATION/TRAINING PROGRAM

## 2023-11-03 PROCEDURE — 85730 THROMBOPLASTIN TIME PARTIAL: CPT

## 2023-11-03 PROCEDURE — 85610 PROTHROMBIN TIME: CPT

## 2023-11-03 PROCEDURE — 700105 HCHG RX REV CODE 258: Performed by: STUDENT IN AN ORGANIZED HEALTH CARE EDUCATION/TRAINING PROGRAM

## 2023-11-03 PROCEDURE — 80061 LIPID PANEL: CPT

## 2023-11-03 PROCEDURE — 770001 HCHG ROOM/CARE - MED/SURG/GYN PRIV*

## 2023-11-03 RX ORDER — LABETALOL HYDROCHLORIDE 5 MG/ML
10 INJECTION, SOLUTION INTRAVENOUS EVERY 4 HOURS PRN
Status: DISCONTINUED | OUTPATIENT
Start: 2023-11-03 | End: 2023-11-03

## 2023-11-03 RX ORDER — LORAZEPAM 2 MG/ML
2 INJECTION INTRAMUSCULAR
Status: DISCONTINUED | OUTPATIENT
Start: 2023-11-03 | End: 2023-11-09 | Stop reason: HOSPADM

## 2023-11-03 RX ORDER — ONDANSETRON 4 MG/1
4 TABLET, ORALLY DISINTEGRATING ORAL EVERY 4 HOURS PRN
Status: DISCONTINUED | OUTPATIENT
Start: 2023-11-03 | End: 2023-11-09 | Stop reason: HOSPADM

## 2023-11-03 RX ORDER — BISACODYL 10 MG
10 SUPPOSITORY, RECTAL RECTAL
Status: DISCONTINUED | OUTPATIENT
Start: 2023-11-03 | End: 2023-11-09 | Stop reason: HOSPADM

## 2023-11-03 RX ORDER — ENALAPRILAT 1.25 MG/ML
1.25 INJECTION INTRAVENOUS EVERY 6 HOURS PRN
Status: DISCONTINUED | OUTPATIENT
Start: 2023-11-03 | End: 2023-11-04

## 2023-11-03 RX ORDER — ALENDRONATE SODIUM 70 MG/1
70 TABLET ORAL
COMMUNITY
Start: 2023-10-27

## 2023-11-03 RX ORDER — ONDANSETRON 2 MG/ML
4 INJECTION INTRAMUSCULAR; INTRAVENOUS EVERY 4 HOURS PRN
Status: DISCONTINUED | OUTPATIENT
Start: 2023-11-03 | End: 2023-11-09 | Stop reason: HOSPADM

## 2023-11-03 RX ORDER — MULTIVIT WITH MINERALS/LUTEIN
1000 TABLET ORAL EVERY EVENING
COMMUNITY

## 2023-11-03 RX ORDER — POLYETHYLENE GLYCOL 3350 17 G/17G
1 POWDER, FOR SOLUTION ORAL
Status: DISCONTINUED | OUTPATIENT
Start: 2023-11-03 | End: 2023-11-09 | Stop reason: HOSPADM

## 2023-11-03 RX ORDER — AMOXICILLIN 250 MG
2 CAPSULE ORAL 2 TIMES DAILY
Status: DISCONTINUED | OUTPATIENT
Start: 2023-11-04 | End: 2023-11-09 | Stop reason: HOSPADM

## 2023-11-03 RX ORDER — DOCUSATE CALCIUM 240 MG
240 CAPSULE ORAL EVERY EVENING
Status: ON HOLD | COMMUNITY
End: 2023-11-20

## 2023-11-03 RX ORDER — LORATADINE 10 MG/1
10 TABLET ORAL
Status: ON HOLD | COMMUNITY
Start: 2023-10-14 | End: 2023-11-20

## 2023-11-03 RX ORDER — LABETALOL HYDROCHLORIDE 5 MG/ML
10 INJECTION, SOLUTION INTRAVENOUS EVERY 4 HOURS PRN
Status: DISCONTINUED | OUTPATIENT
Start: 2023-11-03 | End: 2023-11-09 | Stop reason: HOSPADM

## 2023-11-03 RX ORDER — SODIUM CHLORIDE, SODIUM LACTATE, POTASSIUM CHLORIDE, AND CALCIUM CHLORIDE .6; .31; .03; .02 G/100ML; G/100ML; G/100ML; G/100ML
500 INJECTION, SOLUTION INTRAVENOUS
Status: DISCONTINUED | OUTPATIENT
Start: 2023-11-03 | End: 2023-11-09 | Stop reason: HOSPADM

## 2023-11-03 RX ORDER — FLUTICASONE PROPIONATE 50 MCG
2 SPRAY, SUSPENSION (ML) NASAL DAILY
Status: ON HOLD | COMMUNITY
Start: 2023-09-29 | End: 2023-11-20

## 2023-11-03 RX ORDER — HYDRALAZINE HYDROCHLORIDE 20 MG/ML
10 INJECTION INTRAMUSCULAR; INTRAVENOUS EVERY 4 HOURS PRN
Status: DISCONTINUED | OUTPATIENT
Start: 2023-11-03 | End: 2023-11-09 | Stop reason: HOSPADM

## 2023-11-03 RX ORDER — ATENOLOL 25 MG/1
25 TABLET ORAL DAILY
Status: ON HOLD | COMMUNITY
Start: 2023-10-26 | End: 2023-11-20

## 2023-11-03 RX ORDER — SODIUM CHLORIDE 9 MG/ML
INJECTION, SOLUTION INTRAVENOUS CONTINUOUS
Status: DISCONTINUED | OUTPATIENT
Start: 2023-11-03 | End: 2023-11-06

## 2023-11-03 RX ORDER — ACETAMINOPHEN 325 MG/1
650 TABLET ORAL EVERY 6 HOURS PRN
Status: DISCONTINUED | OUTPATIENT
Start: 2023-11-03 | End: 2023-11-09 | Stop reason: HOSPADM

## 2023-11-03 RX ADMIN — SODIUM CHLORIDE: 9 INJECTION, SOLUTION INTRAVENOUS at 23:30

## 2023-11-04 ENCOUNTER — ANESTHESIA EVENT (OUTPATIENT)
Dept: SURGERY | Facility: MEDICAL CENTER | Age: 88
DRG: 025 | End: 2023-11-04
Payer: MEDICARE

## 2023-11-04 ENCOUNTER — APPOINTMENT (OUTPATIENT)
Dept: RADIOLOGY | Facility: MEDICAL CENTER | Age: 88
DRG: 025 | End: 2023-11-04
Attending: NEUROLOGICAL SURGERY
Payer: MEDICARE

## 2023-11-04 ENCOUNTER — APPOINTMENT (OUTPATIENT)
Dept: RADIOLOGY | Facility: MEDICAL CENTER | Age: 88
DRG: 025 | End: 2023-11-04
Attending: PHYSICIAN ASSISTANT
Payer: MEDICARE

## 2023-11-04 ENCOUNTER — ANESTHESIA (OUTPATIENT)
Dept: SURGERY | Facility: MEDICAL CENTER | Age: 88
DRG: 025 | End: 2023-11-04
Payer: MEDICARE

## 2023-11-04 PROBLEM — S06.5XAA SUBDURAL HEMATOMA (HCC): Status: ACTIVE | Noted: 2023-11-04

## 2023-11-04 PROBLEM — Z71.89 ACP (ADVANCE CARE PLANNING): Status: ACTIVE | Noted: 2023-11-04

## 2023-11-04 PROBLEM — G89.4 CHRONIC PAIN SYNDROME: Status: ACTIVE | Noted: 2023-11-04

## 2023-11-04 PROBLEM — R51.9 HEADACHE: Status: ACTIVE | Noted: 2023-11-04

## 2023-11-04 PROBLEM — R54 AGE-RELATED PHYSICAL DEBILITY: Status: ACTIVE | Noted: 2023-11-04

## 2023-11-04 LAB
ALBUMIN SERPL BCP-MCNC: 3.5 G/DL (ref 3.2–4.9)
ALBUMIN/GLOB SERPL: 1.6 G/DL
ALP SERPL-CCNC: 77 U/L (ref 30–99)
ALT SERPL-CCNC: 16 U/L (ref 2–50)
ANION GAP SERPL CALC-SCNC: 12 MMOL/L (ref 7–16)
APPEARANCE UR: CLEAR
AST SERPL-CCNC: 19 U/L (ref 12–45)
BACTERIA #/AREA URNS HPF: NEGATIVE /HPF
BILIRUB SERPL-MCNC: 0.3 MG/DL (ref 0.1–1.5)
BILIRUB UR QL STRIP.AUTO: NEGATIVE
BUN SERPL-MCNC: 15 MG/DL (ref 8–22)
CALCIUM ALBUM COR SERPL-MCNC: 8.8 MG/DL (ref 8.5–10.5)
CALCIUM SERPL-MCNC: 8.4 MG/DL (ref 8.5–10.5)
CFT BLD TEG: 4.8 MIN (ref 4.6–9.1)
CFT P HPASE BLD TEG: 5.2 MIN (ref 4.3–8.3)
CHLORIDE SERPL-SCNC: 110 MMOL/L (ref 96–112)
CLOT ANGLE BLD TEG: 75.1 DEGREES (ref 63–78)
CLOT LYSIS 30M P MA LENFR BLD TEG: 0.6 % (ref 0–2.6)
CO2 SERPL-SCNC: 22 MMOL/L (ref 20–33)
COLOR UR: YELLOW
CREAT SERPL-MCNC: 0.77 MG/DL (ref 0.5–1.4)
CT.EXTRINSIC BLD ROTEM: 1.1 MIN (ref 0.8–2.1)
EPI CELLS #/AREA URNS HPF: NEGATIVE /HPF
EST. AVERAGE GLUCOSE BLD GHB EST-MCNC: 117 MG/DL
GFR SERPLBLD CREATININE-BSD FMLA CKD-EPI: 75 ML/MIN/1.73 M 2
GLOBULIN SER CALC-MCNC: 2.2 G/DL (ref 1.9–3.5)
GLUCOSE BLD STRIP.AUTO-MCNC: 180 MG/DL (ref 65–99)
GLUCOSE SERPL-MCNC: 157 MG/DL (ref 65–99)
GLUCOSE UR STRIP.AUTO-MCNC: NEGATIVE MG/DL
HBA1C MFR BLD: 5.7 % (ref 4–5.6)
HYALINE CASTS #/AREA URNS LPF: ABNORMAL /LPF
KETONES UR STRIP.AUTO-MCNC: NEGATIVE MG/DL
LEUKOCYTE ESTERASE UR QL STRIP.AUTO: ABNORMAL
MAGNESIUM SERPL-MCNC: 1.8 MG/DL (ref 1.5–2.5)
MCF BLD TEG: 62.6 MM (ref 52–69)
MCF.PLATELET INHIB BLD ROTEM: 23.2 MM (ref 15–32)
MICRO URNS: ABNORMAL
NITRITE UR QL STRIP.AUTO: NEGATIVE
PA AA BLD-ACNC: 22.8 % (ref 0–11)
PA ADP BLD-ACNC: 4 % (ref 0–17)
PH UR STRIP.AUTO: 7 [PH] (ref 5–8)
PHOSPHATE SERPL-MCNC: 2.9 MG/DL (ref 2.5–4.5)
POTASSIUM SERPL-SCNC: 4.1 MMOL/L (ref 3.6–5.5)
PROT SERPL-MCNC: 5.7 G/DL (ref 6–8.2)
PROT UR QL STRIP: NEGATIVE MG/DL
RBC # URNS HPF: ABNORMAL /HPF
RBC UR QL AUTO: NEGATIVE
SODIUM SERPL-SCNC: 141 MMOL/L (ref 135–145)
SODIUM SERPL-SCNC: 144 MMOL/L (ref 135–145)
SODIUM SERPL-SCNC: 144 MMOL/L (ref 135–145)
SP GR UR STRIP.AUTO: 1.02
TEG ALGORITHM TGALG: ABNORMAL
UROBILINOGEN UR STRIP.AUTO-MCNC: 0.2 MG/DL
WBC #/AREA URNS HPF: ABNORMAL /HPF

## 2023-11-04 PROCEDURE — 85384 FIBRINOGEN ACTIVITY: CPT

## 2023-11-04 PROCEDURE — 160009 HCHG ANES TIME/MIN: Performed by: NEUROLOGICAL SURGERY

## 2023-11-04 PROCEDURE — 700111 HCHG RX REV CODE 636 W/ 250 OVERRIDE (IP): Performed by: ANESTHESIOLOGY

## 2023-11-04 PROCEDURE — 84100 ASSAY OF PHOSPHORUS: CPT

## 2023-11-04 PROCEDURE — 83735 ASSAY OF MAGNESIUM: CPT

## 2023-11-04 PROCEDURE — 160029 HCHG SURGERY MINUTES - 1ST 30 MINS LEVEL 4: Performed by: NEUROLOGICAL SURGERY

## 2023-11-04 PROCEDURE — 81001 URINALYSIS AUTO W/SCOPE: CPT

## 2023-11-04 PROCEDURE — 160002 HCHG RECOVERY MINUTES (STAT): Performed by: NEUROLOGICAL SURGERY

## 2023-11-04 PROCEDURE — 700101 HCHG RX REV CODE 250: Performed by: NEUROLOGICAL SURGERY

## 2023-11-04 PROCEDURE — A9270 NON-COVERED ITEM OR SERVICE: HCPCS | Performed by: STUDENT IN AN ORGANIZED HEALTH CARE EDUCATION/TRAINING PROGRAM

## 2023-11-04 PROCEDURE — 700101 HCHG RX REV CODE 250: Performed by: ANESTHESIOLOGY

## 2023-11-04 PROCEDURE — 83036 HEMOGLOBIN GLYCOSYLATED A1C: CPT

## 2023-11-04 PROCEDURE — 82962 GLUCOSE BLOOD TEST: CPT

## 2023-11-04 PROCEDURE — 85347 COAGULATION TIME ACTIVATED: CPT

## 2023-11-04 PROCEDURE — 700111 HCHG RX REV CODE 636 W/ 250 OVERRIDE (IP): Performed by: STUDENT IN AN ORGANIZED HEALTH CARE EDUCATION/TRAINING PROGRAM

## 2023-11-04 PROCEDURE — 70450 CT HEAD/BRAIN W/O DYE: CPT

## 2023-11-04 PROCEDURE — 80053 COMPREHEN METABOLIC PANEL: CPT

## 2023-11-04 PROCEDURE — 70551 MRI BRAIN STEM W/O DYE: CPT

## 2023-11-04 PROCEDURE — 160048 HCHG OR STATISTICAL LEVEL 1-5: Performed by: NEUROLOGICAL SURGERY

## 2023-11-04 PROCEDURE — 700102 HCHG RX REV CODE 250 W/ 637 OVERRIDE(OP): Performed by: STUDENT IN AN ORGANIZED HEALTH CARE EDUCATION/TRAINING PROGRAM

## 2023-11-04 PROCEDURE — 700105 HCHG RX REV CODE 258: Performed by: STUDENT IN AN ORGANIZED HEALTH CARE EDUCATION/TRAINING PROGRAM

## 2023-11-04 PROCEDURE — 700105 HCHG RX REV CODE 258: Performed by: NEUROLOGICAL SURGERY

## 2023-11-04 PROCEDURE — 00C40ZZ EXTIRPATION OF MATTER FROM INTRACRANIAL SUBDURAL SPACE, OPEN APPROACH: ICD-10-PCS | Performed by: NEUROLOGICAL SURGERY

## 2023-11-04 PROCEDURE — 99233 SBSQ HOSP IP/OBS HIGH 50: CPT | Performed by: HOSPITALIST

## 2023-11-04 PROCEDURE — 700105 HCHG RX REV CODE 258: Performed by: ANESTHESIOLOGY

## 2023-11-04 PROCEDURE — 85576 BLOOD PLATELET AGGREGATION: CPT | Mod: 91

## 2023-11-04 PROCEDURE — 700111 HCHG RX REV CODE 636 W/ 250 OVERRIDE (IP): Mod: JZ | Performed by: ANESTHESIOLOGY

## 2023-11-04 PROCEDURE — 700111 HCHG RX REV CODE 636 W/ 250 OVERRIDE (IP): Mod: JZ | Performed by: NEUROLOGICAL SURGERY

## 2023-11-04 PROCEDURE — 160041 HCHG SURGERY MINUTES - EA ADDL 1 MIN LEVEL 4: Performed by: NEUROLOGICAL SURGERY

## 2023-11-04 PROCEDURE — 160035 HCHG PACU - 1ST 60 MINS PHASE I: Performed by: NEUROLOGICAL SURGERY

## 2023-11-04 PROCEDURE — 110371 HCHG SHELL REV 272: Performed by: NEUROLOGICAL SURGERY

## 2023-11-04 PROCEDURE — 770022 HCHG ROOM/CARE - ICU (200)

## 2023-11-04 PROCEDURE — C1713 ANCHOR/SCREW BN/BN,TIS/BN: HCPCS | Performed by: NEUROLOGICAL SURGERY

## 2023-11-04 PROCEDURE — 99291 CRITICAL CARE FIRST HOUR: CPT | Performed by: INTERNAL MEDICINE

## 2023-11-04 PROCEDURE — 84295 ASSAY OF SERUM SODIUM: CPT | Mod: 91

## 2023-11-04 PROCEDURE — 110454 HCHG SHELL REV 250: Performed by: NEUROLOGICAL SURGERY

## 2023-11-04 DEVICE — SCREW STRYK NC 1.5X5MM (6NCX40=240) (80EA/PK) CONSIGNED QTY 240 PRE-LOAD: Type: IMPLANTABLE DEVICE | Site: CRANIAL | Status: FUNCTIONAL

## 2023-11-04 DEVICE — PLATE NC BURR HOLE COVER FOR SHUNT 14MM (6NCX6=36): Type: IMPLANTABLE DEVICE | Site: CRANIAL | Status: FUNCTIONAL

## 2023-11-04 DEVICE — GRAFT DURAMATRIX ONLAY PLUS 3IN X 3IN OR 7.5CM X 7.5CM: Type: IMPLANTABLE DEVICE | Site: CRANIAL | Status: FUNCTIONAL

## 2023-11-04 RX ORDER — GABAPENTIN 300 MG/1
300-600 CAPSULE ORAL 2 TIMES DAILY
Status: DISCONTINUED | OUTPATIENT
Start: 2023-11-04 | End: 2023-11-04

## 2023-11-04 RX ORDER — METOPROLOL TARTRATE 1 MG/ML
1 INJECTION, SOLUTION INTRAVENOUS
Status: DISCONTINUED | OUTPATIENT
Start: 2023-11-04 | End: 2023-11-04 | Stop reason: HOSPADM

## 2023-11-04 RX ORDER — ONDANSETRON 2 MG/ML
4 INJECTION INTRAMUSCULAR; INTRAVENOUS
Status: DISCONTINUED | OUTPATIENT
Start: 2023-11-04 | End: 2023-11-04 | Stop reason: HOSPADM

## 2023-11-04 RX ORDER — ONDANSETRON 2 MG/ML
INJECTION INTRAMUSCULAR; INTRAVENOUS PRN
Status: DISCONTINUED | OUTPATIENT
Start: 2023-11-04 | End: 2023-11-04 | Stop reason: SURG

## 2023-11-04 RX ORDER — EPHEDRINE SULFATE 50 MG/ML
INJECTION, SOLUTION INTRAVENOUS PRN
Status: DISCONTINUED | OUTPATIENT
Start: 2023-11-04 | End: 2023-11-04 | Stop reason: SURG

## 2023-11-04 RX ORDER — ATORVASTATIN CALCIUM 40 MG/1
40 TABLET, FILM COATED ORAL EVERY EVENING
Status: DISCONTINUED | OUTPATIENT
Start: 2023-11-04 | End: 2023-11-09 | Stop reason: HOSPADM

## 2023-11-04 RX ORDER — MIDAZOLAM HYDROCHLORIDE 1 MG/ML
INJECTION INTRAMUSCULAR; INTRAVENOUS PRN
Status: DISCONTINUED | OUTPATIENT
Start: 2023-11-04 | End: 2023-11-04 | Stop reason: SURG

## 2023-11-04 RX ORDER — HYDROMORPHONE HYDROCHLORIDE 1 MG/ML
0.1 INJECTION, SOLUTION INTRAMUSCULAR; INTRAVENOUS; SUBCUTANEOUS
Status: DISCONTINUED | OUTPATIENT
Start: 2023-11-04 | End: 2023-11-04 | Stop reason: HOSPADM

## 2023-11-04 RX ORDER — HALOPERIDOL 5 MG/ML
1 INJECTION INTRAMUSCULAR
Status: DISCONTINUED | OUTPATIENT
Start: 2023-11-04 | End: 2023-11-04 | Stop reason: HOSPADM

## 2023-11-04 RX ORDER — HYDRALAZINE HYDROCHLORIDE 20 MG/ML
5 INJECTION INTRAMUSCULAR; INTRAVENOUS
Status: DISCONTINUED | OUTPATIENT
Start: 2023-11-04 | End: 2023-11-04 | Stop reason: HOSPADM

## 2023-11-04 RX ORDER — SODIUM CHLORIDE, SODIUM LACTATE, POTASSIUM CHLORIDE, CALCIUM CHLORIDE 600; 310; 30; 20 MG/100ML; MG/100ML; MG/100ML; MG/100ML
INJECTION, SOLUTION INTRAVENOUS
Status: DISCONTINUED | OUTPATIENT
Start: 2023-11-04 | End: 2023-11-04 | Stop reason: SURG

## 2023-11-04 RX ORDER — LEVETIRACETAM 500 MG/5ML
INJECTION, SOLUTION, CONCENTRATE INTRAVENOUS PRN
Status: DISCONTINUED | OUTPATIENT
Start: 2023-11-04 | End: 2023-11-04 | Stop reason: SURG

## 2023-11-04 RX ORDER — FLUTICASONE PROPIONATE 50 MCG
2 SPRAY, SUSPENSION (ML) NASAL DAILY
Status: DISCONTINUED | OUTPATIENT
Start: 2023-11-04 | End: 2023-11-04

## 2023-11-04 RX ORDER — SODIUM CHLORIDE, SODIUM LACTATE, POTASSIUM CHLORIDE, CALCIUM CHLORIDE 600; 310; 30; 20 MG/100ML; MG/100ML; MG/100ML; MG/100ML
INJECTION, SOLUTION INTRAVENOUS CONTINUOUS
Status: DISCONTINUED | OUTPATIENT
Start: 2023-11-04 | End: 2023-11-04 | Stop reason: HOSPADM

## 2023-11-04 RX ORDER — OXYCODONE HCL 5 MG/5 ML
5 SOLUTION, ORAL ORAL
Status: DISCONTINUED | OUTPATIENT
Start: 2023-11-04 | End: 2023-11-04 | Stop reason: HOSPADM

## 2023-11-04 RX ORDER — ATENOLOL 25 MG/1
25 TABLET ORAL NIGHTLY
Status: DISCONTINUED | OUTPATIENT
Start: 2023-11-04 | End: 2023-11-08

## 2023-11-04 RX ORDER — GABAPENTIN 300 MG/1
300 CAPSULE ORAL EVERY MORNING
Status: DISCONTINUED | OUTPATIENT
Start: 2023-11-04 | End: 2023-11-09 | Stop reason: HOSPADM

## 2023-11-04 RX ORDER — HYDROMORPHONE HYDROCHLORIDE 1 MG/ML
0.4 INJECTION, SOLUTION INTRAMUSCULAR; INTRAVENOUS; SUBCUTANEOUS
Status: DISCONTINUED | OUTPATIENT
Start: 2023-11-04 | End: 2023-11-04 | Stop reason: HOSPADM

## 2023-11-04 RX ORDER — LEVETIRACETAM 500 MG/5ML
500 INJECTION, SOLUTION, CONCENTRATE INTRAVENOUS EVERY 12 HOURS
Status: DISCONTINUED | OUTPATIENT
Start: 2023-11-04 | End: 2023-11-07

## 2023-11-04 RX ORDER — OXYCODONE HCL 5 MG/5 ML
10 SOLUTION, ORAL ORAL
Status: DISCONTINUED | OUTPATIENT
Start: 2023-11-04 | End: 2023-11-04 | Stop reason: HOSPADM

## 2023-11-04 RX ORDER — GABAPENTIN 300 MG/1
600 CAPSULE ORAL
Status: DISCONTINUED | OUTPATIENT
Start: 2023-11-04 | End: 2023-11-09 | Stop reason: HOSPADM

## 2023-11-04 RX ORDER — LORATADINE 10 MG/1
10 TABLET ORAL NIGHTLY
Status: DISCONTINUED | OUTPATIENT
Start: 2023-11-04 | End: 2023-11-05

## 2023-11-04 RX ORDER — DEXAMETHASONE SODIUM PHOSPHATE 4 MG/ML
INJECTION, SOLUTION INTRA-ARTICULAR; INTRALESIONAL; INTRAMUSCULAR; INTRAVENOUS; SOFT TISSUE PRN
Status: DISCONTINUED | OUTPATIENT
Start: 2023-11-04 | End: 2023-11-04 | Stop reason: SURG

## 2023-11-04 RX ORDER — ROCURONIUM BROMIDE 10 MG/ML
INJECTION, SOLUTION INTRAVENOUS PRN
Status: DISCONTINUED | OUTPATIENT
Start: 2023-11-04 | End: 2023-11-04 | Stop reason: SURG

## 2023-11-04 RX ORDER — HYDROMORPHONE HYDROCHLORIDE 1 MG/ML
0.5 INJECTION, SOLUTION INTRAMUSCULAR; INTRAVENOUS; SUBCUTANEOUS EVERY 4 HOURS PRN
Status: COMPLETED | OUTPATIENT
Start: 2023-11-04 | End: 2023-11-05

## 2023-11-04 RX ORDER — CEFAZOLIN SODIUM 1 G/3ML
INJECTION, POWDER, FOR SOLUTION INTRAMUSCULAR; INTRAVENOUS
Status: DISCONTINUED | OUTPATIENT
Start: 2023-11-04 | End: 2023-11-04 | Stop reason: HOSPADM

## 2023-11-04 RX ORDER — SODIUM CHLORIDE, SODIUM GLUCONATE, SODIUM ACETATE, POTASSIUM CHLORIDE AND MAGNESIUM CHLORIDE 526; 502; 368; 37; 30 MG/100ML; MG/100ML; MG/100ML; MG/100ML; MG/100ML
INJECTION, SOLUTION INTRAVENOUS
Status: DISCONTINUED | OUTPATIENT
Start: 2023-11-04 | End: 2023-11-04 | Stop reason: SURG

## 2023-11-04 RX ORDER — LIDOCAINE HYDROCHLORIDE 20 MG/ML
INJECTION, SOLUTION EPIDURAL; INFILTRATION; INTRACAUDAL; PERINEURAL PRN
Status: DISCONTINUED | OUTPATIENT
Start: 2023-11-04 | End: 2023-11-04 | Stop reason: SURG

## 2023-11-04 RX ORDER — EPHEDRINE SULFATE 50 MG/ML
5 INJECTION, SOLUTION INTRAVENOUS
Status: DISCONTINUED | OUTPATIENT
Start: 2023-11-04 | End: 2023-11-04 | Stop reason: HOSPADM

## 2023-11-04 RX ORDER — BUTALBITAL, ACETAMINOPHEN AND CAFFEINE 50; 325; 40 MG/1; MG/1; MG/1
1 TABLET ORAL EVERY 6 HOURS PRN
Status: DISCONTINUED | OUTPATIENT
Start: 2023-11-04 | End: 2023-11-04

## 2023-11-04 RX ORDER — HYDROMORPHONE HYDROCHLORIDE 1 MG/ML
0.2 INJECTION, SOLUTION INTRAMUSCULAR; INTRAVENOUS; SUBCUTANEOUS
Status: DISCONTINUED | OUTPATIENT
Start: 2023-11-04 | End: 2023-11-04 | Stop reason: HOSPADM

## 2023-11-04 RX ORDER — LEVETIRACETAM 500 MG/5ML
500 INJECTION, SOLUTION, CONCENTRATE INTRAVENOUS EVERY 12 HOURS
Status: DISCONTINUED | OUTPATIENT
Start: 2023-11-04 | End: 2023-11-04

## 2023-11-04 RX ADMIN — LEVETIRACETAM 500 MG: 100 INJECTION, SOLUTION, CONCENTRATE INTRAVENOUS at 12:58

## 2023-11-04 RX ADMIN — HYDROMORPHONE HYDROCHLORIDE 0.5 MG: 1 INJECTION, SOLUTION INTRAMUSCULAR; INTRAVENOUS; SUBCUTANEOUS at 12:55

## 2023-11-04 RX ADMIN — ROCURONIUM BROMIDE 70 MG: 50 INJECTION, SOLUTION INTRAVENOUS at 09:08

## 2023-11-04 RX ADMIN — LEVETIRACETAM 1000 MG: 100 INJECTION, SOLUTION, CONCENTRATE INTRAVENOUS at 09:16

## 2023-11-04 RX ADMIN — PROPOFOL 100 MG: 10 INJECTION, EMULSION INTRAVENOUS at 09:08

## 2023-11-04 RX ADMIN — FENTANYL CITRATE 50 MCG: 50 INJECTION, SOLUTION INTRAMUSCULAR; INTRAVENOUS at 09:34

## 2023-11-04 RX ADMIN — HYDRALAZINE HYDROCHLORIDE 5 MG: 20 INJECTION, SOLUTION INTRAMUSCULAR; INTRAVENOUS at 12:18

## 2023-11-04 RX ADMIN — FENTANYL CITRATE 50 MCG: 50 INJECTION, SOLUTION INTRAMUSCULAR; INTRAVENOUS at 09:20

## 2023-11-04 RX ADMIN — GABAPENTIN 600 MG: 300 CAPSULE ORAL at 00:36

## 2023-11-04 RX ADMIN — GABAPENTIN 300 MG: 300 CAPSULE ORAL at 06:27

## 2023-11-04 RX ADMIN — SODIUM CHLORIDE, POTASSIUM CHLORIDE, SODIUM LACTATE AND CALCIUM CHLORIDE: 600; 310; 30; 20 INJECTION, SOLUTION INTRAVENOUS at 09:02

## 2023-11-04 RX ADMIN — ONDANSETRON 8 MG: 2 INJECTION INTRAMUSCULAR; INTRAVENOUS at 10:46

## 2023-11-04 RX ADMIN — ACETAMINOPHEN 650 MG: 325 TABLET, FILM COATED ORAL at 00:36

## 2023-11-04 RX ADMIN — ATENOLOL 25 MG: 25 TABLET ORAL at 00:36

## 2023-11-04 RX ADMIN — SUGAMMADEX 200 MG: 100 INJECTION, SOLUTION INTRAVENOUS at 11:04

## 2023-11-04 RX ADMIN — HYDROMORPHONE HYDROCHLORIDE 0.5 MG: 1 INJECTION, SOLUTION INTRAMUSCULAR; INTRAVENOUS; SUBCUTANEOUS at 17:26

## 2023-11-04 RX ADMIN — CEFAZOLIN 1 G: 1 INJECTION, POWDER, FOR SOLUTION INTRAMUSCULAR; INTRAVENOUS at 22:51

## 2023-11-04 RX ADMIN — SODIUM CHLORIDE, SODIUM GLUCONATE, SODIUM ACETATE, POTASSIUM CHLORIDE AND MAGNESIUM CHLORIDE: 526; 502; 368; 37; 30 INJECTION, SOLUTION INTRAVENOUS at 10:39

## 2023-11-04 RX ADMIN — MIDAZOLAM 2 MG: 1 INJECTION, SOLUTION INTRAMUSCULAR; INTRAVENOUS at 09:06

## 2023-11-04 RX ADMIN — HYDROMORPHONE HYDROCHLORIDE 0.5 MG: 1 INJECTION, SOLUTION INTRAMUSCULAR; INTRAVENOUS; SUBCUTANEOUS at 21:19

## 2023-11-04 RX ADMIN — PROPOFOL 50 MG: 10 INJECTION, EMULSION INTRAVENOUS at 09:09

## 2023-11-04 RX ADMIN — SODIUM CHLORIDE: 9 INJECTION, SOLUTION INTRAVENOUS at 14:13

## 2023-11-04 RX ADMIN — DEXAMETHASONE SODIUM PHOSPHATE 10 MG: 4 INJECTION INTRA-ARTICULAR; INTRALESIONAL; INTRAMUSCULAR; INTRAVENOUS; SOFT TISSUE at 09:08

## 2023-11-04 RX ADMIN — CEFAZOLIN 2 G: 1 INJECTION, POWDER, FOR SOLUTION INTRAMUSCULAR; INTRAVENOUS at 09:08

## 2023-11-04 RX ADMIN — CEFAZOLIN 1 G: 1 INJECTION, POWDER, FOR SOLUTION INTRAMUSCULAR; INTRAVENOUS at 18:10

## 2023-11-04 RX ADMIN — LIDOCAINE HYDROCHLORIDE 70 MG: 20 INJECTION, SOLUTION EPIDURAL; INFILTRATION; INTRACAUDAL at 09:08

## 2023-11-04 RX ADMIN — EPHEDRINE SULFATE 10 MG: 50 INJECTION INTRAVENOUS at 09:15

## 2023-11-04 RX ADMIN — FENTANYL CITRATE 100 MCG: 50 INJECTION, SOLUTION INTRAMUSCULAR; INTRAVENOUS at 09:08

## 2023-11-04 ASSESSMENT — LIFESTYLE VARIABLES
ON A TYPICAL DAY WHEN YOU DRINK ALCOHOL HOW MANY DRINKS DO YOU HAVE: 0
DOES PATIENT WANT TO STOP DRINKING: NO
TOTAL SCORE: 0
HAVE PEOPLE ANNOYED YOU BY CRITICIZING YOUR DRINKING: NO
CONSUMPTION TOTAL: NEGATIVE
AVERAGE NUMBER OF DAYS PER WEEK YOU HAVE A DRINK CONTAINING ALCOHOL: 0
HOW MANY TIMES IN THE PAST YEAR HAVE YOU HAD 5 OR MORE DRINKS IN A DAY: 0
HAVE YOU EVER FELT YOU SHOULD CUT DOWN ON YOUR DRINKING: NO
EVER FELT BAD OR GUILTY ABOUT YOUR DRINKING: NO
ALCOHOL_USE: NO
TOTAL SCORE: 0
TOTAL SCORE: 0
EVER HAD A DRINK FIRST THING IN THE MORNING TO STEADY YOUR NERVES TO GET RID OF A HANGOVER: NO
SUBSTANCE_ABUSE: 0

## 2023-11-04 ASSESSMENT — PAIN DESCRIPTION - PAIN TYPE
TYPE: CHRONIC PAIN
TYPE: ACUTE PAIN
TYPE: ACUTE PAIN;SURGICAL PAIN
TYPE: ACUTE PAIN;SURGICAL PAIN
TYPE: ACUTE PAIN
TYPE: ACUTE PAIN;SURGICAL PAIN
TYPE: ACUTE PAIN
TYPE: ACUTE PAIN
TYPE: SURGICAL PAIN
TYPE: ACUTE PAIN;SURGICAL PAIN

## 2023-11-04 ASSESSMENT — COGNITIVE AND FUNCTIONAL STATUS - GENERAL
STANDING UP FROM CHAIR USING ARMS: A LITTLE
DAILY ACTIVITIY SCORE: 18
MOVING FROM LYING ON BACK TO SITTING ON SIDE OF FLAT BED: A LITTLE
DRESSING REGULAR LOWER BODY CLOTHING: A LITTLE
WALKING IN HOSPITAL ROOM: A LITTLE
DRESSING REGULAR UPPER BODY CLOTHING: A LITTLE
HELP NEEDED FOR BATHING: A LITTLE
PERSONAL GROOMING: A LITTLE
CLIMB 3 TO 5 STEPS WITH RAILING: A LITTLE
SUGGESTED CMS G CODE MODIFIER MOBILITY: CK
TOILETING: A LITTLE
MOVING TO AND FROM BED TO CHAIR: A LITTLE
TURNING FROM BACK TO SIDE WHILE IN FLAT BAD: A LITTLE
MOBILITY SCORE: 18
EATING MEALS: A LITTLE
SUGGESTED CMS G CODE MODIFIER DAILY ACTIVITY: CK

## 2023-11-04 ASSESSMENT — PATIENT HEALTH QUESTIONNAIRE - PHQ9
SUM OF ALL RESPONSES TO PHQ9 QUESTIONS 1 AND 2: 0
2. FEELING DOWN, DEPRESSED, IRRITABLE, OR HOPELESS: NOT AT ALL
1. LITTLE INTEREST OR PLEASURE IN DOING THINGS: NOT AT ALL

## 2023-11-04 ASSESSMENT — ENCOUNTER SYMPTOMS
DEPRESSION: 0
ABDOMINAL PAIN: 0
MYALGIAS: 0
SHORTNESS OF BREATH: 0
BLURRED VISION: 0
COUGH: 0
CHILLS: 0
SPEECH CHANGE: 0
NAUSEA: 0
DIZZINESS: 1
VOMITING: 0
PALPITATIONS: 0
FOCAL WEAKNESS: 0
WEAKNESS: 1
BRUISES/BLEEDS EASILY: 1
DOUBLE VISION: 0
HEADACHES: 1
HEARTBURN: 0
FEVER: 0

## 2023-11-04 ASSESSMENT — FIBROSIS 4 INDEX
FIB4 SCORE: 2.36
FIB4 SCORE: 2.36

## 2023-11-04 NOTE — THERAPY
Physical Therapy Contact Note    Patient Name: Anita Beard  Age:  88 y.o., Sex:  female  Medical Record #: [id number]  Today's Date: 11/4/2023    PT consult received, awaiting OR; will follow up post for accurate assessment of PT/dc needs;     Yary OSMAN, PT,  [phone]

## 2023-11-04 NOTE — PROGRESS NOTES
Report given to pre op nurse lucy Chavarria taken down by transport in bed. Personal belongings left in room as patient will be transported to ICU after surgery and I will bring it down to her ICU room when we have a room assignment.

## 2023-11-04 NOTE — CARE PLAN
The patient is Stable - Low risk of patient condition declining or worsening    Shift Goals  Clinical Goals: Q2h neuro checks, monitor drain output  Patient Goals: JOSELITO  Family Goals: Pain managment    Progress made toward(s) clinical / shift goals:      Problem: Knowledge Deficit - Standard  Goal: Patient and family/care givers will demonstrate understanding of plan of care, disease process/condition, diagnostic tests and medications  Outcome: Progressing     Problem: Respiratory  Goal: Patient will achieve/maintain optimum respiratory ventilation and gas exchange  Outcome: Progressing     Problem: Pain - Standard  Goal: Alleviation of pain or a reduction in pain to the patient’s comfort goal  Outcome: Progressing     Problem: Fall Risk  Goal: Patient will remain free from falls  Outcome: Progressing       Patient is not progressing towards the following goals:

## 2023-11-04 NOTE — ASSESSMENT & PLAN NOTE
Patient has a pain pump in place which is functioning and apparently distributing narcotic  She also takes Tylenol No. 4, 3 times daily and has been doing so for many years.  Continue fentanyl continuous infusion through pain pump.

## 2023-11-04 NOTE — ANESTHESIA PREPROCEDURE EVALUATION
Case: [id number] Date/Time: 11/04/23 0900    Procedures:       CREATION, CRANIAL ALEKSANDR HOLE (Right: Head)      CRANIOTOMY (Right: Head)    Anesthesia type: General    Location: [place] [address] / SURGERY [facility]    Surgeons: Johan Alves III, M.D.          Relevant Problems   PULMONARY   (positive) COPD (chronic obstructive pulmonary disease) (formerly [facility])   (positive) Shortness of breath      NEURO   (positive) Headache      CARDIAC   (positive) Aortic atherosclerosis (HCC)   (positive) Atrial fibrillation with rapid ventricular response (HCC)         (positive) LINUS (acute kidney injury) (HCC)      Other   (positive) Chronic pain syndrome   (positive) SDH (subdural hematoma) (formerly [facility])       Physical Exam    Airway   Mallampati: II  TM distance: >3 FB  Neck ROM: full       Cardiovascular - normal exam  Rhythm: regular  Rate: normal  (-) murmur     Dental - normal exam           Pulmonary - normal exam  Breath sounds clear to auscultation     Abdominal    Neurological - normal exam                 Anesthesia Plan    ASA 3- EMERGENT   ASA physical status 3 criteria: COPD - poorly controlledASA physical status emergent criteria: neurologic compromise requiring immediate intervention    Plan - general       Airway plan will be ETT          Induction: intravenous    Postoperative Plan: Postoperative administration of opioids is intended.    Pertinent diagnostic labs and testing reviewed    Informed Consent:    Anesthetic plan and risks discussed with patient.    Use of blood products discussed with: patient whom consented to blood products.

## 2023-11-04 NOTE — ED TRIAGE NOTES
"Chief Complaint   Patient presents with    Other     [place] [facility] [place] today as outpatient due to intermittent memory issue and was found out to have  a SDH right side 8mm midline shift. AOX4 GCS15; Denies any head injury or fall; not on thinners; on room air and ambulatory with the help on her cane. Denies any N/V and headache       Patient has an implanted pain pump (Fentanyl/Morphine) for pain; H/O of COPD; HTN, Afib    Ht 1.6 m (5' 3\")   Wt 66 kg (145 lb 8.1 oz)   BMI 25.77 kg/m²   BP (!) 163/69   Pulse 62   Temp 36.7 °C (98 °F) (Temporal)   Resp 14   Ht 1.6 m (5' 3\")   Wt 66 kg (145 lb 8.1 oz)   SpO2 95%   BMI 25.77 kg/m²     "

## 2023-11-04 NOTE — ANESTHESIA PROCEDURE NOTES
Airway    Date/Time: 11/4/2023 9:10 AM    Performed by: Cameron Oliva D.O.  Authorized by: Cameron Oliva D.O.    Location:  OR  Urgency:  Elective  Indications for Airway Management:  Anesthesia      Spontaneous Ventilation: absent    Sedation Level:  Deep  Preoxygenated: Yes    Patient Position:  Sniffing  Mask Difficulty Assessment:  0 - not attempted  Final Airway Type:  Endotracheal airway  Final Endotracheal Airway:  ETT  Cuffed: Yes    Technique Used for Successful ETT Placement:  Direct laryngoscopy    Insertion Site:  Oral  Blade Type:  Konrad  Laryngoscope Blade/Videolaryngoscope Blade Size:  3  ETT Size (mm):  7.5  Measured from:  Teeth  ETT to Teeth (cm):  23  Placement Verified by: auscultation and capnometry    Cormack-Lehane Classification:  Grade I - full view of glottis  Number of Attempts at Approach:  1

## 2023-11-04 NOTE — PROGRESS NOTES
4 Eyes Skin Assessment Completed by RAÚL Barnes and RAÚL Calix.    Head WDL  Ears WDL  Nose WDL  Mouth WDL  Neck WDL  Breast/Chest WDL  Shoulder Blades WDL  Spine scarring  (R) Arm/Elbow/Hand WDL  (L) Arm/Elbow/Hand WDL  Abdomen WDL  Groin WDL  Scrotum/Coccyx/Buttocks WDL  (R) Leg Scar and Scab  (L) Leg Scar and Scab  (R) Heel/Foot/Toe WDL  (L) Heel/Foot/Toe WDL          Devices In Places Tele Box, Blood Pressure Cuff, and Pulse Ox      Interventions In Place N/A    Possible Skin Injury No    Pictures Uploaded Into Epic N/A  Wound Consult Placed N/A  RN Wound Prevention Protocol Ordered No

## 2023-11-04 NOTE — ED PROVIDER NOTES
"ED Provider Note    CHIEF COMPLAINT  Chief Complaint   Patient presents with    Other     BIB flight fixed Wing; transfer from [facility], patient had her CT done today as outpatient due to intermittent memory issue and was found out to have  a SDH right side 8mm midline shift. AOX4 GCS15; Denies any head injury or fall; not on thinners; on room air and ambulatory with the help on her cane. 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 female who presents by [facility] due to finding of chronic subdural with left shift on a CT performed outpatient clinic in [facility] today.  Patient had the CT due to about 6 weeks of memory problems.  She also notes about 3 weeks of \"spacing out spells\".  She does not recall any falls or trauma.  She does not drink alcohol.  She is not on a blood thinner or aspirin.  It has been reported that she has been having some waxing and waning cognition.  She lives on her daughter's property but does live alone in her home.    PAST MEDICAL HISTORY   has a past medical history of Afib (HCC), COPD (chronic obstructive pulmonary disease) (HCC), and Hypertension.    SURGICAL HISTORY   has a past surgical history that includes other.    FAMILY HISTORY  History reviewed. No pertinent family history.    SOCIAL HISTORY  Social History     Tobacco Use    Smoking status: Never    Smokeless tobacco: Never   Substance and Sexual Activity    Alcohol use: Never    Drug use: Never    Sexual activity: Not on file       CURRENT MEDICATIONS  Home Medications       Reviewed by Jada Adam (Pharmacy Tech) on 11/03/23 at 2331  Med List Status: Complete     Medication Last Dose Status   acetaminophen-codeine " "(TYLENOL/CODEINE #4) 300-60 MG per tablet 11/3/2023 Active   alendronate (FOSAMAX) 70 MG Tab 11/1/2023 Active   Ascorbic Acid (VITAMIN C) 1000 MG Tab 11/2/2023 Active   atenolol (TENORMIN) 25 MG Tab 11/2/2023 Active   docusate calcium (SURFAK) 240 MG Cap 11/2/2023 Active   fluticasone (FLONASE) 50 MCG/ACT nasal spray PRN Active   gabapentin (NEURONTIN) 300 MG Cap 11/3/2023 Active   loratadine (CLARITIN) 10 MG Tab 11/2/2023 Active   MAGNESIUM PO 11/2/2023 Active   Pain Pump (PATIENT SUPPLIED) XX YESICA CONTINUOUS Active                    ALLERGIES  No Known Allergies    PHYSICAL EXAM  VITAL SIGNS: /57   Pulse 67   Temp 36.7 °C (98 °F) (Temporal)   Resp 17   Ht 1.6 m (5' 3\")   Wt 66 kg (145 lb 8.1 oz)   SpO2 94%   BMI 25.77 kg/m²    Constitutional: Awake and alert. No acute distress  HEENT: Normal Conjunctiva. PERRLA. EOMI. no signs of trauma to the scalp, no hemotympanum raccoon eyes or loera signs.  Neck: Grossly normal range of motion. Airway midline.  Cardiovascular: Normal heart rate, Normal rhythm.  Thorax & Lungs: No respiratory distress. Clear to Auscultation Bilaterally.  Abdomen: Normal inspection. Normoactive Bowel sounds. Non tender. Nondistended  Skin: No obvious rash.  Back: No tenderness, No CVA tenderness.   Musculoskeletal: No obvious deformity. Moves all extremities Well.  Neurologic: A&Ox3.  Normal gait.  Muscle strength is 5 out of 5 with elbow flexion extension,  strength.  Knee flexion extension is 5 out of 5.  Cranial nerves III through XII intact.  Speech is fluent.  Psychiatric: Mood and affect are appropriate for situation.      DIAGNOSTIC STUDIES / PROCEDURES  EKG  I have independently interpreted this EKG  Sinus bradycardia rate of 57 bpm.  No acute ST or T wave changes.  Normal intervals    LABS  Results for orders placed or performed during the hospital encounter of 11/03/23   CBC WITH DIFFERENTIAL   Result Value Ref Range    WBC 7.3 4.8 - 10.8 K/uL    RBC 4.54 4.20 - " 5.40 M/uL    Hemoglobin 12.8 12.0 - 16.0 g/dL    Hematocrit 40.0 37.0 - 47.0 %    MCV 88.1 81.4 - 97.8 fL    MCH 28.2 27.0 - 33.0 pg    MCHC 32.0 (L) 32.2 - 35.5 g/dL    RDW 40.6 35.9 - 50.0 fL    Platelet Count 241 164 - 446 K/uL    MPV 10.8 9.0 - 12.9 fL    Neutrophils-Polys 51.70 44.00 - 72.00 %    Lymphocytes 39.00 22.00 - 41.00 %    Monocytes 6.60 0.00 - 13.40 %    Eosinophils 2.20 0.00 - 6.90 %    Basophils 0.40 0.00 - 1.80 %    Immature Granulocytes 0.10 0.00 - 0.90 %    Nucleated RBC 0.00 0.00 - 0.20 /100 WBC    Neutrophils (Absolute) 3.75 1.82 - 7.42 K/uL    Lymphs (Absolute) 2.83 1.00 - 4.80 K/uL    Monos (Absolute) 0.48 0.00 - 0.85 K/uL    Eos (Absolute) 0.16 0.00 - 0.51 K/uL    Baso (Absolute) 0.03 0.00 - 0.12 K/uL    Immature Granulocytes (abs) 0.01 0.00 - 0.11 K/uL    NRBC (Absolute) 0.00 K/uL   COMP METABOLIC PANEL   Result Value Ref Range    Sodium 140 135 - 145 mmol/L    Potassium 4.7 3.6 - 5.5 mmol/L    Chloride 105 96 - 112 mmol/L    Co2 26 20 - 33 mmol/L    Anion Gap 9.0 7.0 - 16.0    Glucose 97 65 - 99 mg/dL    Bun 17 8 - 22 mg/dL    Creatinine 0.77 0.50 - 1.40 mg/dL    Calcium 9.7 8.5 - 10.5 mg/dL    Correct Calcium 9.7 8.5 - 10.5 mg/dL    AST(SGOT) 28 12 - 45 U/L    ALT(SGPT) 18 2 - 50 U/L    Alkaline Phosphatase 90 30 - 99 U/L    Total Bilirubin 0.3 0.1 - 1.5 mg/dL    Albumin 4.0 3.2 - 4.9 g/dL    Total Protein 6.9 6.0 - 8.2 g/dL    Globulin 2.9 1.9 - 3.5 g/dL    A-G Ratio 1.4 g/dL   PROTHROMBIN TIME (INR)   Result Value Ref Range    PT 13.6 12.0 - 14.6 sec    INR 1.03 0.87 - 1.13   APTT   Result Value Ref Range    APTT 30.5 24.7 - 36.0 sec   ESTIMATED GFR   Result Value Ref Range    GFR (CKD-EPI) 75 >60 mL/min/1.73 m 2   Lipid Profile   Result Value Ref Range    Cholesterol,Tot 215 (H) 100 - 199 mg/dL    Triglycerides 60 0 - 149 mg/dL    HDL 61 >=40 mg/dL     (H) <100 mg/dL   MAGNESIUM   Result Value Ref Range    Magnesium 2.1 1.5 - 2.5 mg/dL   EKG   Result Value Ref Range    Report    "    [facility] Emergency Dept.    Test Date:  2023  Pt Name:    AV BOYD           Department: ER  MRN:        [id number]                      Room:       RD 01  Gender:     Female                       Technician: [id number]  :        [id number]                   Requested By:ER TRIAGE PROTOCOL  Order #:    [id number]                    Reading MD:    Measurements  Intervals                                Axis  Rate:       57                           P:          63  PA:         208                          QRS:        15  QRSD:       110                          T:          34  QT:         444  QTc:        433    Interpretive Statements  Sinus bradycardia  Compared to ECG 2020 12:34:17  Sinus rhythm no longer present  First degree AV block no longer present           RADIOLOGY  I have independently interpreted the diagnostic imaging associated with this visit and am waiting the final reading from the radiologist.   My preliminary interpretation is as follows: I do appreciate right-sided subdural hematoma on external CT images.    CT report from outside hospital demonstrates a 2.2 cm subdural on the right with 8 mm midline shift.  Appears to be mixed attenuation.    COURSE & MEDICAL DECISION MAKING    ED Observation Status? No; Patient does not meet criteria for ED Observation.     INITIAL ASSESSMENT, COURSE AND PLAN  Care Narrative:   88-year-old female history of hypertension, A-fib not on blood thinners here via air medic from [facility] for subacute subdural hematoma with 8 mm midline shift.  Afebrile and reassuring vitals  On exam she is GCS 15, moves all extremities, normal gait.  Really nonfocal neurologic exam.  Symptoms leading to imaging seem to be waxing and waning consciousness with \"staring off spells\" and memory problems over the last 6 weeks.  Labs obtained and including coagulation panel.  Dr. Alves the neurosurgeon was contacted and discussed the imaging " and patient current condition.  At the neurosurgeon recommendation patient admitted to the hospitalist.  I ordered MRI without contrast as instructed.  Patient updated on plan for admission and agreeable.  HTN/IDDM FOLLOW UP:  The patient has known hypertension and is being followed by their primary care doctor        CRITICAL CARE TIME 30 minutes  There was a very real possibility of deterioration of the patient's condition.  This patient required the highest level of care.  I provided critical care services which included: review of the medical record, treatment orders, ordering and reviewing test results, frequent reevaluation of the patient's condition and response to treatment, as well as discussing the case with appropriate personnel and various consultants. The critical care time associated with the care of this patient is exclusive of any procedures or specific interventions.    ADDITIONAL PROBLEM LIST    Elevated blood pressure    DISPOSITION AND DISCUSSIONS  I have discussed management of the patient with the following physicians and LANDY's:  Dr. Alves - Neurosurgery  Dr. Pako Carroll - Hospitalist    Discussion of management with other QHP or appropriate source(s): None     Barriers to care at this time, including but not limited to:  None .     Decision tools and prescription drugs considered including, but not limited to: Pain Medications patient not having acute pain at this time related to SDH findings. .    FINAL DIAGNOSIS  1. SDH (subdural hematoma) (HCC)           Electronically signed by: Maxwell Berumen D.O., 11/3/2023 10:28 PM

## 2023-11-04 NOTE — DISCHARGE PLANNING
PM&R referral from SAM[facility] Saucedo right chronic SDH, brain compression in or will follow. Medicare provider.

## 2023-11-04 NOTE — PROGRESS NOTES
4 Eyes Skin Assessment Completed by RAÚL Hood and RAÚL Felix.    Head Incision  Ears red/blanching  Nose WDL  Mouth WDL  Neck WDL  Breast/Chest WDL  Shoulder Blades WDL  Spine WDL  (R) Arm/Elbow/Hand WDL  (L) Arm/Elbow/Hand WDL  Abdomen WDL  Groin WDL  Scrotum/Coccyx/Buttocks WDL  (R) Leg Redness  (L) Leg Redness and Abrasion  (R) Heel/Foot/Toe old blister, cracked  (L) Heel/Foot/Toe Cracked          Devices In Places ECG, Blood Pressure Cuff, Pulse Ox, Solis, and Arterial Line      Interventions In Place NC W/Ear Foams, 2q hr turns    Possible Skin Injury No    Pictures Uploaded Into Epic N/A  Wound Consult Placed N/A  RN Wound Prevention Protocol Ordered No

## 2023-11-04 NOTE — ANESTHESIA TIME REPORT
Anesthesia Start and Stop Event Times     Date Time Event    11/4/2023 0851 Ready for Procedure     0902 Anesthesia Start     1114 Anesthesia Stop        Responsible Staff  11/04/23    Name Role Begin End    Cameron Oliva D.O. Anesth 0902 [id number]        Overtime Reason:  no overtime (within assigned shift)    Comments:

## 2023-11-04 NOTE — ASSESSMENT & PLAN NOTE
SDH with right sided 8mm midline shift noted on CT head at transferring facility  No known trauma, however the patient's mental status is such that where she to have fallen, it is likely she would not remember.  Target SBP<140-160  Now s/p craniotomy and drainage  Continue serial neurochecks  I reviewed neurosurgery note they recommended to initiate pharmacological DVT prophylaxis I started her on Lovenox 40 mg on November 7, 2023  Neurosurgery is following her.  Plan is to remove the drain.  I discussed plan of care with patient and her daughter at the bedside and answered all of their questions.

## 2023-11-04 NOTE — HOSPITAL COURSE
Anita Beard is a 88 y.o. female with history of hypertension, chronic pain syndrome with pain pump, remote history of A-fib no longer on anticoagulation who presented 11/3/2023 as transfer from [facility] ER for SDH.  Patient reported to have memory issue, CT head done at transferring ER --which noted SDH with right to left 8 mm midline shift.  Patient was transferred to [facility] ER for neurosurgery evaluation.  Neurosurgery has been consulted, request MRI brain, tentative plan for OR in AM.  Admitted to medicine service for further evaluation and treatment.

## 2023-11-04 NOTE — OR NURSING
1110: Pt arrived from OR, handoff received from anesthesiologist and OR nurse. Patient sleeping with OPA In place, breathing even and unlabored on 6L oxygen via mask. Pupils PERRLA, 2mm brisk. Dressing to head C/D/I. Vitals stable. Left radial art line correlating with cuff pressures. Solis in place. Per anesthesia, not aware of pain pump in place for patient.     1130: Patient sleeping, vitals remain stable, no change to pupil assessment.     1200: Patient's daughter updated on status.    1230: Patient transported to R101 on monitor. Personal belongings on bed.

## 2023-11-04 NOTE — ASSESSMENT & PLAN NOTE
Right sided  S/p evacuation by DR. Alves with a subgaleal drain  Neurochecks q 2  Pt agitated yesterday evening and overnight needing precedex and mittens - improved with demerol suggesting may be pain related and unable to use her pain pump  Wakes up and follows command   Will await neurosurgery assessment if any additional imaging needed   Keep normothermia  Keep normal glycemia  Keep sodium within normal limits  On Keppra

## 2023-11-04 NOTE — H&P
Hospital Medicine History & Physical Note    Date of Service  11/3/2023    Primary Care Physician  Pcp Pt States None    Consultants  Neurosurgery (Dr. Alves)    Code Status  DNAR/DNI    Chief Complaint  Chief Complaint   Patient presents with    Other     BIB flight fixed Wing; transfer from [facility], patient had her CT done today as outpatient due to intermittent memory issue and was found out to have  a SDH right side 8mm midline shift. AOX4 GCS15; Denies any head injury or fall; not on thinners; on room air and ambulatory with the help on her cane. Denies any N/V and headache         History of Presenting Illness  Anita Beard is a 88 y.o. female with history of hypertension, chronic pain syndrome with pain pump, remote history of A-fib no longer on anticoagulation who presented 11/3/2023 as transfer from [facility] ER for SDH.  Patient reported to have memory issue, CT head done at transferring ER --which noted SDH with right to left 8 mm midline shift.  Patient was transferred to [facility] ER for neurosurgery evaluation.  Neurosurgery has been consulted, request MRI brain, tentative plan for OR in AM.  Admitted to medicine service for further evaluation and treatment.    I discussed the plan of care with patient, bedside RN, and pharmacy.    Review of Systems  Review of Systems   Constitutional:  Negative for chills and fever.   HENT:  Negative for hearing loss and tinnitus.    Eyes:  Negative for blurred vision and double vision.   Respiratory:  Negative for cough and shortness of breath.    Cardiovascular:  Negative for chest pain and palpitations.   Gastrointestinal:  Negative for abdominal pain, heartburn, nausea and vomiting.   Genitourinary:  Negative for dysuria and urgency.   Musculoskeletal:  Negative for joint pain and myalgias.   Skin:  Negative for itching and rash.   Neurological:  Positive for dizziness, weakness and headaches. Negative for speech change and focal weakness.        Memory  impairment   Endo/Heme/Allergies:  Negative for environmental allergies. Bruises/bleeds easily.   Psychiatric/Behavioral:  Negative for depression and substance abuse.    All other systems reviewed and are negative.      Past Medical History   has a past medical history of Afib (HCC), COPD (chronic obstructive pulmonary disease) (HCC), and Hypertension.    Surgical History   has a past surgical history that includes other.     Family History  family history is not on file.   Family history reviewed with patient. There is no family history that is pertinent to the chief complaint.     Social History   reports that she has never smoked. She has never used smokeless tobacco. She reports that she does not drink alcohol and does not use drugs.    Allergies  No Known Allergies    Medications  Prior to Admission Medications   Prescriptions Last Dose Informant Patient Reported? Taking?   ALBUTEROL INH   Yes No   Sig: Inhale  by mouth 2 Times a Day.   Magnesium 500 MG Cap   Yes No   Sig: Take  by mouth 2 Times a Day.   Omega-3 1000 MG Cap   Yes No   Sig: Take 1,280 mg by mouth every day.   acetaminophen-codeine (TYLENOL/CODEINE #4) 300-60 MG per tablet   Yes No   Sig: Take  by mouth 3 times a day.   busPIRone (BUSPAR) 10 MG Tab tablet   Yes No   Sig: Take 7.5 mg by mouth 2 times a day as needed.   furosemide (LASIX) 20 MG Tab   No No   Sig: Take 1 Tab by mouth 1 time daily as needed.   gabapentin (NEURONTIN) 100 MG Cap   Yes No   Sig: Take 100 mg by mouth every day.   metoprolol SR (TOPROL XL) 50 MG TABLET SR 24 HR   No No   Sig: Take 1.5 Tabs by mouth every day.   polyethylene glycol/lytes (MIRALAX) Pack   Yes No   Sig: Take 17 g by mouth as needed.   rivaroxaban (XARELTO) 20 MG Tab tablet   No No   Sig: Take 1 Tab by mouth with dinner.   rosuvastatin (CRESTOR) 5 MG Tab   No No   Sig: Take 1 Tab by mouth 3 times a week.   spironolactone (ALDACTONE) 50 MG Tab   Yes No   Sig: Take 50 mg by mouth every day.       Facility-Administered Medications: None       Physical Exam  Temp:  [36.5 °C (97.7 °F)-36.7 °C (98 °F)] 36.5 °C (97.7 °F)  Pulse:  [62-67] 67  Resp:  [14-18] 18  BP: (124-163)/(57-76) 151/76  SpO2:  [94 %-95 %] 95 %  Blood Pressure : (!) 163/69   Temperature: 36.7 °C (98 °F)   Pulse: 62   Respiration: 14   Pulse Oximetry: 95 %       Physical Exam  Vitals and nursing note reviewed.   Constitutional:       General: She is not in acute distress.  HENT:      Head: Normocephalic and atraumatic.      Nose: Nose normal.      Mouth/Throat:      Mouth: Mucous membranes are dry.      Pharynx: Oropharynx is clear.   Eyes:      General: No scleral icterus.     Extraocular Movements: Extraocular movements intact.   Cardiovascular:      Rate and Rhythm: Normal rate and regular rhythm.      Pulses: Normal pulses.      Heart sounds:      No friction rub.   Pulmonary:      Effort: No respiratory distress.      Breath sounds: No wheezing or rales.   Chest:      Chest wall: No tenderness.   Abdominal:      General: There is no distension.      Tenderness: There is no abdominal tenderness. There is no guarding or rebound.   Musculoskeletal:         General: Normal range of motion.      Cervical back: Neck supple. No tenderness.      Right lower leg: No edema.      Left lower leg: No edema.   Skin:     General: Skin is warm and dry.      Capillary Refill: Capillary refill takes less than 2 seconds.   Neurological:      General: No focal deficit present.      Mental Status: She is alert and oriented to person, place, and time.   Psychiatric:         Mood and Affect: Mood normal.         Laboratory:  Recent Labs     11/03/23 2201   WBC 7.3   RBC 4.54   HEMOGLOBIN 12.8   HEMATOCRIT 40.0   MCV 88.1   MCH 28.2   MCHC 32.0*   RDW 40.6   PLATELETCT 241   MPV 10.8     Recent Labs     11/03/23 2201   SODIUM 140   POTASSIUM 4.7   CHLORIDE 105   CO2 26   GLUCOSE 97   BUN 17   CREATININE 0.77   CALCIUM 9.7     Recent Labs     11/03/23 2201  "  ALTSGPT 18   ASTSGOT 28   ALKPHOSPHAT 90   TBILIRUBIN 0.3   GLUCOSE 97     Recent Labs     11/03/23  2201   APTT 30.5   INR 1.03     No results for input(s): \"NTPROBNP\" in the last 72 hours.  Recent Labs     11/03/23  2201   TRIGLYCERIDE 60   HDL 61   *     No results for input(s): \"TROPONINT\" in the last 72 hours.    Imaging:  OUTSIDE IMAGES-CT HEAD   Final Result      MR-BRAIN-W/O    (Results Pending)       no X-Ray or EKG requiring interpretation    Assessment/Plan:  Justification for Admission Status  I anticipate this patient will require at least 2 midnights of hospitalization, therefore appropriate for inpatient status.        * SDH (subdural hematoma) (HCC)- (present on admission)  Assessment & Plan  SDH with right sided 8mm midline shift noted on CT head at transferring facility  Target SBP<140-160  Frequent neurochecks  F/u MRI brain  Neurosurgery consulted --plan for OR 11/4    Chronic pain syndrome  Assessment & Plan  Continue pain pump    Headache  Assessment & Plan  ME Fioricet     COPD (chronic obstructive pulmonary disease) (HCC)- (present on admission)  Assessment & Plan  Not in acute exacerbation  As needed nebs    ACP (advance care planning)  Assessment & Plan  Goal of care discussed with patient ER.  She showed me her POLST which noted DNR.  She stated she does not wish for aggressive/heroic life-sustaining measures-no CPR/defibrillation/intubation or mechanical ventilation.  She is however agreeable for proposed neurosurgical intervention as needed.  DNR/DNI status confirmed.  Diagnosis, prognosis, questions and concerns addressed.  ACP: 16 minutes    Age-related physical debility  Assessment & Plan  Fall precautions  PT/OT        VTE prophylaxis: SCDs/TEDs  "

## 2023-11-04 NOTE — ANESTHESIA PROCEDURE NOTES
Arterial Line    Performed by: Cameron Oliva D.O.  Authorized by: Cameron Oliva D.O.    Start Time:  11/4/2023 9:12 AM  End Time:  11/4/2023 9:14 AM  Localization: ultrasound guidance and surface landmarks    Patient Location:  OR  Indication: continuous blood pressure monitoring        Catheter Size:  20 G  Seldinger Technique?: Yes    Laterality:  Left  Site:  Radial artery  Line Secured:  Antimicrobial disc, tape and transparent dressing  Events: patient tolerated procedure well with no complications

## 2023-11-04 NOTE — PROGRESS NOTES
[facility] Medicine Daily Progress Note    Date of Service  11/4/2023    Chief Complaint  Anita Beard is a 88 y.o. female admitted 11/3/2023 with SDH.     Hospital Course  Anita Beard is a 88 y.o. female with history of hypertension, chronic pain syndrome with pain pump, remote history of A-fib no longer on anticoagulation who presented 11/3/2023 as transfer from [facility] for SDH.  Patient reported to have memory issue, CT head done at transferring ER --which noted SDH with right to left 8 mm midline shift.  Patient was transferred to [facility] for neurosurgery evaluation.  Neurosurgery has been consulted, request MRI brain, tentative plan for OR in AM.  Admitted to medicine service for further evaluation and treatment.    Interval Problem Update  11/4 patient new to me today patient was recently transferred from PACU, patient is waking up from anesthesia, patient is able to move her 4 extremities, note from neurosurgery reviewed status post right-sided craniotomy due to subdural hematoma, requiring cerebellum decompression and extensive membranes resection, drain is in place, discussed with ICU team discussed with critical care we are transferring care from internal medicine to critical care, all question answered.    I have discussed this patient's plan of care and discharge plan at IDT rounds today with Case Management, Nursing, Nursing leadership, and other members of the IDT team.    Consultants/Specialty  critical care and neurosurgery    Code Status  DNAR/DNI    Disposition  The patient is not medically cleared for discharge to home or a post-acute facility.      I have placed the appropriate orders for post-discharge needs.    Review of Systems  Review of Systems   Unable to perform ROS: Other (s/p Craniotomy.)        Physical Exam  Temp:  [36 °C (96.8 °F)-36.7 °C (98 °F)] 36.1 °C (97 °F)  Pulse:  [61-80] 80  Resp:  [12-19] 16  BP: (122-163)/(52-76) 154/64  SpO2:  [93 %-97 %] 96 %    Physical Exam  Vitals  and nursing note reviewed.   Constitutional:       Comments: Follows simple commands.   Waking up from anesthesia   HENT:      Head:      Comments: Bandage in place  Drain in place.   Eyes:      General: No scleral icterus.     Extraocular Movements: Extraocular movements intact.      Conjunctiva/sclera: Conjunctivae normal.   Cardiovascular:      Rate and Rhythm: Normal rate and regular rhythm.      Pulses: Normal pulses.      Heart sounds: Normal heart sounds.   Pulmonary:      Effort: Pulmonary effort is normal. No respiratory distress.      Breath sounds: Normal breath sounds.   Abdominal:      General: Bowel sounds are normal. There is no distension.      Tenderness: There is no abdominal tenderness. There is no guarding.   Musculoskeletal:         General: Normal range of motion.      Cervical back: Normal range of motion and neck supple.   Skin:     General: Skin is warm.      Capillary Refill: Capillary refill takes less than 2 seconds.   Neurological:      General: No focal deficit present.      Comments: Waking up from anesthesia  No focal weakness,  4 extremities. Open her eyes.    Psychiatric:         Mood and Affect: Mood normal.         Behavior: Behavior normal.         Fluids    Intake/Output Summary (Last 24 hours) at 11/4/2023 1255  Last data filed at 11/4/2023 1155  Gross per 24 hour   Intake 1250 ml   Output 830 ml   Net 420 ml       Laboratory  Recent Labs     11/03/23 2201   WBC 7.3   RBC 4.54   HEMOGLOBIN 12.8   HEMATOCRIT 40.0   MCV 88.1   MCH 28.2   MCHC 32.0*   RDW 40.6   PLATELETCT 241   MPV 10.8     Recent Labs     11/03/23  2201 11/04/23  0035 11/04/23  1130   SODIUM 140 141 144   POTASSIUM 4.7  --  4.1   CHLORIDE 105  --  110   CO2 26  --  22   GLUCOSE 97  --  157*   BUN 17  --  15   CREATININE 0.77  --  0.77   CALCIUM 9.7  --  8.4*     Recent Labs     11/03/23 2201   APTT 30.5   INR 1.03         Recent Labs     11/03/23 2201   TRIGLYCERIDE 60   HDL 61   *        Imaging  MR-BRAIN-W/O   Final Result      1.  Large right frontal-parietal-temporal complex subdural hematoma with multiple loculations and complex membranes.   2.  Mass effect with effacement of sulcal markings and right to left shift of midline structures of about 8 mm.   3.  Mild supratentorial white matter disease consistent with microvascular ischemic change.   4.  Mild pontine ischemic gliosis.   5.  Chronic right maxillary sinusitis.   6.  Dental artifact.      OUTSIDE IMAGES-CT HEAD   Final Result           Assessment/Plan  * SDH (subdural hematoma) (HCC)- (present on admission)  Assessment & Plan  SDH with right sided 8mm midline shift noted on CT head at transferring facility  Target SBP<140-160  Frequent neurochecks  F/u MRI brain  Neurosurgery consulted --plan for OR 11/4    ACP (advance care planning)  Assessment & Plan  Goal of care discussed with patient ER.  She showed me her POLST which noted DNR.  She stated she does not wish for aggressive/heroic life-sustaining measures-no CPR/defibrillation/intubation or mechanical ventilation.  She is however agreeable for proposed neurosurgical intervention as needed.  DNR/DNI status confirmed.  Diagnosis, prognosis, questions and concerns addressed.  ACP: 16 minutes    Age-related physical debility  Assessment & Plan  Fall precautions  PT/OT    Chronic pain syndrome  Assessment & Plan  Continue pain pump    Headache  Assessment & Plan  KS Fioricet     COPD (chronic obstructive pulmonary disease) (HCC)- (present on admission)  Assessment & Plan  Not in acute exacerbation  As needed nebs         VTE prophylaxis: scd's.     I have performed a physical exam and reviewed and updated ROS and Plan today (11/4/2023). In review of yesterday's note (11/3/2023), there are no changes except as documented above.      Greater than 51 minutes spent prepping to see patient (e.g. reviewing  tests/imaging results, notes from consultants, bedside nurse, night shift )  obtaining and/or reviewing separately obtained history. Performing a medically appropriate examination and evaluation.  Counseling and educating the patient.  Ordering medications, tests, or procedures.  Referring and communicating with other health care professionals.  Documenting clinical information in EPIC.  Independently interpreting results and communicating results to patient.  Care coordination.

## 2023-11-04 NOTE — CONSULTS
Pulmonary Consultation    Date of consult: 11/4/2023    Referring Physician  CIARAN Donald*    Reason for Consultation  ICU transfer post subdural evacuation    History of Presenting Illness  88 y.o. female who presented 11/3/2023 with high blood pressure, chronic pain syndrome with a pain pump, remote history of A-fib admitted 11/3/2023 transfer from [facility] with a subdural hematoma right side with an 8 mm midnight shift thought to be chronic.  Her Andrews Coma Scale 15 alert awake and oriented x4.  Patient went to the OR today for any to me by Dr. Alves [findings showed decompressed cerebrum, extensive membranes resected, meticulous hemostasis achieved].  Patient transferred to ICU with a subgaleal Hemovac.    Code Status  DNAR/DNI    Review of Systems  Review of Systems   Unable to perform ROS: Acuity of condition   Pt is post op and too sleppy     Past Medical History   has a past medical history of Afib (HCC), COPD (chronic obstructive pulmonary disease) (HCC), and Hypertension.    Surgical History   has a past surgical history that includes other.    Family History  family history is not on file.    Social History   reports that she has never smoked. She has never used smokeless tobacco. She reports that she does not drink alcohol and does not use drugs.    Medications  Home Medications       Reviewed by Cb Askew R.N. (Registered Nurse) on 11/04/23 at 0849  Med List Status: Complete     Medication Last Dose Status   acetaminophen (Tylenol) tablet 650 mg  Active   acetaminophen-codeine (TYLENOL/CODEINE #4) 300-60 MG per tablet 11/3/2023 Active   alendronate (FOSAMAX) 70 MG Tab 11/1/2023 Active   Ascorbic Acid (VITAMIN C) 1000 MG Tab 11/2/2023 Active   atenolol (TENORMIN) 25 MG Tab 11/2/2023 Active   atenolol (Tenormin) tablet 25 mg  Active   atorvastatin (Lipitor) tablet 40 mg  Active   bisacodyl (Dulcolax) suppository 10 mg  Active   butalbital/apap/caffeine (Fioricet)  -40 mg per tablet 1 Tablet  Active   docusate calcium (SURFAK) 240 MG Cap 11/2/2023 Active   enalaprilat (Vasotec) injection 1.25 mg 1 mL  Active   fluticasone (FLONASE) 50 MCG/ACT nasal spray PRN Active   gabapentin (NEURONTIN) 300 MG Cap 11/3/2023 Active   gabapentin (Neurontin) capsule 300 mg  Active   gabapentin (Neurontin) capsule 600 mg  Active   hydrALAZINE (Apresoline) injection 10 mg  Active   HYDROmorphone (Dilaudid) injection 0.5 mg  Active   labetalol (Normodyne/Trandate) injection 10 mg  Active   lactated ringers infusion (BOLUS)  Active   loratadine (CLARITIN) 10 MG Tab 11/2/2023 Active   loratadine (Claritin) tablet 10 mg  Active   LORazepam (Ativan) injection 2 mg  Active   MAGNESIUM PO 11/2/2023 Active   NS infusion  Active   ondansetron (Zofran ODT) dispertab 4 mg  Active   ondansetron (Zofran) syringe/vial injection 4 mg  Active   Pain Pump (Patient Supplied) Device  Active   Pain Pump (PATIENT SUPPLIED) XX YESICA CONTINUOUS Active   polyethylene glycol/lytes (Miralax) PACKET 1 Packet  Active   Respiratory Therapy Consult  Active   senna-docusate (Pericolace Or Senokot S) 8.6-50 MG per tablet 2 Tablet  Active                  Current Facility-Administered Medications   Medication Dose Route Frequency Provider Last Rate Last Admin    loratadine (Claritin) tablet 10 mg  10 mg Oral Nightly Pako Carroll M.D.        atenolol (Tenormin) tablet 25 mg  25 mg Oral Nightly Pako Carroll M.D.   25 mg at 11/04/23 0036    Pain Pump (Patient Supplied) Device   Injection Continuous Pako Carroll M.D.   New Bag at 11/04/23 0045    gabapentin (Neurontin) capsule 300 mg  300 mg Oral QAM Pako Carroll M.D.   300 mg at 11/04/23 0627    And    gabapentin (Neurontin) capsule 600 mg  600 mg Oral QHS Pako Carroll M.D.   600 mg at 11/04/23 0036    atorvastatin (Lipitor) tablet 40 mg  40 mg Oral Q EVENING Pako Carroll M.D.        HYDROmorphone (Dilaudid) injection 0.5 mg  0.5 mg Intravenous Q4HRS PRN Pako Carroll,  M.D.   0.5 mg at 11/04/23 1255    ceFAZolin (Ancef) 1 g in  mL IVPB  1 g Intravenous Q8HRS Johan Alves III, M.D.        levETIRAcetam (Keppra) injection 500 mg  500 mg Intravenous Q12HRS Johan Alves III, M.D.   500 mg at 11/04/23 1258    senna-docusate (Pericolace Or Senokot S) 8.6-50 MG per tablet 2 Tablet  2 Tablet Oral BID Pako Carroll M.D.        And    polyethylene glycol/lytes (Miralax) PACKET 1 Packet  1 Packet Oral QDAY PRN Pako Carroll M.D.        And    bisacodyl (Dulcolax) suppository 10 mg  10 mg Rectal QDAY PRN Pako Carroll M.D.        lactated ringers infusion (BOLUS)  500 mL Intravenous Once PRN Pako Carroll M.D.        acetaminophen (Tylenol) tablet 650 mg  650 mg Oral Q6HRS PRN Pako Carroll M.D.   650 mg at 11/04/23 0036    ondansetron (Zofran) syringe/vial injection 4 mg  4 mg Intravenous Q4HRS PRN Pako Carroll M.D.        ondansetron (Zofran ODT) dispertab 4 mg  4 mg Oral Q4HRS PRN Pako Carroll M.D.        Respiratory Therapy Consult   Nebulization Continuous RT Pako Carroll M.D.        NS infusion   Intravenous Continuous Pako Carroll M.D. 80 mL/hr at 11/04/23 1413 New Bag at 11/04/23 1413    LORazepam (Ativan) injection 2 mg  2 mg Intravenous Q5 MIN PRN Pako Carroll M.D.        labetalol (Normodyne/Trandate) injection 10 mg  10 mg Intravenous Q4HRS PRN Pako Carroll M.D.        hydrALAZINE (Apresoline) injection 10 mg  10 mg Intravenous Q4HRS PRN Pako Carroll M.D.           Allergies  Allergies   Allergen Reactions    Ace Inhibitors Swelling     Angioedema       Vital Signs last 24 hours  Temp:  [36 °C (96.8 °F)-36.7 °C (98 °F)] 36.1 °C (97 °F)  Pulse:  [61-80] 63  Resp:  [12-19] 15  BP: (122-172)/(52-76) 129/58  SpO2:  [93 %-97 %] 95 %    Physical Exam  Physical Exam  Constitutional:       Comments: Sleepy but arousable and nodding yes ad no    HENT:      Head:      Comments: Head is wrapped  Incision noted     Mouth/Throat:      Mouth: Mucous membranes are dry.    Cardiovascular:      Rate and Rhythm: Normal rate.   Abdominal:      General: Bowel sounds are normal.      Palpations: Abdomen is soft.   Musculoskeletal:      Cervical back: Normal range of motion.      Right lower leg: No edema.      Left lower leg: No edema.   Neurological:      General: No focal deficit present.      Mental Status: She is oriented to person, place, and time.   Psychiatric:      Comments: Too sleepy to assess         Fluids    Intake/Output Summary (Last 24 hours) at 11/4/2023 1557  Last data filed at 11/4/2023 1300  Gross per 24 hour   Intake 1250 ml   Output 830 ml   Net 420 ml       Laboratory  Recent Results (from the past 48 hour(s))   CBC WITH DIFFERENTIAL    Collection Time: 11/03/23 10:01 PM   Result Value Ref Range    WBC 7.3 4.8 - 10.8 K/uL    RBC 4.54 4.20 - 5.40 M/uL    Hemoglobin 12.8 12.0 - 16.0 g/dL    Hematocrit 40.0 37.0 - 47.0 %    MCV 88.1 81.4 - 97.8 fL    MCH 28.2 27.0 - 33.0 pg    MCHC 32.0 (L) 32.2 - 35.5 g/dL    RDW 40.6 35.9 - 50.0 fL    Platelet Count 241 164 - 446 K/uL    MPV 10.8 9.0 - 12.9 fL    Neutrophils-Polys 51.70 44.00 - 72.00 %    Lymphocytes 39.00 22.00 - 41.00 %    Monocytes 6.60 0.00 - 13.40 %    Eosinophils 2.20 0.00 - 6.90 %    Basophils 0.40 0.00 - 1.80 %    Immature Granulocytes 0.10 0.00 - 0.90 %    Nucleated RBC 0.00 0.00 - 0.20 /100 WBC    Neutrophils (Absolute) 3.75 1.82 - 7.42 K/uL    Lymphs (Absolute) 2.83 1.00 - 4.80 K/uL    Monos (Absolute) 0.48 0.00 - 0.85 K/uL    Eos (Absolute) 0.16 0.00 - 0.51 K/uL    Baso (Absolute) 0.03 0.00 - 0.12 K/uL    Immature Granulocytes (abs) 0.01 0.00 - 0.11 K/uL    NRBC (Absolute) 0.00 K/uL   COMP METABOLIC PANEL    Collection Time: 11/03/23 10:01 PM   Result Value Ref Range    Sodium 140 135 - 145 mmol/L    Potassium 4.7 3.6 - 5.5 mmol/L    Chloride 105 96 - 112 mmol/L    Co2 26 20 - 33 mmol/L    Anion Gap 9.0 7.0 - 16.0    Glucose 97 65 - 99 mg/dL    Bun 17 8 - 22 mg/dL    Creatinine 0.77 0.50 - 1.40 mg/dL     Calcium 9.7 8.5 - 10.5 mg/dL    Correct Calcium 9.7 8.5 - 10.5 mg/dL    AST(SGOT) 28 12 - 45 U/L    ALT(SGPT) 18 2 - 50 U/L    Alkaline Phosphatase 90 30 - 99 U/L    Total Bilirubin 0.3 0.1 - 1.5 mg/dL    Albumin 4.0 3.2 - 4.9 g/dL    Total Protein 6.9 6.0 - 8.2 g/dL    Globulin 2.9 1.9 - 3.5 g/dL    A-G Ratio 1.4 g/dL   PROTHROMBIN TIME (INR)    Collection Time: 23 10:01 PM   Result Value Ref Range    PT 13.6 12.0 - 14.6 sec    INR 1.03 0.87 - 1.13   APTT    Collection Time: 23 10:01 PM   Result Value Ref Range    APTT 30.5 24.7 - 36.0 sec   ESTIMATED GFR    Collection Time: 23 10:01 PM   Result Value Ref Range    GFR (CKD-EPI) 75 >60 mL/min/1.73 m 2   Lipid Profile    Collection Time: 23 10:01 PM   Result Value Ref Range    Cholesterol,Tot 215 (H) 100 - 199 mg/dL    Triglycerides 60 0 - 149 mg/dL    HDL 61 >=40 mg/dL     (H) <100 mg/dL   MAGNESIUM    Collection Time: 23 10:01 PM   Result Value Ref Range    Magnesium 2.1 1.5 - 2.5 mg/dL   EKG    Collection Time: 23 10:30 PM   Result Value Ref Range    Report       [facility]    Test Date:  2023  Pt Name:    AV BOYD           Department: ER  MRN:        [id number]                      Room:       RD 01  Gender:     Female                       Technician: [id number]  :        [id number]                   Requested By:ER TRIAGE PROTOCOL  Order #:    [id number]                    Reading MD:    Measurements  Intervals                                Axis  Rate:       57                           P:          63  MA:         208                          QRS:        15  QRSD:       110                          T:          34  QT:         444  QTc:        433    Interpretive Statements  Sinus bradycardia  Compared to ECG 2020 12:34:17  Sinus rhythm no longer present  First degree AV block no longer present     HEMOGLOBIN A1C    Collection Time: 23 12:35 AM   Result Value  Ref Range    Glycohemoglobin 5.7 (H) 4.0 - 5.6 %    Est Avg Glucose 117 mg/dL   Platelet Mapping with Basic TEG    Collection Time: 11/04/23 12:35 AM   Result Value Ref Range    Reaction Time Initial-R 4.8 4.6 - 9.1 min    React Time Initial Hep 5.2 4.3 - 8.3 min    Clot Kinetics-K 1.1 0.8 - 2.1 min    Clot Angle-Angle 75.1 63.0 - 78.0 degrees    Maximum Clot Strength-MA 62.6 52.0 - 69.0 mm    TEG Functional Fibrinogen(MA) 23.2 15.0 - 32.0 mm    Lysis 30 minutes-LY30 0.6 0.0 - 2.6 %    % Inhibition ADP 4.0 0.0 - 17.0 %    % Inhibition AA 22.8 (H) 0.0 - 11.0 %    TEG Algorithm Link Algorithm    SODIUM SERUM (NA)    Collection Time: 11/04/23 12:35 AM   Result Value Ref Range    Sodium 141 135 - 145 mmol/L   Urinalysis    Collection Time: 11/04/23  2:28 AM    Specimen: Urine   Result Value Ref Range    Color Yellow     Character Clear     Specific Gravity 1.020 <1.035    Ph 7.0 5.0 - 8.0    Glucose Negative Negative mg/dL    Ketones Negative Negative mg/dL    Protein Negative Negative mg/dL    Bilirubin Negative Negative    Urobilinogen, Urine 0.2 Negative    Nitrite Negative Negative    Leukocyte Esterase Trace (A) Negative    Occult Blood Negative Negative    Micro Urine Req Microscopic    URINE MICROSCOPIC (W/UA)    Collection Time: 11/04/23  2:28 AM   Result Value Ref Range    WBC 0-2 /hpf    RBC 5-10 (A) /hpf    Bacteria Negative None /hpf    Epithelial Cells Negative /hpf    Hyaline Cast 0-2 /lpf   Comp Metabolic Panel    Collection Time: 11/04/23 11:30 AM   Result Value Ref Range    Sodium 144 135 - 145 mmol/L    Potassium 4.1 3.6 - 5.5 mmol/L    Chloride 110 96 - 112 mmol/L    Co2 22 20 - 33 mmol/L    Anion Gap 12.0 7.0 - 16.0    Glucose 157 (H) 65 - 99 mg/dL    Bun 15 8 - 22 mg/dL    Creatinine 0.77 0.50 - 1.40 mg/dL    Calcium 8.4 (L) 8.5 - 10.5 mg/dL    Correct Calcium 8.8 8.5 - 10.5 mg/dL    AST(SGOT) 19 12 - 45 U/L    ALT(SGPT) 16 2 - 50 U/L    Alkaline Phosphatase 77 30 - 99 U/L    Total Bilirubin 0.3 0.1  - 1.5 mg/dL    Albumin 3.5 3.2 - 4.9 g/dL    Total Protein 5.7 (L) 6.0 - 8.2 g/dL    Globulin 2.2 1.9 - 3.5 g/dL    A-G Ratio 1.6 g/dL   MAGNESIUM    Collection Time: 11/04/23 11:30 AM   Result Value Ref Range    Magnesium 1.8 1.5 - 2.5 mg/dL   PHOSPHORUS    Collection Time: 11/04/23 11:30 AM   Result Value Ref Range    Phosphorus 2.9 2.5 - 4.5 mg/dL   ESTIMATED GFR    Collection Time: 11/04/23 11:30 AM   Result Value Ref Range    GFR (CKD-EPI) 75 >60 mL/min/1.73 m 2          Assessment/Plan  * SDH (subdural hematoma) ([facility])- (present on admission)  Assessment & Plan  Right sided  S/p evacuation by DR. Alves with a subgaleal drain  Neurochecks q 2  Pt following commands and moving extremities  Keep normothermia  Keep normal glycemia  Keep sodium within normal limits  On Keppra    Chronic pain syndrome- (present on admission)  Assessment & Plan  Patient on gabapentin and as needed Dilaudid    COPD (chronic obstructive pulmonary disease) ([facility])- (present on admission)  Assessment & Plan  RT protocol        Discussed patient condition and risk of morbidity and/or mortality with Dr. Alves's team and Dr. Bryant and patient's daughterand pts daughter  The patient remains critically ill.  Critical care time = 31 minutes in directly providing and coordinating critical care and extensive data review.  No time overlap and excludes procedures.

## 2023-11-04 NOTE — ED NOTES
Med rec complete per patient  Allergies reviewed.   Patient denies any outpatient antibiotics in the last 30 days.   Anticoagulants taken in the last 14 days? No     Preferred pharmacy for this visit - [facility], [place] KARIE Cho CPhT

## 2023-11-04 NOTE — PROGRESS NOTES
Belongings (hearing aides, glasses, phone, clothes, cane, and two bags) brought to patient in room R101.

## 2023-11-04 NOTE — ED NOTES
Patient unable to recall past medical histories and some events; MRI tech informed about it; granddaughter will be here in the morning per patient

## 2023-11-04 NOTE — OR SURGEON
Immediate Post OP Note    PreOp Diagnosis: right chronic SDH, brain compression      PostOp Diagnosis: same      Procedure(s):  Right sided CRANIOTOMY    Surgeon(s):  Johan Alves III, M.D.    Anesthesiologist/Type of Anesthesia:  General    Estimated Blood Loss: 50cc    Findings: decompressed cerebrum, extensive membranes resected, meticulous hemostasis achieved    Complications: none    Subgaleal hemovac    To RICU postop        11/4/2023 9:02 AM Johan Alves III, M.D.

## 2023-11-04 NOTE — PROGRESS NOTES
Chronic SDH, MRI to evaluate for membranes, OR tomorrow  for R bella holes vs crani. Appreciate hospitalist admitting patient.

## 2023-11-05 PROBLEM — D72.829 LEUKOCYTOSIS (LEUCOCYTOSIS): Status: ACTIVE | Noted: 2023-11-05

## 2023-11-05 PROBLEM — D62 ACUTE BLOOD LOSS ANEMIA: Status: ACTIVE | Noted: 2023-11-05

## 2023-11-05 PROBLEM — R41.82 ALTERED MENTAL STATUS: Status: ACTIVE | Noted: 2023-11-05

## 2023-11-05 LAB
ABO + RH BLD: NORMAL
ABO GROUP BLD: NORMAL
ANION GAP SERPL CALC-SCNC: 11 MMOL/L (ref 7–16)
BASOPHILS # BLD AUTO: 0.1 % (ref 0–1.8)
BASOPHILS # BLD: 0.02 K/UL (ref 0–0.12)
BLD GP AB SCN SERPL QL: NORMAL
BUN SERPL-MCNC: 17 MG/DL (ref 8–22)
CALCIUM SERPL-MCNC: 8.4 MG/DL (ref 8.5–10.5)
CHLORIDE SERPL-SCNC: 111 MMOL/L (ref 96–112)
CO2 SERPL-SCNC: 21 MMOL/L (ref 20–33)
CREAT SERPL-MCNC: 0.72 MG/DL (ref 0.5–1.4)
EOSINOPHIL # BLD AUTO: 0 K/UL (ref 0–0.51)
EOSINOPHIL NFR BLD: 0 % (ref 0–6.9)
ERYTHROCYTE [DISTWIDTH] IN BLOOD BY AUTOMATED COUNT: 40.7 FL (ref 35.9–50)
GFR SERPLBLD CREATININE-BSD FMLA CKD-EPI: 81 ML/MIN/1.73 M 2
GLUCOSE SERPL-MCNC: 144 MG/DL (ref 65–99)
HCT VFR BLD AUTO: 34.7 % (ref 37–47)
HCT VFR BLD AUTO: 35.5 % (ref 37–47)
HGB BLD-MCNC: 11.4 G/DL (ref 12–16)
HGB BLD-MCNC: 11.6 G/DL (ref 12–16)
IMM GRANULOCYTES # BLD AUTO: 0.05 K/UL (ref 0–0.11)
IMM GRANULOCYTES NFR BLD AUTO: 0.4 % (ref 0–0.9)
LYMPHOCYTES # BLD AUTO: 0.75 K/UL (ref 1–4.8)
LYMPHOCYTES NFR BLD: 5.6 % (ref 22–41)
MAGNESIUM SERPL-MCNC: 1.9 MG/DL (ref 1.5–2.5)
MCH RBC QN AUTO: 28.9 PG (ref 27–33)
MCHC RBC AUTO-ENTMCNC: 32.7 G/DL (ref 32.2–35.5)
MCV RBC AUTO: 88.5 FL (ref 81.4–97.8)
MONOCYTES # BLD AUTO: 0.35 K/UL (ref 0–0.85)
MONOCYTES NFR BLD AUTO: 2.6 % (ref 0–13.4)
NEUTROPHILS # BLD AUTO: 12.25 K/UL (ref 1.82–7.42)
NEUTROPHILS NFR BLD: 91.3 % (ref 44–72)
NRBC # BLD AUTO: 0 K/UL
NRBC BLD-RTO: 0 /100 WBC (ref 0–0.2)
PHOSPHATE SERPL-MCNC: 2.3 MG/DL (ref 2.5–4.5)
PLATELET # BLD AUTO: 177 K/UL (ref 164–446)
PMV BLD AUTO: 10.5 FL (ref 9–12.9)
POTASSIUM SERPL-SCNC: 4.1 MMOL/L (ref 3.6–5.5)
RBC # BLD AUTO: 4.01 M/UL (ref 4.2–5.4)
RH BLD: NORMAL
SODIUM SERPL-SCNC: 141 MMOL/L (ref 135–145)
SODIUM SERPL-SCNC: 143 MMOL/L (ref 135–145)
WBC # BLD AUTO: 13.4 K/UL (ref 4.8–10.8)

## 2023-11-05 PROCEDURE — 85014 HEMATOCRIT: CPT

## 2023-11-05 PROCEDURE — 700111 HCHG RX REV CODE 636 W/ 250 OVERRIDE (IP): Performed by: HOSPITALIST

## 2023-11-05 PROCEDURE — 99233 SBSQ HOSP IP/OBS HIGH 50: CPT | Performed by: HOSPITALIST

## 2023-11-05 PROCEDURE — 86850 RBC ANTIBODY SCREEN: CPT

## 2023-11-05 PROCEDURE — 86900 BLOOD TYPING SEROLOGIC ABO: CPT

## 2023-11-05 PROCEDURE — 700105 HCHG RX REV CODE 258: Performed by: NEUROLOGICAL SURGERY

## 2023-11-05 PROCEDURE — 85018 HEMOGLOBIN: CPT

## 2023-11-05 PROCEDURE — 99291 CRITICAL CARE FIRST HOUR: CPT | Performed by: INTERNAL MEDICINE

## 2023-11-05 PROCEDURE — 86901 BLOOD TYPING SEROLOGIC RH(D): CPT

## 2023-11-05 PROCEDURE — 700105 HCHG RX REV CODE 258: Performed by: INTERNAL MEDICINE

## 2023-11-05 PROCEDURE — 80048 BASIC METABOLIC PNL TOTAL CA: CPT

## 2023-11-05 PROCEDURE — 770000 HCHG ROOM/CARE - INTERMEDIATE ICU *

## 2023-11-05 PROCEDURE — 83735 ASSAY OF MAGNESIUM: CPT

## 2023-11-05 PROCEDURE — 700111 HCHG RX REV CODE 636 W/ 250 OVERRIDE (IP): Mod: JZ

## 2023-11-05 PROCEDURE — 700105 HCHG RX REV CODE 258: Performed by: STUDENT IN AN ORGANIZED HEALTH CARE EDUCATION/TRAINING PROGRAM

## 2023-11-05 PROCEDURE — 700111 HCHG RX REV CODE 636 W/ 250 OVERRIDE (IP): Mod: JZ | Performed by: NEUROLOGICAL SURGERY

## 2023-11-05 PROCEDURE — 700101 HCHG RX REV CODE 250: Performed by: INTERNAL MEDICINE

## 2023-11-05 PROCEDURE — 85025 COMPLETE CBC W/AUTO DIFF WBC: CPT

## 2023-11-05 PROCEDURE — 700111 HCHG RX REV CODE 636 W/ 250 OVERRIDE (IP): Performed by: STUDENT IN AN ORGANIZED HEALTH CARE EDUCATION/TRAINING PROGRAM

## 2023-11-05 PROCEDURE — 84100 ASSAY OF PHOSPHORUS: CPT

## 2023-11-05 PROCEDURE — 700111 HCHG RX REV CODE 636 W/ 250 OVERRIDE (IP): Performed by: INTERNAL MEDICINE

## 2023-11-05 RX ORDER — HYDROMORPHONE HYDROCHLORIDE 1 MG/ML
1 INJECTION, SOLUTION INTRAMUSCULAR; INTRAVENOUS; SUBCUTANEOUS EVERY 4 HOURS PRN
Status: DISCONTINUED | OUTPATIENT
Start: 2023-11-05 | End: 2023-11-05

## 2023-11-05 RX ORDER — DEXMEDETOMIDINE HYDROCHLORIDE 4 UG/ML
.1-1.5 INJECTION, SOLUTION INTRAVENOUS CONTINUOUS
Status: DISCONTINUED | OUTPATIENT
Start: 2023-11-05 | End: 2023-11-08

## 2023-11-05 RX ADMIN — LEVETIRACETAM 500 MG: 100 INJECTION, SOLUTION, CONCENTRATE INTRAVENOUS at 17:26

## 2023-11-05 RX ADMIN — HYDROMORPHONE HYDROCHLORIDE 1 MG: 1 INJECTION, SOLUTION INTRAMUSCULAR; INTRAVENOUS; SUBCUTANEOUS at 17:26

## 2023-11-05 RX ADMIN — HYDROMORPHONE HYDROCHLORIDE 0.5 MG: 1 INJECTION, SOLUTION INTRAMUSCULAR; INTRAVENOUS; SUBCUTANEOUS at 01:59

## 2023-11-05 RX ADMIN — CEFAZOLIN 1 G: 1 INJECTION, POWDER, FOR SOLUTION INTRAMUSCULAR; INTRAVENOUS at 15:44

## 2023-11-05 RX ADMIN — FENTANYL CITRATE 50 MCG: 50 INJECTION, SOLUTION INTRAMUSCULAR; INTRAVENOUS at 17:14

## 2023-11-05 RX ADMIN — Medication 25 MCG/HR: at 18:52

## 2023-11-05 RX ADMIN — DEXMEDETOMIDINE 0.2 MCG/KG/HR: 100 INJECTION, SOLUTION INTRAVENOUS at 00:29

## 2023-11-05 RX ADMIN — DEXMEDETOMIDINE 1 MCG/KG/HR: 100 INJECTION, SOLUTION INTRAVENOUS at 19:56

## 2023-11-05 RX ADMIN — HYDROMORPHONE HYDROCHLORIDE 1 MG: 1 INJECTION, SOLUTION INTRAMUSCULAR; INTRAVENOUS; SUBCUTANEOUS at 13:12

## 2023-11-05 RX ADMIN — FENTANYL CITRATE 50 MCG: 50 INJECTION, SOLUTION INTRAMUSCULAR; INTRAVENOUS at 14:20

## 2023-11-05 RX ADMIN — HYDRALAZINE HYDROCHLORIDE 10 MG: 20 INJECTION, SOLUTION INTRAMUSCULAR; INTRAVENOUS at 22:12

## 2023-11-05 RX ADMIN — CEFAZOLIN 1 G: 1 INJECTION, POWDER, FOR SOLUTION INTRAMUSCULAR; INTRAVENOUS at 22:51

## 2023-11-05 RX ADMIN — SODIUM CHLORIDE: 9 INJECTION, SOLUTION INTRAVENOUS at 03:19

## 2023-11-05 RX ADMIN — FENTANYL CITRATE 25 MCG: 50 INJECTION, SOLUTION INTRAMUSCULAR; INTRAVENOUS at 19:49

## 2023-11-05 RX ADMIN — CEFAZOLIN 1 G: 1 INJECTION, POWDER, FOR SOLUTION INTRAMUSCULAR; INTRAVENOUS at 05:41

## 2023-11-05 RX ADMIN — LEVETIRACETAM 500 MG: 100 INJECTION, SOLUTION, CONCENTRATE INTRAVENOUS at 05:34

## 2023-11-05 RX ADMIN — FENTANYL CITRATE 25 MCG: 50 INJECTION, SOLUTION INTRAMUSCULAR; INTRAVENOUS at 22:40

## 2023-11-05 RX ADMIN — HYDROMORPHONE HYDROCHLORIDE 0.5 MG: 1 INJECTION, SOLUTION INTRAMUSCULAR; INTRAVENOUS; SUBCUTANEOUS at 01:49

## 2023-11-05 ASSESSMENT — PAIN DESCRIPTION - PAIN TYPE
TYPE: ACUTE PAIN

## 2023-11-05 ASSESSMENT — PAIN SCALES - GENERAL: PAIN_LEVEL: 2

## 2023-11-05 NOTE — PROGRESS NOTES
Notified Ovidio Bermeo of neuro changes that pt started having around 1600. Pt is following commands, and is able to move all extremities. However, pt does not verbalize much and is Aox1, when it appears her baseline was Aox4. Per pt's daughter at bedside, pt was talking normally the night prior. Per provider, a stat head CT was completed. Provider notified of CT head results and instructed to cancel CT head originally scheduled for 0600. Additionally, obtained order for bilateral soft wrist restraints, as pt continues to attempt to pull drain out.

## 2023-11-05 NOTE — CARE PLAN
The patient is Stable - Low risk of patient condition declining or worsening    Shift Goals  Clinical Goals: Q2H neuro checks, SBP under 160  Patient Goals: JOSELITO  Family Goals: Pain managment    Progress made toward(s) clinical / shift goals:    Problem: Hemodynamics  Goal: Patient's hemodynamics, fluid balance and neurologic status will be stable or improve  Outcome: Progressing     Problem: Pain - Standard  Goal: Alleviation of pain or a reduction in pain to the patient’s comfort goal  Outcome: Progressing     Problem: Fall Risk  Goal: Patient will remain free from falls  Outcome: Progressing     Problem: Safety - Medical Restraint  Goal: Remains free of injury from restraints (Restraint for Interference with Medical Device)  Outcome: Progressing       Patient is not progressing towards the following goals:

## 2023-11-05 NOTE — THERAPY
Speech Language Therapy Contact Note    Patient Name: Anita Beard  Age:  88 y.o., Sex:  female  Medical Record #: [id number]  Today's Date: 11/5/2023 11/05/23 0952   Treatment Variance   Reason For Missed Therapy Medical - Patient Unarousable   Interdisciplinary Plan of Care Collaboration   Collaboration Comments Orders received and appreciated. RN contacted this AM and reported that pt is not arousable enough yet to be appropriate for swallow evaluation. RN to let SLP know if status changes today. SLP to f/u when appropriate.

## 2023-11-05 NOTE — PROGRESS NOTES
"Neurosurgery Progress Note    Subjective:  POD#1 right craniotomy for large right chronic SDH.  Was alert and oriented following surgery, but became more somnolent and CT was completed following that, and that was reviewed by Dr. Alves and looks fine.   Per daughter, the patient is slow to wake up from anesthesia and has taken several days in the past before mentation has cleared.  Has pain pump implanted.    Subgaleal drain with 100 cc output last 8 hours.     Exam:  Opens eyes to voice, hard of hearing.  PERRL  Not following commands.   Briskly withdraws x4 to pain, and yells \"ow.\"    BP  Min: 121/51  Max: 185/80  Pulse  Av.1  Min: 52  Max: 110  Resp  Av.5  Min: 12  Max: 55  Temp  Av.2 °C (97.1 °F)  Min: 36 °C (96.8 °F)  Max: 36.6 °C (97.9 °F)  Monitored Temp 2  Av.2 °C (97.2 °F)  Min: 34.9 °C (94.8 °F)  Max: 36.8 °C (98.2 °F)  SpO2  Av.5 %  Min: 89 %  Max: 98 %    No data recorded    Recent Labs     23  0440   WBC 7.3 13.4*   RBC 4.54 4.01*   HEMOGLOBIN 12.8 11.6*   HEMATOCRIT 40.0 35.5*   MCV 88.1 88.5   MCH 28.2 28.9   MCHC 32.0* 32.7   RDW 40.6 40.7   PLATELETCT 241 177   MPV 10.8 10.5     Recent Labs     23  0035 23  1130 23  1832 23  0545   SODIUM 140   < > 144 144 143   POTASSIUM 4.7  --  4.1  --  4.1   CHLORIDE 105  --  110  --  111   CO2 26  --  22  --  21   GLUCOSE 97  --  157*  --  144*   BUN 17  --  15  --  17   CREATININE 0.77  --  0.77  --  0.72   CALCIUM 9.7  --  8.4*  --  8.4*    < > = values in this interval not displayed.     Recent Labs     11/03/23  2201   APTT 30.5   INR 1.03     Recent Labs     23  0035   REACTMIN 4.8   CLOTKINET 1.1   CLOTANGL 75.1   MAXCLOTS 62.6   [id number] 0.6   PRCINADP 4.0   PRCINAA 22.8*       Intake/Output                         23 0700 - 23 0659 23 - -0659 Total 0109-5989 7641-1664 Total                 Intake    P.O.  0  0 " 0  --  -- --    P.O. 0 0 0 -- -- --    I.V.  1660  940.8 2600.8  145.7  -- 145.7    Precedex Volume -- 27.5 27.5 0.9 -- 0.9    Volume (mL) (NS infusion) 410 913.3 1323.3 144.8 -- 144.8    Volume (mL) (Lactated Ringers) 1000 -- 1000 -- -- --    Volume (mL) (electrolyte-A (Plasmalyte-A) infusion) 250 -- 250 -- -- --    Volume (mL) (lactated ringers infusion) 0 -- 0 -- -- --    Other  0  -- 0  --  -- --    Medications (PO/Enteral Liquids) 0 -- 0 -- -- --    IV Piggyback  --  245.5 245.5  --  -- --    Volume (mL) (ceFAZolin (Ancef) 1 g in  mL IVPB) -- 245.5 245.5 -- -- --    Total Intake 1660 1186.3 2846.3 145.7 -- 145.7       Output    Urine  1000  440 1440  5  -- 5    Urine 300 -- 300 -- -- --    Urine Void (mL) 0 -- 0 -- -- --    Output (mL) (Urethral Catheter Temperature probe 16 Fr.)  5 -- 5    Drains  150  180 330  --  -- --    Output (mL) (Closed/Suction Drain 1 Right Scalp Hemovac) 150 180 330 -- -- --    Stool  --  -- --  --  -- --    Number of Times Stooled 0 x -- 0 x 0 x -- 0 x    Blood  50  -- 50  --  -- --    Est. Blood Loss 50 -- 50 -- -- --    Total Output 4064 756 6702 5 -- 5       Net I/O     460 566.3 1026.3 140.7 -- 140.7              Intake/Output Summary (Last 24 hours) at 11/5/2023 1014  Last data filed at 11/5/2023 0800  Gross per 24 hour   Intake 2591.98 ml   Output 1825 ml   Net 766.98 ml             dexmedetomidine (Precedex) infusion  0.1-1.5 mcg/kg/hr (Ideal) Continuous    HYDROmorphone  1 mg Q4HRS PRN    loratadine  10 mg Nightly    atenolol  25 mg Nightly    Pain Pump   Continuous    gabapentin  300 mg QAM    And    gabapentin  600 mg QHS    atorvastatin  40 mg Q EVENING    ceFAZolin  1 g Q8HRS    levETIRAcetam (Keppra) IV  500 mg Q12HRS    senna-docusate  2 Tablet BID    And    polyethylene glycol/lytes  1 Packet QDAY PRN    And    bisacodyl  10 mg QDAY PRN    LR  500 mL Once PRN    acetaminophen  650 mg Q6HRS PRN    ondansetron  4 mg Q4HRS PRN    ondansetron  4 mg Q4HRS  PRN    Respiratory Therapy Consult   Continuous RT    NS   Continuous    LORazepam  2 mg Q5 MIN PRN    labetalol  10 mg Q4HRS PRN    hydrALAZINE  10 mg Q4HRS PRN       Assessment and Plan:  Hospital day # 3  POD# 1  Leave drain today.  [place] [facility] [place] still needs q2 hour neuro checks.  Appreciate intensivist care.  Following.    Chemical prophylactic DVT therapy: No  Start date/time: likely 11/6.

## 2023-11-05 NOTE — PROGRESS NOTES
Pulmonary Progress Note    Date of admission  11/3/2023    Chief Complaint  88 y.o. female admitted 11/3/2023 with transfer from [facility], patient had her CT done today as outpatient due to intermittent memory issue and was found out to have  a SDH right side 8mm midline shift.    Hospital Course  88 y.o. female who presented 11/3/2023 with high blood pressure, chronic pain syndrome with a pain pump, remote history of A-fib admitted 11/3/2023 transfer from [facility] with a subdural hematoma right side with an 8 mm midnight shift thought to be chronic.  Her Akbar Coma Scale 15 alert awake and oriented x4.  Patient went to the OR today for any to me by Dr. Alves [findings showed decompressed cerebrum, extensive membranes resected, meticulous hemostasis achieved].  Patient transferred to ICU with a subgaleal Hemovac.    Interval Problem Update  Reviewed last 24 hour events:  Quite agitated overnight needing mittens and precedex  Increased pain meds as pt too delirious to use pain pump  Reassess this am with family at bedside    Review of Systems  Review of Systems   Unable to perform ROS: Acuity of condition        Vital Signs for last 24 hours   Temp:  [36 °C (96.8 °F)-36.6 °C (97.9 °F)] 36.6 °C (97.9 °F)  Pulse:  [] 56  Resp:  [12-55] 33  BP: (121-185)/(51-80) 134/56  SpO2:  [89 %-98 %] 96 %         Physical Exam   Physical Exam  Constitutional:       Appearance: She is ill-appearing.      Comments: Asleep  Opens eyes to voice but falls back asleep  O precedex   HENT:      Head:      Comments: Craniotomy site is dry  + drain     Mouth/Throat:      Mouth: Mucous membranes are dry.   Eyes:      Extraocular Movements: Extraocular movements intact.      Pupils: Pupils are equal, round, and reactive to light.   Cardiovascular:      Rate and Rhythm: Normal rate.   Pulmonary:      Effort: Pulmonary effort is normal.      Breath sounds: Normal breath sounds.   Abdominal:      General: Bowel sounds  are normal.      Palpations: Abdomen is soft.   Musculoskeletal:      Right lower leg: No edema.      Left lower leg: No edema.   Neurological:      Comments: Moving all 4    Psychiatric:      Comments: Unable to assess         Medications  Current Facility-Administered Medications   Medication Dose Route Frequency Provider Last Rate Last Admin    dexmedetomidine (PRECEDEX) 400 mcg/100mL NS premix infusion  0.1-1.5 mcg/kg/hr (Ideal) Intravenous Continuous Aditya Fofana M.D.   Stopped at 11/05/23 0704    HYDROmorphone (Dilaudid) injection 1 mg  1 mg Intravenous Q4HRS PRN Aditya Fofana M.D.        loratadine (Claritin) tablet 10 mg  10 mg Oral Nightly Pako Carroll M.D.        atenolol (Tenormin) tablet 25 mg  25 mg Oral Nightly Pako Carroll M.D.   25 mg at 11/04/23 0036    Pain Pump (Patient Supplied) Device   Injection Continuous Pako Carroll M.D.   New Bag at 11/04/23 0045    gabapentin (Neurontin) capsule 300 mg  300 mg Oral QAM Pako Carroll M.D.   300 mg at 11/04/23 0627    And    gabapentin (Neurontin) capsule 600 mg  600 mg Oral QHS Pako Carroll M.D.   600 mg at 11/04/23 0036    atorvastatin (Lipitor) tablet 40 mg  40 mg Oral Q EVENING Pako Carroll M.D.        ceFAZolin (Ancef) 1 g in  mL IVPB  1 g Intravenous Q8HRS Johan Alves III, M.D.   Stopped at 11/05/23 0611    levETIRAcetam (Keppra) injection 500 mg  500 mg Intravenous Q12HRS Johan Alves III, M.D.   500 mg at 11/05/23 0534    senna-docusate (Pericolace Or Senokot S) 8.6-50 MG per tablet 2 Tablet  2 Tablet Oral BID Pako Carroll M.D.        And    polyethylene glycol/lytes (Miralax) PACKET 1 Packet  1 Packet Oral QDAY PRN Pako Carroll M.D.        And    bisacodyl (Dulcolax) suppository 10 mg  10 mg Rectal QDAY PRN Pako Carroll M.D.        lactated ringers infusion (BOLUS)  500 mL Intravenous Once PRN Pako Carroll M.D.        acetaminophen (Tylenol) tablet 650 mg  650 mg Oral Q6HRS PRN Pako Carroll M.D.   650 mg at  11/04/23 0036    ondansetron (Zofran) syringe/vial injection 4 mg  4 mg Intravenous Q4HRS PRN Pako Carroll M.D.        ondansetron (Zofran ODT) dispertab 4 mg  4 mg Oral Q4HRS PRN Pako Carroll M.D.        Respiratory Therapy Consult   Nebulization Continuous RT Pako Carroll M.D.        NS infusion   Intravenous Continuous Pako Carroll M.D. 80 mL/hr at 11/05/23 0319 New Bag at 11/05/23 0319    LORazepam (Ativan) injection 2 mg  2 mg Intravenous Q5 MIN PRN Pako Carroll M.D.        labetalol (Normodyne/Trandate) injection 10 mg  10 mg Intravenous Q4HRS PRN Pako Carroll M.D.        hydrALAZINE (Apresoline) injection 10 mg  10 mg Intravenous Q4HRS PRN Pako Carroll M.D.           Fluids    Intake/Output Summary (Last 24 hours) at 11/5/2023 0757  Last data filed at 11/5/2023 0720  Gross per 24 hour   Intake 2847.16 ml   Output 1820 ml   Net 1027.16 ml       Laboratory          Recent Labs     11/03/23 2201 11/04/23 0035 11/04/23 1130 11/04/23  1832 11/05/23  0545   SODIUM 140   < > 144 144 143   POTASSIUM 4.7  --  4.1  --  4.1   CHLORIDE 105  --  110  --  111   CO2 26  --  22  --  21   BUN 17  --  15  --  17   CREATININE 0.77  --  0.77  --  0.72   MAGNESIUM 2.1  --  1.8  --  1.9   PHOSPHORUS  --   --  2.9  --  2.3*   CALCIUM 9.7  --  8.4*  --  8.4*    < > = values in this interval not displayed.     Recent Labs     11/03/23 2201 11/04/23 1130 11/05/23  0545   ALTSGPT 18 16  --    ASTSGOT 28 19  --    ALKPHOSPHAT 90 77  --    TBILIRUBIN 0.3 0.3  --    GLUCOSE 97 157* 144*     Recent Labs     11/03/23 2201 11/04/23 1130 11/05/23  0440   WBC 7.3  --  13.4*   NEUTSPOLYS 51.70  --  91.30*   LYMPHOCYTES 39.00  --  5.60*   MONOCYTES 6.60  --  2.60   EOSINOPHILS 2.20  --  0.00   BASOPHILS 0.40  --  0.10   ASTSGOT 28 19  --    ALTSGPT 18 16  --    ALKPHOSPHAT 90 77  --    TBILIRUBIN 0.3 0.3  --      Recent Labs     11/03/23  2201 11/05/23  0440   RBC 4.54 4.01*   HEMOGLOBIN 12.8 11.6*   HEMATOCRIT 40.0 35.5*    PLATELETCT 241 177   PROTHROMBTM 13.6  --    APTT 30.5  --    INR 1.03  --          Assessment/Plan  * SDH (subdural hematoma) ([facility])- (present on admission)  Assessment & Plan  Right sided  S/p evacuation by DR. Alves with a subgaleal drain  Neurochecks q 2  Pt agitated yesterday evening and overnight needing precedex and mittens - improved with demerol suggesting may be pain related and unable to use her pain pump  Wakes up and follows command   Will await neurosurgery assessment if any additional imaging needed   Keep normothermia  Keep normal glycemia  Keep sodium within normal limits  On Keppra    Acute blood loss anemia  Assessment & Plan  May be due to combined iv fluids and some loss from surgery  Repeat another at noon    Leukocytosis (leucocytosis)  Assessment & Plan  Assume reactive post  Op  No fever  Monitor closely    Chronic pain syndrome- (present on admission)  Assessment & Plan  Patient on gabapentin and as needed Dilaudid  Has a pain pump and may have some withdrawal    COPD (chronic obstructive pulmonary disease) ([facility])- (present on admission)  Assessment & Plan  RT protocol         VTE:   SCDs  Ulcer: Not Indicated  Lines: subgaleal     I have performed a physical exam and reviewed and updated ROS and Plan today (11/5/2023). In review of yesterday's note (11/4/2023), there are no changes except as documented above.     Discussed patient condition and risk of morbidity and/or mortality with RN and Charge nurse / hot rounds  The patient remains critically ill.  Critical care time = 31 minutes in directly providing and coordinating critical care and extensive data review.  No time overlap and excludes procedures.

## 2023-11-05 NOTE — CARE PLAN
The patient is Watcher - Medium risk of patient condition declining or worsening    Shift Goals  Clinical Goals: Q2H Neuro Checks, SBP<160, Wean sedation  Patient Goals: JOSELITO  Family Goals: Updates, participation in care    Progress made toward(s) clinical / shift goals: Sedation off this morning, see MAR. See additional notes attached to careplan problems      Problem: Hemodynamics  Goal: Patient's hemodynamics, fluid balance and neurologic status will be stable or improve  Outcome: Progressing  Note: Sinus bradycardia but SBP adequate     Problem: Safety - Medical Restraint  Goal: Remains free of injury from restraints (Restraint for Interference with Medical Device)  11/5/2023 0950 by Austin Hong R.N.  Outcome: Progressing  11/5/2023 0950 by Austin Hong R.N.  Outcome: Progressing  Flowsheets (Taken 11/5/2023 0950)  Addressed this shift: Remains free of injury from restraints (restraint for interference with medical device):   Determine that other, less restrictive measures have been tried or would not be effective before applying the restraint   Evaluate the patient's condition at the time of restraint application   Inform patient/family regarding the reason for restraint   Every 2 hours: Monitor safety, psychosocial status, comfort, nutrition and hydration  Goal: Free from restraint(s) (Restraint for Interference with Medical Device)  11/5/2023 0950 by Austin Hong R.N.  Outcome: Progressing  11/5/2023 0950 by Austin Hong R.N.  Outcome: Progressing  Flowsheets (Taken 11/5/2023 0950)  Addressed this shift: Free from restraint(s) (restraint for interference with medical device):   ONCE/SHIFT or MINIMUM Every 12 hours: Assess and document the continuing need for restraints   Every 24 hours: Continued use of restraint requires Licensed Independent Practitioner to perform face to face examination and written order   Identify and implement measures to help patient regain control     Problem: Craniotomy  Surgery  Goal: Post-Operative Craniotomy Surgery: Patient will achieve optimal post-surgical outcomes  Outcome: Progressing  Note: Q2H Neuro checks, hemovac emptied Qshift per oders     Problem: Neuro Status  Goal: Neuro status will remain stable or improve  Outcome: Progressing       Patient is not progressing towards the following goals:

## 2023-11-05 NOTE — PROGRESS NOTES
Hospital Medicine Daily Progress Note    Date of Service  11/5/2023    Chief Complaint  Anita Beard is a 88 y.o. female admitted 11/3/2023 with associated headache.  altered    Hospital Course  Anita Beard is a 88 y.o. female with history of hypertension, chronic pain syndrome with pain pump, remote history of A-fib no longer on anticoagulation who presented 11/3/2023 as transfer from [facility] ER for SDH.  Patient reported to have memory issue, CT head done at transferring ER --which noted SDH with right to left 8 mm midline shift.  went to the OR on11/4 for craniotomy and hematoma evacuation    Interval Problem Update  ROS limited by mental status.  No signs of distress    Discussed with family at the bedside    Titrating Precedex to goal RASS score -1 to +1    I have discussed this patient's plan of care and discharge plan at IDT rounds today with Case Management, Nursing, Nursing leadership, and other members of the IDT team.    Consultants/Specialty  neurosurgery    Code Status  DNAR/DNI    Disposition  The patient is not medically cleared for discharge to home or a post-acute facility.      I have placed the appropriate orders for post-discharge needs.    Review of Systems  Review of Systems   Unable to perform ROS: Mental status change        Physical Exam  Temp:  [36 °C (96.8 °F)-36.6 °C (97.9 °F)] 36.6 °C (97.9 °F)  Pulse:  [] 54  Resp:  [12-55] 22  BP: (121-185)/(51-80) 149/61  SpO2:  [89 %-98 %] 96 %    Physical Exam  Constitutional:       General: She is not in acute distress.     Appearance: Normal appearance. She is well-developed. She is not diaphoretic.   HENT:      Head: Normocephalic and atraumatic.      Comments: Post op dressings in place  Neck:      Vascular: No JVD.   Cardiovascular:      Rate and Rhythm: Normal rate and regular rhythm.      Heart sounds: Murmur heard.   Pulmonary:      Effort: Pulmonary effort is normal. No respiratory distress.      Breath sounds: No stridor. No  wheezing or rales.   Abdominal:      Palpations: Abdomen is soft.      Tenderness: There is no abdominal tenderness. There is no guarding or rebound.   Musculoskeletal:      Right lower leg: No edema.      Left lower leg: No edema.   Skin:     General: Skin is warm and dry.      Capillary Refill: Capillary refill takes less than 2 seconds.      Findings: No rash.   Neurological:      Comments: Rouses to verbal stim.  Attends and O to self   Does not follow but is purposeful x 4 extremities         Fluids    Intake/Output Summary (Last 24 hours) at 11/5/2023 1120  Last data filed at 11/5/2023 1000  Gross per 24 hour   Intake 1901.94 ml   Output 1925 ml   Net -23.06 ml       Laboratory  Recent Labs     11/03/23 2201 11/05/23  0440   WBC 7.3 13.4*   RBC 4.54 4.01*   HEMOGLOBIN 12.8 11.6*   HEMATOCRIT 40.0 35.5*   MCV 88.1 88.5   MCH 28.2 28.9   MCHC 32.0* 32.7   RDW 40.6 40.7   PLATELETCT 241 177   MPV 10.8 10.5     Recent Labs     11/03/23 2201 11/04/23  0035 11/04/23  1130 11/04/23  1832 11/05/23  0545   SODIUM 140   < > 144 144 143   POTASSIUM 4.7  --  4.1  --  4.1   CHLORIDE 105  --  110  --  111   CO2 26  --  22  --  21   GLUCOSE 97  --  157*  --  144*   BUN 17  --  15  --  17   CREATININE 0.77  --  0.77  --  0.72   CALCIUM 9.7  --  8.4*  --  8.4*    < > = values in this interval not displayed.     Recent Labs     11/03/23 2201   APTT 30.5   INR 1.03         Recent Labs     11/03/23 2201   TRIGLYCERIDE 60   HDL 61   *       Imaging  CT-HEAD W/O   Final Result         Postsurgical change from right frontal craniotomy and evacuation of the right subdural hematoma.      Postsurgical pneumocephalus and residual blood seen in the right subdural space.      Improved right to left midline shift of 3 mm, previously 6 mm.            MR-BRAIN-W/O   Final Result      1.  Large right frontal-parietal-temporal complex subdural hematoma with multiple loculations and complex membranes.   2.  Mass effect with  effacement of sulcal markings and right to left shift of midline structures of about 8 mm.   3.  Mild supratentorial white matter disease consistent with microvascular ischemic change.   4.  Mild pontine ischemic gliosis.   5.  Chronic right maxillary sinusitis.   6.  Dental artifact.      OUTSIDE IMAGES-CT HEAD   Final Result           Assessment/Plan  * SDH (subdural hematoma) ([facility])- (present on admission)  Assessment & Plan  SDH with right sided 8mm midline shift noted on CT head at transferring facility  Target SBP<140-160  Frequent neurochecks  F/u MRI brain  Neurosurgery consulted --plan for OR 11/4    S/p craniotomy.   Close monitoring in ICU  Transferring care to ICU team.     ACP (advance care planning)  Assessment & Plan  Goal of care discussed with patient ER.  She showed me her POLST which noted DNR.  She stated she does not wish for aggressive/heroic life-sustaining measures-no CPR/defibrillation/intubation or mechanical ventilation.  She is however agreeable for proposed neurosurgical intervention as needed.  DNR/DNI status confirmed.  Diagnosis, prognosis, questions and concerns addressed.  ACP: 16 minutes    Age-related physical debility  Assessment & Plan  Fall precautions  PT/OT when appropriate.     Chronic pain syndrome- (present on admission)  Assessment & Plan  Continue pain pump  monitor    Headache  Assessment & Plan  Monitoring   CT showed right sided SDH  Neuro consulted  S/p craniotomy on 11/4  Seen in ICU.     COPD (chronic obstructive pulmonary disease) ([facility])- (present on admission)  Assessment & Plan  Not in acute exacerbation  As needed nebs         VTE prophylaxis:   SCDs/TEDs      I have performed a physical exam and reviewed and updated ROS and Plan today (11/5/2023). In review of yesterday's note (11/4/2023), there are no changes except as documented above.

## 2023-11-05 NOTE — CONSULTS
DATE OF SERVICE:  11/04/2023     CHIEF COMPLAINT:  Chronic subdural hematoma.     HISTORY OF PRESENT ILLNESS:  An 88-year-old right-handed woman who does not   report any recent falls, but might have fallen 6-10 weeks ago.  She developed   increasing memory changes that prompted outside hospital head CT.  This showed   a 2.5 cm holohemispheric vertex subdural hematoma on the right with 9-mm   midline shift.  She was transferred to [facility].  An MRI of the brain shows a   septated loculated mostly chronic subdural hematoma, bright on T1, bright on   T2, so at least 3 weeks old.  She denies headaches, weakness, numbness or   vomiting.  She is not on any blood thinners.  She is amenable to surgical   decompression of this via craniotomy.     PAST MEDICAL HISTORY:  Significant for atrial fibrillation, COPD,   hypertension.     PAST SURGICAL HISTORY:  Includes knee surgery.     FAMILY HISTORY:  Noncontributory.     SOCIAL HISTORY:  She is nonsmoker, nondrinker.  She is retired. I reached her   daughter by phone.     PHYSICAL EXAMINATION:  NEUROLOGIC:  She is awake, alert and oriented x3.  She has no obvious cranial   trauma. Pupils are symmetric.  Extraocular motion intact.  Face full.  Tongue   is midline.  Speech is fluent.  She has no pronator drift.  She moves arms and   legs symmetrically.  Good sensation to face, arms, legs.     ASSESSMENT AND PLAN:  The patient has an operative subdural hematoma.  I   recommend a craniotomy for evacuation and stripping of membranes.  I explained   the risks, benefits and alternatives including pain, infection, bleeding, CSF   leak, failure to completely resolve symptoms, neurologic deficit, weakness,   numbness, speech difficulties, prolonged recovery due to age.  She will go to   the ICU postop.  We will move expeditiously to the OR.     Thanks for allowing me to participate in her care.         ______________________________  MD LAURI Brown III/AYDEN/BRUNA    DD:   11/04/2023 07:18  DT:  11/04/2023 07:45    Job#:  [id number]

## 2023-11-05 NOTE — OP REPORT
DATE OF SERVICE:  11/04/2023     PREOPERATIVE DIAGNOSES:    1.  Chronic membranous subdural hematoma.  2.  Brain compression.     POSTOPERATIVE DIAGNOSES:  1.  Chronic membranous subdural hematoma.  2.  Brain compression.     PROCEDURES PERFORMED:  Right-sided craniotomy for evacuation of subdural and   resection of membranes.     SURGEON:  Johan Alves III, MD.     ASSISTANT:  None.     BLOOD LOSS:  50 mL.     FINDINGS:  Highly membranous network of neovascularization resected all the   way to ___ brain relaxed.     COMPLICATIONS:  None.     DRAINS LEFT:  Subgaleal Hemovac.     DISPOSITION:  Extubated to recovery and to floor.     HISTORY OF PRESENT ILLNESS:  An 88-year-old right-handed woman who had   progressive decline, memory issues, various vague complaints, and went to the   outside hospital, had 2.5 cm likely membranous subdural hematoma, confirmed by   MRI here.  I explained the risks, benefits and alternatives of craniotomy for   resection and this includes pain, infection, bleeding, CSF leak, failure to   completely resolve symptoms, neurologic deficit, weakness, numbness, speech   difficulties, need for other procedures understood and she understood the   risks, benefits and agreed to consent.     SUMMARY OF OPERATIVE PROCEDURE:  The patient was taken to the operating suite,   placed under general anesthesia.  Solis catheter was placed on a regular bed   supine, right hemicranium clipped of hair and presented at the vertex with a   right shoulder bump, curvilinear incision with the zygoma as the base, but we   are going to try to stay out of the temporalis muscles, its mostly high was   drawn out.  Preoperative antibiotics were given.  Proper timeout was   performed.  Keppra was given.  The patient was prepped and draped in a sterile   fashion.     A curvilinear incision was made and myocutaneous flap held back with   self-retaining retractors and Eli clips.  We used a craniotome to make 3 bur    holes, connected with a B1 footplate drilled central tack-ups.  We did   circumferential tack-ups around the dura.  We got hemostasis.  We then did a   curvilinear incision in the dura with temporally as the base and used ice cold   bipolars to resect meticulously to all the temporal, frontal and parietal   poles.  All of the membranous subdural hematoma, Surgiflo and Surgicel were   helpful.  We went under the inner table of the skull to resect any bleeding   areas.  We had meticulous hemostasis with clear irrigation at the end.  We   laid the dura back down, a few 4-0 Nurolon tack-ups and Duragen in the   defects.  We tied the central tack-up and reaffixed the bone flap to the skull   with standard titanium plates and screws.  We copiously irrigated with   bacitracin-infused saline  We tunneled a subgaleal Hemovac, secured to skin   with stitch.  We assured meticulous hemostasis and closed the wound in   anatomic layers, 3-0 Vicryl for the small amount of temporalis fascia that we   cut, 2-0 Vicryl for the galea and staples for the skin.  Sterile dressings   were applied.  The patient was extubated to go to recovery.     There were no complications.  Needle and sponge count correct at the end of   the case.        ______________________________  MD KOSTA Brown IIIP/JACKIM    DD:  11/04/2023 11:14  DT:  11/04/2023 13:02    Job#:  [id number]

## 2023-11-05 NOTE — ANESTHESIA POSTPROCEDURE EVALUATION
Patient: Anita Beard    Procedure Summary     Date: 11/04/23 Room / Location: [facility] 07 / SURGERY [facility]    Anesthesia Start: 0902 Anesthesia Stop: 1114    Procedure: CRANIOTOMY (Right: Head) Diagnosis: (right chronic Subdural Hematoma, brain compression)    Surgeons: Johan Alves III, M.D. Responsible Provider: Cameron Oliva D.O.    Anesthesia Type: general ASA Status: 3 - Emergent          Final Anesthesia Type: general  Last vitals  BP   Blood Pressure : 134/56, Arterial BP: 146/60    Temp   36.6 °C (97.9 °F)    Pulse   (!) 56   Resp   (!) 33    SpO2   96 %      Anesthesia Post Evaluation    Patient location during evaluation: PACU  Patient participation: complete - patient participated  Level of consciousness: awake and alert  Pain score: 2    Airway patency: patent  Anesthetic complications: no  Cardiovascular status: hemodynamically stable  Respiratory status: acceptable  Hydration status: euvolemic    PONV: none          No notable events documented.     Nurse Pain Score: 3 (NPRS)

## 2023-11-05 NOTE — ASSESSMENT & PLAN NOTE
Patient likely has underlying dementia based on the discussion with family  Multifactorial: Subdural hematoma, recent intervention, underlying dementia, possible opiate withdrawal.  Start scheduled IV fentanyl in order to treat possible withdrawal syndrome until patient is able to take p.o.  Now s/p craniotomy and drainage of subdural hematoma  Frequent reorientation  Window shades open  Encourage family to visit  Mobilize    11/6  Pt having bradycardia into the 30s on precedex so have had to step back on that medication  This afternoon on repeat exam the patient is awake alert and interactive.  She is having some back pain when she moves around in bed but this is chronic for her.  She is oriented to the hospital, to this examiner, and to her family.  Moving all 4 extremities purposefully and to command.    11/7 patient is still requiring Precedex gtt.  Plan is to wean her off as tolerated. I am titrating as per RASS of -1 to +1.    11/8 now patient symptoms are significantly improved.  Plan is to remove the drain today.

## 2023-11-06 LAB
ANION GAP SERPL CALC-SCNC: 10 MMOL/L (ref 7–16)
ANION GAP SERPL CALC-SCNC: 15 MMOL/L (ref 7–16)
BASOPHILS # BLD AUTO: 0.1 % (ref 0–1.8)
BASOPHILS # BLD: 0.02 K/UL (ref 0–0.12)
BUN SERPL-MCNC: 17 MG/DL (ref 8–22)
BUN SERPL-MCNC: 19 MG/DL (ref 8–22)
CALCIUM SERPL-MCNC: 8.1 MG/DL (ref 8.5–10.5)
CALCIUM SERPL-MCNC: 8.6 MG/DL (ref 8.5–10.5)
CHLORIDE SERPL-SCNC: 113 MMOL/L (ref 96–112)
CHLORIDE SERPL-SCNC: 114 MMOL/L (ref 96–112)
CO2 SERPL-SCNC: 17 MMOL/L (ref 20–33)
CO2 SERPL-SCNC: 20 MMOL/L (ref 20–33)
CREAT SERPL-MCNC: 0.63 MG/DL (ref 0.5–1.4)
CREAT SERPL-MCNC: 0.63 MG/DL (ref 0.5–1.4)
EOSINOPHIL # BLD AUTO: 0 K/UL (ref 0–0.51)
EOSINOPHIL NFR BLD: 0 % (ref 0–6.9)
ERYTHROCYTE [DISTWIDTH] IN BLOOD BY AUTOMATED COUNT: 40.7 FL (ref 35.9–50)
GFR SERPLBLD CREATININE-BSD FMLA CKD-EPI: 86 ML/MIN/1.73 M 2
GFR SERPLBLD CREATININE-BSD FMLA CKD-EPI: 86 ML/MIN/1.73 M 2
GLUCOSE SERPL-MCNC: 115 MG/DL (ref 65–99)
GLUCOSE SERPL-MCNC: 133 MG/DL (ref 65–99)
HCT VFR BLD AUTO: 36.6 % (ref 37–47)
HGB BLD-MCNC: 11.9 G/DL (ref 12–16)
IMM GRANULOCYTES # BLD AUTO: 0.1 K/UL (ref 0–0.11)
IMM GRANULOCYTES NFR BLD AUTO: 0.5 % (ref 0–0.9)
LYMPHOCYTES # BLD AUTO: 1.57 K/UL (ref 1–4.8)
LYMPHOCYTES NFR BLD: 8 % (ref 22–41)
MAGNESIUM SERPL-MCNC: 2 MG/DL (ref 1.5–2.5)
MCH RBC QN AUTO: 28.1 PG (ref 27–33)
MCHC RBC AUTO-ENTMCNC: 32.5 G/DL (ref 32.2–35.5)
MCV RBC AUTO: 86.5 FL (ref 81.4–97.8)
MONOCYTES # BLD AUTO: 0.67 K/UL (ref 0–0.85)
MONOCYTES NFR BLD AUTO: 3.4 % (ref 0–13.4)
NEUTROPHILS # BLD AUTO: 17.38 K/UL (ref 1.82–7.42)
NEUTROPHILS NFR BLD: 88 % (ref 44–72)
NRBC # BLD AUTO: 0 K/UL
NRBC BLD-RTO: 0 /100 WBC (ref 0–0.2)
PHOSPHATE SERPL-MCNC: 1.2 MG/DL (ref 2.5–4.5)
PLATELET # BLD AUTO: 198 K/UL (ref 164–446)
PMV BLD AUTO: 10.7 FL (ref 9–12.9)
POTASSIUM SERPL-SCNC: 3.4 MMOL/L (ref 3.6–5.5)
POTASSIUM SERPL-SCNC: 4.1 MMOL/L (ref 3.6–5.5)
RBC # BLD AUTO: 4.23 M/UL (ref 4.2–5.4)
SODIUM SERPL-SCNC: 143 MMOL/L (ref 135–145)
SODIUM SERPL-SCNC: 146 MMOL/L (ref 135–145)
WBC # BLD AUTO: 19.7 K/UL (ref 4.8–10.8)

## 2023-11-06 PROCEDURE — 83735 ASSAY OF MAGNESIUM: CPT

## 2023-11-06 PROCEDURE — 700101 HCHG RX REV CODE 250: Performed by: INTERNAL MEDICINE

## 2023-11-06 PROCEDURE — 700105 HCHG RX REV CODE 258: Performed by: NEUROLOGICAL SURGERY

## 2023-11-06 PROCEDURE — 700111 HCHG RX REV CODE 636 W/ 250 OVERRIDE (IP): Performed by: STUDENT IN AN ORGANIZED HEALTH CARE EDUCATION/TRAINING PROGRAM

## 2023-11-06 PROCEDURE — 700105 HCHG RX REV CODE 258: Performed by: HOSPITALIST

## 2023-11-06 PROCEDURE — 700105 HCHG RX REV CODE 258: Performed by: STUDENT IN AN ORGANIZED HEALTH CARE EDUCATION/TRAINING PROGRAM

## 2023-11-06 PROCEDURE — 85025 COMPLETE CBC W/AUTO DIFF WBC: CPT

## 2023-11-06 PROCEDURE — 700111 HCHG RX REV CODE 636 W/ 250 OVERRIDE (IP): Performed by: HOSPITALIST

## 2023-11-06 PROCEDURE — 84100 ASSAY OF PHOSPHORUS: CPT

## 2023-11-06 PROCEDURE — 700105 HCHG RX REV CODE 258: Performed by: INTERNAL MEDICINE

## 2023-11-06 PROCEDURE — 700111 HCHG RX REV CODE 636 W/ 250 OVERRIDE (IP): Mod: JZ | Performed by: NEUROLOGICAL SURGERY

## 2023-11-06 PROCEDURE — 80048 BASIC METABOLIC PNL TOTAL CA: CPT

## 2023-11-06 PROCEDURE — 770000 HCHG ROOM/CARE - INTERMEDIATE ICU *

## 2023-11-06 PROCEDURE — 99233 SBSQ HOSP IP/OBS HIGH 50: CPT | Performed by: HOSPITALIST

## 2023-11-06 PROCEDURE — 700101 HCHG RX REV CODE 250: Performed by: HOSPITALIST

## 2023-11-06 RX ORDER — HYDROMORPHONE HYDROCHLORIDE 1 MG/ML
1 INJECTION, SOLUTION INTRAMUSCULAR; INTRAVENOUS; SUBCUTANEOUS ONCE
Status: COMPLETED | OUTPATIENT
Start: 2023-11-06 | End: 2023-11-06

## 2023-11-06 RX ORDER — DEXTROSE MONOHYDRATE 50 MG/ML
INJECTION, SOLUTION INTRAVENOUS CONTINUOUS
Status: DISCONTINUED | OUTPATIENT
Start: 2023-11-06 | End: 2023-11-06

## 2023-11-06 RX ORDER — DEXTROSE, SODIUM CHLORIDE, SODIUM LACTATE, POTASSIUM CHLORIDE, AND CALCIUM CHLORIDE 5; .6; .31; .03; .02 G/100ML; G/100ML; G/100ML; G/100ML; G/100ML
INJECTION, SOLUTION INTRAVENOUS CONTINUOUS
Status: DISCONTINUED | OUTPATIENT
Start: 2023-11-06 | End: 2023-11-06

## 2023-11-06 RX ADMIN — POTASSIUM PHOSPHATE, MONOBASIC POTASSIUM PHOSPHATE, DIBASIC 30 MMOL: 224; 236 INJECTION, SOLUTION, CONCENTRATE INTRAVENOUS at 12:00

## 2023-11-06 RX ADMIN — CEFAZOLIN 1 G: 1 INJECTION, POWDER, FOR SOLUTION INTRAMUSCULAR; INTRAVENOUS at 21:34

## 2023-11-06 RX ADMIN — HYDRALAZINE HYDROCHLORIDE 10 MG: 20 INJECTION, SOLUTION INTRAMUSCULAR; INTRAVENOUS at 03:12

## 2023-11-06 RX ADMIN — FENTANYL CITRATE 25 MCG: 50 INJECTION, SOLUTION INTRAMUSCULAR; INTRAVENOUS at 03:45

## 2023-11-06 RX ADMIN — HYDROMORPHONE HYDROCHLORIDE 1 MG: 1 INJECTION, SOLUTION INTRAMUSCULAR; INTRAVENOUS; SUBCUTANEOUS at 21:28

## 2023-11-06 RX ADMIN — CEFAZOLIN 1 G: 1 INJECTION, POWDER, FOR SOLUTION INTRAMUSCULAR; INTRAVENOUS at 04:47

## 2023-11-06 RX ADMIN — Medication 25 MCG/HR: at 13:38

## 2023-11-06 RX ADMIN — LEVETIRACETAM 500 MG: 100 INJECTION, SOLUTION, CONCENTRATE INTRAVENOUS at 04:45

## 2023-11-06 RX ADMIN — LEVETIRACETAM 500 MG: 100 INJECTION, SOLUTION, CONCENTRATE INTRAVENOUS at 17:46

## 2023-11-06 RX ADMIN — DEXTROSE MONOHYDRATE: 50 INJECTION, SOLUTION INTRAVENOUS at 10:54

## 2023-11-06 RX ADMIN — DEXMEDETOMIDINE 1 MCG/KG/HR: 100 INJECTION, SOLUTION INTRAVENOUS at 04:45

## 2023-11-06 RX ADMIN — CEFAZOLIN 1 G: 1 INJECTION, POWDER, FOR SOLUTION INTRAMUSCULAR; INTRAVENOUS at 14:59

## 2023-11-06 RX ADMIN — DEXTROSE MONOHYDRATE: 50 INJECTION, SOLUTION INTRAVENOUS at 05:45

## 2023-11-06 ASSESSMENT — PAIN DESCRIPTION - PAIN TYPE
TYPE: CHRONIC PAIN
TYPE: ACUTE PAIN
TYPE: CHRONIC PAIN
TYPE: ACUTE PAIN
TYPE: CHRONIC PAIN
TYPE: ACUTE PAIN

## 2023-11-06 ASSESSMENT — FIBROSIS 4 INDEX: FIB4 SCORE: 2.06

## 2023-11-06 NOTE — THERAPY
Physical Therapy Contact Note    Patient Name: Anita Beard  Age:  88 y.o., Sex:  female  Medical Record #: [id number]  Today's Date: 11/6/2023 11/06/23 2165   Initial Contact Note    Initial Contact Note Order Received and Verified, Physical Therapy Evaluation in Progress with Full Report to Follow.   Interdisciplinary Plan of Care Collaboration   Collaboration Comments PT Consult recieved. Per discussion with RN, patient is currently on Precedex drip, not following any commands or alert/awake. Plan is to lower precedex drip when family arrives. PT to follow up at a later date as appropriate.   Session Information   Date / Session Number  11/6-Hold (EVAL)

## 2023-11-06 NOTE — DISCHARGE PLANNING
Care Transition Team Assessment    Met with pts daughter, Cata Morton, at bedside, introduced self and explained  role, she agreed to visit for CM initial assessment. Pt rested throughout visit and did not participate.   She reports pt normally lives alone in a 5th wheel on the daughters property, located approximately 100 yards from the house. She reports pt is normally completely independent with her ADL's, and uses a cane or a 3WW PRN for mobility. She reported recently pt had complaints of dizziness and weakness, so they installed additional grab bars and hand rails throughout the RV. The RV has deck/porch with 5 steps in and 2 more steps on the inside.   She denied use of any HH services.    Cata reports the DC goal is dependent on pts progress and she is hoping Anita will be more awake to make those decisions. She expressed interest in [facility] if patient qualifies and does confirm that pt could move into her home and they will likely have 24/7 supervision assistance available if needed.      Information Source  Orientation Level: Unable to assess  Information Given By: Relative  Informant's Name: daughter, Cata Morton  Who is responsible for making decisions for patient? : Patient    Readmission Evaluation  Is this a readmission?: No    Elopement Risk  Legal Hold: No  Ambulatory or Self Mobile in Wheelchair: No-Not an Elopement Risk  Elopement Risk: Not at Risk for Elopement    Interdisciplinary Discharge Planning  Lives with - Patient's Self Care Capacity: Alone and Able to Care For Self, Adult Children (lives alone in a 5th wheel on daughters property (100 yards from house))  Patient or legal guardian wants to designate a caregiver: No  Support Systems: Children, Family Member(s)  Housing / Facility: Motor Home  Able to Return to Previous ADL's: Future Time w/Therapy  Prior Services: None, Home-Independent  Patient Prefers to be Discharged to:: pending progress  Assistance Needed:  Unknown at this Time  Durable Medical Equipment: Walker, Other - Specify (cane and 3WW)    Discharge Preparedness  What is your plan after discharge?: Uncertain - pending medical team collaboration  What are your discharge supports?: Child, Sibling, Other (comment) (family)  Prior Functional Level: Ambulatory, Independent with Activities of Daily Living, Independent with Medication Management, Uses Cane, Uses Walker    Functional Assesment  Prior Functional Level: Ambulatory, Independent with Activities of Daily Living, Independent with Medication Management, Uses Cane, Uses Walker    Finances  Financial Barriers to Discharge: No  Prescription Coverage: Yes    Vision / Hearing Impairment  Vision Impairment : Yes  Right Eye Vision: Wears Glasses  Left Eye Vision: Wears Glasses  Hearing Impairment : Yes  Hearing Impairment: Both Ears, Hearing Device(s) Available  Does Pt Need Special Equipment for the Hearing Impaired?: Yes-But Does not Need for Facility to Arrange Equipment    Advance Directive  Advance Directive?: POLST    Domestic Abuse  Have you ever been the victim of abuse or violence?: No  Physical Abuse or Sexual Abuse: No  Verbal Abuse or Emotional Abuse: No  Possible Abuse/Neglect Reported to:: Not Applicable    Discharge Risks or Barriers  Discharge risks or barriers?: Complex medical needs, Post-acute placement / services  Patient risk factors: Complex medical needs, Cognitive / sensory / physical deficit    Anticipated Discharge Information  Discharge Disposition: Disch to IP rehab facility or distinct part unit (62)

## 2023-11-06 NOTE — CARE PLAN
The patient is Watcher - Medium risk of patient condition declining or worsening    Shift Goals  Clinical Goals: Q2H neuro checks, SBP <160  Patient Goals: JOSELITO  Family Goals: Updates, participation in care    Progress made toward(s) clinical / shift goals:      Problem: Hemodynamics  Goal: Patient's hemodynamics, fluid balance and neurologic status will be stable or improve  Outcome: Progressing     Problem: Pain - Standard  Goal: Alleviation of pain or a reduction in pain to the patient’s comfort goal  Outcome: Progressing     Problem: Fall Risk  Goal: Patient will remain free from falls  Outcome: Progressing     Problem: Safety - Medical Restraint  Goal: Remains free of injury from restraints (Restraint for Interference with Medical Device)  Outcome: Progressing  Note: No restraint related injury      Problem: Skin Integrity  Goal: Skin integrity is maintained or improved  Outcome: Progressing       Patient is not progressing towards the following goals:      Problem: Knowledge Deficit - Standard  Goal: Patient and family/care givers will demonstrate understanding of plan of care, disease process/condition, diagnostic tests and medications  Outcome: Not Progressing

## 2023-11-06 NOTE — PROGRESS NOTES
[facility] Medicine Daily Progress Note    Date of Service  11/6/2023    Chief Complaint  Anita Beard is a 88 y.o. female admitted 11/3/2023 with associated headache.  altered    Hospital Course  Anita Beard is a 88 y.o. female with history of hypertension, chronic pain syndrome with pain pump, remote history of A-fib no longer on anticoagulation who presented 11/3/2023 as transfer from [facility] ER for SDH.  Patient reported to have memory issue, CT head done at transferring ER --which noted SDH with right to left 8 mm midline shift.  went to the OR on11/4 for craniotomy and hematoma evacuation    Interval Problem Update  Review of systems is somewhat limited due to the patient's underlying dementia, however on my exam this afternoon she is awake alert and interactive, complains of some back pain which is chronic for her.  Does not complain of headache.  No other complaints.    Discussed with family at the bedside    Titrating Precedex to goal RASS score -1 to +1    I have discussed this patient's plan of care and discharge plan at IDT rounds today with Case Management, Nursing, Nursing leadership, and other members of the IDT team.    Consultants/Specialty  neurosurgery    Code Status  DNAR/DNI    Disposition  Medically Cleared  I have placed the appropriate orders for post-discharge needs.    Review of Systems  Review of Systems   Unable to perform ROS: Mental status change        Physical Exam  Temp:  [36.5 °C (97.7 °F)] 36.5 °C (97.7 °F)  Pulse:  [] 49  Resp:  [14-63] 21  BP: ()/(50-90) 109/51  SpO2:  [95 %-100 %] 98 %    Physical Exam  Constitutional:       General: She is not in acute distress.     Appearance: Normal appearance. She is well-developed. She is not diaphoretic.   HENT:      Head: Normocephalic and atraumatic.      Comments: Post op dressings in place  Neck:      Vascular: No JVD.   Cardiovascular:      Rate and Rhythm: Normal rate and regular rhythm.      Heart sounds: Murmur heard.    Pulmonary:      Effort: Pulmonary effort is normal. No respiratory distress.      Breath sounds: No stridor. No wheezing or rales.   Abdominal:      Palpations: Abdomen is soft.      Tenderness: There is no abdominal tenderness. There is no guarding or rebound.   Musculoskeletal:      Right lower leg: No edema.      Left lower leg: No edema.   Skin:     General: Skin is warm and dry.      Capillary Refill: Capillary refill takes less than 2 seconds.      Findings: No rash.   Neurological:      Comments: Rouses to verbal stim.  Attends and O to self   Does not follow but is purposeful x 4 extremities         Fluids    Intake/Output Summary (Last 24 hours) at 11/6/2023 0650  Last data filed at 11/6/2023 0447  Gross per 24 hour   Intake 1024.25 ml   Output 1200 ml   Net -175.75 ml         Laboratory  Recent Labs     11/03/23  2201 11/05/23  0440 11/05/23  1122 11/06/23  0415   WBC 7.3 13.4*  --  19.7*   RBC 4.54 4.01*  --  4.23   HEMOGLOBIN 12.8 11.6* 11.4* 11.9*   HEMATOCRIT 40.0 35.5* 34.7* 36.6*   MCV 88.1 88.5  --  86.5   MCH 28.2 28.9  --  28.1   MCHC 32.0* 32.7  --  32.5   RDW 40.6 40.7  --  40.7   PLATELETCT 241 177  --  198   MPV 10.8 10.5  --  10.7       Recent Labs     11/04/23  1130 11/04/23  1832 11/05/23  0545 11/06/23  0415   SODIUM 144 144 143 146*   POTASSIUM 4.1  --  4.1 3.4*   CHLORIDE 110  --  111 114*   CO2 22  --  21 17*   GLUCOSE 157*  --  144* 133*   BUN 15  --  17 19   CREATININE 0.77  --  0.72 0.63   CALCIUM 8.4*  --  8.4* 8.6       Recent Labs     11/03/23 2201   APTT 30.5   INR 1.03           Recent Labs     11/03/23 2201   TRIGLYCERIDE 60   HDL 61   *         Imaging  CT-HEAD W/O   Final Result         Postsurgical change from right frontal craniotomy and evacuation of the right subdural hematoma.      Postsurgical pneumocephalus and residual blood seen in the right subdural space.      Improved right to left midline shift of 3 mm, previously 6 mm.            MR-BRAIN-W/O   Final  Result      1.  Large right frontal-parietal-temporal complex subdural hematoma with multiple loculations and complex membranes.   2.  Mass effect with effacement of sulcal markings and right to left shift of midline structures of about 8 mm.   3.  Mild supratentorial white matter disease consistent with microvascular ischemic change.   4.  Mild pontine ischemic gliosis.   5.  Chronic right maxillary sinusitis.   6.  Dental artifact.      OUTSIDE IMAGES-CT HEAD   Final Result           Assessment/Plan  * SDH (subdural hematoma) (HCC)- (present on admission)  Assessment & Plan  SDH with right sided 8mm midline shift noted on CT head at transferring facility  No known trauma, however the patient's mental status is such that where she to have fallen, it is likely she would not remember.  Target SBP<140-160  Now s/p craniotomy and drainage  Continue serial neurochecks  Neurosurgery following  Follow serum sodium    Altered mental status  Assessment & Plan  Patient likely has underlying dementia based on the discussion with family  Multifactorial: Subdural hematoma, recent intervention, underlying dementia, possible opiate withdrawal.  Start scheduled IV fentanyl in order to treat possible withdrawal syndrome until patient is able to take p.o.  Now s/p craniotomy and drainage of subdural hematoma  Frequent reorientation  Window shades open  Encourage family to visit  Mobilize    11/6  Pt having bradycardia into the 30s on precedex so have had to step back on that medication  This afternoon on repeat exam the patient is awake alert and interactive.  She is having some back pain when she moves around in bed but this is chronic for her.  She is oriented to the hospital, to this examiner, and to her family.  Moving all 4 extremities purposefully and to command.    ACP (advance care planning)  Assessment & Plan  Per discussion with my partner, patient is DNR/I    Age-related physical debility  Assessment & Plan  Fall  precautions  PT/OT when appropriate.     Chronic pain syndrome- (present on admission)  Assessment & Plan  Patient has a pain pump in place which is functioning and apparently distributing narcotic  She also takes Tylenol No. 4, 3 times daily and has been doing so for many years.  We will start her on scheduled fentanyl to preclude withdrawal syndrome    Headache  Assessment & Plan  Secondary subdural hematoma    COPD (chronic obstructive pulmonary disease) (HCC)- (present on admission)  Assessment & Plan  Not in acute exacerbation  O2 and RT protocols         VTE prophylaxis:   SCDs/TEDs      I have performed a physical exam and reviewed and updated ROS and Plan today (11/6/2023). In review of yesterday's note (11/5/2023), there are no changes except as documented above.

## 2023-11-06 NOTE — PROGRESS NOTES
Report given to Thomas OLSEN in IMCU. Patient placed on transport monitor and transported to IMCU via bed with belongigns, accompanied by Patient's daughter, RN, CCT, and x2 Patient transporters.   Patient bedside handoff complleted with Thomas OLSEN.

## 2023-11-06 NOTE — PROGRESS NOTES
Neurosurgery Progress Note    Subjective:  POD#2 right craniotomy for large right chronic SDH.  Was alert and oriented following surgery, but became more somnolent and CT was completed following that, and that was reviewed by Dr. Alves and looks fine.   Per daughter, the patient is slow to wake up from anesthesia and has taken several days in the past before mentation has cleared.  Has pain pump implanted.    Subgaleal drain with 75 cc output last 8 hours.     Patient improving  Weaning sedation    Exam:  Opens eyes to voice, hard of hearing.  PERRL  Grossly strong to upper / lower  Agitated but quickly calmed  Alert to person/place  Overall appropriate, following commands   Restrained       BP  Min: 91/50  Max: 174/72  Pulse  Av.9  Min: 38  Max: 109  Resp  Av.7  Min: 14  Max: 63  Temp  Av.5 °C (97.7 °F)  Min: 36.5 °C (97.7 °F)  Max: 36.5 °C (97.7 °F)  Monitored Temp 2  Av.1 °C (96.9 °F)  Min: 31.5 °C (88.7 °F)  Max: 36.7 °C (98.1 °F)  SpO2  Av.2 %  Min: 95 %  Max: 100 %    No data recorded    Recent Labs     23  2201 23  0440 23  1122 23  0415   WBC 7.3 13.4*  --  19.7*   RBC 4.54 4.01*  --  4.23   HEMOGLOBIN 12.8 11.6* 11.4* 11.9*   HEMATOCRIT 40.0 35.5* 34.7* 36.6*   MCV 88.1 88.5  --  86.5   MCH 28.2 28.9  --  28.1   MCHC 32.0* 32.7  --  32.5   RDW 40.6 40.7  --  40.7   PLATELETCT 241 177  --  198   MPV 10.8 10.5  --  10.7       Recent Labs     23  1130 23  1832 23  0545 23  0415   SODIUM 144 144 143 146*   POTASSIUM 4.1  --  4.1 3.4*   CHLORIDE 110  --  111 114*   CO2 22  --  21 17*   GLUCOSE 157*  --  144* 133*   BUN 15  --  17 19   CREATININE 0.77  --  0.72 0.63   CALCIUM 8.4*  --  8.4* 8.6       Recent Labs     23  2201   APTT 30.5   INR 1.03       Recent Labs     23  0035   REACTMIN 4.8   CLOTKINET 1.1   CLOTANGL 75.1   MAXCLOTS 62.6   PAB97DTQ 0.6   PRCINADP 4.0   PRCINAA 22.8*         Intake/Output                          11/05/23 0700 - 11/06/23 0659 11/06/23 0700 - 11/07/23 0659     [id number] 1900-0659 Total 0700-1859 1900-0659 Total                 Intake    I.V.  805.7  70.9 876.6  --  -- --    Fentanyl Volume -- 0.5 0.5 -- -- --    Precedex Volume 41 22.7 63.6 -- -- --    Volume (mL) (NS infusion) 764.8 47.7 812.5 -- -- --    IV Piggyback  147.7  -- 147.7  --  -- --    Volume (mL) (ceFAZolin (Ancef) 1 g in  mL IVPB) 147.7 -- 147.7 -- -- --    Total Intake 953.4 70.9 1024.3 -- -- --       Output    Urine  495  350 845  --  -- --    Output (mL) (Urethral Catheter Temperature probe 16 Fr.) 495 350 845 -- -- --    Drains  170  185 355  --  -- --    Output (mL) (Closed/Suction Drain 1 Right Scalp Hemovac) 170 185 355 -- -- --    Stool  --  -- --  --  -- --    Number of Times Stooled 0 x 1 x 1 x -- -- --    Total Output  -- -- --       Net I/O     288.4 -464.1 -175.8 -- -- --              Intake/Output Summary (Last 24 hours) at 11/6/2023 0937  Last data filed at 11/6/2023 0447  Gross per 24 hour   Intake 878.55 ml   Output 1195 ml   Net -316.45 ml               dextrose 5%   Continuous    dexmedetomidine (Precedex) infusion  0.1-1.5 mcg/kg/hr (Ideal) Continuous    fentaNYL  25 mcg Q2HRS PRN    fentaNYL  25 mcg/hr Continuous    atenolol  25 mg Nightly    Pain Pump   Continuous    gabapentin  300 mg QAM    And    gabapentin  600 mg QHS    atorvastatin  40 mg Q EVENING    ceFAZolin  1 g Q8HRS    levETIRAcetam (Keppra) IV  500 mg Q12HRS    senna-docusate  2 Tablet BID    And    polyethylene glycol/lytes  1 Packet QDAY PRN    And    bisacodyl  10 mg QDAY PRN    LR  500 mL Once PRN    acetaminophen  650 mg Q6HRS PRN    ondansetron  4 mg Q4HRS PRN    ondansetron  4 mg Q4HRS PRN    Respiratory Therapy Consult   Continuous RT    LORazepam  2 mg Q5 MIN PRN    labetalol  10 mg Q4HRS PRN    hydrALAZINE  10 mg Q4HRS PRN       Assessment and Plan:  Hospital day #4  POD#2  Leave drain today.  q2 hour neuro checks.  Appreciate  intensivist care.  Following.    Keep drain in  PT/OT    Chemical prophylactic DVT therapy: yes per NS  Start date/time: okay for 40qday

## 2023-11-06 NOTE — PROGRESS NOTES
4 Eyes Skin Assessment Completed by RAÚL Guzman and RAÚL Casper.    Head Redness and Incision  Ears WDL  Nose WDL  Mouth WDL  Neck WDL  Breast/Chest WDL  Shoulder Blades WDL  Spine WDL  (R) Arm/Elbow/Hand Redness and Bruising  (L) Arm/Elbow/Hand Redness and Bruising  Abdomen WDL  Groin WDL  Scrotum/Coccyx/Buttocks WDL  (R) Leg Redness  (L) Leg Redness  (R) Heel/Foot/Toe Blanching and Boggy  (L) Heel/Foot/Toe Blanching and Boggy          Devices In Places ECG, Blood Pressure Cuff, Pulse Ox, Solis, and Nasal Cannula      Interventions In Place NC W/Ear Foams, Pillows, and Q2 Turns    Possible Skin Injury No    Pictures Uploaded Into Epic Yes  Wound Consult Placed N/A  RN Wound Prevention Protocol Ordered No

## 2023-11-06 NOTE — THERAPY
"Speech Language Therapy Contact Note    Patient Name: Anita Beard  Age:  88 y.o., Sex:  female  Medical Record #: [id number]  Today's Date: 11/6/2023    Attempted AM and PM to see pt for CSE. RN reports that pt is \"still pretty sleepy but almost off the drip.\" Suspects pt may do better tomorrow. Will HOLD CSE this date and re-attempt when pt is more alert and medically appropriate to participate.     11/06/23 1405   Treatment Variance   Reason For Missed Therapy Medical - Patient Unarousable;Medical - Patient on Hold from Therapy   Initial Contact Note    Initial Contact Note  Order Received and Verified, Speech Therapy Evaluation in Progress with Full Report to Follow.   Interdisciplinary Plan of Care Collaboration   IDT Collaboration with  Nursing       "

## 2023-11-07 LAB
ANION GAP SERPL CALC-SCNC: 17 MMOL/L (ref 7–16)
BASOPHILS # BLD AUTO: 0.1 % (ref 0–1.8)
BASOPHILS # BLD: 0.02 K/UL (ref 0–0.12)
BUN SERPL-MCNC: 16 MG/DL (ref 8–22)
CALCIUM SERPL-MCNC: 8.2 MG/DL (ref 8.5–10.5)
CHLORIDE SERPL-SCNC: 112 MMOL/L (ref 96–112)
CO2 SERPL-SCNC: 14 MMOL/L (ref 20–33)
CREAT SERPL-MCNC: 0.62 MG/DL (ref 0.5–1.4)
EOSINOPHIL # BLD AUTO: 0 K/UL (ref 0–0.51)
EOSINOPHIL NFR BLD: 0 % (ref 0–6.9)
ERYTHROCYTE [DISTWIDTH] IN BLOOD BY AUTOMATED COUNT: 41.7 FL (ref 35.9–50)
GFR SERPLBLD CREATININE-BSD FMLA CKD-EPI: 86 ML/MIN/1.73 M 2
GLUCOSE SERPL-MCNC: 126 MG/DL (ref 65–99)
HCT VFR BLD AUTO: 34.4 % (ref 37–47)
HGB BLD-MCNC: 11.3 G/DL (ref 12–16)
IMM GRANULOCYTES # BLD AUTO: 0.05 K/UL (ref 0–0.11)
IMM GRANULOCYTES NFR BLD AUTO: 0.4 % (ref 0–0.9)
LYMPHOCYTES # BLD AUTO: 1.05 K/UL (ref 1–4.8)
LYMPHOCYTES NFR BLD: 7.4 % (ref 22–41)
MAGNESIUM SERPL-MCNC: 1.9 MG/DL (ref 1.5–2.5)
MCH RBC QN AUTO: 28.7 PG (ref 27–33)
MCHC RBC AUTO-ENTMCNC: 32.8 G/DL (ref 32.2–35.5)
MCV RBC AUTO: 87.3 FL (ref 81.4–97.8)
MONOCYTES # BLD AUTO: 0.82 K/UL (ref 0–0.85)
MONOCYTES NFR BLD AUTO: 5.8 % (ref 0–13.4)
NEUTROPHILS # BLD AUTO: 12.29 K/UL (ref 1.82–7.42)
NEUTROPHILS NFR BLD: 86.3 % (ref 44–72)
NRBC # BLD AUTO: 0 K/UL
NRBC BLD-RTO: 0 /100 WBC (ref 0–0.2)
PHOSPHATE SERPL-MCNC: 2.2 MG/DL (ref 2.5–4.5)
PLATELET # BLD AUTO: 198 K/UL (ref 164–446)
PMV BLD AUTO: 11.1 FL (ref 9–12.9)
POTASSIUM SERPL-SCNC: 3.9 MMOL/L (ref 3.6–5.5)
RBC # BLD AUTO: 3.94 M/UL (ref 4.2–5.4)
SODIUM SERPL-SCNC: 143 MMOL/L (ref 135–145)
WBC # BLD AUTO: 14.2 K/UL (ref 4.8–10.8)

## 2023-11-07 PROCEDURE — 97535 SELF CARE MNGMENT TRAINING: CPT

## 2023-11-07 PROCEDURE — 700101 HCHG RX REV CODE 250: Performed by: INTERNAL MEDICINE

## 2023-11-07 PROCEDURE — 80048 BASIC METABOLIC PNL TOTAL CA: CPT

## 2023-11-07 PROCEDURE — A9270 NON-COVERED ITEM OR SERVICE: HCPCS | Performed by: STUDENT IN AN ORGANIZED HEALTH CARE EDUCATION/TRAINING PROGRAM

## 2023-11-07 PROCEDURE — 97530 THERAPEUTIC ACTIVITIES: CPT

## 2023-11-07 PROCEDURE — 700111 HCHG RX REV CODE 636 W/ 250 OVERRIDE (IP): Mod: JZ | Performed by: INTERNAL MEDICINE

## 2023-11-07 PROCEDURE — 97163 PT EVAL HIGH COMPLEX 45 MIN: CPT

## 2023-11-07 PROCEDURE — 99223 1ST HOSP IP/OBS HIGH 75: CPT | Performed by: PHYSICAL MEDICINE & REHABILITATION

## 2023-11-07 PROCEDURE — 700105 HCHG RX REV CODE 258: Performed by: NEUROLOGICAL SURGERY

## 2023-11-07 PROCEDURE — 700102 HCHG RX REV CODE 250 W/ 637 OVERRIDE(OP): Performed by: STUDENT IN AN ORGANIZED HEALTH CARE EDUCATION/TRAINING PROGRAM

## 2023-11-07 PROCEDURE — 83735 ASSAY OF MAGNESIUM: CPT

## 2023-11-07 PROCEDURE — A9270 NON-COVERED ITEM OR SERVICE: HCPCS | Performed by: INTERNAL MEDICINE

## 2023-11-07 PROCEDURE — 770000 HCHG ROOM/CARE - INTERMEDIATE ICU *

## 2023-11-07 PROCEDURE — 99291 CRITICAL CARE FIRST HOUR: CPT | Performed by: INTERNAL MEDICINE

## 2023-11-07 PROCEDURE — 700111 HCHG RX REV CODE 636 W/ 250 OVERRIDE (IP): Mod: JZ | Performed by: NEUROLOGICAL SURGERY

## 2023-11-07 PROCEDURE — 92610 EVALUATE SWALLOWING FUNCTION: CPT

## 2023-11-07 PROCEDURE — 85025 COMPLETE CBC W/AUTO DIFF WBC: CPT

## 2023-11-07 PROCEDURE — 700111 HCHG RX REV CODE 636 W/ 250 OVERRIDE (IP): Mod: JZ | Performed by: STUDENT IN AN ORGANIZED HEALTH CARE EDUCATION/TRAINING PROGRAM

## 2023-11-07 PROCEDURE — 92523 SPEECH SOUND LANG COMPREHEN: CPT

## 2023-11-07 PROCEDURE — 700102 HCHG RX REV CODE 250 W/ 637 OVERRIDE(OP): Performed by: INTERNAL MEDICINE

## 2023-11-07 PROCEDURE — 97166 OT EVAL MOD COMPLEX 45 MIN: CPT

## 2023-11-07 PROCEDURE — 84100 ASSAY OF PHOSPHORUS: CPT

## 2023-11-07 PROCEDURE — 700105 HCHG RX REV CODE 258: Performed by: INTERNAL MEDICINE

## 2023-11-07 RX ORDER — LEVETIRACETAM 500 MG/1
500 TABLET ORAL 2 TIMES DAILY
Status: DISCONTINUED | OUTPATIENT
Start: 2023-11-07 | End: 2023-11-09 | Stop reason: HOSPADM

## 2023-11-07 RX ORDER — ENOXAPARIN SODIUM 100 MG/ML
40 INJECTION SUBCUTANEOUS DAILY
Status: DISCONTINUED | OUTPATIENT
Start: 2023-11-07 | End: 2023-11-09 | Stop reason: HOSPADM

## 2023-11-07 RX ORDER — MORPHINE SULFATE 4 MG/ML
2 INJECTION INTRAVENOUS ONCE
Status: COMPLETED | OUTPATIENT
Start: 2023-11-07 | End: 2023-11-07

## 2023-11-07 RX ORDER — HYDROCODONE BITARTRATE AND ACETAMINOPHEN 10; 325 MG/1; MG/1
1 TABLET ORAL EVERY 4 HOURS PRN
Status: DISCONTINUED | OUTPATIENT
Start: 2023-11-07 | End: 2023-11-09 | Stop reason: HOSPADM

## 2023-11-07 RX ADMIN — HYDROCODONE BITARTRATE AND ACETAMINOPHEN 1 TABLET: 10; 325 TABLET ORAL at 15:11

## 2023-11-07 RX ADMIN — ONDANSETRON 4 MG: 2 INJECTION INTRAMUSCULAR; INTRAVENOUS at 01:40

## 2023-11-07 RX ADMIN — ENOXAPARIN SODIUM 40 MG: 100 INJECTION SUBCUTANEOUS at 17:06

## 2023-11-07 RX ADMIN — LEVETIRACETAM 500 MG: 100 INJECTION, SOLUTION, CONCENTRATE INTRAVENOUS at 05:07

## 2023-11-07 RX ADMIN — LEVETIRACETAM 500 MG: 500 TABLET, FILM COATED ORAL at 17:06

## 2023-11-07 RX ADMIN — BISACODYL 10 MG: 10 SUPPOSITORY RECTAL at 03:06

## 2023-11-07 RX ADMIN — CEFAZOLIN 1 G: 1 INJECTION, POWDER, FOR SOLUTION INTRAMUSCULAR; INTRAVENOUS at 05:14

## 2023-11-07 RX ADMIN — DOCUSATE SODIUM 50 MG AND SENNOSIDES 8.6 MG 2 TABLET: 8.6; 5 TABLET, FILM COATED ORAL at 17:06

## 2023-11-07 RX ADMIN — MORPHINE SULFATE 2 MG: 4 INJECTION, SOLUTION INTRAMUSCULAR; INTRAVENOUS at 04:07

## 2023-11-07 RX ADMIN — DEXMEDETOMIDINE 0.3 MCG/KG/HR: 100 INJECTION, SOLUTION INTRAVENOUS at 05:15

## 2023-11-07 RX ADMIN — GABAPENTIN 600 MG: 300 CAPSULE ORAL at 20:09

## 2023-11-07 RX ADMIN — DIBASIC SODIUM PHOSPHATE, MONOBASIC POTASSIUM PHOSPHATE AND MONOBASIC SODIUM PHOSPHATE 500 MG: 852; 155; 130 TABLET ORAL at 11:48

## 2023-11-07 RX ADMIN — ATENOLOL 25 MG: 25 TABLET ORAL at 20:10

## 2023-11-07 RX ADMIN — DIBASIC SODIUM PHOSPHATE, MONOBASIC POTASSIUM PHOSPHATE AND MONOBASIC SODIUM PHOSPHATE 500 MG: 852; 155; 130 TABLET ORAL at 17:06

## 2023-11-07 ASSESSMENT — PAIN DESCRIPTION - PAIN TYPE
TYPE: ACUTE PAIN;CHRONIC PAIN
TYPE: ACUTE PAIN
TYPE: CHRONIC PAIN
TYPE: ACUTE PAIN;CHRONIC PAIN
TYPE: CHRONIC PAIN
TYPE: CHRONIC PAIN
TYPE: ACUTE PAIN;CHRONIC PAIN

## 2023-11-07 ASSESSMENT — COGNITIVE AND FUNCTIONAL STATUS - GENERAL
WALKING IN HOSPITAL ROOM: A LITTLE
TURNING FROM BACK TO SIDE WHILE IN FLAT BAD: A LOT
EATING MEALS: A LITTLE
DRESSING REGULAR LOWER BODY CLOTHING: A LOT
SUGGESTED CMS G CODE MODIFIER MOBILITY: CL
STANDING UP FROM CHAIR USING ARMS: A LOT
TOILETING: A LOT
MOVING FROM LYING ON BACK TO SITTING ON SIDE OF FLAT BED: A LOT
HELP NEEDED FOR BATHING: A LOT
DRESSING REGULAR UPPER BODY CLOTHING: A LOT
HELP NEEDED FOR BATHING: A LOT
DRESSING REGULAR LOWER BODY CLOTHING: A LOT
MOVING TO AND FROM BED TO CHAIR: A LOT
CLIMB 3 TO 5 STEPS WITH RAILING: A LOT
PERSONAL GROOMING: A LITTLE
SUGGESTED CMS G CODE MODIFIER MOBILITY: CL
DAILY ACTIVITIY SCORE: 14
PERSONAL GROOMING: A LITTLE
DRESSING REGULAR UPPER BODY CLOTHING: A LITTLE
CLIMB 3 TO 5 STEPS WITH RAILING: TOTAL
DAILY ACTIVITIY SCORE: 15
MOBILITY SCORE: 10
MOBILITY SCORE: 14
STANDING UP FROM CHAIR USING ARMS: A LITTLE
WALKING IN HOSPITAL ROOM: A LOT
TURNING FROM BACK TO SIDE WHILE IN FLAT BAD: A LOT
SUGGESTED CMS G CODE MODIFIER DAILY ACTIVITY: CK
EATING MEALS: A LITTLE
MOVING FROM LYING ON BACK TO SITTING ON SIDE OF FLAT BED: UNABLE
TOILETING: A LOT
SUGGESTED CMS G CODE MODIFIER DAILY ACTIVITY: CK
MOVING TO AND FROM BED TO CHAIR: A LOT

## 2023-11-07 ASSESSMENT — ENCOUNTER SYMPTOMS
ABDOMINAL PAIN: 0
DIARRHEA: 0
SPUTUM PRODUCTION: 0
SENSORY CHANGE: 0
HEADACHES: 0
MYALGIAS: 1
SPEECH CHANGE: 0
TREMORS: 0
WEIGHT LOSS: 0
SHORTNESS OF BREATH: 0
HALLUCINATIONS: 0
PALPITATIONS: 0
ORTHOPNEA: 0
DIZZINESS: 0
BLURRED VISION: 0
FEVER: 0
NECK PAIN: 0
EYE PAIN: 0
BACK PAIN: 0
DOUBLE VISION: 0
PHOTOPHOBIA: 0
NAUSEA: 0
WEAKNESS: 1
TINGLING: 0
COUGH: 0
FOCAL WEAKNESS: 0
CONSTIPATION: 0
VOMITING: 0
CHILLS: 0

## 2023-11-07 ASSESSMENT — GAIT ASSESSMENTS
ASSISTIVE DEVICE: FRONT WHEEL WALKER
GAIT LEVEL OF ASSIST: MINIMAL ASSIST
DISTANCE (FEET): 20
DEVIATION: BRADYKINETIC;DECREASED BASE OF SUPPORT;OTHER (COMMENT)
DISTANCE (FEET): 12

## 2023-11-07 ASSESSMENT — FIBROSIS 4 INDEX: FIB4 SCORE: 2.06

## 2023-11-07 ASSESSMENT — ACTIVITIES OF DAILY LIVING (ADL): TOILETING: INDEPENDENT

## 2023-11-07 ASSESSMENT — LIFESTYLE VARIABLES: SUBSTANCE_ABUSE: 0

## 2023-11-07 NOTE — PROGRESS NOTES
Late entry due to patient care.     2100 MD notified that patient complaining of increased pain. One time dose of dilaudid ordered and given per MAR.     0100 MD notified that patient complaining of new pain in right frontal head. One time dose of morphine ordered. Held due to new onset emesis, MD aware.

## 2023-11-07 NOTE — PROGRESS NOTES
Neurosurgery Progress Note    Subjective:  POD#3 right craniotomy for large right chronic SDH.  Was alert and oriented following surgery, but became more somnolent and CT was completed following that, and that was reviewed by Dr. Alves and looks fine.   Per daughter, the patient is slow to wake up from anesthesia and has taken several days in the past before mentation has cleared.  Has pain pump implanted.    Subgaleal drain with 50 cc output last 8 hours.     Patient improving  Denies headache  Solis in    Exam:  Patient calm and cooperative  Speech clear  A&O x 3  PERRL  Grossly strong to upper / lower  Overall appropriate, following commands       BP  Min: 94/49  Max: 149/66  Pulse  Av.3  Min: 36  Max: 93  Resp  Av.1  Min: 9  Max: 33  Temp  Av.7 °C (98 °F)  Min: 36.4 °C (97.5 °F)  Max: 36.8 °C (98.3 °F)  SpO2  Av.4 %  Min: 95 %  Max: 99 %    No data recorded    Recent Labs     23  0440 23  1122 23  0415 23  0320   WBC 13.4*  --  19.7* 14.2*   RBC 4.01*  --  4.23 3.94*   HEMOGLOBIN 11.6* 11.4* 11.9* 11.3*   HEMATOCRIT 35.5* 34.7* 36.6* 34.4*   MCV 88.5  --  86.5 87.3   MCH 28.9  --  28.1 28.7   MCHC 32.7  --  32.5 32.8   RDW 40.7  --  40.7 41.7   PLATELETCT 177  --  198 198   MPV 10.5  --  10.7 11.1       Recent Labs     23  0415 23  1747 23  0320   SODIUM 146* 143 143   POTASSIUM 3.4* 4.1 3.9   CHLORIDE 114* 113* 112   CO2 17* 20 14*   GLUCOSE 133* 115* 126*   BUN 19 17 16   CREATININE 0.63 0.63 0.62   CALCIUM 8.6 8.1* 8.2*                     Intake/Output                         23 0700 - 23 0659 23 0700 - 11659 Total  Total                 Intake    P.O.  --  0 0  --  -- --    P.O. -- 0 0 -- -- --    I.V.  1241.9  15.7 1257.6  20.5  -- 20.5    Fentanyl Volume 16 -- 16 9.2 -- 9.2    Precedex Volume 184.5 15.7 200.2 11.4 -- 11.4    Volume (mL) (NS infusion) 435.2 -- 435.2 -- -- --     Volume (mL) (dextrose 5% infusion) 606.2 -- 606.2 -- -- --    Other  --  0 0  --  -- --    Medications (PO/Enteral Liquids) -- 0 0 -- -- --    IV Piggyback  315.9  -- 315.9  --  -- --    Volume (mL) (ceFAZolin (Ancef) 1 g in  mL IVPB) 198 -- 198 -- -- --    Volume (mL) (potassium phosphate IVPB 30 mmol in 500 mL D5W (premix)) 117.9 -- 117.9 -- -- --    Total Intake 1557.8 15.7 1573.5 20.5 -- 20.5       Output    Urine  400  350 750  --  -- --    Output (mL) (Urethral Catheter Temperature probe 16 Fr.) 400 350 750 -- -- --    Emesis  --  -- --  --  -- --    Emesis - Number of Times -- 2 x 2 x -- -- --    Drains  70  50 120  --  -- --    Output (mL) (Closed/Suction Drain 1 Right Scalp Hemovac) 70 50 120 -- -- --    Stool  --  -- --  --  -- --    Number of Times Stooled -- 1 x 1 x 2 x -- 2 x    Total Output 470 400 870 -- -- --       Net I/O     1087.8 -384.3 703.5 20.5 -- 20.5              Intake/Output Summary (Last 24 hours) at 11/7/2023 1128  Last data filed at 11/7/2023 0800  Gross per 24 hour   Intake 1594.01 ml   Output 870 ml   Net 724.01 ml               enoxaparin (LOVENOX) injection  40 mg DAILY AT 1800    HYDROcodone/acetaminophen  1 Tablet Q4HRS PRN    fentaNYL   Continuous    phosphorus  500 mg BID    dexmedetomidine (Precedex) infusion  0.1-1.5 mcg/kg/hr (Ideal) Continuous    fentaNYL  25 mcg Q2HRS PRN    atenolol  25 mg Nightly    Pain Pump   Continuous    gabapentin  300 mg QAM    And    gabapentin  600 mg QHS    atorvastatin  40 mg Q EVENING    levETIRAcetam (Keppra) IV  500 mg Q12HRS    senna-docusate  2 Tablet BID    And    polyethylene glycol/lytes  1 Packet QDAY PRN    And    bisacodyl  10 mg QDAY PRN    LR  500 mL Once PRN    acetaminophen  650 mg Q6HRS PRN    ondansetron  4 mg Q4HRS PRN    ondansetron  4 mg Q4HRS PRN    Respiratory Therapy Consult   Continuous RT    LORazepam  2 mg Q5 MIN PRN    labetalol  10 mg Q4HRS PRN    hydrALAZINE  10 mg Q4HRS PRN       Assessment and Plan:  Hospital  day #5  POD#3  Drain out when 30cc or less per 8hrs  Q4 hour neuro checks.  PT/OT, mobilize  Solis out when able  Expect home in a few days     Chemical prophylactic DVT therapy: yes per NS  Start date/time: okay for 40qday

## 2023-11-07 NOTE — THERAPY
Physical Therapy   Initial Evaluation     Patient Name: Anita Beard  Age:  88 y.o., Sex:  female  Medical Record #: [id number]  Today's Date: 11/7/2023     Precautions  Precautions: Fall Risk;Swallow Precautions;Other (See Comments)  Comments: SBP goal: < 140-160; Hemo vac (+)    Assessment  Patient is 88 y.o. female who presented 11/3/2023 as transfer from [facility] for SDH. Patient presented with intermittent memory issue and found to have SDH right side 8mm midline shift.   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 Underwent Right-sided craniotomy for evacuation of subdural and resection of membranes on 11/4/23.     PMH: hypertension, chronic pain syndrome with pain pump, remote history of A-fib, COPD    Patient seen for PT evaluation and treatment. Was in bed, agreeable for the session. Was able to participate with functional mobility tasks as detailed below. Additional therapeutic activity to assist with toileting and education/training as mentioned below. Will continue to benefit from PT services to address mobility and recommend post acute placement at this time.     Plan    Physical Therapy Initial Treatment Plan   Treatment Plan : Bed Mobility, Gait Training, Neuro Re-Education / Balance, Stair Training, Therapeutic Activities, Therapeutic Exercise  Treatment Frequency: 4 Times per Week  Duration: Until Therapy Goals Met    DC Equipment Recommendations: Unable to determine at this time  Discharge Recommendations: Recommend post-acute placement for additional physical therapy services prior to discharge home (May benefit from Intensive In-patient rehab)     Objective     11/07/23 1038   Charge Group   PT Evaluation PT Evaluation High   PT Self Care / Home Evaluation (Units) 1   Total Time Spent   PT Total Time Yes   PT Evaluation Time Spent (Mins) 20   PT Therapeutic Activities Time Spent (Mins) 20   PT Self Care/Home Evaluation Time Spent (Mins) 10   PT Total Time Spent  (Calculated) 50   Initial Contact Note    Initial Contact Note Order Received and Verified, Physical Therapy Evaluation in Progress with Full Report to Follow.   Precautions   Precautions Fall Risk;Swallow Precautions;Other (See Comments)   Comments SBP goal: < 140-160; Hemo vac (+)   Vitals   Pulse 76   Patient BP Position Sitting  (Edge of bed)   Blood Pressure  125/60   Pulse Oximetry 96 %   O2 Delivery Device None - Room Air   Vitals Comments Patient was weaned to room air from 1L NC at the beginning of the session. RN made aware. Post activity: /58, HR 80, SpO2 94   Pain   Pain Scales 0 to 10 Scale    Intervention Medication (see MAR);Rest;Repositioned   Pain 0 - 10 Group   Location Neck;Back   Therapist Pain Assessment Prior to Activity;During Activity  (Patient reported pain associated with being in bed for prolonged time)   Prior Living Situation   Prior Services Home-Independent   Housing / Facility Other (Comments)  (Trailer)   Steps Into Home 3   Steps In Home 1  (Into the living room)   Rail None   Equipment Owned Single Point Cane;Other (Comments)  (3 wheeled cart for grocery shopping)   Lives with - Patient's Self Care Capacity Alone and Able to Care For Self   Comments Patient's daughter lives nearby, will be able to assist as needed.   Prior Level of Functional Mobility   Bed Mobility Independent   Transfer Status Independent   Ambulation Independent   Ambulation Distance Community   Assistive Devices Used Single Point Cane   Stairs Independent   Comments Per patient she used SPC occasionally for balance. Patient is a retired Respiratory Therapist.   Cognition    Level of Consciousness Alert   Passive ROM Lower Body   Passive ROM Lower Body WDL   Active ROM Lower Body    Active ROM Lower Body  WDL   Strength Lower Body   Lower Body Strength  X   Comments Grossly BLE 3/5 to 3+/5   Other Treatments   Other Treatments Provided Patient was educated about daily mobility with nursing, OOB to chair  for meals, use of FWW for mobility at this time to improve balance and reduce risk of falls. Discussed DC recommendations-agreeable for placement at this time. Patient was additionally assisted with toileting this session.   Balance Assessment   Sitting Balance (Static) Fair -   Sitting Balance (Dynamic) Fair -   Standing Balance (Static) Fair -   Standing Balance (Dynamic) Poor +   Comments W FWW in standing   Bed Mobility    Supine to Sit Minimal Assist   Scooting Contact Guard Assist   Comments HOB raised; cues for sequencing   Gait Analysis   Gait Level Of Assist Minimal Assist   Assistive Device Front Wheel Walker   Distance (Feet) 12  (Additional 5 feet x 2 reps)   Deviation Bradykinetic;Decreased Base Of Support;Other (Comment)  (Asymmetrical step & stride length)   Comments Patient ambulated short distance in the room, slow steady pace, reports feeling weak and wobbly. Cues for appropriate use of AD, directions.   Functional Mobility   Sit to Stand Minimal Assist   Bed, Chair, Wheelchair Transfer Minimal Assist   Toilet Transfers Minimal Assist   Transfer Method Stand Step   Mobility Bed-BSC, BSC-chair, W FWW   Comments Cues for hand placement, LE placement, sequencing   How much difficulty does the patient currently have...   Turning over in bed (including adjusting bedclothes, sheets and blankets)? 2   Sitting down on and standing up from a chair with arms (e.g., wheelchair, bedside commode, etc.) 2   Moving from lying on back to sitting on the side of the bed? 2   How much help from another person does the patient currently need...   Moving to and from a bed to a chair (including a wheelchair)? 3   Need to walk in a hospital room? 3   Climbing 3-5 steps with a railing? 2   6 clicks Mobility Score 14   Activity Tolerance   Sitting in Chair Post session   Edema / Skin Assessment   Edema / Skin  X   Comments Sx incision-R side head   Patient / Family Goals    Patient / Family Goal #1 To get better and  stronger, be able to walk   Short Term Goals    Short Term Goal # 1 Patient will perform supine-sit with HOB flat with supervision in 6 visits   Short Term Goal # 2 Patient will perform sit-stand and chair transfers with FWW with supervision in 6 visits   Short Term Goal # 3 Patient will ambulate > 100 feet with FWW with supervision in 6 visits   Short Term Goal # 4 Patient will negotiate 3 steps without rails with supervision in 6 visits   Education Group   Education Provided Role of Physical Therapist   Role of Physical Therapist Patient Response Patient;Acceptance;Explanation;Verbal Demonstration   Physical Therapy Initial Treatment Plan    Treatment Plan  Bed Mobility;Gait Training;Neuro Re-Education / Balance;Stair Training;Therapeutic Activities;Therapeutic Exercise   Treatment Frequency 4 Times per Week   Duration Until Therapy Goals Met   Problem List    Problems Impaired Bed Mobility;Impaired Transfers;Impaired Ambulation;Functional Strength Deficit;Impaired Balance   Anticipated Discharge Equipment and Recommendations   DC Equipment Recommendations Unable to determine at this time   Discharge Recommendations Recommend post-acute placement for additional physical therapy services prior to discharge home     Interdisciplinary Plan of Care Collaboration   IDT Collaboration with  Nursing   Patient Position at End of Therapy Seated;Chair Alarm On;Call Light within Reach;Tray Table within Reach;Phone within Reach   Session Information   Date / Session Number  11/7-1(1/4, 11/13)

## 2023-11-07 NOTE — PROGRESS NOTES
MD wanted fentanyl drip decreased from 25mcg to 12.5mcg but the pump was unable to be set to this dose. We agreed that until we knew if pts pain pump was working or not that we would hold off on stopping. Spoke with [employer] who contacted the local rep to come out and check status of pts pump. Brit from [employer] arrived and stated that pts pump was in working order and providing medication at its programmed rate. Per MD give pt the PRN Norco and after one hour stop the fentanyl drip all together. Pt and daughter notified of plan and expressed agreement. Norco has been given and timer is set to stop drip.

## 2023-11-07 NOTE — CARE PLAN
The patient is Watcher - Medium risk of patient condition declining or worsening    Shift Goals  Clinical Goals: stable neuro status  Patient Goals: rest, pain control  Family Goals: updates    Progress made toward(s) clinical / shift goals:    Problem: Hemodynamics  Goal: Patient's hemodynamics, fluid balance and neurologic status will be stable or improve  Outcome: Progressing     Problem: Urinary - Renal Perfusion  Goal: Ability to achieve and maintain adequate renal perfusion and functioning will improve  Outcome: Progressing     Problem: Respiratory  Goal: Patient will achieve/maintain optimum respiratory ventilation and gas exchange  Outcome: Progressing       Patient is not progressing towards the following goals:      Problem: Pain - Standard  Goal: Alleviation of pain or a reduction in pain to the patient’s comfort goal  Outcome: Not Progressing  Note: Pt assessed for pain regularly and medicated PRN per MAR.

## 2023-11-07 NOTE — PROGRESS NOTES
Hospital Medicine Daily Progress Note    Date of Service  11/7/2023    Chief Complaint  Anita Beard is a 88 y.o. female admitted 11/3/2023 with associated headache.  altered    Hospital Course  Anita Beard is a 88 y.o. female with history of hypertension, chronic pain syndrome with pain pump, remote history of A-fib no longer on anticoagulation who presented 11/3/2023 as transfer from [facility] for SDH.  Patient reported to have memory issue, CT head done at transferring ER --which noted SDH with right to left 8 mm midline shift.  went to the OR on11/4 for craniotomy and hematoma evacuation    Interval Problem Update    11/07/23    I evaluated and examined her at the bedside.  She was sitting in chair and reported that she is feeling better.  She has been seen using significant amount of opioid for pain control. Plan is to titrate down fentanyl.   Currently she is requiring 2 L of oxygen to maintain oxygen saturation.  Hemoglobin remained stable plan is to initiate DVT prophylaxis.  Continue Precedex gtt. I am titrating as per RASS of -1 to +1.    I have discussed this patient's plan of care and discharge plan at IDT rounds today with Case Management, Nursing, Nursing leadership, and other members of the IDT team.    Consultants/Specialty  neurosurgery    Code Status  DNAR/DNI    Disposition  The patient is not medically cleared for discharge to home or a post-acute facility.  Anticipate discharge to: home with close outpatient follow-up    I have placed the appropriate orders for post-discharge needs.    Review of Systems  Review of Systems   Constitutional:  Positive for malaise/fatigue. Negative for chills, fever and weight loss.   HENT:  Negative for hearing loss and tinnitus.    Eyes:  Negative for blurred vision, double vision, photophobia and pain.   Respiratory:  Negative for cough, sputum production and shortness of breath.    Cardiovascular:  Negative for chest pain, palpitations, orthopnea and leg  swelling.   Gastrointestinal:  Negative for abdominal pain, constipation, diarrhea, nausea and vomiting.   Genitourinary:  Negative for dysuria, frequency and urgency.   Musculoskeletal:  Positive for myalgias. Negative for back pain, joint pain and neck pain.   Skin:  Negative for rash.   Neurological:  Positive for weakness. Negative for dizziness, tingling, tremors, sensory change, speech change, focal weakness and headaches.   Psychiatric/Behavioral:  Negative for hallucinations and substance abuse.    All other systems reviewed and are negative.       Physical Exam  Temp:  [36.4 °C (97.5 °F)-36.8 °C (98.2 °F)] 36.8 °C (98.2 °F)  Pulse:  [36-93] 71  Resp:  [9-34] 19  BP: ()/(49-66) 94/49  SpO2:  [95 %-99 %] 98 %    Physical Exam  Vitals reviewed.   Constitutional:       General: She is not in acute distress.     Appearance: Normal appearance. She is not ill-appearing.      Comments: She is sitting in chair.    HENT:      Head: Normocephalic and atraumatic.      Comments: Dinora present     Nose: No congestion.   Eyes:      General:         Right eye: No discharge.         Left eye: No discharge.      Pupils: Pupils are equal, round, and reactive to light.   Cardiovascular:      Rate and Rhythm: Normal rate and regular rhythm.      Pulses: Normal pulses.      Heart sounds: Normal heart sounds. No murmur heard.  Pulmonary:      Effort: Pulmonary effort is normal. No respiratory distress.      Breath sounds: Normal breath sounds. No stridor.   Abdominal:      General: Bowel sounds are normal. There is no distension.      Palpations: Abdomen is soft.      Tenderness: There is no abdominal tenderness.   Musculoskeletal:         General: No swelling or tenderness. Normal range of motion.      Cervical back: Normal range of motion. No rigidity.   Skin:     General: Skin is warm.      Capillary Refill: Capillary refill takes less than 2 seconds.      Coloration: Skin is not jaundiced or pale.      Findings: No  bruising.   Neurological:      General: No focal deficit present.      Mental Status: She is alert and oriented to person, place, and time.      Cranial Nerves: No cranial nerve deficit.      Comments: She is following commands and moving all her extremities.   Psychiatric:         Mood and Affect: Mood normal.         Behavior: Behavior normal.         Fluids    Intake/Output Summary (Last 24 hours) at 11/7/2023 0738  Last data filed at 11/7/2023 0600  Gross per 24 hour   Intake 1573.49 ml   Output 870 ml   Net 703.49 ml         Laboratory  Recent Labs     11/05/23  0440 11/05/23  1122 11/06/23  0415 11/07/23  0320   WBC 13.4*  --  19.7* 14.2*   RBC 4.01*  --  4.23 3.94*   HEMOGLOBIN 11.6* 11.4* 11.9* 11.3*   HEMATOCRIT 35.5* 34.7* 36.6* 34.4*   MCV 88.5  --  86.5 87.3   MCH 28.9  --  28.1 28.7   MCHC 32.7  --  32.5 32.8   RDW 40.7  --  40.7 41.7   PLATELETCT 177  --  198 198   MPV 10.5  --  10.7 11.1       Recent Labs     11/05/23  0545 11/06/23  0415 11/06/23  1747   SODIUM 143 146* 143   POTASSIUM 4.1 3.4* 4.1   CHLORIDE 111 114* 113*   CO2 21 17* 20   GLUCOSE 144* 133* 115*   BUN 17 19 17   CREATININE 0.72 0.63 0.63   CALCIUM 8.4* 8.6 8.1*                         Imaging  CT-HEAD W/O   Final Result         Postsurgical change from right frontal craniotomy and evacuation of the right subdural hematoma.      Postsurgical pneumocephalus and residual blood seen in the right subdural space.      Improved right to left midline shift of 3 mm, previously 6 mm.            MR-BRAIN-W/O   Final Result      1.  Large right frontal-parietal-temporal complex subdural hematoma with multiple loculations and complex membranes.   2.  Mass effect with effacement of sulcal markings and right to left shift of midline structures of about 8 mm.   3.  Mild supratentorial white matter disease consistent with microvascular ischemic change.   4.  Mild pontine ischemic gliosis.   5.  Chronic right maxillary sinusitis.   6.  Dental  artifact.      OUTSIDE IMAGES-CT HEAD   Final Result           Assessment/Plan  * SDH (subdural hematoma) (HCC)- (present on admission)  Assessment & Plan  SDH with right sided 8mm midline shift noted on CT head at transferring facility  No known trauma, however the patient's mental status is such that where she to have fallen, it is likely she would not remember.  Target SBP<140-160  Now s/p craniotomy and drainage  Continue serial neurochecks  Neurosurgery following  I reviewed neurosurgery note they recommended to initiate pharmacological DVT prophylaxis I started her on Lovenox 40 mg on November 7, 2023    Altered mental status  Assessment & Plan  Patient likely has underlying dementia based on the discussion with family  Multifactorial: Subdural hematoma, recent intervention, underlying dementia, possible opiate withdrawal.  Start scheduled IV fentanyl in order to treat possible withdrawal syndrome until patient is able to take p.o.  Now s/p craniotomy and drainage of subdural hematoma  Frequent reorientation  Window shades open  Encourage family to visit  Mobilize    11/6  Pt having bradycardia into the 30s on precedex so have had to step back on that medication  This afternoon on repeat exam the patient is awake alert and interactive.  She is having some back pain when she moves around in bed but this is chronic for her.  She is oriented to the hospital, to this examiner, and to her family.  Moving all 4 extremities purposefully and to command.    11/7 patient is still requiring Precedex gtt.  Plan is to wean her off as tolerated. I am titrating as per RASS of -1 to +1.    ACP (advance care planning)  Assessment & Plan  Per discussion with my partner, patient is DNR/I    Age-related physical debility  Assessment & Plan  Fall precautions  PT/OT when appropriate.     Chronic pain syndrome- (present on admission)  Assessment & Plan  Patient has a pain pump in place which is functioning and apparently  distributing narcotic  She also takes Tylenol No. 4, 3 times daily and has been doing so for many years.  Continue fentanyl continuous infusion through pain pump.    Headache  Assessment & Plan  Secondary subdural hematoma    COPD (chronic obstructive pulmonary disease) (HCC)- (present on admission)  Assessment & Plan  Not in acute exacerbation  O2 and RT protocols         Patient is critically ill.   The patient continues to have: Agitation  The vital organ system that is affected is the: Central nervous system  If untreated there is a high chance of deterioration into: Central nervous system deterioration  And eventually death.   The critical care that I am providing today is: I am  titrating Precedex gtt as per RASS of -1 to +1.  The critical that has been undertaken is medically complex.   There has been no overlap in critical care time.   Critical Care Time not including procedures: 38 minutes      VTE prophylaxis:    enoxaparin ppx      I have performed a physical exam and reviewed and updated ROS and Plan today (11/7/2023). In review of yesterday's note (11/6/2023), there are no changes except as documented above.

## 2023-11-07 NOTE — THERAPY
"Speech Language Pathology   Clinical Swallow Evaluation     Patient Name: Anita Beard  AGE:  88 y.o., SEX:  female  Medical Record #: [id number]  Date of Service: 11/7/2023      History of Present Illness  87 y/o female presented 11/3 from outside facility for SDH. R craniotomy 11/4.     CMHx: SDH, COPD, age-related physical debility, AMS, leukocyotosis  PMHx: LINUS, depression, afib, HFpEF    CT Head w/o 11/4:  \"Postsurgical change from right frontal craniotomy and evacuation of the right subdural hematoma.  Postsurgical pneumocephalus and residual blood seen in the right subdural space.  Improved right to left midline shift of 3 mm, previously 6 mm.\"      General Information:  Vitals  O2 (LPM): 3  O2 Delivery Device: Silicone Nasal Cannula  Level of Consciousness: Alert     Orientation: Disoriented x 4  Follows Directives: Yes      Prior Living Situation & Level of Function:  Prior Services: None, Home-Independent  Housing / Facility: Motor Home  Lives with - Patient's Self Care Capacity: Alone and Able to Care For Self, Adult Children (Lives alone in a 5th wheel on daughters property (100 yards from house))     Communication: Declining memory and problem solving in the month prior to admission likely related to developing SDH  Swallowing: WNL       Oral Mechanism Evaluation:  Dentition: Good   Facial Symmetry: Equal  Facial Sensation: Equal     Labial Observations: WFL   Lingual Observations: Midline  Motor Speech: WFL         Laryngeal Function:  Secretion Management: Adequate  Voice Quality: WFL  Cough: Perceptually WNL       Subjective  Patient is pleasant and agreeable, daughter at bedside.      Assessment  Current Method of Nutrition: NPO until cleared by speech pathology  Positioning: Ramirez's (60-90 degrees) (reclined partially for at end of assessment due to back pain)  Bolus Administration: SLP, Patient  O2 (LPM): 3 O2 Delivery Device: Silicone Nasal Cannula  Factor(s) Affecting Performance: Impaired " endurance (related toback and neck pain)        Swallowing Trials:  Swallowing Trials  Ice: WFL  Thin Liquid (TN0): WFL  Pureed (PU4): WFL  Soft & Bite Sized (SB6): WFL  Regular (RG7): WFL      Oral Phase: WFL    Pharyngeal Phase: No overt signs or symptoms of aspiration or penetration.      Clinical Impressions  Patient presents with swallow ability within functional limits. No overt signs of aspiration or penetration observed. Should other clinical signs of impaired PO tolerance arise, instrumental evaluation would be warranted. No further acute SLP services for swallowing indicated. Sign off.      Recommendations  Diet Consistency: regular textures, thin liquids  Instrumentation: None indicated at this time  Medication: Whole with liquid, One pill at a time, As tolerated  Supervision: Distant supervision - check on patient 2-3 times per meal  Positioning: Fully upright and midline during oral intake  Risk Management : Reduce environmental distractions  Oral Care: BID       SLP Treatment Plan  Treatment Plan: None Indicated  SLP Frequency: N/A - Evaluation Only  Estimated Duration: N/A - Evaluation Only      Anticipated Discharge Needs  Discharge Recommendations: Recommend outpatient speech therapy services   Therapy Recommendations Upon DC: Cognitive-Linguistic Training                  Debbie Avila, SLP

## 2023-11-07 NOTE — CARE PLAN
The patient is Watcher - Medium risk of patient condition declining or worsening    Shift Goals  Clinical Goals: stable neuro checks  Patient Goals: JOSELITO  Family Goals: updates    Progress made toward(s) clinical / shift goals:    Problem: Knowledge Deficit - Standard  Goal: Patient and family/care givers will demonstrate understanding of plan of care, disease process/condition, diagnostic tests and medications  Outcome: Progressing     Problem: Hemodynamics  Goal: Patient's hemodynamics, fluid balance and neurologic status will be stable or improve  Outcome: Progressing     Problem: Fluid Volume  Goal: Fluid volume balance will be maintained  Outcome: Progressing     Problem: Urinary - Renal Perfusion  Goal: Ability to achieve and maintain adequate renal perfusion and functioning will improve  Outcome: Progressing     Problem: Respiratory  Goal: Patient will achieve/maintain optimum respiratory ventilation and gas exchange  Outcome: Progressing     Problem: Pain - Standard  Goal: Alleviation of pain or a reduction in pain to the patient’s comfort goal  Outcome: Progressing     Problem: Fall Risk  Goal: Patient will remain free from falls  Outcome: Progressing     Problem: Safety - Medical Restraint  Goal: Remains free of injury from restraints (Restraint for Interference with Medical Device)  Outcome: Progressing     Problem: Skin Integrity  Goal: Skin integrity is maintained or improved  Outcome: Progressing     Problem: Neuro Status  Goal: Neuro status will remain stable or improve  Outcome: Progressing

## 2023-11-08 LAB
ANION GAP SERPL CALC-SCNC: 11 MMOL/L (ref 7–16)
BASOPHILS # BLD AUTO: 0.3 % (ref 0–1.8)
BASOPHILS # BLD: 0.02 K/UL (ref 0–0.12)
BUN SERPL-MCNC: 13 MG/DL (ref 8–22)
CALCIUM SERPL-MCNC: 8.2 MG/DL (ref 8.5–10.5)
CHLORIDE SERPL-SCNC: 107 MMOL/L (ref 96–112)
CO2 SERPL-SCNC: 20 MMOL/L (ref 20–33)
CREAT SERPL-MCNC: 0.68 MG/DL (ref 0.5–1.4)
EKG IMPRESSION: NORMAL
EKG IMPRESSION: NORMAL
EOSINOPHIL # BLD AUTO: 0.06 K/UL (ref 0–0.51)
EOSINOPHIL NFR BLD: 0.9 % (ref 0–6.9)
ERYTHROCYTE [DISTWIDTH] IN BLOOD BY AUTOMATED COUNT: 42.7 FL (ref 35.9–50)
GFR SERPLBLD CREATININE-BSD FMLA CKD-EPI: 85 ML/MIN/1.73 M 2
GLUCOSE SERPL-MCNC: 163 MG/DL (ref 65–99)
HCT VFR BLD AUTO: 29 % (ref 37–47)
HGB BLD-MCNC: 9.4 G/DL (ref 12–16)
IMM GRANULOCYTES # BLD AUTO: 0.02 K/UL (ref 0–0.11)
IMM GRANULOCYTES NFR BLD AUTO: 0.3 % (ref 0–0.9)
LYMPHOCYTES # BLD AUTO: 1.51 K/UL (ref 1–4.8)
LYMPHOCYTES NFR BLD: 21.9 % (ref 22–41)
MAGNESIUM SERPL-MCNC: 2 MG/DL (ref 1.5–2.5)
MCH RBC QN AUTO: 28.6 PG (ref 27–33)
MCHC RBC AUTO-ENTMCNC: 32.4 G/DL (ref 32.2–35.5)
MCV RBC AUTO: 88.1 FL (ref 81.4–97.8)
MONOCYTES # BLD AUTO: 0.53 K/UL (ref 0–0.85)
MONOCYTES NFR BLD AUTO: 7.7 % (ref 0–13.4)
NEUTROPHILS # BLD AUTO: 4.77 K/UL (ref 1.82–7.42)
NEUTROPHILS NFR BLD: 68.9 % (ref 44–72)
NRBC # BLD AUTO: 0 K/UL
NRBC BLD-RTO: 0 /100 WBC (ref 0–0.2)
PHOSPHATE SERPL-MCNC: 3.1 MG/DL (ref 2.5–4.5)
PLATELET # BLD AUTO: 140 K/UL (ref 164–446)
PMV BLD AUTO: 11.1 FL (ref 9–12.9)
POTASSIUM SERPL-SCNC: 3 MMOL/L (ref 3.6–5.5)
RBC # BLD AUTO: 3.29 M/UL (ref 4.2–5.4)
SODIUM SERPL-SCNC: 138 MMOL/L (ref 135–145)
WBC # BLD AUTO: 6.9 K/UL (ref 4.8–10.8)

## 2023-11-08 PROCEDURE — 84100 ASSAY OF PHOSPHORUS: CPT

## 2023-11-08 PROCEDURE — A9270 NON-COVERED ITEM OR SERVICE: HCPCS | Performed by: INTERNAL MEDICINE

## 2023-11-08 PROCEDURE — 83735 ASSAY OF MAGNESIUM: CPT

## 2023-11-08 PROCEDURE — 85025 COMPLETE CBC W/AUTO DIFF WBC: CPT

## 2023-11-08 PROCEDURE — 700101 HCHG RX REV CODE 250: Performed by: INTERNAL MEDICINE

## 2023-11-08 PROCEDURE — 93005 ELECTROCARDIOGRAM TRACING: CPT | Performed by: STUDENT IN AN ORGANIZED HEALTH CARE EDUCATION/TRAINING PROGRAM

## 2023-11-08 PROCEDURE — 700111 HCHG RX REV CODE 636 W/ 250 OVERRIDE (IP): Mod: JZ | Performed by: INTERNAL MEDICINE

## 2023-11-08 PROCEDURE — 99233 SBSQ HOSP IP/OBS HIGH 50: CPT | Performed by: INTERNAL MEDICINE

## 2023-11-08 PROCEDURE — 770001 HCHG ROOM/CARE - MED/SURG/GYN PRIV*

## 2023-11-08 PROCEDURE — 93010 ELECTROCARDIOGRAM REPORT: CPT | Mod: 76 | Performed by: STUDENT IN AN ORGANIZED HEALTH CARE EDUCATION/TRAINING PROGRAM

## 2023-11-08 PROCEDURE — A9270 NON-COVERED ITEM OR SERVICE: HCPCS | Performed by: STUDENT IN AN ORGANIZED HEALTH CARE EDUCATION/TRAINING PROGRAM

## 2023-11-08 PROCEDURE — 93005 ELECTROCARDIOGRAM TRACING: CPT | Performed by: INTERNAL MEDICINE

## 2023-11-08 PROCEDURE — 700102 HCHG RX REV CODE 250 W/ 637 OVERRIDE(OP): Performed by: INTERNAL MEDICINE

## 2023-11-08 PROCEDURE — 93010 ELECTROCARDIOGRAM REPORT: CPT | Performed by: STUDENT IN AN ORGANIZED HEALTH CARE EDUCATION/TRAINING PROGRAM

## 2023-11-08 PROCEDURE — 700102 HCHG RX REV CODE 250 W/ 637 OVERRIDE(OP): Performed by: STUDENT IN AN ORGANIZED HEALTH CARE EDUCATION/TRAINING PROGRAM

## 2023-11-08 PROCEDURE — 80048 BASIC METABOLIC PNL TOTAL CA: CPT

## 2023-11-08 RX ORDER — METOPROLOL TARTRATE 1 MG/ML
5 INJECTION, SOLUTION INTRAVENOUS
Status: DISCONTINUED | OUTPATIENT
Start: 2023-11-08 | End: 2023-11-09 | Stop reason: HOSPADM

## 2023-11-08 RX ORDER — ATENOLOL 25 MG/1
25 TABLET ORAL NIGHTLY
Status: DISCONTINUED | OUTPATIENT
Start: 2023-11-08 | End: 2023-11-09 | Stop reason: HOSPADM

## 2023-11-08 RX ADMIN — DOCUSATE SODIUM 50 MG AND SENNOSIDES 8.6 MG 2 TABLET: 8.6; 5 TABLET, FILM COATED ORAL at 05:52

## 2023-11-08 RX ADMIN — ATENOLOL 25 MG: 25 TABLET ORAL at 20:12

## 2023-11-08 RX ADMIN — LEVETIRACETAM 500 MG: 500 TABLET, FILM COATED ORAL at 05:51

## 2023-11-08 RX ADMIN — METOPROLOL TARTRATE 25 MG: 25 TABLET, FILM COATED ORAL at 08:55

## 2023-11-08 RX ADMIN — GABAPENTIN 300 MG: 300 CAPSULE ORAL at 05:52

## 2023-11-08 RX ADMIN — METOPROLOL TARTRATE 25 MG: 25 TABLET, FILM COATED ORAL at 17:30

## 2023-11-08 RX ADMIN — METOPROLOL TARTRATE 5 MG: 5 INJECTION INTRAVENOUS at 10:16

## 2023-11-08 RX ADMIN — METOPROLOL TARTRATE 5 MG: 5 INJECTION INTRAVENOUS at 08:56

## 2023-11-08 RX ADMIN — ENOXAPARIN SODIUM 40 MG: 100 INJECTION SUBCUTANEOUS at 17:23

## 2023-11-08 RX ADMIN — LEVETIRACETAM 500 MG: 500 TABLET, FILM COATED ORAL at 17:24

## 2023-11-08 RX ADMIN — GABAPENTIN 600 MG: 300 CAPSULE ORAL at 20:12

## 2023-11-08 ASSESSMENT — PAIN DESCRIPTION - PAIN TYPE
TYPE: ACUTE PAIN

## 2023-11-08 ASSESSMENT — ENCOUNTER SYMPTOMS
MYALGIAS: 1
EYE PAIN: 0
BLURRED VISION: 0
VOMITING: 0
DIARRHEA: 0
WEAKNESS: 1
ORTHOPNEA: 0
SENSORY CHANGE: 0
CONSTIPATION: 0
BACK PAIN: 0
DOUBLE VISION: 0
HEADACHES: 0
HALLUCINATIONS: 0
COUGH: 0
TINGLING: 0
FOCAL WEAKNESS: 0
NECK PAIN: 0
TREMORS: 0
PALPITATIONS: 0
NAUSEA: 0
DIZZINESS: 0
PHOTOPHOBIA: 0
ABDOMINAL PAIN: 0
SHORTNESS OF BREATH: 0
WEIGHT LOSS: 0
CHILLS: 0
SPEECH CHANGE: 0
FEVER: 0
SPUTUM PRODUCTION: 0

## 2023-11-08 ASSESSMENT — LIFESTYLE VARIABLES: SUBSTANCE_ABUSE: 0

## 2023-11-08 NOTE — PROGRESS NOTES
1200: RN attempted to take out subgleal drain but RN meeting resistance. Charge, RN notified. Neurosurg contacted.    1240: Subgleal drain pulled out by charge RN. Site is clean and dry, no drainage from site.

## 2023-11-08 NOTE — PROGRESS NOTES
Pt's HR sustaining in 130's-140's Afib. BP: 114/77. No complaints of chest pain, palpitation, or dizziness. Dr. Byers notified.

## 2023-11-08 NOTE — CARE PLAN
The patient is Watcher - Medium risk of patient condition declining or worsening    Shift Goals  Clinical Goals: stable neuro checks  Patient Goals: pain control, rest  Family Goals: updates    Progress made toward(s) clinical / shift goals:    Problem: Knowledge Deficit - Standard  Goal: Patient and family/care givers will demonstrate understanding of plan of care, disease process/condition, diagnostic tests and medications  Outcome: Progressing     Problem: Hemodynamics  Goal: Patient's hemodynamics, fluid balance and neurologic status will be stable or improve  Outcome: Progressing     Problem: Fluid Volume  Goal: Fluid volume balance will be maintained  Outcome: Progressing     Problem: Urinary - Renal Perfusion  Goal: Ability to achieve and maintain adequate renal perfusion and functioning will improve  Outcome: Progressing     Problem: Respiratory  Goal: Patient will achieve/maintain optimum respiratory ventilation and gas exchange  Outcome: Progressing     Problem: Pain - Standard  Goal: Alleviation of pain or a reduction in pain to the patient’s comfort goal  Outcome: Progressing     Problem: Fall Risk  Goal: Patient will remain free from falls  Outcome: Progressing     Problem: Skin Integrity  Goal: Skin integrity is maintained or improved  Outcome: Progressing     Problem: Craniotomy Surgery  Goal: Post-Operative Craniotomy Surgery: Patient will achieve optimal post-surgical outcomes  Outcome: Progressing     Problem: Neuro Status  Goal: Neuro status will remain stable or improve  Outcome: Progressing

## 2023-11-08 NOTE — DISCHARGE PLANNING
"[facility] Acute Rehabilitation Transitional Care Coordination    Physiatry consult complete.  Dr. Mcmillan recommending good candidate for inpatient rehab.      Telephone call to patients daughter Cata Morton to discuss IRF referral.  Explained specifics of inpatient rehab, answered questions/concerns.  Cata voiced awareness [facility] IRP from family member prior admission RR   Cata endorses acute rehab for her Mother, states patient in agreement.   Anita's RV is parked at Cata's.  Cata will plan to have Anita to stay with she/family main residence at discharge.  Cata/family will provide support as needed at discharge.  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  Reviewed Medicare/[employer]ARP insurance for program.  Cata aware potential admission pending medical clearance.  Provided T[facility] contact information.    "

## 2023-11-08 NOTE — THERAPY
Occupational Therapy   Initial Evaluation     Patient Name: Anita Beard  Age:  88 y.o., Sex:  female  Medical Record #: [id number]  Today's Date: 11/7/2023     Precautions  Precautions: Fall Risk  Comments: SBP goal: < 140-160; Hemo vac (+)    Assessment    Patient is 88 y.o. female s/p R craniotomy for large R chronic SDH, pmhx includes a-fib, chronic pain with pain pump, HTN. Pt presents to OT eval highly motivated for activity, asking to go for a walk. Pt supportive daughter at bedside, pt lives in a 5th wheel on daughter's property but daughter reports pt will stay in the main house's guest room as long as she needs with 24hr support upon DC home. Pt is independent at baseline and motivated to regain her strength and independence, pt is a great acute rehab candidate, recommend PMR consult.     Plan    Occupational Therapy Initial Treatment Plan   Treatment Interventions: Self Care / Activities of Daily Living, Therapeutic Exercises, Therapeutic Activity, Adaptive Equipment  Treatment Frequency: 4 Times per Week  Duration: Until Therapy Goals Met    DC Equipment Recommendations: Unable to determine at this time  Discharge Recommendations: Recommend post-acute placement for additional occupational therapy services prior to discharge home (renown rehab consult)        Objective     11/07/23 1635   Prior Living Situation   Prior Services Home-Independent   Housing / Facility Other (Comments)  (5th wheel on daughter's property)   Steps Into Home 4   Steps In Home 1   Bathroom Set up Walk In Shower   Equipment Owned Single Point Cane   Lives with - Patient's Self Care Capacity Alone and Able to Care For Self   Comments daughter's home is 100ft away and has 2 guest rooms, dtr at bedside confirms pt will stay in the main house as long as she needs upon DC   Prior Level of ADL Function   Self Feeding Independent   Grooming / Hygiene Independent   Bathing Independent   Dressing Independent   Toileting Independent   Prior  Level of IADL Function   Medication Management Independent   Laundry Independent   Kitchen Mobility Independent   Finances Independent   Home Management Independent   Shopping Independent   Prior Level Of Mobility Independent Without Device in Community   Comments retired RT   Precautions   Precautions Fall Risk   Vitals   Vitals Comments 95% room air   Pain 0 - 10 Group   Therapist Pain Assessment Nurse Notified;Post Activity Pain Same as Prior to Activity;0   Cognition    Level of Consciousness Alert   Comments some short term memory deficits, pt is aware of them and annoyed by it   Active ROM Upper Body   Active ROM Upper Body  WDL   Strength Upper Body   Upper Body Strength  WDL   Coordination Upper Body   Coordination WDL   Balance Assessment   Sitting Balance (Static) Fair   Sitting Balance (Dynamic) Fair -   Standing Balance (Static) Fair -   Standing Balance (Dynamic) Fair -   Weight Shift Sitting Fair   Weight Shift Standing Fair   Comments FWW standing   Bed Mobility    Supine to Sit Minimal Assist   Sit to Supine Minimal Assist   Scooting Contact Guard Assist   Comments flat bed   ADL Assessment   Eating Supervision   Grooming Supervision;Seated   Upper Body Dressing Minimal Assist   Lower Body Dressing Minimal Assist   Toileting Minimal Assist   Comments declined toileting   How much help from another person does the patient currently need...   Putting on and taking off regular lower body clothing? 2   Bathing (including washing, rinsing, and drying)? 2   Toileting, which includes using a toilet, bedpan, or urinal? 2   Putting on and taking off regular upper body clothing? 3   Taking care of personal grooming such as brushing teeth? 3   Eating meals? 3   6 Clicks Daily Activity Score 15   mRS Prior to admission   Prior to admission mRS 0   Modified [place] (mRS)   Modified [place] Score 4   Functional Mobility   Sit to Stand Minimal Assist   Bed, Chair, Wheelchair Transfer Minimal Assist   Toilet Transfers  Minimal Assist   Transfer Method Stand Step   Mobility in room   Distance (Feet) 20   # of Times Distance was Traveled 1   Edema / Skin Assessment   Comments R side crani incision   Activity Tolerance   Sitting Edge of Bed 30+min   Standing 6-8min   Patient / Family Goals   Patient / Family Goal #1 regain independent PLOF   Short Term Goals   Short Term Goal # 1 pt will complete toilet transfer with SPV   Short Term Goal # 2 pt will complete toileting ADL at SPV level   Short Term Goal # 3 pt will tolerate >5min standing grooming with SPV   Short Term Goal # 4 pt will complete LB dress with SPV   Education Group   Education Provided Activities of Daily Living;Role of Occupational Therapist   Role of Occupational Therapist Patient Response Patient;Family;Acceptance;Explanation;Verbal Demonstration   ADL Patient Response Patient;Family;Acceptance;Explanation;Verbal Demonstration   Additional Comments edu on DC options   Occupational Therapy Initial Treatment Plan    Treatment Interventions Self Care / Activities of Daily Living;Therapeutic Exercises;Therapeutic Activity;Adaptive Equipment   Treatment Frequency 4 Times per Week   Duration Until Therapy Goals Met   Problem List   Problem List Decreased Active Daily Living Skills;Decreased Homemaking Skills;Decreased Functional Mobility;Decreased Activity Tolerance;Safety Awareness Deficits / Cognition;Impaired Postural Control / Balance   Anticipated Discharge Equipment and Recommendations   DC Equipment Recommendations Unable to determine at this time   Discharge Recommendations Recommend post-acute placement for additional occupational therapy services prior to discharge home  ([facility] rehab consult)

## 2023-11-08 NOTE — PROGRESS NOTES
Patient converted to afib at 0600. -130, /53. No complaints of chest pain or dizziness. MD notified. No new orders.

## 2023-11-08 NOTE — DISCHARGE PLANNING
Follow up for post acute services CM note reviewed Therapy notes reviewed.  Physiatry to consult per protocol.

## 2023-11-08 NOTE — PROGRESS NOTES
Neurosurgery Progress Note    Subjective:  POD#4 right craniotomy for large right chronic SDH.  Was alert and oriented following surgery, but became more somnolent and CT was completed following that, and that was reviewed by Dr. Alves and looks fine.   Per daughter, the patient is slow to wake up from anesthesia and has taken several days in the past before mentation has cleared.  Has pain pump implanted.    Subgaleal drain with 15 cc output last 8 hours.     Patient improving  Denies headache  Pending rehab    Exam:  Patient calm and cooperative  Speech clear  A&O x 3  PERRL  Grossly strong to upper / lower  Overall appropriate, following commands   Incision C/D/I       BP  Min: 94/47  Max: 137/63  Pulse  Av.3  Min: 56  Max: 149  Resp  Av.6  Min: 13  Max: 37  Temp  Av.6 °C (97.9 °F)  Min: 36.6 °C (97.8 °F)  Max: 36.6 °C (97.9 °F)  SpO2  Av.6 %  Min: 86 %  Max: 100 %    No data recorded    Recent Labs     23  0415 23  0320 23  0215   WBC 19.7* 14.2* 6.9   RBC 4.23 3.94* 3.29*   HEMOGLOBIN 11.9* 11.3* 9.4*   HEMATOCRIT 36.6* 34.4* 29.0*   MCV 86.5 87.3 88.1   MCH 28.1 28.7 28.6   MCHC 32.5 32.8 32.4   RDW 40.7 41.7 42.7   PLATELETCT 198 198 140*   MPV 10.7 11.1 11.1       Recent Labs     23  0415 23  1747 23  0320   SODIUM 146* 143 143   POTASSIUM 3.4* 4.1 3.9   CHLORIDE 114* 113* 112   CO2 17* 20 14*   GLUCOSE 133* 115* 126*   BUN 19 17 16   CREATININE 0.63 0.63 0.62   CALCIUM 8.6 8.1* 8.2*                     Intake/Output                         23 07 - 23 0659 23 - 23 Total [id number] Total                 Intake    P.O.  --  -- --  240  -- 240    P.O. -- -- -- 240 -- 240    I.V.  41.6  12.9 54.5  --  -- --    Fentanyl Volume 11.2 -- 11.2 -- -- --    Precedex Volume 30.4 12.9 43.3 -- -- --    IV Piggyback  427.7  -- 427.7  --  -- --    Volume (mL) (ceFAZolin (Ancef) 1 g in  mL IVPB)  290 -- 290 -- -- --    Volume (mL) (potassium phosphate IVPB 30 mmol in 500 mL D5W (premix)) 137.8 -- 137.8 -- -- --    Total Intake 469.3 12.9 482.2 240 -- 240       Output    Urine  300  100 400  100  -- 100    Output (mL) (Urethral Catheter Temperature probe 16 Fr.) 300 100 400 100 -- 100    Drains  70  15 85  20  -- 20    Output (mL) (Closed/Suction Drain 1 Right Scalp Hemovac) 70 15 85 20 -- 20    Stool  --  -- --  --  -- --    Number of Times Stooled 2 x -- 2 x -- -- --    Total Output 370 115 485 120 -- 120       Net I/O     99.3 -102.2 -2.8 120 -- 120              Intake/Output Summary (Last 24 hours) at 11/8/2023 1041  Last data filed at 11/8/2023 0800  Gross per 24 hour   Intake 701.65 ml   Output 605 ml   Net 96.65 ml               Metoprolol Tartrate  5 mg Q5 MIN PRN    metoprolol tartrate  25 mg BID    enoxaparin (LOVENOX) injection  40 mg DAILY AT 1800    HYDROcodone/acetaminophen  1 Tablet Q4HRS PRN    fentaNYL   Continuous    levETIRAcetam  500 mg BID    dexmedetomidine (Precedex) infusion  0.1-1.5 mcg/kg/hr (Ideal) Continuous    fentaNYL  25 mcg Q2HRS PRN    Pain Pump   Continuous    gabapentin  300 mg QAM    And    gabapentin  600 mg QHS    atorvastatin  40 mg Q EVENING    senna-docusate  2 Tablet BID    And    polyethylene glycol/lytes  1 Packet QDAY PRN    And    bisacodyl  10 mg QDAY PRN    LR  500 mL Once PRN    acetaminophen  650 mg Q6HRS PRN    ondansetron  4 mg Q4HRS PRN    ondansetron  4 mg Q4HRS PRN    Respiratory Therapy Consult   Continuous RT    LORazepam  2 mg Q5 MIN PRN    labetalol  10 mg Q4HRS PRN    hydrALAZINE  10 mg Q4HRS PRN       Assessment and Plan:  Hospital day #6  POD#4  Drain out when 30cc or less per 8hrs  Q4 hour neuro checks.  PT/OT, mobilize  D/c drain  Rehab vs home soon     Chemical prophylactic DVT therapy: yes per NS  Start date/time: okay for 40qday

## 2023-11-08 NOTE — CONSULTS
Physical Medicine and Rehabilitation Consultation              Date of initial consultation: 11/7/2023  Requesting provider:  ordered by Pako Carroll M.D. at 11/07/23 0474  Consulting provider: Diandra Mcmillan D.O.  Reason for consultation: assess for acute inpatient rehab appropriateness  LOS: 4 Day(s)    Chief complaint: SDH     HPI: The patient is a 88 y.o.  female with a past medical history of HTN, chronic pain s/p pain pump, and remote history of a fib not on AC, ;  who presented on 11/3/2023  9:59 PM as a transfer from [facility] with concern for SDH. Per documentation, patient presented to OSH with issues with memory and AMS. CT head obtained at OSH showed SDH with right to left 8mm midline shift. Patient was transferred to [facility], Neurosurgery was consulted, and patient was taken to the OR on 11/4 for right sided craniotomy for evacuation of SDH and resection of membranes performed by Dr. Alves. Post op, patient has required precedex drip which is being weaned. Patient' required fentanyl post op, but this has been weaned after patient's pain pump was interrogated by [employer] and confirmed patient's pain pump is in working order. Patient's hospital course has been notable for leukocytosis and ABLA. Patient has been able to  participate with therapy, she is Min A for mobility and ADLs     Patient seen and examined at bedside, patient calm and cooperative. Patient's daughter reports patient's cognition is near back to her baseline, with some memory issues both otherwise much improved. Patient reports mild HA. Otherwise, Does not report HA, lightheadedness, SOB, CP, abdominal pain, or changes in numbness/tingling/weakness.       Social Hx:  Patient lives alone in 5th wheel at [employer]'s house, planning to stay in dather's main house , 1 [address] daughter's house, 4 [address] 5th wheel   4 [address]  At prior level of function patient was Independent with mobility and ADLs.     Tobacco: Denies   Alcohol: Denies   Drugs: Denies      THERAPY:  Restrictions: Fall Risk   PT: Functional mobility   11/7 Min A with FWW x 12 ft, Min A sit to stand, Min A transfers     OT: ADLs  11/7 Min A upper body dressing, Min A lower body dressing , Min A bed mobility, Min A sit to stand     SLP:   11/7 Regular and thins     IMAGING:  CT-HEAD W/O   Final Result           Postsurgical change from right frontal craniotomy and evacuation of the right subdural hematoma.       Postsurgical pneumocephalus and residual blood seen in the right subdural space.       Improved right to left midline shift of 3 mm, previously 6 mm.               MR-BRAIN-W/O   Final Result       1.  Large right frontal-parietal-temporal complex subdural hematoma with multiple loculations and complex membranes.   2.  Mass effect with effacement of sulcal markings and right to left shift of midline structures of about 8 mm.   3.  Mild supratentorial white matter disease consistent with microvascular ischemic change.   4.  Mild pontine ischemic gliosis.   5.  Chronic right maxillary sinusitis.   6.  Dental artifact.       PROCEDURES:  11/4 Right sided craniotomy for evacuation of SDH by Dr. Alves     PMH:  Past Medical History:   Diagnosis Date    Afib (HCC)     COPD (chronic obstructive pulmonary disease) (HCC)     Hypertension        PSH:  Past Surgical History:   Procedure Laterality Date    CRANIOTOMY Right 11/4/2023    Procedure: CRANIOTOMY;  Surgeon: Johan Alves III, M.D.;  Location: SURGERY [facility];  Service: Neurosurgery    OTHER      2006 back surgery        FHX:  History reviewed. No pertinent family history.    Medications:  Current Facility-Administered Medications   Medication Dose    enoxaparin (Lovenox) inj 40 mg  40 mg    HYDROcodone/acetaminophen (Norco)  MG per tablet 1 Tablet  1 Tablet    fentaNYL (SUBLIMAZE) 50 mcg/mL in 50mL (Continuous Infusion)      levETIRAcetam (Keppra) tablet 500 mg  500 mg    dexmedetomidine (PRECEDEX) 400 mcg/100mL NS premix infusion   "0.1-1.5 mcg/kg/hr (Ideal)    fentaNYL (Sublimaze) injection 25 mcg  25 mcg    atenolol (Tenormin) tablet 25 mg  25 mg    Pain Pump (Patient Supplied) Device      gabapentin (Neurontin) capsule 300 mg  300 mg    And    gabapentin (Neurontin) capsule 600 mg  600 mg    atorvastatin (Lipitor) tablet 40 mg  40 mg    senna-docusate (Pericolace Or Senokot S) 8.6-50 MG per tablet 2 Tablet  2 Tablet    And    polyethylene glycol/lytes (Miralax) PACKET 1 Packet  1 Packet    And    bisacodyl (Dulcolax) suppository 10 mg  10 mg    lactated ringers infusion (BOLUS)  500 mL    acetaminophen (Tylenol) tablet 650 mg  650 mg    ondansetron (Zofran) syringe/vial injection 4 mg  4 mg    ondansetron (Zofran ODT) dispertab 4 mg  4 mg    Respiratory Therapy Consult      LORazepam (Ativan) injection 2 mg  2 mg    labetalol (Normodyne/Trandate) injection 10 mg  10 mg    hydrALAZINE (Apresoline) injection 10 mg  10 mg       Allergies:  Allergies   Allergen Reactions    Ace Inhibitors Swelling     Angioedema       Physical Exam:  Vitals: /57   Pulse 77   Temp 36.8 °C (98.3 °F)   Resp (!) 26   Ht 1.6 m (5' 3\")   Wt 70.3 kg (154 lb 15.7 oz)   SpO2 93%   Gen: NAD, laying comfortably in bed   Head: scalp incision CDI   Eyes/ Nose/ Mouth: PERRLA, moist mucous membranes  Cardio: RRR, good distal perfusion, warm extremities  Pulm: normal respiratory effort, no cyanosis, on RA   Abd: Soft NTND, negative borborygmi   Ext: No peripheral edema. No calf tenderness. No clubbing.    Mental status:  A&Ox4 (person, place, date, situation) answers questions appropriately follows commands, impaired memory, reports she has no idea what happened the last 3 days   Speech: fluent, no aphasia or dysarthria    CRANIAL NERVES:  2,3: visual acuity grossly intact, PERRL  3,4,6: EOMI bilaterally, no nystagmus or diplopia  5: sensation intact to light touch bilaterally and symmetric  7: no facial asymmetry  8: hearing grossly intact    Motor:      Upper " Extremity  Myotome R L   Shoulder flexion C5 5/5 5/5   Elbow flexion C5 5/5 5/5   Wrist extension C6 5/5 5/5   Elbow extension C7 5/5 5/5   Finger flexion C8 5/5 5/5   Finger abduction T1 5/5 5/5     Lower Extremity Myotome R L   Hip flexion L2 5/5 5/5   Knee extension L3 5/5 5/5   Ankle dorsiflexion L4 5/5 5/5   Toe extension L5 5/5 5/5   Ankle plantarflexion S1 5/5 5/5       Negative Pronator drift bilaterally    Sensory:   intact to light touch through out    DTRs: 2+ in bilateral  biceps  No clonus at bilateral ankles  Negative Hdez b/l     Tone: no spasticity noted, no cogwheeling noted    Coordination:   intact fine motor with fingers bilaterally      Labs: Reviewed and significant for   Recent Labs     11/05/23  0440 11/05/23  1122 11/06/23  0415 11/07/23  0320   RBC 4.01*  --  4.23 3.94*   HEMOGLOBIN 11.6* 11.4* 11.9* 11.3*   HEMATOCRIT 35.5* 34.7* 36.6* 34.4*   PLATELETCT 177  --  198 198     Recent Labs     11/06/23  0415 11/06/23  1747 11/07/23  0320   SODIUM 146* 143 143   POTASSIUM 3.4* 4.1 3.9   CHLORIDE 114* 113* 112   CO2 17* 20 14*   GLUCOSE 133* 115* 126*   BUN 19 17 16   CREATININE 0.63 0.63 0.62   CALCIUM 8.6 8.1* 8.2*     Recent Results (from the past 24 hour(s))   Basic Metabolic Panel    Collection Time: 11/06/23  5:47 PM   Result Value Ref Range    Sodium 143 135 - 145 mmol/L    Potassium 4.1 3.6 - 5.5 mmol/L    Chloride 113 (H) 96 - 112 mmol/L    Co2 20 20 - 33 mmol/L    Glucose 115 (H) 65 - 99 mg/dL    Bun 17 8 - 22 mg/dL    Creatinine 0.63 0.50 - 1.40 mg/dL    Calcium 8.1 (L) 8.5 - 10.5 mg/dL    Anion Gap 10.0 7.0 - 16.0   ESTIMATED GFR    Collection Time: 11/06/23  5:47 PM   Result Value Ref Range    GFR (CKD-EPI) 86 >60 mL/min/1.73 m 2   CBC WITH DIFFERENTIAL    Collection Time: 11/07/23  3:20 AM   Result Value Ref Range    WBC 14.2 (H) 4.8 - 10.8 K/uL    RBC 3.94 (L) 4.20 - 5.40 M/uL    Hemoglobin 11.3 (L) 12.0 - 16.0 g/dL    Hematocrit 34.4 (L) 37.0 - 47.0 %    MCV 87.3 81.4 - 97.8  fL    MCH 28.7 27.0 - 33.0 pg    MCHC 32.8 32.2 - 35.5 g/dL    RDW 41.7 35.9 - 50.0 fL    Platelet Count 198 164 - 446 K/uL    MPV 11.1 9.0 - 12.9 fL    Neutrophils-Polys 86.30 (H) 44.00 - 72.00 %    Lymphocytes 7.40 (L) 22.00 - 41.00 %    Monocytes 5.80 0.00 - 13.40 %    Eosinophils 0.00 0.00 - 6.90 %    Basophils 0.10 0.00 - 1.80 %    Immature Granulocytes 0.40 0.00 - 0.90 %    Nucleated RBC 0.00 0.00 - 0.20 /100 WBC    Neutrophils (Absolute) 12.29 (H) 1.82 - 7.42 K/uL    Lymphs (Absolute) 1.05 1.00 - 4.80 K/uL    Monos (Absolute) 0.82 0.00 - 0.85 K/uL    Eos (Absolute) 0.00 0.00 - 0.51 K/uL    Baso (Absolute) 0.02 0.00 - 0.12 K/uL    Immature Granulocytes (abs) 0.05 0.00 - 0.11 K/uL    NRBC (Absolute) 0.00 K/uL   MAGNESIUM    Collection Time: 11/07/23  3:20 AM   Result Value Ref Range    Magnesium 1.9 1.5 - 2.5 mg/dL   PHOSPHORUS    Collection Time: 11/07/23  3:20 AM   Result Value Ref Range    Phosphorus 2.2 (L) 2.5 - 4.5 mg/dL   Basic Metabolic Panel    Collection Time: 11/07/23  3:20 AM   Result Value Ref Range    Sodium 143 135 - 145 mmol/L    Potassium 3.9 3.6 - 5.5 mmol/L    Chloride 112 96 - 112 mmol/L    Co2 14 (L) 20 - 33 mmol/L    Glucose 126 (H) 65 - 99 mg/dL    Bun 16 8 - 22 mg/dL    Creatinine 0.62 0.50 - 1.40 mg/dL    Calcium 8.2 (L) 8.5 - 10.5 mg/dL    Anion Gap 17.0 (H) 7.0 - 16.0   ESTIMATED GFR    Collection Time: 11/07/23  3:20 AM   Result Value Ref Range    GFR (CKD-EPI) 86 >60 mL/min/1.73 m 2         ASSESSMENT:  Patient is a 88 y.o. female admitted with chronic SDH     [facility] Code / Diagnosis to Support: 0002.22 - Brain Dysfunction: Traumatic, Closed Injury    Rehabilitation: Impaired ADLs and mobility  Patient is a good candidate for inpatient rehab based on needs for PT, OT, and speech therapy, see dispo details below.     Barriers to transfer include: Insurance authorization, TCCs to verify disposition, medical clearance and bed availability     Additional Recommendations:  Chronic SDH   AMS    - ASM suspected due to SDH vs pain pump dosage   - Chronic SDH seen on OSH  CT head with R sided 8 mm midline shift   - neurosurg consulted   - 11/4 R sided craniotomy for evacuation of SDH performed by Dr. Alves   - on keppra 500mg BID   - subgaleal drain in place   - Patient with AMS and confusion, requiring precedex drip, now weaning   - continue with PT/OT/SLP     Leukocytosis   - down trending WBC   - 11/7  WBC 14.2   - Afebrile   - will need WBC to continue to trend down prior to acceptance to IRF     Chronic pain   - history of pain pump   - pain pump interogated by [employer] 11/7   - confirmation of appropriate function of pain pump, now utilizing pain pump  + PRN norco for pain control postop     Neuropathy  - chronic neuropathy   - on home dose gabapentin     Dispo:  - patient is currently functioning below their level of baseline, recommend post acute rehab  - recommend IRF level therapy with 3hr of therapy 5 days per week  - prior to acceptance to IRF, will need to be off precedex, have drain removed, and WBC improved   - TCC to assist with insurance auth and DC support         Medical Complexity:  Chronic SDH   AMS   Leukocytosis   Chronic pain   Neuropathy   Impaired mobility and ADLs     DVT PPX: Lovenox       Thank you for allowing us to participate in the care of this patient.     Patient was seen for 81 minutes on unit/floor of which > 50% of time was spent on counseling and coordination of care regarding the above, including prognosis, risk reduction, benefits of treatment, and options for next stage of care.    Diandra Mcmillan D.O.   Physical Medicine and Rehabilitation     Please note that this dictation was created using voice recognition software. I have made every reasonable attempt to correct obvious errors, but there may be errors of grammar and possibly content that I did not discover before finalizing the note.

## 2023-11-08 NOTE — CARE PLAN
The patient is Watcher - Medium risk of patient condition declining or worsening    Shift Goals  Clinical Goals: stable neuro checks, pain control  Patient Goals: sleep  Family Goals: sleep    Progress made toward(s) clinical / shift goals:    Problem: Knowledge Deficit - Standard  Goal: Patient and family/care givers will demonstrate understanding of plan of care, disease process/condition, diagnostic tests and medications  Outcome: Progressing     Problem: Hemodynamics  Goal: Patient's hemodynamics, fluid balance and neurologic status will be stable or improve  Outcome: Progressing     Problem: Respiratory  Goal: Patient will achieve/maintain optimum respiratory ventilation and gas exchange  Outcome: Progressing     Problem: Skin Integrity  Goal: Skin integrity is maintained or improved  Outcome: Progressing     Problem: Craniotomy Surgery  Goal: Post-Operative Craniotomy Surgery: Patient will achieve optimal post-surgical outcomes  Outcome: Progressing     Problem: Neuro Status  Goal: Neuro status will remain stable or improve  Outcome: Progressing       Patient is not progressing towards the following goals:      Problem: Fall Risk  Goal: Patient will remain free from falls  Outcome: Not Progressing  Note: Fall precautions in place. Bed in lowest position. Non-skid socks in place. Personal possessions within reach. Mobility sign on door. Bed-alarm on. Call light within reach. Pt educated regarding fall prevention and states understanding.

## 2023-11-09 ENCOUNTER — HOSPITAL ENCOUNTER (INPATIENT)
Facility: REHABILITATION | Age: 88
LOS: 12 days | DRG: 949 | End: 2023-11-21
Attending: PHYSICAL MEDICINE & REHABILITATION | Admitting: PHYSICAL MEDICINE & REHABILITATION
Payer: MEDICARE

## 2023-11-09 VITALS
HEART RATE: 70 BPM | WEIGHT: 154.98 LBS | OXYGEN SATURATION: 95 % | BODY MASS INDEX: 27.46 KG/M2 | HEIGHT: 63 IN | RESPIRATION RATE: 19 BRPM | TEMPERATURE: 98.4 F | DIASTOLIC BLOOD PRESSURE: 60 MMHG | SYSTOLIC BLOOD PRESSURE: 130 MMHG

## 2023-11-09 DIAGNOSIS — R41.82 ALTERED MENTAL STATUS, UNSPECIFIED ALTERED MENTAL STATUS TYPE: ICD-10-CM

## 2023-11-09 DIAGNOSIS — I50.30 HEART FAILURE WITH PRESERVED EJECTION FRACTION, UNSPECIFIED HF CHRONICITY (HCC): ICD-10-CM

## 2023-11-09 DIAGNOSIS — J44.9 CHRONIC OBSTRUCTIVE PULMONARY DISEASE, UNSPECIFIED COPD TYPE (HCC): ICD-10-CM

## 2023-11-09 DIAGNOSIS — S06.5XAA SUBDURAL HEMATOMA (HCC): ICD-10-CM

## 2023-11-09 DIAGNOSIS — S06.5XAA SDH (SUBDURAL HEMATOMA) (HCC): ICD-10-CM

## 2023-11-09 LAB
ALBUMIN SERPL BCP-MCNC: 2.1 G/DL (ref 3.2–4.9)
ALBUMIN/GLOB SERPL: 0.7 G/DL
ALP SERPL-CCNC: 59 U/L (ref 30–99)
ALT SERPL-CCNC: 10 U/L (ref 2–50)
ANION GAP SERPL CALC-SCNC: 7 MMOL/L (ref 7–16)
AST SERPL-CCNC: 17 U/L (ref 12–45)
BASOPHILS # BLD AUTO: 0.3 % (ref 0–1.8)
BASOPHILS # BLD: 0.02 K/UL (ref 0–0.12)
BILIRUB SERPL-MCNC: 0.3 MG/DL (ref 0.1–1.5)
BUN SERPL-MCNC: 13 MG/DL (ref 8–22)
CALCIUM ALBUM COR SERPL-MCNC: 9.6 MG/DL (ref 8.5–10.5)
CALCIUM SERPL-MCNC: 8.1 MG/DL (ref 8.5–10.5)
CHLORIDE SERPL-SCNC: 107 MMOL/L (ref 96–112)
CO2 SERPL-SCNC: 22 MMOL/L (ref 20–33)
CREAT SERPL-MCNC: 0.65 MG/DL (ref 0.5–1.4)
EOSINOPHIL # BLD AUTO: 0.11 K/UL (ref 0–0.51)
EOSINOPHIL NFR BLD: 1.8 % (ref 0–6.9)
ERYTHROCYTE [DISTWIDTH] IN BLOOD BY AUTOMATED COUNT: 42.3 FL (ref 35.9–50)
GFR SERPLBLD CREATININE-BSD FMLA CKD-EPI: 85 ML/MIN/1.73 M 2
GLOBULIN SER CALC-MCNC: 2.9 G/DL (ref 1.9–3.5)
GLUCOSE SERPL-MCNC: 126 MG/DL (ref 65–99)
HCT VFR BLD AUTO: 28 % (ref 37–47)
HGB BLD-MCNC: 9.1 G/DL (ref 12–16)
IMM GRANULOCYTES # BLD AUTO: 0.02 K/UL (ref 0–0.11)
IMM GRANULOCYTES NFR BLD AUTO: 0.3 % (ref 0–0.9)
LYMPHOCYTES # BLD AUTO: 1.51 K/UL (ref 1–4.8)
LYMPHOCYTES NFR BLD: 25.1 % (ref 22–41)
MAGNESIUM SERPL-MCNC: 1.7 MG/DL (ref 1.5–2.5)
MCH RBC QN AUTO: 28.6 PG (ref 27–33)
MCHC RBC AUTO-ENTMCNC: 32.5 G/DL (ref 32.2–35.5)
MCV RBC AUTO: 88.1 FL (ref 81.4–97.8)
MONOCYTES # BLD AUTO: 0.67 K/UL (ref 0–0.85)
MONOCYTES NFR BLD AUTO: 11.1 % (ref 0–13.4)
NEUTROPHILS # BLD AUTO: 3.68 K/UL (ref 1.82–7.42)
NEUTROPHILS NFR BLD: 61.4 % (ref 44–72)
NRBC # BLD AUTO: 0 K/UL
NRBC BLD-RTO: 0 /100 WBC (ref 0–0.2)
PHOSPHATE SERPL-MCNC: 2.1 MG/DL (ref 2.5–4.5)
PLATELET # BLD AUTO: 142 K/UL (ref 164–446)
PMV BLD AUTO: 11.4 FL (ref 9–12.9)
POTASSIUM SERPL-SCNC: 3.5 MMOL/L (ref 3.6–5.5)
PROT SERPL-MCNC: 5 G/DL (ref 6–8.2)
RBC # BLD AUTO: 3.18 M/UL (ref 4.2–5.4)
SODIUM SERPL-SCNC: 136 MMOL/L (ref 135–145)
WBC # BLD AUTO: 6 K/UL (ref 4.8–10.8)

## 2023-11-09 PROCEDURE — A9270 NON-COVERED ITEM OR SERVICE: HCPCS | Performed by: STUDENT IN AN ORGANIZED HEALTH CARE EDUCATION/TRAINING PROGRAM

## 2023-11-09 PROCEDURE — 700102 HCHG RX REV CODE 250 W/ 637 OVERRIDE(OP): Performed by: INTERNAL MEDICINE

## 2023-11-09 PROCEDURE — 700102 HCHG RX REV CODE 250 W/ 637 OVERRIDE(OP): Performed by: STUDENT IN AN ORGANIZED HEALTH CARE EDUCATION/TRAINING PROGRAM

## 2023-11-09 PROCEDURE — 700102 HCHG RX REV CODE 250 W/ 637 OVERRIDE(OP): Performed by: PHYSICAL MEDICINE & REHABILITATION

## 2023-11-09 PROCEDURE — 770010 HCHG ROOM/CARE - REHAB SEMI PRIVAT*

## 2023-11-09 PROCEDURE — 83735 ASSAY OF MAGNESIUM: CPT

## 2023-11-09 PROCEDURE — 99239 HOSP IP/OBS DSCHRG MGMT >30: CPT | Performed by: INTERNAL MEDICINE

## 2023-11-09 PROCEDURE — 85025 COMPLETE CBC W/AUTO DIFF WBC: CPT

## 2023-11-09 PROCEDURE — 84100 ASSAY OF PHOSPHORUS: CPT

## 2023-11-09 PROCEDURE — 700111 HCHG RX REV CODE 636 W/ 250 OVERRIDE (IP): Mod: JZ | Performed by: PHYSICAL MEDICINE & REHABILITATION

## 2023-11-09 PROCEDURE — A9270 NON-COVERED ITEM OR SERVICE: HCPCS | Performed by: PHYSICAL MEDICINE & REHABILITATION

## 2023-11-09 PROCEDURE — 80053 COMPREHEN METABOLIC PANEL: CPT

## 2023-11-09 PROCEDURE — 94760 N-INVAS EAR/PLS OXIMETRY 1: CPT

## 2023-11-09 PROCEDURE — 99223 1ST HOSP IP/OBS HIGH 75: CPT | Performed by: PHYSICAL MEDICINE & REHABILITATION

## 2023-11-09 PROCEDURE — A9270 NON-COVERED ITEM OR SERVICE: HCPCS | Performed by: INTERNAL MEDICINE

## 2023-11-09 PROCEDURE — 97530 THERAPEUTIC ACTIVITIES: CPT

## 2023-11-09 PROCEDURE — 97116 GAIT TRAINING THERAPY: CPT

## 2023-11-09 RX ORDER — ATORVASTATIN CALCIUM 40 MG/1
40 TABLET, FILM COATED ORAL EVERY EVENING
Qty: 30 TABLET | Status: ON HOLD
Start: 2023-11-09 | End: 2023-11-20 | Stop reason: SDUPTHER

## 2023-11-09 RX ORDER — MIDAZOLAM HYDROCHLORIDE 5 MG/ML
5 INJECTION INTRAMUSCULAR; INTRAVENOUS PRN
Status: DISCONTINUED | OUTPATIENT
Start: 2023-11-09 | End: 2023-11-21 | Stop reason: HOSPADM

## 2023-11-09 RX ORDER — OXYCODONE HYDROCHLORIDE 5 MG/1
5 TABLET ORAL
Status: DISCONTINUED | OUTPATIENT
Start: 2023-11-09 | End: 2023-11-09

## 2023-11-09 RX ORDER — GABAPENTIN 300 MG/1
600 CAPSULE ORAL
Status: DISCONTINUED | OUTPATIENT
Start: 2023-11-09 | End: 2023-11-21 | Stop reason: HOSPADM

## 2023-11-09 RX ORDER — HYDRALAZINE HYDROCHLORIDE 25 MG/1
25 TABLET, FILM COATED ORAL EVERY 8 HOURS PRN
Status: DISCONTINUED | OUTPATIENT
Start: 2023-11-09 | End: 2023-11-21 | Stop reason: HOSPADM

## 2023-11-09 RX ORDER — AMOXICILLIN 250 MG
2 CAPSULE ORAL 2 TIMES DAILY
Status: DISCONTINUED | OUTPATIENT
Start: 2023-11-09 | End: 2023-11-11

## 2023-11-09 RX ORDER — ONDANSETRON 2 MG/ML
4 INJECTION INTRAMUSCULAR; INTRAVENOUS 4 TIMES DAILY PRN
Status: DISCONTINUED | OUTPATIENT
Start: 2023-11-09 | End: 2023-11-21 | Stop reason: HOSPADM

## 2023-11-09 RX ORDER — BISACODYL 10 MG
10 SUPPOSITORY, RECTAL RECTAL
Status: DISCONTINUED | OUTPATIENT
Start: 2023-11-09 | End: 2023-11-11

## 2023-11-09 RX ORDER — ALUMINA, MAGNESIA, AND SIMETHICONE 2400; 2400; 240 MG/30ML; MG/30ML; MG/30ML
20 SUSPENSION ORAL
Status: DISCONTINUED | OUTPATIENT
Start: 2023-11-09 | End: 2023-11-21 | Stop reason: HOSPADM

## 2023-11-09 RX ORDER — LEVETIRACETAM 500 MG/1
500 TABLET ORAL 2 TIMES DAILY
Qty: 6 TABLET | Refills: 0 | Status: ON HOLD
Start: 2023-11-09 | End: 2023-11-20

## 2023-11-09 RX ORDER — POTASSIUM CHLORIDE 20 MEQ/1
40 TABLET, EXTENDED RELEASE ORAL ONCE
Status: COMPLETED | OUTPATIENT
Start: 2023-11-09 | End: 2023-11-09

## 2023-11-09 RX ORDER — ACETAMINOPHEN 325 MG/1
650 TABLET ORAL EVERY 4 HOURS PRN
Status: DISCONTINUED | OUTPATIENT
Start: 2023-11-09 | End: 2023-11-21 | Stop reason: HOSPADM

## 2023-11-09 RX ORDER — LORAZEPAM 2 MG/ML
2 INJECTION INTRAMUSCULAR
Status: DISCONTINUED | OUTPATIENT
Start: 2023-11-09 | End: 2023-11-21 | Stop reason: HOSPADM

## 2023-11-09 RX ORDER — TRAZODONE HYDROCHLORIDE 50 MG/1
50 TABLET ORAL
Status: DISCONTINUED | OUTPATIENT
Start: 2023-11-09 | End: 2023-11-21 | Stop reason: HOSPADM

## 2023-11-09 RX ORDER — GABAPENTIN 300 MG/1
300 CAPSULE ORAL EVERY MORNING
Status: DISCONTINUED | OUTPATIENT
Start: 2023-11-10 | End: 2023-11-21 | Stop reason: HOSPADM

## 2023-11-09 RX ORDER — OXYCODONE HYDROCHLORIDE 10 MG/1
10 TABLET ORAL
Status: DISCONTINUED | OUTPATIENT
Start: 2023-11-09 | End: 2023-11-09

## 2023-11-09 RX ORDER — OMEPRAZOLE 20 MG/1
20 CAPSULE, DELAYED RELEASE ORAL DAILY
Status: DISCONTINUED | OUTPATIENT
Start: 2023-11-09 | End: 2023-11-21 | Stop reason: HOSPADM

## 2023-11-09 RX ORDER — ECHINACEA PURPUREA EXTRACT 125 MG
2 TABLET ORAL PRN
Status: DISCONTINUED | OUTPATIENT
Start: 2023-11-09 | End: 2023-11-21 | Stop reason: HOSPADM

## 2023-11-09 RX ORDER — POLYETHYLENE GLYCOL 3350 17 G/17G
1 POWDER, FOR SOLUTION ORAL
Status: DISCONTINUED | OUTPATIENT
Start: 2023-11-09 | End: 2023-11-11

## 2023-11-09 RX ORDER — ENOXAPARIN SODIUM 100 MG/ML
40 INJECTION SUBCUTANEOUS DAILY
Status: DISCONTINUED | OUTPATIENT
Start: 2023-11-09 | End: 2023-11-21 | Stop reason: HOSPADM

## 2023-11-09 RX ORDER — GABAPENTIN 300 MG/1
300 CAPSULE ORAL EVERY MORNING
Status: CANCELLED | OUTPATIENT
Start: 2023-11-10

## 2023-11-09 RX ORDER — HYDROCODONE BITARTRATE AND ACETAMINOPHEN 10; 325 MG/1; MG/1
1 TABLET ORAL EVERY 4 HOURS PRN
Status: DISCONTINUED | OUTPATIENT
Start: 2023-11-09 | End: 2023-11-21 | Stop reason: HOSPADM

## 2023-11-09 RX ORDER — ENOXAPARIN SODIUM 100 MG/ML
40 INJECTION SUBCUTANEOUS DAILY
Status: CANCELLED | OUTPATIENT
Start: 2023-11-09

## 2023-11-09 RX ORDER — ATENOLOL 25 MG/1
25 TABLET ORAL NIGHTLY
Status: DISCONTINUED | OUTPATIENT
Start: 2023-11-09 | End: 2023-11-13

## 2023-11-09 RX ORDER — LEVETIRACETAM 500 MG/1
500 TABLET ORAL 2 TIMES DAILY
Status: DISCONTINUED | OUTPATIENT
Start: 2023-11-09 | End: 2023-11-21 | Stop reason: HOSPADM

## 2023-11-09 RX ORDER — ATENOLOL 25 MG/1
25 TABLET ORAL NIGHTLY
Status: CANCELLED | OUTPATIENT
Start: 2023-11-09

## 2023-11-09 RX ORDER — ENOXAPARIN SODIUM 100 MG/ML
40 INJECTION SUBCUTANEOUS DAILY
Status: ON HOLD | DISCHARGE
Start: 2023-11-09 | End: 2023-11-20

## 2023-11-09 RX ORDER — ATORVASTATIN CALCIUM 40 MG/1
40 TABLET, FILM COATED ORAL EVERY EVENING
Status: DISCONTINUED | OUTPATIENT
Start: 2023-11-09 | End: 2023-11-21 | Stop reason: HOSPADM

## 2023-11-09 RX ORDER — LEVETIRACETAM 500 MG/1
500 TABLET ORAL 2 TIMES DAILY
Status: CANCELLED | OUTPATIENT
Start: 2023-11-09

## 2023-11-09 RX ORDER — ATORVASTATIN CALCIUM 40 MG/1
40 TABLET, FILM COATED ORAL EVERY EVENING
Status: CANCELLED | OUTPATIENT
Start: 2023-11-09

## 2023-11-09 RX ORDER — LORAZEPAM 2 MG/ML
2 INJECTION INTRAMUSCULAR
Status: CANCELLED | OUTPATIENT
Start: 2023-11-09

## 2023-11-09 RX ORDER — ONDANSETRON 4 MG/1
4 TABLET, ORALLY DISINTEGRATING ORAL 4 TIMES DAILY PRN
Status: DISCONTINUED | OUTPATIENT
Start: 2023-11-09 | End: 2023-11-21 | Stop reason: HOSPADM

## 2023-11-09 RX ORDER — GABAPENTIN 300 MG/1
600 CAPSULE ORAL
Status: CANCELLED | OUTPATIENT
Start: 2023-11-09

## 2023-11-09 RX ADMIN — HYDROCODONE BITARTRATE AND ACETAMINOPHEN 1 TABLET: 10; 325 TABLET ORAL at 20:45

## 2023-11-09 RX ADMIN — GABAPENTIN 300 MG: 300 CAPSULE ORAL at 05:11

## 2023-11-09 RX ADMIN — DOCUSATE SODIUM 50MG AND SENNOSIDES 8.6MG 2 TABLET: 8.6; 5 TABLET, FILM COATED ORAL at 20:46

## 2023-11-09 RX ADMIN — POTASSIUM CHLORIDE 40 MEQ: 1500 TABLET, EXTENDED RELEASE ORAL at 09:20

## 2023-11-09 RX ADMIN — ATORVASTATIN CALCIUM 40 MG: 40 TABLET, FILM COATED ORAL at 20:44

## 2023-11-09 RX ADMIN — DIBASIC SODIUM PHOSPHATE, MONOBASIC POTASSIUM PHOSPHATE AND MONOBASIC SODIUM PHOSPHATE 250 MG: 852; 155; 130 TABLET ORAL at 09:20

## 2023-11-09 RX ADMIN — ENOXAPARIN SODIUM 40 MG: 100 INJECTION SUBCUTANEOUS at 17:17

## 2023-11-09 RX ADMIN — GABAPENTIN 600 MG: 300 CAPSULE ORAL at 20:44

## 2023-11-09 RX ADMIN — ATENOLOL 25 MG: 25 TABLET ORAL at 20:44

## 2023-11-09 RX ADMIN — LEVETIRACETAM 500 MG: 500 TABLET, FILM COATED ORAL at 05:11

## 2023-11-09 RX ADMIN — LEVETIRACETAM 500 MG: 500 TABLET, FILM COATED ORAL at 20:44

## 2023-11-09 RX ADMIN — DOCUSATE SODIUM 50 MG AND SENNOSIDES 8.6 MG 2 TABLET: 8.6; 5 TABLET, FILM COATED ORAL at 05:11

## 2023-11-09 ASSESSMENT — GAIT ASSESSMENTS
DISTANCE (FEET): 30
ASSISTIVE DEVICE: FRONT WHEEL WALKER
GAIT LEVEL OF ASSIST: MINIMAL ASSIST
DEVIATION: BRADYKINETIC;OTHER (COMMENT)

## 2023-11-09 ASSESSMENT — LIFESTYLE VARIABLES
ON A TYPICAL DAY WHEN YOU DRINK ALCOHOL HOW MANY DRINKS DO YOU HAVE: 0
CONSUMPTION TOTAL: NEGATIVE
ALCOHOL_USE: NO
HAVE PEOPLE ANNOYED YOU BY CRITICIZING YOUR DRINKING: NO
AVERAGE NUMBER OF DAYS PER WEEK YOU HAVE A DRINK CONTAINING ALCOHOL: 0
TOTAL SCORE: 0
EVER_SMOKED: NEVER
EVER FELT BAD OR GUILTY ABOUT YOUR DRINKING: NO
TOTAL SCORE: 0
TOTAL SCORE: 0
HAVE YOU EVER FELT YOU SHOULD CUT DOWN ON YOUR DRINKING: NO
EVER HAD A DRINK FIRST THING IN THE MORNING TO STEADY YOUR NERVES TO GET RID OF A HANGOVER: NO
HOW MANY TIMES IN THE PAST YEAR HAVE YOU HAD 5 OR MORE DRINKS IN A DAY: 0

## 2023-11-09 ASSESSMENT — COGNITIVE AND FUNCTIONAL STATUS - GENERAL
MOVING TO AND FROM BED TO CHAIR: A LOT
STANDING UP FROM CHAIR USING ARMS: A LITTLE
CLIMB 3 TO 5 STEPS WITH RAILING: A LOT
TURNING FROM BACK TO SIDE WHILE IN FLAT BAD: A LOT
SUGGESTED CMS G CODE MODIFIER MOBILITY: CL
MOBILITY SCORE: 14
WALKING IN HOSPITAL ROOM: A LITTLE
MOVING FROM LYING ON BACK TO SITTING ON SIDE OF FLAT BED: A LOT

## 2023-11-09 ASSESSMENT — PATIENT HEALTH QUESTIONNAIRE - PHQ9
SUM OF ALL RESPONSES TO PHQ9 QUESTIONS 1 AND 2: 0
1. LITTLE INTEREST OR PLEASURE IN DOING THINGS: NOT AT ALL
2. FEELING DOWN, DEPRESSED, IRRITABLE, OR HOPELESS: NOT AT ALL
SUM OF ALL RESPONSES TO PHQ9 QUESTIONS 1 AND 2: 0
2. FEELING DOWN, DEPRESSED, IRRITABLE, OR HOPELESS: NOT AT ALL
1. LITTLE INTEREST OR PLEASURE IN DOING THINGS: NOT AT ALL

## 2023-11-09 ASSESSMENT — FIBROSIS 4 INDEX: FIB4 SCORE: 3.26

## 2023-11-09 ASSESSMENT — PAIN DESCRIPTION - PAIN TYPE
TYPE: ACUTE PAIN
TYPE: ACUTE PAIN;CHRONIC PAIN
TYPE: ACUTE PAIN
TYPE: SURGICAL PAIN

## 2023-11-09 NOTE — PROGRESS NOTES
1132: Report given to RAÚL Hernandez at [facility]. RN notified of staples removal two weeks post procedure.     1230: GMT at bedside for pt transport. BP: 142/60, hr: 69, O2: 96 on RA, RR:18. COBRA form given to GMT. Pt is A&OX4. All pt belongings sent with pt and daughter. IV's removed. All questions answered.

## 2023-11-09 NOTE — CARE PLAN
Problem: Knowledge Deficit - Standard  Goal: Patient and family/care givers will demonstrate understanding of plan of care, disease process/condition, diagnostic tests and medications  Outcome: Not Met     Problem: Skin Integrity  Goal: Skin integrity is maintained or improved  Outcome: Progressing   The patient is Stable - Low risk of patient condition declining or worsening    Shift Goals  Patient Goals: rest    Progress made toward(s) clinical / shift goals:  Patient resting in bed no distress noted     Patient is not progressing towards the following goals:      Problem: Knowledge Deficit - Standard  Goal: Patient and family/care givers will demonstrate understanding of plan of care, disease process/condition, diagnostic tests and medications  Outcome: Not Met

## 2023-11-09 NOTE — PREADMISSION SCREENING NOTE
Pre-Admission Screening Form    Patient Information:   Name: Anita Beard     MRN: [id number]       : 1935      Age: 88 y.o.   Gender: female      Race: White [7]       Marital Status:  [5]  Family Contact: ANT PEDERSON        Relationship: Daughter [2]  Home Phone: [phone]           Cell Phone: [phone]  Advanced Directives: None  Code Status:  DNAR/DNI  Current Attending Provider: Marysol Byers MD  Referring Physician: Dr. Pako Carroll      Physiatrist Consult: Dr. Diandra Mcmillan       Referral Date: 2023  Primary Payor Source:  MEDICARE  Secondary Payor Source:  [facility]    Medical Information:   Date of Admission to Acute Care Sett[place]/3/2023  Room Number: RIH552/00  Rehabilitation Diagnosis: 0002.22 - Brain Dysfunction: Traumatic, Closed Injury    There is no immunization history on file for this patient.  Allergies   Allergen Reactions    Ace Inhibitors Swelling     Angioedema     Past Medical History:   Diagnosis Date    Afib (HCC)     COPD (chronic obstructive pulmonary disease) (HCC)     Hypertension      Past Surgical History:   Procedure Laterality Date    CRANIOTOMY Right 2023    Procedure: CRANIOTOMY;  Surgeon: Johan Alves III, M.D.;  Location: SURGERY [facility];  Service: Neurosurgery    OTHER       back surgery        History Leading to Admission, Conditions that Caused the Need for Rehab (CMS):       Pako Carroll M.D.  Physician  [facility] Medicine  H&P     Addendum  Date of Service:  11/3/2023 11:06 PM    Addendum      [facility] Medicine History & Physical Note     Date of Service  11/3/2023     Primary Care Physician  Pcp Pt States None     Consultants  Neurosurgery (Dr. Alves)     Code Status  DNAR/DNI     Chief Complaint    Chief Complaint  Patient presents with   Other      BIB flight fixed Wing; transfer from [facility], patient had her CT done today as outpatient due to intermittent memory issue and was found out to have  a SDH right side 8mm  midline shift. AOX4 GCS15; Denies any head injury or fall; not on thinners; on room air and ambulatory with the help on her cane. Denies any N/V and headache     History of Presenting Illness  Anita Beard is a 88 y.o. female with history of hypertension, chronic pain syndrome with pain pump, remote history of A-fib no longer on anticoagulation who presented 11/3/2023 as transfer from [facility] for SDH.  Patient reported to have memory issue, CT head done at transferring ER --which noted SDH with right to left 8 mm midline shift.  Patient was transferred to [facility] for neurosurgery evaluation.  Neurosurgery has been consulted, request MRI brain, tentative plan for OR in AM.  Admitted to medicine service for further evaluation and treatment.     I discussed the plan of care with patient, bedside RN, and pharmacy.     Review of Systems  Review of Systems   Constitutional:  Negative for chills and fever.   HENT:  Negative for hearing loss and tinnitus.    Eyes:  Negative for blurred vision and double vision.   Respiratory:  Negative for cough and shortness of breath.    Cardiovascular:  Negative for chest pain and palpitations.   Gastrointestinal:  Negative for abdominal pain, heartburn, nausea and vomiting.   Genitourinary:  Negative for dysuria and urgency.   Musculoskeletal:  Negative for joint pain and myalgias.   Skin:  Negative for itching and rash.   Neurological:  Positive for dizziness, weakness and headaches. Negative for speech change and focal weakness.        Memory impairment   Endo/Heme/Allergies:  Negative for environmental allergies. Bruises/bleeds easily.   Psychiatric/Behavioral:  Negative for depression and substance abuse.    All other systems reviewed and are negative.        Past Medical History   has a past medical history of Afib (HCC), COPD (chronic obstructive pulmonary disease) (HCC), and Hypertension.     Surgical History   has a past surgical history that includes other.      Family  History  family history is not on file.   Family history reviewed with patient. There is no family history that is pertinent to the chief complaint.      Social History   reports that she has never smoked. She has never used smokeless tobacco. She reports that she does not drink alcohol and does not use drugs.     Allergies  No Known Allergies     Assessment/Plan:  Justification for Admission Status  I anticipate this patient will require at least 2 midnights of hospitalization, therefore appropriate for inpatient status.     * SDH (subdural hematoma) (HCC)- (present on admission)  Assessment & Plan  SDH with right sided 8mm midline shift noted on CT head at transferring facility  Target SBP<140-160  Frequent neurochecks  F/u MRI brain  Neurosurgery consulted --plan for OR 11/4     Chronic pain syndrome  Assessment & Plan  Continue pain pump     Headache  Assessment & Plan  MT Fioricet      COPD (chronic obstructive pulmonary disease) (HCC)- (present on admission)  Assessment & Plan  Not in acute exacerbation  As needed nebs     ACP (advance care planning)  Assessment & Plan  Goal of care discussed with patient ER.  She showed me her POLST which noted DNR.  She stated she does not wish for aggressive/heroic life-sustaining measures-no CPR/defibrillation/intubation or mechanical ventilation.  She is however agreeable for proposed neurosurgical intervention as needed.  DNR/DNI status confirmed.  Diagnosis, prognosis, questions and concerns addressed.  ACP: 16 minutes     Age-related physical debility  Assessment & Plan  Fall precautions  PT/OT     VTE prophylaxis: SCDs/TEDs                Johan Alves III, M.D.  Physician  Surgery Neurosurgery  Consults     Signed  Date of Service:  11/4/2023  7:18 AM    DATE OF SERVICE:  11/04/2023      CHIEF COMPLAINT:  Chronic subdural hematoma.     HISTORY OF PRESENT ILLNESS:  An 88-year-old right-handed woman who does not   report any recent falls, but might have fallen 6-10  weeks ago.  She developed   increasing memory changes that prompted outside hospital head CT.  This showed   a 2.5 cm holohemispheric vertex subdural hematoma on the right with 9-mm   midline shift.  She was transferred to [facility].  An MRI of the brain shows a   septated loculated mostly chronic subdural hematoma, bright on T1, bright on   T2, so at least 3 weeks old.  She denies headaches, weakness, numbness or   vomiting.  She is not on any blood thinners.  She is amenable to surgical   decompression of this via craniotomy.     PAST MEDICAL HISTORY:  Significant for atrial fibrillation, COPD,   hypertension.     PAST SURGICAL HISTORY:  Includes knee surgery.     FAMILY HISTORY:  Noncontributory.     SOCIAL HISTORY:  She is nonsmoker, nondrinker.  She is retired. I reached her   daughter by phone.     PHYSICAL EXAMINATION:  NEUROLOGIC:  She is awake, alert and oriented x3.  She has no obvious cranial   trauma. Pupils are symmetric.  Extraocular motion intact.  Face full.  Tongue   is midline.  Speech is fluent.  She has no pronator drift.  She moves arms and   legs symmetrically.  Good sensation to face, arms, legs.     ASSESSMENT AND PLAN:  The patient has an operative subdural hematoma.  I   recommend a craniotomy for evacuation and stripping of membranes.  I explained   the risks, benefits and alternatives including pain, infection, bleeding, CSF   leak, failure to completely resolve symptoms, neurologic deficit, weakness,   numbness, speech difficulties, prolonged recovery due to age.  She will go to   the ICU postop.  We will move expeditiously to the OR.     Thanks for allowing me to participate in her care.      MD Johan Brown III, III, M.D.  Physician  Surgery Neurosurgery  OP Report     Signed  Date of Service:  11/4/2023 11:14 AM    DATE OF SERVICE:  11/04/2023      PREOPERATIVE DIAGNOSES:    1.  Chronic membranous subdural hematoma.  2.  Brain compression.     POSTOPERATIVE  DIAGNOSES:  1.  Chronic membranous subdural hematoma.  2.  Brain compression.     PROCEDURES PERFORMED:  Right-sided craniotomy for evacuation of subdural and   resection of membranes.     SURGEON:  Johan Alves III, MD.     ASSISTANT:  None.     BLOOD LOSS:  50 mL.     FINDINGS:  Highly membranous network of neovascularization resected all the   way to ___ brain relaxed.     COMPLICATIONS:  None.     DRAINS LEFT:  Subgaleal Hemovac.     DISPOSITION:  Extubated to recovery and to floor.     HISTORY OF PRESENT ILLNESS:  An 88-year-old right-handed woman who had   progressive decline, memory issues, various vague complaints, and went to the   outside hospital, had 2.5 cm likely membranous subdural hematoma, confirmed by   MRI here.  I explained the risks, benefits and alternatives of craniotomy for   resection and this includes pain, infection, bleeding, CSF leak, failure to   completely resolve symptoms, neurologic deficit, weakness, numbness, speech   difficulties, need for other procedures understood and she understood the   risks, benefits and agreed to consent.     SUMMARY OF OPERATIVE PROCEDURE:  The patient was taken to the operating suite,   placed under general anesthesia.  Solis catheter was placed on a regular bed   supine, right hemicranium clipped of hair and presented at the vertex with a   right shoulder bump, curvilinear incision with the zygoma as the base, but we   are going to try to stay out of the temporalis muscles, its mostly high was   drawn out.  Preoperative antibiotics were given.  Proper timeout was   performed.  Keppra was given.  The patient was prepped and draped in a sterile   fashion.     A curvilinear incision was made and myocutaneous flap held back with   self-retaining retractors and Eli clips.  We used a craniotome to make 3 bur   holes, connected with a B1 footplate drilled central tack-ups.  We did   circumferential tack-ups around the dura.  We got hemostasis.  We then did  a   curvilinear incision in the dura with temporally as the base and used ice cold   bipolars to resect meticulously to all the temporal, frontal and parietal   poles.  All of the membranous subdural hematoma, Surgiflo and Surgicel were   helpful.  We went under the inner table of the skull to resect any bleeding   areas.  We had meticulous hemostasis with clear irrigation at the end.  We   laid the dura back down, a few 4-0 Nurolon tack-ups and Duragen in the   defects.  We tied the central tack-up and reaffixed the bone flap to the skull   with standard titanium plates and screws.  We copiously irrigated with   bacitracin-infused saline  We tunneled a subgaleal Hemovac, secured to skin   with stitch.  We assured meticulous hemostasis and closed the wound in   anatomic layers, 3-0 Vicryl for the small amount of temporalis fascia that we   cut, 2-0 Vicryl for the galea and staples for the skin.  Sterile dressings   were applied.  The patient was extubated to go to recovery.     There were no complications.  Needle and sponge count correct at the end of   the case.  ____________________________  MD Ghazala Brown III, M.D.  Physician  Pulmonary  Consults     Signed  Date of Service:  11/4/2023  3:51 PM    Expand All Collapse All      Pulmonary Consultation     Date of consult: 11/4/2023     Referring Physician  CIARAN Donald*     Reason for Consultation  ICU transfer post subdural evacuation     History of Presenting Illness  88 y.o. female who presented 11/3/2023 with high blood pressure, chronic pain syndrome with a pain pump, remote history of A-fib admitted 11/3/2023 transfer from [facility] with a subdural hematoma right side with an 8 mm midnight shift thought to be chronic.  Her [place] Coma Scale 15 alert awake and oriented x4.  Patient went to the OR today for any to me by Dr. Alves [findings showed decompressed cerebrum, extensive membranes  resected, meticulous hemostasis achieved].  Patient transferred to ICU with a subgaleal Hemovac.     Code Status  DNAR/DNI     Review of Systems  Review of Systems   Unable to perform ROS: Acuity of condition   Pt is post op and too sleppy      Past Medical History   has a past medical history of Afib (HCC), COPD (chronic obstructive pulmonary disease) (HCC), and Hypertension.     Surgical History   has a past surgical history that includes other.     Family History  family history is not on file.     Social History   reports that she has never smoked. She has never used smokeless tobacco. She reports that she does not drink alcohol and does not use drugs.     Assessment/Plan  * SDH (subdural hematoma) (HCC)- (present on admission)  Assessment & Plan  Right sided  S/p evacuation by DR. Alves with a subgaleal drain  Neurochecks q 2  Pt following commands and moving extremities  Keep normothermia  Keep normal glycemia  Keep sodium within normal limits  On Keppra     Chronic pain syndrome- (present on admission)  Assessment & Plan  Patient on gabapentin and as needed Dilaudid     COPD (chronic obstructive pulmonary disease) (HCC)- (present on admission)  Assessment & Plan  RT protocol              Diandra Mcmillan D.O.  Physician  Physical Medicine & Rehab  Consults     Signed  Date of Service:  11/7/2023  5:23 PM  Consult Orders  IP Consult For Physiatry [[id number]] ordered by Pako Carroll M.D. at 11/07/23 1618                                                    Physical Medicine and Rehabilitation Consultation                                                                            Date of initial consultation: 11/7/2023  Requesting provider:  ordered by Pako Carroll M.D. at 11/07/23 1618  Consulting provider: Diandra Mcmillan D.O.  Reason for consultation: assess for acute inpatient rehab appropriateness  LOS: 4 Day(s)     Chief complaint: SDH      HPI: The patient is a 88 y.o.  female with a past medical history  of HTN, chronic pain s/p pain pump, and remote history of a fib not on AC, ;  who presented on 11/3/2023  9:59 PM as a transfer from OSH with concern for SDH. Per documentation, patient presented to OSH with issues with memory and AMS. CT head obtained at OSH showed SDH with right to left 8mm midline shift. Patient was transferred to [facility], Neurosurgery was consulted, and patient was taken to the OR on 11/4 for right sided craniotomy for evacuation of SDH and resection of membranes performed by Dr. Alves. Post op, patient has required precedex drip which is being weaned. Patient' required fentanyl post op, but this has been weaned after patient's pain pump was interrogated by [employer] and confirmed patient's pain pump is in working order. Patient's hospital course has been notable for leukocytosis and ABLA. Patient has been able to  participate with therapy, she is Min A for mobility and ADLs      Patient seen and examined at bedside, patient calm and cooperative. Patient's daughter reports patient's cognition is near back to her baseline, with some memory issues both otherwise much improved. Patient reports mild HA. Otherwise, Does not report HA, lightheadedness, SOB, CP, abdominal pain, or changes in numbness/tingling/weakness.      Social Hx:  Patient lives alone in 5th wheel at daugther's house, planning to stay in dather's main house , 1 VICENTA daughter's house, 4 VICENTA 5th wheel   4 VICENTA  At prior level of function patient was Independent with mobility and ADLs.      Tobacco: Denies   Alcohol: Denies   Drugs: Denies      THERAPY:  Restrictions: Fall Risk   PT: Functional mobility   11/7 Min A with FWW x 12 ft, Min A sit to stand, Min A transfers      OT: ADLs  11/7 Min A upper body dressing, Min A lower body dressing , Min A bed mobility, Min A sit to stand      SLP:   11/7 Regular and thins      IMAGING:    CT-HEAD W/O  Final Result     Postsurgical change from right frontal craniotomy and evacuation of the  "right subdural hematoma.     Postsurgical pneumocephalus and residual blood seen in the right subdural space.     Improved right to left midline shift of 3 mm, previously 6 mm.     MR-BRAIN-W/O  Final Result     1.  Large right frontal-parietal-temporal complex subdural hematoma with multiple loculations and complex membranes.  2.  Mass effect with effacement of sulcal markings and right to left shift of midline structures of about 8 mm.  3.  Mild supratentorial white matter disease consistent with microvascular ischemic change.  4.  Mild pontine ischemic gliosis.  5.  Chronic right maxillary sinusitis.  6.  Dental artifact.     PROCEDURES:  11/4 Right sided craniotomy for evacuation of SDH by Dr. Alves      Physical Exam:  Vitals: /57   Pulse 77   Temp 36.8 °C (98.3 °F)   Resp (!) 26   Ht 1.6 m (5' 3\")   Wt 70.3 kg (154 lb 15.7 oz)   SpO2 93%   Gen: NAD, laying comfortably in bed   Head: scalp incision CDI   Eyes/ Nose/ Mouth: PERRLA, moist mucous membranes  Cardio: RRR, good distal perfusion, warm extremities  Pulm: normal respiratory effort, no cyanosis, on RA   Abd: Soft NTND, negative borborygmi   Ext: No peripheral edema. No calf tenderness. No clubbing.     Mental status:  A&Ox4 (person, place, date, situation) answers questions appropriately follows commands, impaired memory, reports she has no idea what happened the last 3 days   Speech: fluent, no aphasia or dysarthria     CRANIAL NERVES:  2,3: visual acuity grossly intact, PERRL  3,4,6: EOMI bilaterally, no nystagmus or diplopia  5: sensation intact to light touch bilaterally and symmetric  7: no facial asymmetry  8: hearing grossly intact     Motor:                            Upper Extremity  Myotome R L  Shoulder flexion C5 5/5 5/5  Elbow flexion C5 5/5 5/5  Wrist extension C6 5/5 5/5  Elbow extension C7 5/5 5/5  Finger flexion C8 5/5 5/5  Finger abduction T1 5/5 5/5     Lower Extremity Myotome R L  Hip flexion L2 5/5 5/5  Knee " extension L3 5/5 5/5  Ankle dorsiflexion L4 5/5 5/5  Toe extension L5 5/5 5/5  Ankle plantarflexion S1 5/5 5/5        Negative Pronator drift bilaterally     Sensory:   intact to light touch through out     DTRs: 2+ in bilateral  biceps  No clonus at bilateral ankles  Negative Hdez b/l      Tone: no spasticity noted, no cogwheeling noted     Coordination:   intact fine motor with fingers bilaterally        Labs: Reviewed and significant for   Recent Labs    11/05/23  0440 11/05/23  1122 11/06/23  0415 11/07/23  0320  RBC 4.01*  --  4.23 3.94*  HEMOGLOBIN 11.6* 11.4* 11.9* 11.3*  HEMATOCRIT 35.5* 34.7* 36.6* 34.4*  PLATELETCT 177  --  198 198     Recent Labs    11/06/23  0415 11/06/23  1747 11/07/23  0320  SODIUM 146* 143 143  POTASSIUM 3.4* 4.1 3.9  CHLORIDE 114* 113* 112  CO2 17* 20 14*  GLUCOSE 133* 115* 126*  BUN 19 17 16  CREATININE 0.63 0.63 0.62  CALCIUM 8.6 8.1* 8.2*     Recent Results  Recent Results (from the past 24 hour(s))  Basic Metabolic Panel    Collection Time: 11/06/23  5:47 PM  Result Value Ref Range    Sodium 143 135 - 145 mmol/L    Potassium 4.1 3.6 - 5.5 mmol/L    Chloride 113 (H) 96 - 112 mmol/L    Co2 20 20 - 33 mmol/L    Glucose 115 (H) 65 - 99 mg/dL    Bun 17 8 - 22 mg/dL    Creatinine 0.63 0.50 - 1.40 mg/dL    Calcium 8.1 (L) 8.5 - 10.5 mg/dL    Anion Gap 10.0 7.0 - 16.0  ESTIMATED GFR    Collection Time: 11/06/23  5:47 PM  Result Value Ref Range    GFR (CKD-EPI) 86 >60 mL/min/1.73 m 2  CBC WITH DIFFERENTIAL    Collection Time: 11/07/23  3:20 AM  Result Value Ref Range    WBC 14.2 (H) 4.8 - 10.8 K/uL    RBC 3.94 (L) 4.20 - 5.40 M/uL    Hemoglobin 11.3 (L) 12.0 - 16.0 g/dL    Hematocrit 34.4 (L) 37.0 - 47.0 %    MCV 87.3 81.4 - 97.8 fL    MCH 28.7 27.0 - 33.0 pg    MCHC 32.8 32.2 - 35.5 g/dL    RDW 41.7 35.9 - 50.0 fL    Platelet Count 198 164 - 446 K/uL    MPV 11.1 9.0 - 12.9 fL    Neutrophils-Polys 86.30 (H) 44.00 - 72.00 %    Lymphocytes 7.40 (L) 22.00 - 41.00 %    Monocytes 5.80 0.00  - 13.40 %    Eosinophils 0.00 0.00 - 6.90 %    Basophils 0.10 0.00 - 1.80 %    Immature Granulocytes 0.40 0.00 - 0.90 %    Nucleated RBC 0.00 0.00 - 0.20 /100 WBC    Neutrophils (Absolute) 12.29 (H) 1.82 - 7.42 K/uL    Lymphs (Absolute) 1.05 1.00 - 4.80 K/uL    Monos (Absolute) 0.82 0.00 - 0.85 K/uL    Eos (Absolute) 0.00 0.00 - 0.51 K/uL    Baso (Absolute) 0.02 0.00 - 0.12 K/uL    Immature Granulocytes (abs) 0.05 0.00 - 0.11 K/uL    NRBC (Absolute) 0.00 K/uL  MAGNESIUM    Collection Time: 11/07/23  3:20 AM  Result Value Ref Range    Magnesium 1.9 1.5 - 2.5 mg/dL  PHOSPHORUS    Collection Time: 11/07/23  3:20 AM  Result Value Ref Range    Phosphorus 2.2 (L) 2.5 - 4.5 mg/dL  Basic Metabolic Panel    Collection Time: 11/07/23  3:20 AM  Result Value Ref Range    Sodium 143 135 - 145 mmol/L    Potassium 3.9 3.6 - 5.5 mmol/L    Chloride 112 96 - 112 mmol/L    Co2 14 (L) 20 - 33 mmol/L    Glucose 126 (H) 65 - 99 mg/dL    Bun 16 8 - 22 mg/dL    Creatinine 0.62 0.50 - 1.40 mg/dL    Calcium 8.2 (L) 8.5 - 10.5 mg/dL    Anion Gap 17.0 (H) 7.0 - 16.0  ESTIMATED GFR    Collection Time: 11/07/23  3:20 AM  Result Value Ref Range    GFR (CKD-EPI) 86 >60 mL/min/1.73 m 2    ASSESSMENT:  Patient is a 88 y.o. female admitted with chronic SDH      [facility] Code / Diagnosis to Support: 0002.22 - Brain Dysfunction: Traumatic, Closed Injury     Rehabilitation: Impaired ADLs and mobility  Patient is a good candidate for inpatient rehab based on needs for PT, OT, and speech therapy, see dispo details below.      Barriers to transfer include: Insurance authorization, TCCs to verify disposition, medical clearance and bed availability      Additional Recommendations:  Chronic SDH   AMS   - ASM suspected due to SDH vs pain pump dosage   - Chronic SDH seen on OSH  CT head with R sided 8 mm midline shift   - neurosurg consulted   - 11/4 R sided craniotomy for evacuation of SDH performed by Dr. Alves   - on keppra 500mg BID   - subgaleal drain in place    - Patient with AMS and confusion, requiring precedex drip, now weaning   - continue with PT/OT/SLP      Leukocytosis   - down trending WBC   - 11/7  WBC 14.2   - Afebrile   - will need WBC to continue to trend down prior to acceptance to IRF      Chronic pain   - history of pain pump   - pain pump interogated by [employer] 11/7   - confirmation of appropriate function of pain pump, now utilizing pain pump  + PRN norco for pain control postop      Neuropathy  - chronic neuropathy   - on home dose gabapentin      Dispo:  - patient is currently functioning below their level of baseline, recommend post acute rehab  - recommend IRF level therapy with 3hr of therapy 5 days per week  - prior to acceptance to IRF, will need to be off precedex, have drain removed, and WBC improved   - TCC to assist with insurance auth and DC support      Medical Complexity:  Chronic SDH   AMS   Leukocytosis   Chronic pain   Neuropathy   Impaired mobility and ADLs      DVT PPX: Lovenox      Thank you for allowing us to participate in the care of this patient.      Patient was seen for 81 minutes on unit/floor of which > 50% of time was spent on counseling and coordination of care regarding the above, including prognosis, risk reduction, benefits of treatment, and options for next stage of care.     Diandra Mcmillan D.O.   Physical Medicine and Rehabilitation      Please note that this dictation was created using voice recognition software. I have made every reasonable attempt to correct obvious errors, but there may be errors of grammar and possibly content that I did not discover before finalizing the note.     Co-morbidities:  See above  Potential Risk - Complications: Cognitive Impairment, Deep Vein Thrombosis, Malnutrition, Pain, Perceptual Impairment, Pneumonia, Pressure Ulcer, Seizures, and Infection  Level of Risk: High    Ongoing Medical Management Needed (Medical/Nursing Needs):   Patient Active Problem List    Diagnosis Date Noted  "   Leukocytosis (leucocytosis) 11/05/2023    Acute blood loss anemia 11/05/2023    Altered mental status 11/05/2023    Headache 11/04/2023    Chronic pain syndrome 11/04/2023    Age-related physical debility 11/04/2023    ACP (advance care planning) 11/04/2023    Subdural hematoma (HCC) 11/04/2023    SDH (subdural hematoma) ([facility]) 11/03/2023    Aortic atherosclerosis ([facility]) 05/29/2020    Heart failure with preserved ejection fraction ([facility]) 05/29/2020    Shortness of breath 05/19/2020    COPD (chronic obstructive pulmonary disease) ([facility]) 05/19/2020    Hyperglycemia 05/19/2020    LINUS (acute kidney injury) ([facility]) 05/19/2020    Depression 05/19/2020    Atrial fibrillation with rapid ventricular response ([facility]) 05/19/2020     Maria Elena Waters R.N.  Registered Nurse  Progress Notes     Signed  Date of Service:  11/8/2023  5:59 PM    Monitor Summary  SR: 60's-80's       Current Vital Signs:   Temperature: 36.9 °C (98.4 °F) Pulse: 68 Respiration: 16 Blood Pressure : (!) 142/65  Weight: 70.3 kg (154 lb 15.7 oz) Height: 160 cm (5' 3\")  Pulse Oximetry: 95 % O2 (LPM): 0      Completed Laboratory Reports:  Recent Labs     11/06/23  1747 11/07/23  0320 11/08/23  0215 11/08/23  1020 11/09/23  0517   WBC  --  14.2* 6.9  --  6.0   HEMOGLOBIN  --  11.3* 9.4*  --  9.1*   HEMATOCRIT  --  34.4* 29.0*  --  28.0*   PLATELETCT  --  198 140*  --  142*   SODIUM 143 143  --  138 136   POTASSIUM 4.1 3.9  --  3.0* 3.5*   BUN 17 16  --  13 13   CREATININE 0.63 0.62  --  0.68 0.65   ALBUMIN  --   --   --   --  2.1*   GLUCOSE 115* 126*  --  163* 126*     Additional Labs: Not Applicable    Prior Living Situation:   Housing / Facility: Other (Comments) ([address] on daughter's property)  Steps Into Home: 4  Steps In Home: 1  Lives with - Patient's Self Care Capacity: Alone and Able to Care For Self  Equipment Owned: Single Point Cane    Prior Level of Function / Living Situation:   Physical Therapy: Prior Services: Home-Independent  Housing / Facility: " Other (Comments) ([address] on daughter's property)  Steps Into Home: 4  Steps In Home: 1  Rail: None  Bathroom Set up: Walk In Shower  Equipment Owned: Single Point Cane  Lives with - Patient's Self Care Capacity: Alone and Able to Care For Self  Bed Mobility: Independent  Transfer Status: Independent  Ambulation: Independent  Assistive Devices Used: Single Point Cane  Stairs: Independent  Current Level of Function:   Gait Level Of Assist: Minimal Assist  Assistive Device: Front Wheel Walker  Distance (Feet): 20  Deviation: Bradykinetic, Decreased Base Of Support, Other (Comment) (Asymmetrical step & stride length)  Supine to Sit: Minimal Assist  Sit to Supine: Minimal Assist  Scooting: Contact Guard Assist  Comments: flat bed  Sit to Stand: Minimal Assist  Bed, Chair, Wheelchair Transfer: Minimal Assist  Toilet Transfers: Minimal Assist  Transfer Method: Stand Step  Sitting in Chair: Post session  Sitting Edge of Bed: 30+min  Standin-8min  Occupational Therapy:   Self Feeding: Independent  Grooming / Hygiene: Independent  Bathing: Independent  Dressing: Independent  Toileting: Independent  Medication Management: Independent  Laundry: Independent  Kitchen Mobility: Independent  Finances: Independent  Home Management: Independent  Shopping: Independent  Prior Level Of Mobility: Independent Without Device in Community  Prior Services: Home-Independent  Housing / Facility: Other (Comments) (5th wheel on daughter's property)  Current Level of Function:   Eating: Supervision  Upper Body Dressing: Minimal Assist  Lower Body Dressing: Minimal Assist  Toileting: Minimal Assist  Speech Language Pathology:      Rehabilitation Prognosis/Potential: Good  Estimated Length of Stay: 14-21 days    Nursing:      Continent    Scope/Intensity of Services Recommended:  Physical Therapy: 1 hr / day  5 days / week. Therapeutic Interventions Required: Maximize Endurance, Mobility, Strength, and Safety  Occupational Therapy: 1 hr /  day 5 days / week. Therapeutic Interventions Required: Maximize Self Care, ADLs, IADLs, and Energy Conservation  Speech & Language Pathology: 1 hr / day 5 days / week. Therapeutic Interventions Required: Maximize Cognition, Swallowing, and Safety  Rehabilitation Nursin. Therapeutic Interventions Required: Monitor Pain, Skin, Vital Signs, Intake and Output, Labs, Safety, Aspiration Risk, Family Training, and Right craniotomy incision care; DVT Prophylaxis; Bowel and bladder regimen; Infection control; ADL's.  Rehabilitation Physician: 3 - 5 days / week. Therapeutic Interventions Required: Medical Management  Respiratory Care: Consult. Therapeutic Interventions Required: Pulmonary Toileting and Respiratory care per protocol.   Dietician: Consult. Therapeutic Interventions Required: Nutritional evaluation with recommendations to promote optimal health and healing.     She requires 24-hour rehabilitation nursing to manage bowel and bladder function, skin care, surgical incision, nutrition and fluid intake, pulmonary hygiene, pain control, safety, medication management, and patient/family goals. In addition, rehabilitation nursing will reiterate and reinforce therapy skills and equipment use, including ADLs, as well as provide education to the patient and family. Anita Beard is willing to participate in and is able to tolerate the proposed plan of care.    Rehabilitation Goals and Plan (Expected frequency & duration of treatment in the IRF):   Return to the Community, Modified Independent Level of Care, Outpatient Support, and Family Able to Provide  Assistance  Anticipated Date of Rehabilitation Admission: 2023  Patient/Family oriented IRF level of care/facility/plan: Yes  Patient/Family willing to participate in IRF care/facility/plan: Yes  Patient able to tolerate IRF level of care proposed: Yes  Patient has potential to benefit IRF level of care proposed: Yes  Comments: Not Applicable    Special  "Needs or Precautions - Medical Necessity:  Safety Concerns/Precautions:  Fall Risk / High Risk for Falls, Balance, Cognition, and Bed / Chair Alarm  Complex Wound Care: Right craniotomy incision care  Pain Management    Diet:   DIET ORDERS (From admission to next 24h)       Start     Ordered    11/07/23 1033  Diet Order Diet: Regular  ALL MEALS        Question:  Diet:  Answer:  Regular    11/07/23 1032                    Anticipated Discharge Destination / Patient/Family Goal:  Destination: Home with Assistance Support System: Family   Anticipated home health services: OT, PT, SLP, Nursing, and Aide  Previously used HH service/ provider: Not Applicable  Anticipated DME Needs:  To be determined  Outpatient Services:  To be determined  Alternative resources to address additional identified needs:   No future appointments.        Lisset Antunez R.N.  Clinical Admissions Coordinator  Discharge Planning     Signed  Date of Service:  11/8/2023  2:47 PM    [facility] Rehabilitation Transitional Care Coordination     Physiatry consult complete.  Dr. Mcmillan recommending good candidate for inpatient rehab.       Telephone call to patients daughter Cata Morton to discuss IRF referral.  Explained specifics of inpatient rehab, answered questions/concerns.  Cata voiced awareness RR IRP from family member prior admission [facility]   Cata endorses acute rehab for her Mother, states patient in agreement.   Anita's RV is parked at Cata's.  Cata will plan to have Anita to stay with she/family main residence at discharge.  Cata/family will provide support as needed at discharge.  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  Reviewed Medicare/[employer] insurance for program.  Cata aware potential admission pending medical clearance.  Provided TCC " contact information.      Pre-Screen Completed: 11/9/2023 9:55 AM Lisset Antunez R.N.

## 2023-11-09 NOTE — PROGRESS NOTES
Hospital Medicine Daily Progress Note    Date of Service  11/8/2023    Chief Complaint  Anita Beard is a 88 y.o. female admitted 11/3/2023 with associated headache.  altered    Hospital Course  Anita Beard is a 88 y.o. female with history of hypertension, chronic pain syndrome with pain pump, remote history of A-fib no longer on anticoagulation who presented 11/3/2023 as transfer from [facility] ER for SDH.  Patient reported to have memory issue, CT head done at transferring ER --which noted SDH with right to left 8 mm midline shift.  went to the OR on11/4 for craniotomy and hematoma evacuation    Interval Problem Update    11/07/23    I evaluated and examined her at the bedside.  She was sitting in chair and reported that she is feeling better.  She has been seen using significant amount of opioid for pain control. Plan is to titrate down fentanyl.   Currently she is requiring 2 L of oxygen to maintain oxygen saturation.  Hemoglobin remained stable plan is to initiate DVT prophylaxis.  Continue Precedex gtt. I am titrating as per RASS of -1 to +1.    11/08/23  I evaluated and examined her at the bedside  She developed A-fib with rapid ventricular response this morning.  I started her on IV metoprolol 5 mg and she received 2 doses of IV metoprolol and I also started her on p.o. metoprolol.  She converted back to sinus rhythm.  Patient reported that she is feeling better.  I discussed plan of care with patient and her daughter at the bedside and answered all of their questions.  Plan is to remove the drain today.  If she will remain stable and neurosurgery will clear she can be discharged to rehab.    I have discussed this patient's plan of care and discharge plan at IDT rounds today with Case Management, Nursing, Nursing leadership, and other members of the IDT team.    Consultants/Specialty  neurosurgery    Code Status  DNAR/DNI    Disposition  Medically Cleared  I have placed the appropriate orders for  post-discharge needs.    Review of Systems  Review of Systems   Constitutional:  Positive for malaise/fatigue. Negative for chills, fever and weight loss.   HENT:  Negative for hearing loss and tinnitus.    Eyes:  Negative for blurred vision, double vision, photophobia and pain.   Respiratory:  Negative for cough, sputum production and shortness of breath.    Cardiovascular:  Negative for chest pain, palpitations, orthopnea and leg swelling.   Gastrointestinal:  Negative for abdominal pain, constipation, diarrhea, nausea and vomiting.   Genitourinary:  Negative for dysuria, frequency and urgency.   Musculoskeletal:  Positive for myalgias. Negative for back pain, joint pain and neck pain.   Skin:  Negative for rash.   Neurological:  Positive for weakness. Negative for dizziness, tingling, tremors, sensory change, speech change, focal weakness and headaches.   Psychiatric/Behavioral:  Negative for hallucinations and substance abuse.    All other systems reviewed and are negative.       Physical Exam  Temp:  [36.6 °C (97.8 °F)-36.9 °C (98.4 °F)] 36.9 °C (98.4 °F)  Pulse:  [] 89  Resp:  [13-35] 18  BP: ()/(46-77) 121/56  SpO2:  [86 %-100 %] 93 %    Physical Exam  Vitals reviewed.   Constitutional:       General: She is not in acute distress.     Appearance: Normal appearance. She is not ill-appearing.      Comments: She is sitting in chair.    HENT:      Head: Normocephalic and atraumatic.      Comments: [place] present     Nose: No congestion.   Eyes:      General:         Right eye: No discharge.         Left eye: No discharge.      Pupils: Pupils are equal, round, and reactive to light.   Cardiovascular:      Rate and Rhythm: Tachycardia present. Rhythm irregular.      Pulses: Normal pulses.      Heart sounds: Normal heart sounds. No murmur heard.  Pulmonary:      Effort: Pulmonary effort is normal. No respiratory distress.      Breath sounds: Normal breath sounds. No stridor.   Abdominal:      General:  Bowel sounds are normal. There is no distension.      Palpations: Abdomen is soft.      Tenderness: There is no abdominal tenderness.   Musculoskeletal:         General: No swelling or tenderness. Normal range of motion.      Cervical back: Normal range of motion. No rigidity.   Skin:     General: Skin is warm.      Capillary Refill: Capillary refill takes less than 2 seconds.      Coloration: Skin is not jaundiced or pale.      Findings: No bruising.   Neurological:      General: No focal deficit present.      Mental Status: She is alert and oriented to person, place, and time.      Cranial Nerves: No cranial nerve deficit.      Comments: She is following commands and moving all her extremities.   Psychiatric:         Mood and Affect: Mood normal.         Behavior: Behavior normal.         Fluids    Intake/Output Summary (Last 24 hours) at 11/8/2023 1650  Last data filed at 11/8/2023 1200  Gross per 24 hour   Intake 252.85 ml   Output 755 ml   Net -502.15 ml         Laboratory  Recent Labs     11/06/23  0415 11/07/23  0320 11/08/23  0215   WBC 19.7* 14.2* 6.9   RBC 4.23 3.94* 3.29*   HEMOGLOBIN 11.9* 11.3* 9.4*   HEMATOCRIT 36.6* 34.4* 29.0*   MCV 86.5 87.3 88.1   MCH 28.1 28.7 28.6   MCHC 32.5 32.8 32.4   RDW 40.7 41.7 42.7   PLATELETCT 198 198 140*   MPV 10.7 11.1 11.1       Recent Labs     11/06/23  1747 11/07/23  0320 11/08/23  1020   SODIUM 143 143 138   POTASSIUM 4.1 3.9 3.0*   CHLORIDE 113* 112 107   CO2 20 14* 20   GLUCOSE 115* 126* 163*   BUN 17 16 13   CREATININE 0.63 0.62 0.68   CALCIUM 8.1* 8.2* 8.2*                         Imaging  CT-HEAD W/O   Final Result         Postsurgical change from right frontal craniotomy and evacuation of the right subdural hematoma.      Postsurgical pneumocephalus and residual blood seen in the right subdural space.      Improved right to left midline shift of 3 mm, previously 6 mm.            MR-BRAIN-W/O   Final Result      1.  Large right frontal-parietal-temporal  complex subdural hematoma with multiple loculations and complex membranes.   2.  Mass effect with effacement of sulcal markings and right to left shift of midline structures of about 8 mm.   3.  Mild supratentorial white matter disease consistent with microvascular ischemic change.   4.  Mild pontine ischemic gliosis.   5.  Chronic right maxillary sinusitis.   6.  Dental artifact.      OUTSIDE IMAGES-CT HEAD   Final Result           Assessment/Plan  * SDH (subdural hematoma) ([facility])- (present on admission)  Assessment & Plan  SDH with right sided 8mm midline shift noted on CT head at transferring facility  No known trauma, however the patient's mental status is such that where she to have fallen, it is likely she would not remember.  Target SBP<140-160  Now s/p craniotomy and drainage  Continue serial neurochecks  I reviewed neurosurgery note they recommended to initiate pharmacological DVT prophylaxis I started her on Lovenox 40 mg on November 7, 2023  Neurosurgery is following her.  Plan is to remove the drain.  I discussed plan of care with patient and her daughter at the bedside and answered all of their questions.    Altered mental status  Assessment & Plan  Patient likely has underlying dementia based on the discussion with family  Multifactorial: Subdural hematoma, recent intervention, underlying dementia, possible opiate withdrawal.  Start scheduled IV fentanyl in order to treat possible withdrawal syndrome until patient is able to take p.o.  Now s/p craniotomy and drainage of subdural hematoma  Frequent reorientation  Window shades open  Encourage family to visit  Mobilize    11/6  Pt having bradycardia into the 30s on precedex so have had to step back on that medication  This afternoon on repeat exam the patient is awake alert and interactive.  She is having some back pain when she moves around in bed but this is chronic for her.  She is oriented to the hospital, to this examiner, and to her family.  Moving  all 4 extremities purposefully and to command.    11/7 patient is still requiring Precedex gtt.  Plan is to wean her off as tolerated. I am titrating as per RASS of -1 to +1.    11/8 now patient symptoms are significantly improved.  Plan is to remove the drain today.    ACP (advance care planning)  Assessment & Plan  Per discussion with my partner, patient is DNR/I    Age-related physical debility  Assessment & Plan  Fall precautions  PT/OT when appropriate.     Chronic pain syndrome- (present on admission)  Assessment & Plan  Patient has a pain pump in place which is functioning and apparently distributing narcotic  She also takes Tylenol No. 4, 3 times daily and has been doing so for many years.  Continue fentanyl continuous infusion through pain pump.    Headache  Assessment & Plan  Secondary subdural hematoma    Atrial fibrillation with rapid ventricular response (HCC)- (present on admission)  Assessment & Plan  Patient developed atrial fibrillation with rapid ventricular response.  Patient's daughter reported that she was diagnosed with A-fib approximately 3 years ago.  She is not on anticoagulation.  I started her on IV metoprolol and she received 2 doses of IV metoprolol and I also started him on p.o. metoprolol.  Her symptoms improved and she converted to sinus rhythm.  Repeat EKG was performed.      COPD (chronic obstructive pulmonary disease) (HCC)- (present on admission)  Assessment & Plan  Not in acute exacerbation  O2 and RT protocols         I discussed plan of care during multidisciplinary rounds regarding patient's current medical condition and plan of care.      VTE prophylaxis:    enoxaparin ppx      I have performed a physical exam and reviewed and updated ROS and Plan today (11/8/2023). In review of yesterday's note (11/7/2023), there are no changes except as documented above.

## 2023-11-09 NOTE — FLOWSHEET NOTE
11/09/23 1205   Events/Summary/Plan   Events/Summary/Plan RT Consult   Vital Signs   Pulse 72   Respiration 16   Pulse Oximetry 96 %   $ Pulse Oximetry (Spot Check) Yes   Respiratory Assessment   Level of Consciousness Alert   Chest Exam   Work Of Breathing / Effort Within Normal Limits   Breath Sounds   RUL Breath Sounds Clear   RML Breath Sounds Clear   RLL Breath Sounds Diminished   NIDIA Breath Sounds Clear   LLL Breath Sounds Diminished   Oxygen   O2 Delivery Device Room air w/o2 available   Smoking History   Have you ever smoked Never

## 2023-11-09 NOTE — CARE PLAN
The patient is Watcher - Medium risk of patient condition declining or worsening    Shift Goals  Clinical Goals: q4h neuro, hemodynamic stability  Patient Goals: rest  Family Goals: updates    Progress made toward(s) clinical / shift goals:       Problem: Knowledge Deficit - Standard  Goal: Patient and family/care givers will demonstrate understanding of plan of care, disease process/condition, diagnostic tests and medications  Outcome: Progressing   Pt updated with POC, verbalizes understanding    Problem: Hemodynamics  Goal: Patient's hemodynamics, fluid balance and neurologic status will be stable or improve  Outcome: Progressing   Q2h vitals, continuous tele and pulse oximetry monitoring    Problem: Pain - Standard  Goal: Alleviation of pain or a reduction in pain to the patient’s comfort goal  Outcome: Progressing  0-10 pain scale in use. Pt has implanted chronic pain pump for pain control     Problem: Fall Risk  Goal: Patient will remain free from falls  Outcome: Progressing   Bed alarm on, bed in low position and locked. Call light within reach. Daughter at bedside    Problem: Skin Integrity  Goal: Skin integrity is maintained or improved      Outcome: Progressing     Problem: Craniotomy Surgery  Goal: Post-Operative Craniotomy Surgery: Patient will achieve optimal post-surgical outcomes  Outcome: Progressing  Neuro assessment changed from q2 to q4. Subgleal drain removed.     Problem: Neuro Status  Goal: Neuro status will remain stable or improve  Outcome: Progressing   Q4h neuro assessment. Neuro intact throughout shift

## 2023-11-09 NOTE — H&P
Physical Medicine & Rehabilitation  History and Physical (H&P)  &     Post Admission Physician Evaluation (RAJNI)       Date of Admission: 11/9/2023  Date of Service: 11/9/2023   Anita Beard  RH07/02    [facility] Code to Support Admission: 0002.22 - Brain Dysfunction: Traumatic, Closed Injury  Etiologic Diagnosis: SDH (subdural hematoma) (HCC)    _______________________________________________    Chief Complaint: Decreased mobility, weakness    History of Present Illness:  Adapted from the PM&R Consult by Dr. Mcmillan:   The patient is a 88 y.o.  female with a past medical history of HTN, chronic pain s/p pain pump, and remote history of a fib not on AC, ;  who presented on 11/3/2023  9:59 PM as a transfer from OSH with concern for SDH. Per documentation, patient presented to OSH with issues with memory and AMS. CT head obtained at OSH showed SDH with right to left 8mm midline shift. Patient was transferred to [facility], Neurosurgery was consulted, and patient was taken to the OR on 11/4 for right sided craniotomy for evacuation of SDH and resection of membranes performed by Dr. Alves. Post op, patient has required precedex drip which is being weaned. Patient' required fentanyl post op, but this has been weaned after patient's pain pump was interrogated by [employer] and confirmed patient's pain pump is in working order. Patient's hospital course has been notable for leukocytosis and ABLA.     Patient tolerated transfer to [facility]. She reports mild headache. She reports cognition is close to baseline. Denies fever or chills. Denies dizziness.     Review of Systems:     Comprehensive 14 point ROS was reviewed and all were negative except as noted elsewhere in this document.     Past Medical History:  Past Medical History:   Diagnosis Date    Afib (HCC)     COPD (chronic obstructive pulmonary disease) (HCC)     Hypertension        Past Surgical History:  Past Surgical History:   Procedure Laterality Date    CRANIOTOMY Right 11/4/2023     Procedure: CRANIOTOMY;  Surgeon: Johan Alves III, M.D.;  Location: SURGERY [facility];  Service: Neurosurgery    OTHER      2006 back surgery        Family History:  No family history on file.    Medications:  Current Facility-Administered Medications   Medication Dose    Respiratory Therapy Consult      Pharmacy Consult Request ...Pain Management Review 1 Each  1 Each    hydrALAZINE (Apresoline) tablet 25 mg  25 mg    acetaminophen (Tylenol) tablet 650 mg  650 mg    senna-docusate (Pericolace Or Senokot S) 8.6-50 MG per tablet 2 Tablet  2 Tablet    And    polyethylene glycol/lytes (Miralax) PACKET 1 Packet  1 Packet    And    magnesium hydroxide (Milk Of Magnesia) suspension 30 mL  30 mL    And    bisacodyl (Dulcolax) suppository 10 mg  10 mg    omeprazole (PriLOSEC) capsule 20 mg  20 mg    mag hydrox-al hydrox-simeth (Maalox Plus Es Or Mylanta Ds) suspension 20 mL  20 mL    ondansetron (Zofran ODT) dispertab 4 mg  4 mg    Or    ondansetron (Zofran) syringe/vial injection 4 mg  4 mg    traZODone (Desyrel) tablet 50 mg  50 mg    sodium chloride (Ocean) 0.65 % nasal spray 2 Spray  2 Spray    oxyCODONE immediate-release (Roxicodone) tablet 5 mg  5 mg    Or    oxyCODONE immediate release (Roxicodone) tablet 10 mg  10 mg    midazolam (VERSED) 5 mg/mL (1 mL vial)  5 mg    atenolol (Tenormin) tablet 25 mg  25 mg    atorvastatin (Lipitor) tablet 40 mg  40 mg    enoxaparin (Lovenox) inj 40 mg  40 mg    [START ON 11/10/2023] gabapentin (Neurontin) capsule 300 mg  300 mg    And    gabapentin (Neurontin) capsule 600 mg  600 mg    levETIRAcetam (Keppra) tablet 500 mg  500 mg    Pain Pump (Patient Supplied) Device      LORazepam (Ativan) injection 2 mg  2 mg       Allergies:  Ace inhibitors    Psychosocial History:  Housing / Facility: Other (Comments) ([address] on daughter's property)  Steps Into Home: 4  Steps In Home: 1  Lives with - Patient's Self Care Capacity: Alone and Able to Care For Self  Equipment Owned:  "Single Point Cane     Prior Level of Function / Living Situation:   Physical Therapy: Prior Services: Home-Independent  Housing / Facility: Other (Comments) ([address] on daughter's property)  Steps Into Home: 4  Steps In Home: 1  Rail: None  Bathroom Set up: Walk In Shower  Equipment Owned: Single Point Cane  Lives with - Patient's Self Care Capacity: Alone and Able to Care For Self  Bed Mobility: Independent  Transfer Status: Independent  Ambulation: Independent  Assistive Devices Used: Single Point Cane  Stairs: Independent  Current Level of Function:   Gait Level Of Assist: Minimal Assist  Assistive Device: Front Wheel Walker  Distance (Feet): 20  Deviation: Bradykinetic, Decreased Base Of Support, Other (Comment) (Asymmetrical step & stride length)  Supine to Sit: Minimal Assist  Sit to Supine: Minimal Assist  Scooting: Contact Guard Assist  Comments: flat bed  Sit to Stand: Minimal Assist  Bed, Chair, Wheelchair Transfer: Minimal Assist  Toilet Transfers: Minimal Assist  Transfer Method: Stand Step  Sitting in Chair: Post session  Sitting Edge of Bed: 30+min  Standin-8min  Occupational Therapy:   Self Feeding: Independent  Grooming / Hygiene: Independent  Bathing: Independent  Dressing: Independent  Toileting: Independent  Medication Management: Independent  Laundry: Independent  Kitchen Mobility: Independent  Finances: Independent  Home Management: Independent  Shopping: Independent  Prior Level Of Mobility: Independent Without Device in Community  Prior Services: Home-Independent  Housing / Facility: Other (Comments) (5th wheel on daughter's property)  Current Level of Function:   Eating: Supervision  Upper Body Dressing: Minimal Assist  Lower Body Dressing: Minimal Assist  Toileting: Minimal Assist    CURRENT LEVEL OF FUNCTION:   Same as level of function prior to admission to [facility]    Physical Examination:     VITAL SIGNS:   height is 1.6 m (5' 3\") and weight is 68 kg (149 lb " 14.6 oz). Her temporal temperature is 36.7 °C (98 °F). Her pulse is 80. Her respiration is 18 and oxygen saturation is 95%.   GENERAL: No apparent distress  HEENT: Normocephalic/atraumatic, EOMI, and PERRL  CARDIAC: Regular rate and rhythm, normal S1, S2   LUNGS: Clear to auscultation   ABDOMINAL: bowel sounds present, soft, and nontender    EXTREMITIES: no contractures, spasticity, or edema.   NEURO:  Mental status:  A&Ox4 (person, place, date, situation) answers questions appropriately  Speech: fluent, no aphasia or dysarthria  CRANIAL NERVES: Face symmetric  Motor:    5/5  BUE  5/5 BLE  Sensory: intact to light touch through out  DTRs: 2+ in bilateral biceps and patellar tendons      Radiology:      Results for orders placed during the hospital encounter of 11/03/23    MR-BRAIN-W/O    Impression  1.  Large right frontal-parietal-temporal complex subdural hematoma with multiple loculations and complex membranes.  2.  Mass effect with effacement of sulcal markings and right to left shift of midline structures of about 8 mm.  3.  Mild supratentorial white matter disease consistent with microvascular ischemic change.  4.  Mild pontine ischemic gliosis.  5.  Chronic right maxillary sinusitis.  6.  Dental artifact.                                                            Laboratory Values:  Recent Labs     11/07/23  0320 11/08/23  1020 11/09/23  0517   SODIUM 143 138 136   POTASSIUM 3.9 3.0* 3.5*   CHLORIDE 112 107 107   CO2 14* 20 22   GLUCOSE 126* 163* 126*   BUN 16 13 13   CREATININE 0.62 0.68 0.65   CALCIUM 8.2* 8.2* 8.1*     Recent Labs     11/07/23  0320 11/08/23  0215 11/09/23  0517   WBC 14.2* 6.9 6.0   RBC 3.94* 3.29* 3.18*   HEMOGLOBIN 11.3* 9.4* 9.1*   HEMATOCRIT 34.4* 29.0* 28.0*   MCV 87.3 88.1 88.1   MCH 28.7 28.6 28.6   MCHC 32.8 32.4 32.5   RDW 41.7 42.7 42.3   PLATELETCT 198 140* 142*   MPV 11.1 11.1 11.4           Primary Rehabilitation Diagnosis:    This patient is a 88 y.o. female admitted for  acute inpatient rehabilitation with SDH (subdural hematoma) (HCC).    Impairments:   Cognitive  ADLs/IADLs  Mobility    Secondary Diagnosis/Medical Co-morbidities Affecting Function  HTN/HLD  Anemia  Hypokalemia  Chronic pain    Relevant Changes Since Preadmission Evaluation:    Status unchanged    The patient's rehabilitation potential is Good  The patient's medical prognosis is good    Rehabilitation Plan:   Discussion and Recommendations:   1. The patient requires an acute inpatient rehabilitation program with a coordinated program of care at an intensity and frequency not available at a lower level of care. This recommendation is substantiated by the patient's medical physicians who recommend that the patient's intervention and assessment of medical issues needs to be done at an acute level of care for patient's safety and maximum outcome.   2. A coordinated program of care will be supplied by an interdisciplinary team of physical therapy, occupational therapy, rehab physician, rehab nursing, and, if needed, speech therapy and rehab psychology. Rehab team presents a patient-specific rehabilitation and education program concentrating on prevention of future problems related to accessibility, mobility, skin, bowel, bladder, sexuality, and psychosocial and medical/surgical problems.   3. Need for Rehabilitation Physician: The rehab physician will be evaluating the patient on a multi-weekly basis to help coordinate the program of care. The rehab physician communicates between medical physicians, therapists, and nurses to maximize the patient's potential outcome. Specific areas in which the rehab physician will be providing daily assessment include the following:   A. Assessing the patient's heart rate and blood pressure response (vitals monitoring) to activity and making adjustments in medications or conservative measures as needed.   B. The rehab physician will be assessing the frequency at which the program can be  increased to allow the patient to reach optimal functional outcome.   C. The rehab physician will also provide assessments in daily skin care, especially in light of patient's impairments in mobility.   D. The rehab physician will provide special expertise in understanding how to work with functional impairment and recommend appropriate interventions, compensatory techniques, and education that will facilitate the patient's outcome.   4. Rehab R.N.   The rehab RN will be working with patient to carry over in room mobility and activities of daily living when the patient is not in 3 hours of skilled therapy. Rehab nursing will be working in conjunction with rehab physician to address all the medical issues above and continue to assess laboratory work and discuss abnormalities with the treating physicians, assess vitals, and response to activity, and discuss and report abnormalities with the rehab physician. Rehab RN will also continue daily skin care, supervise bladder/bowel program, instruct in medication administration, and ensure patient safety.   5. Rehab Therapy: Therapies to treat at intensity and frequency of (may change after completion of evaluation by all therapeutic disciplines):       PT:  Physical therapy to address mobility, transfer, gait training and evaluation for adaptive equipment needs 1 hour/day at least 5 days/week for the duration of the ELOS (see below)       OT:  Occupational therapy to address ADLs, self-care, home management training, functional mobility/transfers and assistive device evaluation, and community re-integration 1  hour/day at least 5 days/week for the duration of the ELOS (see below).        ST/Dysphagia:  Speech therapy to address speech, language, and cognitive deficits as well as swallowing difficulties with retraining/dysphagia management and community re-integration with comprehension, expression, cognitive training 1  hour/day at least 5 days/week for the duration of the  PEGGY (see below).     Medical management / Rehabilitation Issues/ Adverse Potential as part of rehabilitation plan     Rehabilitation Issues/Adverse Potential  1.  R sided SDH - Patient with 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 Alves. Patient demonstrates functional deficits in strength, balance, coordination, and ADL's. Patient is admitted to [facility] for comprehensive rehabilitation therapy as described below.   Rehabilitation nursing monitors bowel and bladder control, educates on medication administration, co-morbidities and monitors patient safety.    2.  Neurostimulants: None at this time but continue to assess daily for need to initiate should status change.    3.  DVT prophylaxis:  Patient is on Lovenox for anticoagulation upon transfer. Encourage OOB. Monitor daily for signs and symptoms of DVT including but not limited to swelling and pain to prevent the development of DVT that may interfere with therapies.    4.  GI prophylaxis:  On prilosec to prevent gastritis/dyspepsia which may interfere with therapies.    5.  Pain: No issues with pain currently / Controlled with APAP/Oxycodone    6.  Nutrition/Dysphagia: Dietician monitors nutrient intake, recommend supplements prn and provide nutrition education to pt/family to promote optimal nutrition for wound healing/recovery.     7.  Bladder/bowel:  Start bowel and bladder program as described below, to prevent constipation, urinary retention (which may lead to UTI), and urinary incontinence (which will impact upon pt's functional independence).   - TV Q3h while awake with post void bladder scans, I&O cath for PVRs >400  - up to commode after meal     8.  Skin/dermal ulcer prophylaxis: Monitor for new skin conditions with q.2 h. turns as required to prevent the development of skin breakdown.     9.  Cognition/Behavior: As needed psychologist provides adjustment counseling to illness and psychosocial  barriers that may be potential barriers to rehabilitation.     10. Respiratory therapy: RT performs O2 management prn, breathing retraining, pulmonary hygiene and bronchospasm management prn to optimize participation in therapies.     Medical Co-Morbidities/Adverse Potential Affecting Function:   R sided SDH - Patient with 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 Alves  -PT and OT for mobility and ADLs. Per guidelines, 15 hours per week between PT, OT and/or SLP.  -Follow-up NSG    HTN - Patient on Atenolol 25 mg QHS    HLD - Patient on Atorvastatin 40 mg QHS    Anemia - Check AM CBC    Hypokalemia - Check AM CMP     Chronic pain - Patient with pain pump.    Pain - Patient on APAP/Oxycodone    Skin - Patient at risk for skin breakdown due to debility in areas including sacrum, achilles, elbows and head in addition to other sites. Nursing to assess skin daily.     GI Ppx - Patient on Prilosec for GERD prophylaxis. Patient on Senna-docusate for constipation prophylaxis.        DVT Ppx - Patient Lovenox on transfer.    I personally performed a complete drug regimen review and no potential clinically significant medication issues were identified.     Goals/Expected Level of Function Based on Current Medical and Functional Status:  (may change based on patient's medical status and rate of impairment recovery)  Transfers:   Modified Independent  Mobility/Gait:   Modified Independent  ADL's:   Modified Independent  Cognition:  supervs    DISPOSITION: Discharge to pre-morbid independent living setting with the supportive care of patient's family.    ELOS: 7-14 days  ____________________________________    T. Reymundo Pak MD/PhD  [facility] - Physical Medicine & Rehabilitation   [facility] - Brain Injury Medicine   ____________________________________    Pt was seen today for 75 min, and entire time spent in face-to-face contact was >50% in counseling and coordination of care as detailed in A/P  above.

## 2023-11-09 NOTE — CARE PLAN
The patient is Stable - Low risk of patient condition declining or worsening    Shift Goals  Clinical Goals: q4h neuro, hemodynamic stability  Patient Goals: rest  Family Goals: rehab    Progress made toward(s) clinical / shift goals:       Problem: Knowledge Deficit - Standard  Goal: Patient and family/care givers will demonstrate understanding of plan of care, disease process/condition, diagnostic tests and medications  Outcome: Progressing     Problem: Hemodynamics  Goal: Patient's hemodynamics, fluid balance and neurologic status will be stable or improve  Outcome: Progressing     Problem: Pain - Standard  Goal: Alleviation of pain or a reduction in pain to the patient’s comfort goal  Outcome: Progressing   0-10 pain scale used to assess pain    Problem: Fall Risk  Goal: Patient will remain free from falls  Outcome: Progressing  Bed alarm on, bed in low position and locked, call light within reach     Problem: Neuro Status  Goal: Neuro status will remain stable or improve  Outcome: Progressing   Pt is A&OX4, follows command, moves all extremity

## 2023-11-09 NOTE — CARE PLAN
Problem: Fall Risk - Rehab  Goal: Patient will remain free from falls  Outcome: Progressing     Pt instructed to used call light when in need of assistance, call light and personal belongings within reach.    The patient is Stable - Low risk of patient condition declining or worsening    Shift Goals  Patient Goals: rest

## 2023-11-09 NOTE — ASSESSMENT & PLAN NOTE
Patient developed atrial fibrillation with rapid ventricular response.  Patient's daughter reported that she was diagnosed with A-fib approximately 3 years ago.  She is not on anticoagulation.  I started her on IV metoprolol and she received 2 doses of IV metoprolol and I also started him on p.o. metoprolol.  Her symptoms improved and she converted to sinus rhythm.  Repeat EKG was performed.

## 2023-11-09 NOTE — THERAPY
Physical Therapy   Daily Treatment     Patient Name: Anita Beard  Age:  88 y.o., Sex:  female  Medical Record #: [id number]  Today's Date: 11/9/2023     Precautions  Precautions: Fall Risk;Swallow Precautions    Assessment    Patient seen for PT treatment session. Patient seated in the chair, agreeable for the session. Was able to ambulate with FWW. Showed improvement in her functional mobility as compared to previous session. Will continue to benefit from PT services and recommend post acute placement at this time.     Plan    Treatment Plan Status: (P) Continue Current Treatment Plan  Type of Treatment: (P) Bed Mobility, Gait Training, Neuro Re-Education / Balance, Stair Training, Therapeutic Activities, Therapeutic Exercise  Treatment Frequency: (P) 4 Times per Week  Treatment Duration: (P) Until Therapy Goals Met    DC Equipment Recommendations: (P) Unable to determine at this time  Discharge Recommendations: (P) Recommend post-acute placement for additional physical therapy services prior to discharge home    Objective       11/09/23 0835   Charge Group   Charges  Yes   PT Gait Training (Units) 1   PT Therapeutic Activities (Units) 1   Total Time Spent   PT Total Time Yes   PT Gait Training Time Spent (Mins) 10   PT Therapeutic Activities Time Spent (Mins) 13   PT Total Time Spent (Calculated) 23   Precautions   Precautions Fall Risk;Swallow Precautions   Vitals   Pulse 67   Patient BP Position Sitting  (In the chair, prior to ambulation)   Blood Pressure  (!) 142/65   Pulse Oximetry 97 %   O2 Delivery Device None - Room Air   Pain   Pain Scales 0 to 10 Scale    Intervention Medication (see MAR);Rest   Pain 0 - 10 Group   Location Foot   Location Orientation Right;Left   Therapist Pain Assessment Prior to Activity;During Activity;Post Activity  (Reporting burning pain/sensation in feet, acute on chronic in nature, takes gabapentin at home.)   Cognition    Level of Consciousness Alert   Sitting Lower Body Exercises    Sitting Lower Body Exercises Yes   Ankle Pumps 1 set of 10;Bilateral   Hip Flexion 1 set of 10;Bilateral   Hip Abduction 1 set of 10;Bilateral   Hip Adduction 1 set of 10;Bilateral   Long Arc Quad 1 set of 10;Bilateral   Comments Seated in the chair, AROM   Other Treatments   Other Treatments Provided Reinforced daily mobility & ambulation with nursing, OOB to chair for meals, LE ex's several times during the day. Educated about appropriate posture while seated in the chair-patient tends to sit with head/neck in flexion   Balance   Sitting Balance (Static) Fair   Sitting Balance (Dynamic) Fair   Standing Balance (Static) Fair   Standing Balance (Dynamic) Fair -   Skilled Intervention Facilitation;Sequencing;Verbal Cuing   Comments Standing with FWW   Bed Mobility    Comments Patient already OOB to chair with nursing   Gait Analysis   Gait Level Of Assist Minimal Assist   Assistive Device Front Wheel Walker   Distance (Feet) 30   # of Times Distance was Traveled 2   Deviation Bradykinetic;Other (Comment)  (Reduced step & stride length)   Skilled Intervention Facilitation;Sequencing;Verbal Cuing   Comments Cues for appropriate head/neck and upper trunk posture, directions, sequencing, appropriate use of AD   Functional Mobility   Sit to Stand Minimal Assist   Mobility Sit-stand from chair, W FWW, increased time & effort   Skilled Intervention Facilitation;Sequencing;Verbal Cuing   Comments Cues for hand placement, LE placement   How much difficulty does the patient currently have...   Turning over in bed (including adjusting bedclothes, sheets and blankets)? 2   Sitting down on and standing up from a chair with arms (e.g., wheelchair, bedside commode, etc.) 2   Moving from lying on back to sitting on the side of the bed? 2   How much help from another person does the patient currently need...   Moving to and from a bed to a chair (including a wheelchair)? 3   Need to walk in a hospital room? 3   Climbing 3-5 steps  with a railing? 2   6 clicks Mobility Score 14   Activity Tolerance   Sitting in Chair Post session   Patient / Family Goals    Patient / Family Goal #1 To get better and stronger, be able to walk   Short Term Goals    Short Term Goal # 1 Patient will perform supine-sit with HOB flat with supervision in 6 visits   Goal Outcome # 1 Progressing as expected   Short Term Goal # 2 Patient will perform sit-stand and chair transfers with FWW with supervision in 6 visits   Goal Outcome # 2 Progressing as expected   Short Term Goal # 3 Patient will ambulate > 100 feet with FWW with supervision in 6 visits   Goal Outcome # 3 Progressing as expected   Short Term Goal # 4 Patient will negotiate 3 steps without rails with supervision in 6 visits   Goal Outcome # 4 Progressing as expected   Physical Therapy Treatment Plan   Physical Therapy Treatment Plan Continue Current Treatment Plan   Treatment Plan  Bed Mobility;Gait Training;Neuro Re-Education / Balance;Stair Training;Therapeutic Activities;Therapeutic Exercise   Treatment Frequency 4 Times per Week   Duration Until Therapy Goals Met   Anticipated Discharge Equipment and Recommendations   DC Equipment Recommendations Unable to determine at this time   Discharge Recommendations Recommend post-acute placement for additional physical therapy services prior to discharge home   Interdisciplinary Plan of Care Collaboration   IDT Collaboration with  Nursing;Family / Caregiver   Patient Position at End of Therapy Seated;Chair Alarm On;Call Light within Reach;Tray Table within Reach;Family / Friend in Room   Session Information   Date / Session Number  11/9-2(2/4, 11/13)

## 2023-11-09 NOTE — PROGRESS NOTES
4 Eyes Skin Assessment Completed by Mary GEE RN and Marni GEE RN.    Head Incision  Ears WDL  Nose WDL  Mouth WDL  Neck WDL  Breast/Chest WDL  Shoulder Blades WDL  Spine WDL  (R) Arm/Elbow/Hand Bruising  (L) Arm/Elbow/Hand WDL  Abdomen WDL  Groin WDL  Scrotum/Coccyx/Buttocks WDL/pain pump right buttocks  (R) Leg discoloration  (L) Leg discoloration  (R) Heel/Foot/Toe Bruising/right plantar foot  (L) Heel/Foot/Toe WDL          Devices In Places Blood Pressure Cuff      Interventions In Place Pressure Redistribution Mattress    Possible Skin Injury No    Pictures Uploaded Into Epic N/A  Wound Consult Placed N/A  RN Wound Prevention Protocol Ordered No

## 2023-11-09 NOTE — DISCHARGE PLANNING
Case Management Discharge Planning    Admission Date: 11/3/2023  GMLOS: 6.6  ALOS: 6    6-Clicks ADL Score: 15  6-Clicks Mobility Score: 14      Anticipated Discharge Dispo: Discharge Disposition: Disch to IP rehab facility or distinct part unit (62)      Action(s) Taken: Transport Arranged     Escalations Completed: None    Medically Clear: Yes    Next Steps:  RNCM was notified that patient would be transferring to [facility]. COBRA completed and given to bedside RN. Family and patient updated. Family and patient gave verbal consent to sign 2nd IMM as patient needed to use the restroom.     Barriers to Discharge: None    Is the patient up for discharge tomorrow: Patient will DC to [facility] today via GMT at 1130.

## 2023-11-09 NOTE — PROGRESS NOTES
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  Was alert and oriented following surgery, but became more somnolent and CT was completed following that, and that was reviewed by Dr. Alves and looks fine.   Per daughter, the patient is slow to wake up from anesthesia and has taken several days in the past before mentation has cleared.  Has pain pump implanted.    Subgaleal drain with 15 cc output last 8 hours.     Patient improving  Denies headache  Mobilizing better  Drain out   Pending rehab tsf today     Exam:  Patient calm and cooperative  Speech clear  A&O x 3  PERRL  Grossly strong to upper / lower  Overall appropriate, following commands   Incision C/D/I       BP  Min: 94/46  Max: 142/65  Pulse  Av.4  Min: 63  Max: 89  Resp  Av.7  Min: 16  Max: 54  Temp  Av.8 °C (98.3 °F)  Min: 36.6 °C (97.8 °F)  Max: 36.9 °C (98.5 °F)  SpO2  Av.7 %  Min: 90 %  Max: 97 %    No data recorded    Recent Labs     23  0320 23  0215 23  0517   WBC 14.2* 6.9 6.0   RBC 3.94* 3.29* 3.18*   HEMOGLOBIN 11.3* 9.4* 9.1*   HEMATOCRIT 34.4* 29.0* 28.0*   MCV 87.3 88.1 88.1   MCH 28.7 28.6 28.6   MCHC 32.8 32.4 32.5   RDW 41.7 42.7 42.3   PLATELETCT 198 140* 142*   MPV 11.1 11.1 11.4       Recent Labs     23  0320 23  1020 23  0517   SODIUM 143 138 136   POTASSIUM 3.9 3.0* 3.5*   CHLORIDE 112 107 107   CO2 14* 20 22   GLUCOSE 126* 163* 126*   BUN 16 13 13   CREATININE 0.62 0.68 0.65   CALCIUM 8.2* 8.2* 8.1*                     Intake/Output                         23 07 - 23 0659 23 07 - 11/10/23 0659      Total 1305-5328 5829-8752 Total                 Intake    P.O.  540  -- 540  240  -- 240    P.O. 540 -- 540 240 -- 240    Total Intake 540 -- 540 240 -- 240       Output    Urine  400  -- 400  --  -- --    Number of Times Voided -- -- -- 1 x -- 1 x    Output (mL) ([REMOVED] Urethral Catheter  Temperature probe 16 Fr. 11/08/23 1700) 400 -- 400 -- -- --    Drains  20  -- 20  --  -- --    Output (mL) ([REMOVED] Closed/Suction Drain 1 Right Scalp Hemovac 11/08/23 1240) 20 -- 20 -- -- --    Stool  --  -- --  --  -- --    Number of Times Stooled 1 x -- 1 x -- -- --    Total Output 420 -- 420 -- -- --       Net I/O     120 -- 120 240 -- 240              Intake/Output Summary (Last 24 hours) at 11/9/2023 1110  Last data filed at 11/9/2023 1000  Gross per 24 hour   Intake 540 ml   Output 300 ml   Net 240 ml               phosphorus  250 mg Q6HRS    Metoprolol Tartrate  5 mg Q5 MIN PRN    atenolol  25 mg Nightly    enoxaparin (LOVENOX) injection  40 mg DAILY AT 1800    HYDROcodone/acetaminophen  1 Tablet Q4HRS PRN    levETIRAcetam  500 mg BID    fentaNYL  25 mcg Q2HRS PRN    Pain Pump   Continuous    gabapentin  300 mg QAM    And    gabapentin  600 mg QHS    atorvastatin  40 mg Q EVENING    senna-docusate  2 Tablet BID    And    polyethylene glycol/lytes  1 Packet QDAY PRN    And    bisacodyl  10 mg QDAY PRN    LR  500 mL Once PRN    acetaminophen  650 mg Q6HRS PRN    ondansetron  4 mg Q4HRS PRN    ondansetron  4 mg Q4HRS PRN    Respiratory Therapy Consult   Continuous RT    LORazepam  2 mg Q5 MIN PRN    labetalol  10 mg Q4HRS PRN    hydrALAZINE  10 mg Q4HRS PRN       Assessment and Plan:  Hospital day #7  POD#5  Q4 hour neuro checks.  PT/OT, mobilize  Okay for rehab  Patient will f/u in 2wks with CT    Chemical prophylactic DVT therapy: yes per NS  Start date/time: okay for 40qday

## 2023-11-09 NOTE — PROGRESS NOTES
Patient admitted to facility at 1156 via GMT; accompanied by hospital transport.  Patient assisted to room and positioned in bed for comfort and safety; call light within reach.  Patient assisted with stowing belongings and oriented to room and facility.  Admission assessment performed and documented in computer.

## 2023-11-09 NOTE — CARE PLAN
The patient is Watcher - Medium risk of patient condition declining or worsening          Shift Goals  Clinical Goals: q4 neuro, hemodynamic stability  Patient Goals: rest  Family Goals: updates    Progress made toward(s) clinical / shift goals:    Problem: Urinary - Renal Perfusion  Goal: Ability to achieve and maintain adequate renal perfusion and functioning will improve  Outcome: Progressing     Problem: Respiratory  Goal: Patient will achieve/maintain optimum respiratory ventilation and gas exchange  Outcome: Progressing     Problem: Fall Risk  Goal: Patient will remain free from falls  Outcome: Progressing     Problem: Craniotomy Surgery  Goal: Post-Operative Craniotomy Surgery: Patient will achieve optimal post-surgical outcomes  Outcome: Progressing     Problem: Pain - Standard  Goal: Alleviation of pain or a reduction in pain to the patient’s comfort goal  Outcome: Progressing       Patient is not progressing towards the following goals:

## 2023-11-09 NOTE — DISCHARGE PLANNING
[facility] Transitional Care Coordination    Dr. Reymundo Pak will accept Anita to inpatient rehab.  T transport 1130a/12noon today.  Volate update to Dr. Byers.  Nursing to call report to [phone].  Volate update to bedside RN Maria Elena.  Volate update to CHERYL Staley.  Telephone call to daughter Cata, provided update with transport time, provided [facility] Nurses station contact number.    TCC will follow to assist as needed with transition to [facility].

## 2023-11-10 ENCOUNTER — APPOINTMENT (OUTPATIENT)
Dept: PHYSICAL THERAPY | Facility: REHABILITATION | Age: 88
DRG: 949 | End: 2023-11-10
Attending: PHYSICAL MEDICINE & REHABILITATION
Payer: MEDICARE

## 2023-11-10 ENCOUNTER — APPOINTMENT (OUTPATIENT)
Dept: SPEECH THERAPY | Facility: REHABILITATION | Age: 88
DRG: 949 | End: 2023-11-10
Attending: PHYSICAL MEDICINE & REHABILITATION
Payer: MEDICARE

## 2023-11-10 ENCOUNTER — APPOINTMENT (OUTPATIENT)
Dept: OCCUPATIONAL THERAPY | Facility: REHABILITATION | Age: 88
DRG: 949 | End: 2023-11-10
Attending: PHYSICAL MEDICINE & REHABILITATION
Payer: MEDICARE

## 2023-11-10 LAB
25(OH)D3 SERPL-MCNC: 28 NG/ML (ref 30–100)
ALBUMIN SERPL BCP-MCNC: 2.7 G/DL (ref 3.2–4.9)
ALBUMIN/GLOB SERPL: 1.2 G/DL
ALP SERPL-CCNC: 58 U/L (ref 30–99)
ALT SERPL-CCNC: 14 U/L (ref 2–50)
ANION GAP SERPL CALC-SCNC: 7 MMOL/L (ref 7–16)
AST SERPL-CCNC: 17 U/L (ref 12–45)
BASOPHILS # BLD AUTO: 0.2 % (ref 0–1.8)
BASOPHILS # BLD: 0.01 K/UL (ref 0–0.12)
BILIRUB SERPL-MCNC: 0.3 MG/DL (ref 0.1–1.5)
BUN SERPL-MCNC: 10 MG/DL (ref 8–22)
CALCIUM ALBUM COR SERPL-MCNC: 9.2 MG/DL (ref 8.5–10.5)
CALCIUM SERPL-MCNC: 8.2 MG/DL (ref 8.5–10.5)
CHLORIDE SERPL-SCNC: 109 MMOL/L (ref 96–112)
CO2 SERPL-SCNC: 24 MMOL/L (ref 20–33)
CREAT SERPL-MCNC: 0.53 MG/DL (ref 0.5–1.4)
EOSINOPHIL # BLD AUTO: 0.26 K/UL (ref 0–0.51)
EOSINOPHIL NFR BLD: 5.2 % (ref 0–6.9)
ERYTHROCYTE [DISTWIDTH] IN BLOOD BY AUTOMATED COUNT: 41.6 FL (ref 35.9–50)
EST. AVERAGE GLUCOSE BLD GHB EST-MCNC: 111 MG/DL
GFR SERPLBLD CREATININE-BSD FMLA CKD-EPI: 90 ML/MIN/1.73 M 2
GLOBULIN SER CALC-MCNC: 2.2 G/DL (ref 1.9–3.5)
GLUCOSE SERPL-MCNC: 104 MG/DL (ref 65–99)
HBA1C MFR BLD: 5.5 % (ref 4–5.6)
HCT VFR BLD AUTO: 27.5 % (ref 37–47)
HGB BLD-MCNC: 8.9 G/DL (ref 12–16)
IMM GRANULOCYTES # BLD AUTO: 0.01 K/UL (ref 0–0.11)
IMM GRANULOCYTES NFR BLD AUTO: 0.2 % (ref 0–0.9)
LYMPHOCYTES # BLD AUTO: 1.62 K/UL (ref 1–4.8)
LYMPHOCYTES NFR BLD: 32.3 % (ref 22–41)
MCH RBC QN AUTO: 28.4 PG (ref 27–33)
MCHC RBC AUTO-ENTMCNC: 32.4 G/DL (ref 32.2–35.5)
MCV RBC AUTO: 87.9 FL (ref 81.4–97.8)
MONOCYTES # BLD AUTO: 0.59 K/UL (ref 0–0.85)
MONOCYTES NFR BLD AUTO: 11.8 % (ref 0–13.4)
NEUTROPHILS # BLD AUTO: 2.52 K/UL (ref 1.82–7.42)
NEUTROPHILS NFR BLD: 50.3 % (ref 44–72)
NRBC # BLD AUTO: 0 K/UL
NRBC BLD-RTO: 0 /100 WBC (ref 0–0.2)
PLATELET # BLD AUTO: 161 K/UL (ref 164–446)
PMV BLD AUTO: 11.7 FL (ref 9–12.9)
POTASSIUM SERPL-SCNC: 3.6 MMOL/L (ref 3.6–5.5)
PROT SERPL-MCNC: 4.9 G/DL (ref 6–8.2)
RBC # BLD AUTO: 3.13 M/UL (ref 4.2–5.4)
SODIUM SERPL-SCNC: 140 MMOL/L (ref 135–145)
TSH SERPL DL<=0.005 MIU/L-ACNC: 1.14 UIU/ML (ref 0.38–5.33)
WBC # BLD AUTO: 5 K/UL (ref 4.8–10.8)

## 2023-11-10 PROCEDURE — 36415 COLL VENOUS BLD VENIPUNCTURE: CPT

## 2023-11-10 PROCEDURE — 80053 COMPREHEN METABOLIC PANEL: CPT

## 2023-11-10 PROCEDURE — 700102 HCHG RX REV CODE 250 W/ 637 OVERRIDE(OP): Performed by: PHYSICAL MEDICINE & REHABILITATION

## 2023-11-10 PROCEDURE — 97116 GAIT TRAINING THERAPY: CPT

## 2023-11-10 PROCEDURE — 94760 N-INVAS EAR/PLS OXIMETRY 1: CPT

## 2023-11-10 PROCEDURE — 83036 HEMOGLOBIN GLYCOSYLATED A1C: CPT

## 2023-11-10 PROCEDURE — 92523 SPEECH SOUND LANG COMPREHEN: CPT

## 2023-11-10 PROCEDURE — 85025 COMPLETE CBC W/AUTO DIFF WBC: CPT

## 2023-11-10 PROCEDURE — 97165 OT EVAL LOW COMPLEX 30 MIN: CPT

## 2023-11-10 PROCEDURE — 770010 HCHG ROOM/CARE - REHAB SEMI PRIVAT*

## 2023-11-10 PROCEDURE — 84443 ASSAY THYROID STIM HORMONE: CPT

## 2023-11-10 PROCEDURE — 99233 SBSQ HOSP IP/OBS HIGH 50: CPT | Performed by: PHYSICAL MEDICINE & REHABILITATION

## 2023-11-10 PROCEDURE — 94660 CPAP INITIATION&MGMT: CPT

## 2023-11-10 PROCEDURE — 97535 SELF CARE MNGMENT TRAINING: CPT

## 2023-11-10 PROCEDURE — 700111 HCHG RX REV CODE 636 W/ 250 OVERRIDE (IP): Mod: JZ | Performed by: PHYSICAL MEDICINE & REHABILITATION

## 2023-11-10 PROCEDURE — 97162 PT EVAL MOD COMPLEX 30 MIN: CPT

## 2023-11-10 PROCEDURE — 82306 VITAMIN D 25 HYDROXY: CPT

## 2023-11-10 PROCEDURE — A9270 NON-COVERED ITEM OR SERVICE: HCPCS | Performed by: PHYSICAL MEDICINE & REHABILITATION

## 2023-11-10 RX ADMIN — GABAPENTIN 600 MG: 300 CAPSULE ORAL at 20:38

## 2023-11-10 RX ADMIN — ATORVASTATIN CALCIUM 40 MG: 40 TABLET, FILM COATED ORAL at 20:39

## 2023-11-10 RX ADMIN — HYDROCODONE BITARTRATE AND ACETAMINOPHEN 1 TABLET: 10; 325 TABLET ORAL at 08:05

## 2023-11-10 RX ADMIN — DOCUSATE SODIUM 50MG AND SENNOSIDES 8.6MG 2 TABLET: 8.6; 5 TABLET, FILM COATED ORAL at 08:05

## 2023-11-10 RX ADMIN — LEVETIRACETAM 500 MG: 500 TABLET, FILM COATED ORAL at 20:39

## 2023-11-10 RX ADMIN — ATENOLOL 25 MG: 25 TABLET ORAL at 20:38

## 2023-11-10 RX ADMIN — DOCUSATE SODIUM 50MG AND SENNOSIDES 8.6MG 2 TABLET: 8.6; 5 TABLET, FILM COATED ORAL at 20:39

## 2023-11-10 RX ADMIN — ENOXAPARIN SODIUM 40 MG: 100 INJECTION SUBCUTANEOUS at 17:13

## 2023-11-10 RX ADMIN — HYDROCODONE BITARTRATE AND ACETAMINOPHEN 1 TABLET: 10; 325 TABLET ORAL at 20:38

## 2023-11-10 RX ADMIN — LEVETIRACETAM 500 MG: 500 TABLET, FILM COATED ORAL at 08:05

## 2023-11-10 RX ADMIN — GABAPENTIN 300 MG: 300 CAPSULE ORAL at 08:04

## 2023-11-10 SDOH — ECONOMIC STABILITY: TRANSPORTATION INSECURITY
IN THE PAST 12 MONTHS, HAS LACK OF RELIABLE TRANSPORTATION KEPT YOU FROM MEDICAL APPOINTMENTS, MEETINGS, WORK OR FROM GETTING THINGS NEEDED FOR DAILY LIVING?: NO

## 2023-11-10 ASSESSMENT — BRIEF INTERVIEW FOR MENTAL STATUS (BIMS)
INITIAL REPETITION OF BED BLUE SOCK - FIRST ATTEMPT: 3
ASKED TO RECALL BLUE: YES, NO CUE REQUIRED
ASKED TO RECALL BED: YES, AFTER CUEING (A PIECE OF FURNITURE")"
WHAT MONTH IS IT: ACCURATE WITHIN 5 DAYS
WHAT DAY OF THE WEEK IS IT: CORRECT
WHAT YEAR IS IT: CORRECT
BIMS SUMMARY SCORE: 14
ASKED TO RECALL SOCK: YES, NO CUE REQUIRED

## 2023-11-10 ASSESSMENT — BALANCE ASSESSMENTS
LOOK OVER LEFT AND RIGHT SHOULDERS WHILE STANDING: 2
TURN 360 DEGREES: 3
STANDING UNSUPPORTED WITH EYES CLOSED: 4
LONG VERSION TOTAL SCORE (MAX 56): 45
MEDICARE IMPAIRMENT PERCENTAGE: 20
STANDING UNSUPPORTED WITH FEET TOGETHER: 4
PLACE ALTERNATE FOOT ON STEP OR STOOL WHILE STANDING UNSUPPORTED: 3
STANDING ON ONE LEG: 2
PICK UP OBJECT FROM THE FLOOR FROM A STANDING POSITION: 4
REACHING FORWARD WITH OUTSTRETCHED ARM WHILE STANDING: 4
STANDING UNSUPPORTED ONE FOOT IN FRONT: 2
LONG VERSION TOTAL SCORE (MAX 56): 45
SITTING UNSUPPORTED: 4
TRANSFERS: 3
STANDING TO SITTING: 3
SITTING TO STANDING: 3
STANDING UNSUPPORTED: 4

## 2023-11-10 ASSESSMENT — 6 MINUTE WALK TEST (6MWT)
TOTAL DISTANCE WALKED (FT): 530
GAIT SPEED - METERS PER SECOND: 0.45
LEVEL OF ASSISTANCE: SUPERVISION
NUMBER OF RESTS: 1

## 2023-11-10 ASSESSMENT — ACTIVITIES OF DAILY LIVING (ADL)
TOILET_TRANSFER_DESCRIPTION: GRAB BAR;SUPERVISION FOR SAFETY;VERBAL CUEING
BED_CHAIR_WHEELCHAIR_TRANSFER_DESCRIPTION: INCREASED TIME;SUPERVISION FOR SAFETY;VERBAL CUEING
TOILETING: INDEPENDENT

## 2023-11-10 ASSESSMENT — PAIN DESCRIPTION - PAIN TYPE
TYPE: ACUTE PAIN

## 2023-11-10 ASSESSMENT — GAIT ASSESSMENTS
ASSISTIVE DEVICE: FRONT WHEEL WALKER
DISTANCE (FEET): 530
GAIT LEVEL OF ASSIST: CONTACT GUARD ASSIST

## 2023-11-10 NOTE — CARE PLAN
The patient is Stable - Low risk of patient condition declining or worsening    Shift Goals  Clinical Goals: Safety, pain managment  Patient Goals: Pain control    Progress made toward(s) clinical / shift goals:  Safety maintained, PRN pain meds taken with stated relief.    Patient is not progressing towards the following goals:      Problem: Skin Integrity  Goal: Skin integrity is maintained or improved  Outcome: Progressing  Note: Surgical incision to scalp is intact, approximated, staples present, no drainage or redness.     Problem: Hemodynamics  Goal: Patient's hemodynamics, fluid balance and neurologic status will be stable or improve  Outcome: Progressing  Note:    Latest Reference Range & Units 11/10/23 05:20   WBC 4.8 - 10.8 K/uL 5.0   RBC 4.20 - 5.40 M/uL 3.13 (L)   Hemoglobin 12.0 - 16.0 g/dL 8.9 (L)   Hematocrit 37.0 - 47.0 % 27.5 (L)   MCV 81.4 - 97.8 fL 87.9   MCH 27.0 - 33.0 pg 28.4   MCHC 32.2 - 35.5 g/dL 32.4   RDW 35.9 - 50.0 fL 41.6   Platelet Count 164 - 446 K/uL 161 (L)   MPV 9.0 - 12.9 fL 11.7   Neutrophils-Polys 44.00 - 72.00 % 50.30   Neutrophils (Absolute) 1.82 - 7.42 K/uL 2.52   Lymphocytes 22.00 - 41.00 % 32.30   Lymphs (Absolute) 1.00 - 4.80 K/uL 1.62   Monocytes 0.00 - 13.40 % 11.80   Monos (Absolute) 0.00 - 0.85 K/uL 0.59   Eosinophils 0.00 - 6.90 % 5.20   Eos (Absolute) 0.00 - 0.51 K/uL 0.26   Basophils 0.00 - 1.80 % 0.20   Baso (Absolute) 0.00 - 0.12 K/uL 0.01   Immature Granulocytes 0.00 - 0.90 % 0.20   Immature Granulocytes (abs) 0.00 - 0.11 K/uL 0.01   Nucleated RBC 0.00 - 0.20 /100 WBC 0.00   NRBC (Absolute) K/uL 0.00   Sodium 135 - 145 mmol/L 140   Potassium 3.6 - 5.5 mmol/L 3.6   Chloride 96 - 112 mmol/L 109   Co2 20 - 33 mmol/L 24   Anion Gap 7.0 - 16.0  7.0   Glucose 65 - 99 mg/dL 104 (H)   Bun 8 - 22 mg/dL 10   Creatinine 0.50 - 1.40 mg/dL 0.53   GFR (CKD-EPI) >60 mL/min/1.73 m 2 90   Calcium 8.5 - 10.5 mg/dL 8.2 (L)   Correct Calcium 8.5 - 10.5 mg/dL 9.2   AST(SGOT) 12 - 45  U/L 17   ALT(SGPT) 2 - 50 U/L 14   Alkaline Phosphatase 30 - 99 U/L 58   Total Bilirubin 0.1 - 1.5 mg/dL 0.3   Albumin 3.2 - 4.9 g/dL 2.7 (L)   Total Protein 6.0 - 8.2 g/dL 4.9 (L)   Globulin 1.9 - 3.5 g/dL 2.2   A-G Ratio g/dL 1.2   Glycohemoglobin 4.0 - 5.6 % 5.5   Estim. Avg Glu mg/dL 111   25-Hydroxy   Vitamin D 25 30 - 100 ng/mL 28 (L)   TSH 0.380 - 5.330 uIU/mL 1.140

## 2023-11-10 NOTE — FLOWSHEET NOTE
11/10/23 1407   Events/Summary/Plan   Events/Summary/Plan SpO2 check   Vital Signs   Pulse 78   Respiration 16   Pulse Oximetry 98 %   $ Pulse Oximetry (Spot Check) Yes   Respiratory Assessment   Respiratory Pattern Within Normal Limits   Level of Consciousness Alert   Chest Exam   Work Of Breathing / Effort Within Normal Limits   Oxygen   O2 Delivery Device None - Room Air   Non-Invasive Ventilation [employer]   Nocturnal CPAP or BIPAP CPAP - Home Unit   $ System Evaluation Yes   Cleanliness and Damage Inspection Performed Yes

## 2023-11-10 NOTE — CARE PLAN
The patient is Stable - Low risk of patient condition declining or worsening    Shift Goals  Clinical Goals: safety, comfort, sleep  Patient Goals: pain control, sleep    Progress made toward(s) clinical / shift goals:    Problem: Infection  Goal: Patient will remain free from infection  Outcome: Progressing  Note: Pt is able to verbalize s/s of infection: redness of area, fever, pain.  Will continue to monitor pt

## 2023-11-10 NOTE — THERAPY
"Speech Language Pathology   Initial Assessment     Patient Name: Anita Beard  AGE:  88 y.o., SEX:  female  Medical Record #: [id number]  Today's Date: 11/10/2023     Subjective    Per H&P \"Adapted from the PM&R Consult by Dr. Mcmillan:   The patient is a 88 y.o.  female with a past medical history of HTN, chronic pain s/p pain pump, and remote history of a fib not on AC, ;  who presented on 11/3/2023  9:59 PM as a transfer from OSH with concern for SDH. Per documentation, patient presented to OSH with issues with memory and AMS. CT head obtained at OSH showed SDH with right to left 8mm midline shift. Patient was transferred to [facility], Neurosurgery was consulted, and patient was taken to the OR on 11/4 for right sided craniotomy for evacuation of SDH and resection of membranes performed by Dr. Alves. Post op, patient has required precedex drip which is being weaned. Patient' required fentanyl post op, but this has been weaned after patient's pain pump was interrogated by [employer] and confirmed patient's pain pump is in working order. Patient's hospital course has been notable for leukocytosis and ABLA.      Patient tolerated transfer to [facility]. She reports mild headache. She reports cognition is close to baseline. Denies fever or chills. Denies dizziness.\"     Objective       11/10/23 1031   Evaluation Charges   Charges Yes   SLP Speech Language Evaluation Speech Sound Language Comprehension   SLP Total Time Spent   SLP Individual Total Time Spent (Mins) 60   Precautions   Precautions Fall Risk   Comments pain pump for chronic back/neck pain, craniotomy   Prior Living Situation   Prior Services Home-Independent   Housing / Facility Mobile Home   Lives with - Patient's Self Care Capacity Alone and Able to Care For Self  (lives in mobile home on daughter's property)   Prior Level Of Function   Communication Impaired  (pt reports increased trouble with her memory over the last 6 months)   Hearing Impaired Both Ears   Hearing " "Aid Right;Left   Vision Wears Corrective Lenses;Reading    Patient's Primary Language English   Occupation (Pre-Hospital Vocational) Retired Due To Age   Receptive Language / Auditory Comprehension   Receptive Language / Auditory Comprehension WDL   Expressive Language   Expressive Language (WDL) WDL   Reading Comprehension    Reading Comprehension (WDL) WDL   Written Language Expression   Written Language Expression (WDL) WDL   Dominant Hand Right   Cognition   Cognitive-Linguistic (WDL) X   Simple Attention Within Functional Limits (6-7)   Moderate Attention Within Functional Limits (6-7)   Complex Attention Minimal (4)   Functional Memory Activities Supervision (5)   ABS (Agitated Behavior Scale)   Agitated Behavior Scale Performed No   Cognitive Pattern Assessment   Cognitive Pattern Assessment Used BIMS   Brief Interview for Mental Status (BIMS)   Repetition of Three Words (First Attempt) 3   Temporal Orientation: Year Correct   Temporal Orientation: Month Accurate within 5 days   Temporal Orientation: Day Correct   Recall: \"Sock\" Yes, no cue required   Recall: \"Blue\" Yes, no cue required   Recall: \"Bed\" Yes, after cueing (\"a piece of furniture\")   BIMS Summary Score 14   Confusion Assessment Method (CAM)   Is there evidence of an acute change in mental status from the patient's baseline? No   Inattention Behavior not present   Disorganized thinking Behavior not present   Altered level of consciousness Behavior not present   Social / Pragmatic Communication   Social / Pragmatic Communication WDL   Functional Level of Assist   Comprehension Independent   Expression Independent   Social Interaction Independent   Problem Solving Supervision   Problem Solving Description Verbal cueing;Therapy schedule;Increased time;Bed/chair alarm;Seat belt   Memory Supervision   Memory Description Verbal cueing;Therapy schedule;Seat belt;Increased time;Bed/chair alarm   Outcome Measures   Outcome Measures Utilized SCCAN   SCCAN " (Scales of Cognitive and Communicative Ability for Neurorehabilitation)   Oral Expression - Raw Score 18   Oral Expression - Scale Performance Score 95   Orientation - Raw Score 12   Orientation - Scale Performance Score 100   Memory - Raw Score 17   Memory - Scale Performance Score 89   Speech Comprehension - Raw Score 12   Speech Comprehension - Scale Performance Score 92   Reading Comprehension - Raw Score 11   Reading Comprehension - Scale Performance Score 92   Writing - Raw Score 7   Writing - Scale Performance Score 100   Attention - Raw Score 12   Attention - Scale Performance Score 75   Problem Solving - Raw Score 20   Problem Solving - Scale Performance Score 87   SCCAN Total Raw Score 87   SCCAN Degree of Severity Typical Functioning   Problem List   Problem List Attention Deficit   Current Discharge Plan   Current Discharge Plan Return to Prior Living Situation   Benefit   Therapy Benefit Patient would benefit from Inpatient Rehab Speech-Language Pathology to address above identified deficits.   Interdisciplinary Plan of Care Collaboration   IDT Collaboration with  Physician   Patient Position at End of Therapy Seated;Chair Alarm On;Self Releasing Lap Belt Applied;Call Light within Reach;Tray Table within Reach   Collaboration Comments CLOF, recommending ST for 30 minutes for a short time to address med management and memory   Strengths & Barriers   Strengths Able to follow instructions;Alert and oriented;Effective communication skills;Good insight into deficits/needs;Independent prior level of function;Motivated for self care and independence;Supportive family;Willingly participates in therapeutic activities;Pleasant and cooperative   Barriers Impaired carryover of learning;Impaired functional cognition   Speech Language Pathologist Assigned   Assigned SLP / Treatment Time / Comments CL 30       Assessment    Patient is 88 y.o. female with a diagnosis of SDH s/p craniotomy.  Additional factors  "influencing patient status/progress (ie: cognitive factors, co-morbidities, social support, etc): Mild attention deficits, baseline memory deficits per pt, motivated to participate, family support.      Cognitive evaluation completed using the SCCAN, pt achieved an overall score of 87/94 indicating pt's cognition is \"Typically Functioning\"; however pt did score slightly lower in the area of attention (75%) and reported mild memory deficits at baseline (pt reported increased difficulty with memory over the last 6 months, but now thinks this may have been related to the SDH).  Pt currently lives in an RV on her [facility] property in [place] and was independent with all IADL's at baseline.  She described a somewhat disorganized medication management system that she uses at home and is open to learning how to use a pill box to keep her more organized.  She would also benefit targeting functional memory strategies to help pt recall new information related to her current hospitalization.  Pt would benefit from ST for 30 minutes a day to target medication management and functional memory, anticipating that pt will only need this for a short time.      Plan  Recommend Speech Therapy 30 minutes per day 5-6 days per week for 7-10 days for the following treatments:  SLP Speech Language Treatment, SLP Self Care / ADL Training , SLP Cognitive Skill Development, and SLP Group Treatment.    Passport items to be completed:  Express basic needs, understand food/liquid recommendations, consistently follow swallow precautions, manage finances, manage medications, arrive to therapy appointments on time, complete daily memory log entries, solve problems related to safety situations, review education related to hospitalization, complete caregiver training     Goals:  Long term and short term goals have been discussed with patient and they are in agreement.    Speech Therapy Problems (Active)       Problem: Memory STGs       Dates: Start:  " 11/10/23         Goal: STG-Within one week, patient will recall new information related to her current hospitalization using functional memory strategies with spv.         Dates: Start:  11/10/23               Problem: Problem Solving STGs       Dates: Start:  11/10/23         Goal: STG-Within one week, patient will complete a mock medication sorting task with spv to achieve 100% accuracy.         Dates: Start:  11/10/23               Problem: Speech/Swallowing LTGs       Dates: Start:  11/10/23         Goal: LTG-By discharge, patient will solve complex problems with modified independence.        Dates: Start:  11/10/23

## 2023-11-10 NOTE — PROGRESS NOTES
"  Physical Medicine & Rehabilitation Progress Note    Encounter Date: 11/10/2023    Chief Complaint: Decreased mobility, weakness    Interval Events (Subjective):  Patient sitting up in room. She reports she is doing OK. She reports minimal headache. Discussed with SLP and plan to  30 minutes.    Objective:  VITAL SIGNS: /59   Pulse 64   Temp 37 °C (98.6 °F) (Oral)   Resp 16   Ht 1.6 m (5' 3\")   Wt 68 kg (149 lb 14.6 oz)   SpO2 93%   BMI 26.56 kg/m²   Gen: NAD  Psych: Mood and affect appropriate  CV: RRR, 0 edema  Resp: CTAB, no upper airway sounds  Abd: NTND  Neuro: AOx4, following commands    Laboratory Values:  Recent Results (from the past 72 hour(s))   CBC WITH DIFFERENTIAL    Collection Time: 11/08/23  2:15 AM   Result Value Ref Range    WBC 6.9 4.8 - 10.8 K/uL    RBC 3.29 (L) 4.20 - 5.40 M/uL    Hemoglobin 9.4 (L) 12.0 - 16.0 g/dL    Hematocrit 29.0 (L) 37.0 - 47.0 %    MCV 88.1 81.4 - 97.8 fL    MCH 28.6 27.0 - 33.0 pg    MCHC 32.4 32.2 - 35.5 g/dL    RDW 42.7 35.9 - 50.0 fL    Platelet Count 140 (L) 164 - 446 K/uL    MPV 11.1 9.0 - 12.9 fL    Neutrophils-Polys 68.90 44.00 - 72.00 %    Lymphocytes 21.90 (L) 22.00 - 41.00 %    Monocytes 7.70 0.00 - 13.40 %    Eosinophils 0.90 0.00 - 6.90 %    Basophils 0.30 0.00 - 1.80 %    Immature Granulocytes 0.30 0.00 - 0.90 %    Nucleated RBC 0.00 0.00 - 0.20 /100 WBC    Neutrophils (Absolute) 4.77 1.82 - 7.42 K/uL    Lymphs (Absolute) 1.51 1.00 - 4.80 K/uL    Monos (Absolute) 0.53 0.00 - 0.85 K/uL    Eos (Absolute) 0.06 0.00 - 0.51 K/uL    Baso (Absolute) 0.02 0.00 - 0.12 K/uL    Immature Granulocytes (abs) 0.02 0.00 - 0.11 K/uL    NRBC (Absolute) 0.00 K/uL   MAGNESIUM    Collection Time: 11/08/23  2:15 AM   Result Value Ref Range    Magnesium 2.0 1.5 - 2.5 mg/dL   PHOSPHORUS    Collection Time: 11/08/23  2:15 AM   Result Value Ref Range    Phosphorus 3.1 2.5 - 4.5 mg/dL   EKG    Collection Time: 11/08/23  6:56 AM   Result Value Ref Range    Report   "     [facility]    Test Date:  2023  Pt Name:    AV BOYD           Department: [facility]  MRN:        [id number]                      [place]       [facility] [place]:     Female                       Technician: ANJELICA  :        [id number]                   Requested By:JASVIR ONTIVEROS  Order #:    [id number]                    Reading MD: Drew Martinez MD    Measurements  Intervals                                Axis  Rate:       127                          P:          0  WA:         0                            QRS:        28  QRSD:       85                           T:          212  QT:         290  QTc:        422    Interpretive Statements  Atrial fibrillation  Repolarization abnormality, prob rate related  Electronically Signed On 2023 18:51:07 PST by Drew Martinez MD     Basic Metabolic Panel    Collection Time: 23 10:20 AM   Result Value Ref Range    Sodium 138 135 - 145 mmol/L    Potassium 3.0 (L) 3.6 - 5.5 mmol/L    Chloride 107 96 - 112 mmol/L    Co2 20 20 - 33 mmol/L    Glucose 163 (H) 65 - 99 mg/dL    Bun 13 8 - 22 mg/dL    Creatinine 0.68 0.50 - 1.40 mg/dL    Calcium 8.2 (L) 8.5 - 10.5 mg/dL    Anion Gap 11.0 7.0 - 16.0   ESTIMATED GFR    Collection Time: 23 10:20 AM   Result Value Ref Range    GFR (CKD-EPI) 85 >60 mL/min/1.73 m 2   EKG    Collection Time: 23 11:19 AM   Result Value Ref Range    Report       [facility]    Test Date:  2023  Pt Name:    AV BOYD           Department: [facility]  MRN:        [id number]                      [place]       [facility] [place]  Gender:     Female                       Technician: DL  :        [id number]                   Requested By:KELSEY MCLAUGHLIN  Order #:    [id number]                    Reading MD: Drew Martinez MD    Measurements  Intervals                                Axis  Rate:       82                           P:          44  WA:         190                          QRS:        20  QRSD:       94                            T:          29  QT:         382  QTc:        446    Interpretive Statements  Sinus rhythm  Compared to ECG 11/08/2023 06:56:18  Atrial fibrillation no longer present  Early repolarization no longer present  Electronically Signed On 11- 19:10:11 PST by Drew Martinez MD     CBC WITH DIFFERENTIAL    Collection Time: 11/09/23  5:17 AM   Result Value Ref Range    WBC 6.0 4.8 - 10.8 K/uL    RBC 3.18 (L) 4.20 - 5.40 M/uL    Hemoglobin 9.1 (L) 12.0 - 16.0 g/dL    Hematocrit 28.0 (L) 37.0 - 47.0 %    MCV 88.1 81.4 - 97.8 fL    MCH 28.6 27.0 - 33.0 pg    MCHC 32.5 32.2 - 35.5 g/dL    RDW 42.3 35.9 - 50.0 fL    Platelet Count 142 (L) 164 - 446 K/uL    MPV 11.4 9.0 - 12.9 fL    Neutrophils-Polys 61.40 44.00 - 72.00 %    Lymphocytes 25.10 22.00 - 41.00 %    Monocytes 11.10 0.00 - 13.40 %    Eosinophils 1.80 0.00 - 6.90 %    Basophils 0.30 0.00 - 1.80 %    Immature Granulocytes 0.30 0.00 - 0.90 %    Nucleated RBC 0.00 0.00 - 0.20 /100 WBC    Neutrophils (Absolute) 3.68 1.82 - 7.42 K/uL    Lymphs (Absolute) 1.51 1.00 - 4.80 K/uL    Monos (Absolute) 0.67 0.00 - 0.85 K/uL    Eos (Absolute) 0.11 0.00 - 0.51 K/uL    Baso (Absolute) 0.02 0.00 - 0.12 K/uL    Immature Granulocytes (abs) 0.02 0.00 - 0.11 K/uL    NRBC (Absolute) 0.00 K/uL   MAGNESIUM    Collection Time: 11/09/23  5:17 AM   Result Value Ref Range    Magnesium 1.7 1.5 - 2.5 mg/dL   PHOSPHORUS    Collection Time: 11/09/23  5:17 AM   Result Value Ref Range    Phosphorus 2.1 (L) 2.5 - 4.5 mg/dL   Comp Metabolic Panel    Collection Time: 11/09/23  5:17 AM   Result Value Ref Range    Sodium 136 135 - 145 mmol/L    Potassium 3.5 (L) 3.6 - 5.5 mmol/L    Chloride 107 96 - 112 mmol/L    Co2 22 20 - 33 mmol/L    Anion Gap 7.0 7.0 - 16.0    Glucose 126 (H) 65 - 99 mg/dL    Bun 13 8 - 22 mg/dL    Creatinine 0.65 0.50 - 1.40 mg/dL    Calcium 8.1 (L) 8.5 - 10.5 mg/dL    Correct Calcium 9.6 8.5 - 10.5 mg/dL    AST(SGOT) 17 12 - 45 U/L    ALT(SGPT) 10 2 - 50 U/L    Alkaline  Phosphatase 59 30 - 99 U/L    Total Bilirubin 0.3 0.1 - 1.5 mg/dL    Albumin 2.1 (L) 3.2 - 4.9 g/dL    Total Protein 5.0 (L) 6.0 - 8.2 g/dL    Globulin 2.9 1.9 - 3.5 g/dL    A-G Ratio 0.7 g/dL   ESTIMATED GFR    Collection Time: 11/09/23  5:17 AM   Result Value Ref Range    GFR (CKD-EPI) 85 >60 mL/min/1.73 m 2   CBC with Differential    Collection Time: 11/10/23  5:20 AM   Result Value Ref Range    WBC 5.0 4.8 - 10.8 K/uL    RBC 3.13 (L) 4.20 - 5.40 M/uL    Hemoglobin 8.9 (L) 12.0 - 16.0 g/dL    Hematocrit 27.5 (L) 37.0 - 47.0 %    MCV 87.9 81.4 - 97.8 fL    MCH 28.4 27.0 - 33.0 pg    MCHC 32.4 32.2 - 35.5 g/dL    RDW 41.6 35.9 - 50.0 fL    Platelet Count 161 (L) 164 - 446 K/uL    MPV 11.7 9.0 - 12.9 fL    Neutrophils-Polys 50.30 44.00 - 72.00 %    Lymphocytes 32.30 22.00 - 41.00 %    Monocytes 11.80 0.00 - 13.40 %    Eosinophils 5.20 0.00 - 6.90 %    Basophils 0.20 0.00 - 1.80 %    Immature Granulocytes 0.20 0.00 - 0.90 %    Nucleated RBC 0.00 0.00 - 0.20 /100 WBC    Neutrophils (Absolute) 2.52 1.82 - 7.42 K/uL    Lymphs (Absolute) 1.62 1.00 - 4.80 K/uL    Monos (Absolute) 0.59 0.00 - 0.85 K/uL    Eos (Absolute) 0.26 0.00 - 0.51 K/uL    Baso (Absolute) 0.01 0.00 - 0.12 K/uL    Immature Granulocytes (abs) 0.01 0.00 - 0.11 K/uL    NRBC (Absolute) 0.00 K/uL   Comp Metabolic Panel (CMP)    Collection Time: 11/10/23  5:20 AM   Result Value Ref Range    Sodium 140 135 - 145 mmol/L    Potassium 3.6 3.6 - 5.5 mmol/L    Chloride 109 96 - 112 mmol/L    Co2 24 20 - 33 mmol/L    Anion Gap 7.0 7.0 - 16.0    Glucose 104 (H) 65 - 99 mg/dL    Bun 10 8 - 22 mg/dL    Creatinine 0.53 0.50 - 1.40 mg/dL    Calcium 8.2 (L) 8.5 - 10.5 mg/dL    Correct Calcium 9.2 8.5 - 10.5 mg/dL    AST(SGOT) 17 12 - 45 U/L    ALT(SGPT) 14 2 - 50 U/L    Alkaline Phosphatase 58 30 - 99 U/L    Total Bilirubin 0.3 0.1 - 1.5 mg/dL    Albumin 2.7 (L) 3.2 - 4.9 g/dL    Total Protein 4.9 (L) 6.0 - 8.2 g/dL    Globulin 2.2 1.9 - 3.5 g/dL    A-G Ratio 1.2 g/dL    HEMOGLOBIN A1C    Collection Time: 11/10/23  5:20 AM   Result Value Ref Range    Glycohemoglobin 5.5 4.0 - 5.6 %    Est Avg Glucose 111 mg/dL   ESTIMATED GFR    Collection Time: 11/10/23  5:20 AM   Result Value Ref Range    GFR (CKD-EPI) 90 >60 mL/min/1.73 m 2       Medications:  Scheduled Medications   Medication Dose Frequency    senna-docusate  2 Tablet BID    omeprazole  20 mg DAILY    atenolol  25 mg Nightly    atorvastatin  40 mg Q EVENING    enoxaparin (LOVENOX) injection  40 mg DAILY AT 1800    gabapentin  300 mg QAM    And    gabapentin  600 mg QHS    levETIRAcetam  500 mg BID     PRN medications: Respiratory Therapy Consult, hydrALAZINE, acetaminophen, senna-docusate **AND** polyethylene glycol/lytes **AND** magnesium hydroxide **AND** bisacodyl, mag hydrox-al hydrox-simeth, ondansetron **OR** ondansetron, traZODone, sodium chloride, midazolam, LORazepam, HYDROcodone/acetaminophen    Diet:  Current Diet Order   Procedures    Diet Order Diet: Regular       Medical Decision Making and Plan:   R sided SDH - Patient with 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 Alves  -PT and OT for mobility and ADLs. Per guidelines, 15 hours per week between PT, OT and/or SLP.  -Follow-up NSG     HTN - Patient on Atenolol 25 mg QHS     HLD - Patient on Atorvastatin 40 mg QHS     Anemia - Check AM CBC - 8.9, worsening. Recheck 11/13     Hypokalemia - Check AM CMP - 3.6, improved, recheck 11/13     Chronic pain - Patient with pain pump.     Pain - Patient on APAP/Oxycodone     Skin - Patient at risk for skin breakdown due to debility in areas including sacrum, achilles, elbows and head in addition to other sites. Nursing to assess skin daily.      GI Ppx - Patient on Prilosec for GERD prophylaxis. Patient on Senna-docusate for constipation prophylaxis.      DVT Ppx - Patient Lovenox on transfer.  ____________________________________    T. Reymundo Pak MD/PhD  [facility] - Physical Medicine &  Rehabilitation   [facility] - Brain Injury Medicine   ____________________________________    Total time:  50 minutes. Time spent included pre-rounding review of vitals and tests, unit/floor time, face-to-face time with the patient including physical examination, care coordination, counseling of patient and/or family, ordering medications/procedures/tests, discussion with CM, PT, OT, SLP and/or other healthcare providers, and documentation in the electronic medical record. Topics discussed included admission labs, anemia, recheck Monday, and pain control.

## 2023-11-10 NOTE — PROGRESS NOTES
NURSING DAILY NOTE    Name: Anita Beard   Date of Admission: 11/9/2023   Admitting Diagnosis: SDH (subdural hematoma) ([facility])  Attending Physician: Deepti Pak M.d.  Allergies: Ace inhibitors    Safety  Patient Assist     Patient Precautions     Precaution Comments     Bed Transfer Status     Toilet Transfer Status      Assistive Devices     Oxygen  None - Room Air  Diet/Therapeutic Dining  Current Diet Order   Procedures    Diet Order Diet: Regular     Pill Administration  whole  Agitated Behavioral Scale     ABS Level of Severity       Fall Risk  Has the patient had a fall this admission?   No  Talisha Hong Fall Risk Scoring  14, MODERATE RISK  Fall Risk Safety Measures  chair alarm, poor balance, low vision/ hearing, and pt refused bed alarm-despite education, carie RN notified    Vitals  Temperature: 36.9 °C (98.4 °F)  Temp src: Oral  Pulse: 87  Respiration: 18  Blood Pressure : 114/68  Blood Pressure MAP (Calculated): 83 MM HG  BP Location: Right, Upper Arm  Patient BP Position: Supine     Oxygen  Pulse Oximetry: 95 %  O2 Delivery Device: None - Room Air    Bowel and Bladder  Last Bowel Movement  11/09/23  Stool Type  Type 4: Like a sausage or snake, smooth and soft  Bowel Device     Continent  Bladder: Stress incontinence   Bowel: Continent movement  Bladder Function  Urine Void (mL): 200 ml  Urine Color: Yellow  Genitourinary Assessment   Bladder Assessment (WDL):  Within Defined Limits  Urine Color: Yellow  Bladder Device: Bathroom  Time Void: Yes  Bladder Scan: Post Void  $ Bladder Scan Results (mL): 201    Skin  Satnam Score   15  Sensory Interventions   Bed Types: Standard/Trauma Mattress with Overlay  Skin Preventative Measures: Pillows in Use to Float Heels, Waffle Overlay  Moisture Interventions  Moisturizers/Barriers: Barrier Wipes      Pain  Pain Rating Scale  4 - Distracts me, can do usual activities  Pain Location  Head  Pain  Location Orientation  Right, Left, Anterior, Posterior, Mid  Pain Interventions   Other (Comments), Declines (per handoff report pt wants pain medication with NOC med pass)    ADLs    Bathing      Linen Change      Personal Hygiene     Chlorhexidine Bath      Oral Care     Teeth/Dentures     Shave     Nutrition Percentage Eaten     Environmental Precautions  Treaded Slipper Socks on Patient, Personal Belongings, Wastebasket, Call Bell etc. in Easy Reach, Transferred to Stronger Side, Bed in Low Position (pt refuses bed alarm)  Patient Turns/Positioning  Patient Turns Self from Side to Side  Patient Turns Assistance/Tolerance     Bed Positions  Bed Locked, Bed Controls Deactivated  Head of Bed Elevated  Self regulated      Psychosocial/Neurologic Assessment  Psychosocial Assessment  Psychosocial (WDL):  Within Defined Limits  Neurologic Assessment  Neuro (WDL): Within Defined Limits  Level of Consciousness: Alert  EENT (WDL):  WDL Except    Cardio/Pulmonary Assessment  Edema      Respiratory Breath Sounds  RUL Breath Sounds: Clear  RML Breath Sounds: Clear  RLL Breath Sounds: Diminished  NIDIA Breath Sounds: Clear  LLL Breath Sounds: Diminished  Cardiac Assessment

## 2023-11-10 NOTE — THERAPY
"Occupational Therapy   Initial Evaluation     Patient Name: Anita Beard  Age:  88 y.o., Sex:  female  Medical Record #: [id number]  Today's Date: 11/10/2023     Subjective    \"Is it going to be warm in there?\" Pt asking about the shower 2/2 always being cold      Objective       11/10/23 0831   OT Charge Group   Charges Yes   OT Self Care / ADL (Units) 2   OT Evaluation OT Evaluation Low   OT Total Time Spent   OT Individual Total Time Spent (Mins) 75   Prior Living Situation   Prior Services Home-Independent   Housing / Facility Mobile Home  (5th wheel)   Steps Into Home 3  (with landing/porch)   Steps In Home 2  (1 to get into bedroom and 1 into bathroom)   Rail Both Rail (Steps into Home)  (GB set up around home)   Elevator No   Bathroom Set up Walk In Shower;Grab Bars;Shower Chair   Equipment Owned Single Point Cane;Tub / Shower Seat;Grab Bar(s) In Tub / Shower;Raised Toilet Seat Without Arms;Hand Held Shower  (taller toilet; 3 WW)   Lives with - Patient's Self Care Capacity Alone and Able to Care For Self  (lives on [facility] 3 acre property outside of [place])   Comments uses no AD in the home; SPC when walking around outside or short distances and 3WW when out in the community   Prior Level of ADL Function   Self Feeding Independent   Grooming / Hygiene Independent   Bathing Independent   Dressing Independent   Toileting Independent   Prior Level of IADL Function   Medication Management Independent   Laundry Independent   Kitchen Mobility Independent   Finances Independent   Home Management Independent   Shopping Independent   Prior Level Of Mobility Independent With Device in Community;Independent Without Device in Home;Independent With Steps in Home   Driving / Transportation Driving Independent  (dgter does longer distance driving into [place], pt drives herself around town)   Occupation (Pre-Hospital Vocational) Retired Due To Age   Leisure Interests Family  (board games, golf)   Prior Functioning: Everyday " Activities   Self Care Independent   Indoor Mobility (Ambulation) Independent   Stairs Independent   Functional Cognition Independent   Prior Device Use Walker  (3WW in community and SPC short distances and recently in the home)   Pain   Intervention Medication (see MAR)   Pain 0 - 10 Group   Location Head;Shoulder   Location Orientation Right;Left;Anterior;Posterior;Mid   Pain Rating Scale (NPRS) 5   Description Aching;Constant   Comfort Goal Comfort with Movement;Perform Activity;Sleep Comfortably   Therapist Pain Assessment During Activity   Cognition    Orientation Level Oriented x 4   Level of Consciousness Alert   Ability To Follow Commands 2 Step   ABS (Agitated Behavior Scale)   Agitated Behavior Scale Performed No   Vision Screen   Vision Tested   Visual Acuity Able to read clock/calander on wall without difficulty;Able to read employee name badge without difficulty   Passive ROM Upper Body   Passive ROM Upper Body WDL   Active ROM Upper Body   Active ROM Upper Body  WDL   Dominant Hand Right   Strength Upper Body   Upper Body Strength  X   Gross Strength Generalized Weakness, Equal Bilaterally.    Sensation Upper Body   Upper Extremity Sensation  WDL   Comments no c/o numbness/tingling   Upper Body Muscle Tone   Upper Body Muscle Tone  WDL   Bed Mobility    Supine to Sit Standby Assist   Sit to Stand Contact Guard Assist   Scooting Contact Guard Assist   Coordination Upper Body   Coordination WDL   Eating   Assistance Needed Set-up / clean-up   CARE Score - Eating 5   Eating Discharge Goal   Discharge Goal 6   Oral Hygiene   Assistance Needed Set-up / clean-up   CARE Score - Oral Hygiene 5   Oral Hygiene Discharge Goal   Discharge Goal 6   Shower/Bathe Self   Assistance Needed Physical assistance   Physical Assistance Level 26%-50%   CARE Score - Shower/Bathe Self 3   Shower/Bathe Self Discharge Goal   Discharge Goal 6   Upper Body Dressing   Assistance Needed Supervision   CARE Score - Upper Body Dressing  4   Upper Body Dressing Discharge Goal   Discharge Goal 6   Lower Body Dressing   Assistance Needed Physical assistance   Physical Assistance Level 26%-50%   CARE Score - Lower Body Dressing 3   Lower Body Dressing Discharge Goal   Discharge Goal 6   Putting On/Taking Off Footwear   Assistance Needed Physical assistance   Physical Assistance Level 25% or less   CARE Score - Putting On/Taking Off Footwear 3   Putting On/Taking Off Footwear Discharge Goal   Discharge Goal 6   Toileting Hygiene   Assistance Needed Physical assistance   Physical Assistance Level 25% or less   CARE Score - Toileting Hygiene 3   Toileting Hygiene Discharge Goal   Discharge Goal 6   Toilet Transfer   Assistance Needed Incidental touching;Physical assistance   Physical Assistance Level 25% or less   CARE Score - Toilet Transfer 3   Toilet Transfer Discharge Goal   Discharge Goal 6   Hearing, Speech, and Vision   Ability to Hear Adequate   Ability to See in Adequate Light Adequate   Expression of Ideas and Wants Without difficulty   Understanding Verbal and Non-Verbal Content Understands   Functional Level of Assist   Eating Supervision   Grooming Supervision   Bathing Minimal Assist   Upper Body Dressing Supervision   Lower Body Dressing Minimal Assist   Toileting Minimal Assist   Bed, Chair, Wheelchair Transfer Minimal Assist   Toilet Transfers Minimal Assist   Tub / Shower Transfers Minimal Assist   Problem List   Problem List Decreased Active Daily Living Skills;Decreased Homemaking Skills;Decreased Upper Extremity Strength Right;Decreased Upper Extremity Strength Left;Decreased Functional Mobility;Decreased Activity Tolerance;Impaired Postural Control / Balance   Precautions   Precautions Fall Risk   Comments pain pump for chronic back/neck pain, craniotomy   Current Discharge Plan   Current Discharge Plan Return to Prior Living Situation   Benefit    Therapy Benefit Patient Would Benefit from Inpatient Rehab Occupational Therapy to  Maximize Lyman with ADLs, IADLs and Functional Mobility.   Interdisciplinary Plan of Care Collaboration   IDT Collaboration with  Family / Caregiver   Patient Position at End of Therapy Seated;Chair Alarm On;Call Light within Reach;Other (Comments)  (left in bathroom with nursing)   Collaboration Comments dgter present at start of session   Strengths & Barriers   Strengths Able to follow instructions;Alert and oriented;Effective communication skills;Adequate strength;Good insight into deficits/needs;Independent prior level of function;Making steady progress towards goals;Motivated for self care and independence;Pleasant and cooperative;Supportive family;Willingly participates in therapeutic activities   Barriers Decreased endurance;Fatigue;Impaired activity tolerance;Impaired balance       Assessment  Patient is 88 y.o. female with a diagnosis of SDH. Past medical history of HTN, chronic pain s/p pain pump, and remote history of a fib not on AC, ;  who presented on 11/3/2023  9:59 PM as a transfer from OSH with concern for SDH. Per documentation, patient presented to OSH with issues with memory and AMS. CT head obtained at OSH showed SDH with right to left 8mm midline shift. Patient was transferred to [facility], Neurosurgery was consulted, and patient was taken to the OR on 11/4 for right sided craniotomy for evacuation of SDH and resection of membranes performed by Dr. Alves.     Pt lives in 5th wheel on [facility] property. Pt has 3 VICENTA with landing with B rails. Pt has 2 steps inside trailer, 1 to get into bedroom and 1 into bathroom. Walk-in shower with GB and hand held shower head and seat, taller toilet and GB around the house. Pt uses SPC for mobility for short distances and 3WW when out in the community and no AD in the home. Pt was independent with all ADL's/IADL's and mobility. Pt drives short distances in town but daughter drives longer distances into [place] to stock up on supplies and for doctors  appointments. Daughter reported that if pt is unsafe to return home alone again, she can stay with her as long ad needed. Daughter has 1 VICENTA and no steps within the home. Pt currently presents with decreased standing bal/tolerance, decreased strength/endurance, some minor memory issues (as per pt), and increased need for assist with ADL's/mobility.     Plan  Recommend Occupational Therapy  minutes per day 5-7 days per week for 2 weeks for the following treatments:  OT Self Care/ADL, OT Community Reintegration, OT Therapeutic Activity, OT Evaluation, and OT Therapeutic Exercise.    Passport items to be completed:  Perform bathroom transfers, complete dressing, complete feeding, get ready for the day, prepare a simple meal, participate in household tasks, adapt home for safety needs, demonstrate home exercise program, complete caregiver training     Goals:  Long term and short term goals have been discussed with patient and they are in agreement.    Occupational Therapy Goals (Active)       Problem: Dressing       Dates: Start:  11/10/23         Goal: STG-Within one week, patient will dress LB with SBA        Dates: Start:  11/10/23               Problem: Functional Transfers       Dates: Start:  11/10/23         Goal: STG-Within one week, patient will transfer to toilet with SBA        Dates: Start:  11/10/23               Problem: OT Long Term Goals       Dates: Start:  11/10/23         Goal: LTG-By discharge, patient will complete basic self care tasks with mod I        Dates: Start:  11/10/23            Goal: LTG-By discharge, patient will perform bathroom transfers with mod I        Dates: Start:  11/10/23            Goal: LTG-By discharge, patient will complete basic home management with mod I        Dates: Start:  11/10/23               Problem: Toileting       Dates: Start:  11/10/23         Goal: STG-Within one week, patient will complete toileting tasks with SBA        Dates: Start:  11/10/23

## 2023-11-10 NOTE — DISCHARGE PLANNING
CASE MANAGEMENT INITIAL ASSESSMENT    Admit Date:  11/9/2023     Case Management has reviewed the medical chart and will meet withpatient to discuss role of case management / discharge planning / team conference.   Patient is a  88 y.o. female transferred from [facility].    Attending physician: Dr. Pak  PCP: CM to confirm  Neurosurgeon: Dr. Alves     Diagnosis: SDH (subdural hematoma) ([facility]) [S06.5XAA]    Co-morbidities:   Patient Active Problem List    Diagnosis Date Noted    Leukocytosis (leucocytosis) 11/05/2023    Acute blood loss anemia 11/05/2023    Altered mental status 11/05/2023    Headache 11/04/2023    Chronic pain syndrome 11/04/2023    Age-related physical debility 11/04/2023    ACP (advance care planning) 11/04/2023    Subdural hematoma ([facility]) 11/04/2023    SDH (subdural hematoma) ([facility]) 11/03/2023    Aortic atherosclerosis ([facility]) 05/29/2020    Heart failure with preserved ejection fraction ([facility]) 05/29/2020    Shortness of breath 05/19/2020    COPD (chronic obstructive pulmonary disease) ([facility]) 05/19/2020    Hyperglycemia 05/19/2020    LINUS (acute kidney injury) ([facility]) 05/19/2020    Depression 05/19/2020    Atrial fibrillation with rapid ventricular response ([facility]) 05/19/2020     Prior Living Situation:  Housing / Facility: Mobile Home  Lives with - Patient's Self Care Capacity: Alone and Able to Care For Self, Other (Comments) (Lives on daughters property)    Prior Level of Function:   Independent     Support Systems:  Primary : Katie Morton (daughter)    Previous Services Utilized:   Equipment Owned: Single Point Cane  Prior Services: Home-Independent    Other Information:  Primary Payor Source: Medicare A, Medicare B  Secondary Payor Source: Other (Comments)  Other MDs[place]. [place]    Patient / Family Goal:  Patient / Family Goal:  (CM to discuss goals)    Plan:  1. Continue to follow patient through hospitalization and provide discharge planning in collaboration with patient, family,  physicians and ancillary services.     2. Utilize community resources to ensure a safe discharge.

## 2023-11-10 NOTE — DISCHARGE SUMMARY
Discharge Summary    CHIEF COMPLAINT ON ADMISSION  Chief Complaint   Patient presents with    Other     BIB flight fixed Wing; transfer from [facility], patient had her CT done today as outpatient due to intermittent memory issue and was found out to have  a SDH right side 8mm midline shift. AOX4 GCS15; Denies any head injury or fall; not on thinners; on room air and ambulatory with the help on her cane. Denies any N/V and headache         Reason for Admission  EMS     Admission Date  11/3/2023    CODE STATUS  DNAR/DNI    HPI & HOSPITAL COURSE    Anita Beard is a 88 y.o. female with history of hypertension, chronic pain syndrome with pain pump, remote history of A-fib no longer on anticoagulation who presented 11/3/2023 as transfer from [facility] ER for SDH.  Patient reported to have memory issue, CT head done at transferring ER --which noted SDH with right to left 8 mm midline shift.  Patient was transferred to [facility] ER for neurosurgery evaluation.  Neurosurgery consulted and patient underwent right-sided craniotomy for evacuation of subdural hematoma and resection of membranes on November 4, 2023.    Patient transferred to ICU after the surgery and subsequently transferred to [facility] for frequent neurochecks.    Patient had a drain after the surgery that was removed on November 8, 2023.  She developed episode of A-fib with RVR and she received IV metoprolol that resolved and her symptoms of A-fib with RVR and she converted back to sinus rhythm.    Today I evaluated and examined her at the bedside.  She was sitting in chair and denies any acute complaints.  I discussed plan of care with patient and her daughter at the bedside and answered all of their questions.  I discussed plan of care with neurosurgery Dr. Alves over phone that patient will need to be removed her staples at rehab 2 weeks from the date of surgery.  I also prescribed her Keppra for total of 7 days from the date of surgery.    Today on the day of  discharge patient denies any acute complaints and she feels ready to be discharged to [facility] acute rehab.  Discussed plan of care with patient and her daughter at the bedside and answered questions with best of my knowledge.      Therefore, she is discharged in fair and stable condition to home with close outpatient follow-up.    The patient met 2-midnight criteria for an inpatient stay at the time of discharge.    Discharge Date  11/9/2023    FOLLOW UP ITEMS POST DISCHARGE  Primary care provider  Neurosurgery in 2 weeks    DISCHARGE DIAGNOSES  Principal Problem:    SDH (subdural hematoma) (HCC) (POA: Yes)  Active Problems:    COPD (chronic obstructive pulmonary disease) (HCC) (POA: Yes)    Atrial fibrillation with rapid ventricular response (HCC) (POA: Yes)    Headache (POA: Unknown)    Chronic pain syndrome (POA: Yes)    Age-related physical debility (POA: Unknown)    ACP (advance care planning) (POA: Unknown)    Subdural hematoma (HCC) (POA: Yes)    Leukocytosis (leucocytosis) (POA: Unknown)    Altered mental status (POA: Unknown)  Resolved Problems:    * No resolved hospital problems. *      FOLLOW UP  Future Appointments   Date Time Provider Department Center   11/10/2023  8:30 AM Nona Vidal, OT [facility] None   11/10/2023 10:30 AM Jarad Karimi MS,CCC-SLP [facility] None   11/10/2023 12:30 PM Carmenza Jesus, PT [facility] None     No follow-up provider specified.    MEDICATIONS ON DISCHARGE     Medication List        START taking these medications        Instructions   atorvastatin 40 MG Tabs  Commonly known as: Lipitor   Take 1 Tablet by mouth every evening.  Dose: 40 mg     enoxaparin 40 MG/0.4ML Sosy inj  Commonly known as: Lovenox   Inject 40 mg under the skin every day at 6 PM.  Dose: 40 mg     levETIRAcetam 500 MG Tabs  Commonly known as: Keppra   Take 1 Tablet by mouth 2 times a day for 3 days.  Dose: 500 mg     phosphorus 250 MG tablet  Commonly known as: K-Phos-Neutral   Take 1 Tablet by mouth every 6 hours  for 2 days.  Dose: 1 Tablet            CONTINUE taking these medications        Instructions   alendronate 70 MG Tabs  Commonly known as: Fosamax   Take 70 mg by mouth every 7 days.  Dose: 70 mg     atenolol 25 MG Tabs  Commonly known as: Tenormin   Take 25 mg by mouth every day.  Dose: 25 mg     docusate calcium 240 MG Caps  Commonly known as: Surfak   Take 240 mg by mouth every evening.  Dose: 240 mg     fluticasone 50 MCG/ACT nasal spray  Commonly known as: Flonase   Administer 2 Sprays into affected nostril(S) every day.  Dose: 2 Spray     gabapentin 300 MG Caps  Commonly known as: Neurontin   Take 300-600 mg by mouth 2 times a day. 300 mg in the morning, 600 mg in the evening  Dose: 300-600 mg     loratadine 10 MG Tabs  Commonly known as: Claritin   Take 10 mg by mouth every day.  Dose: 10 mg     MAGNESIUM PO   Take 350 mg by mouth every evening.  Dose: 350 mg     Pain Pump Vashti  Commonly known as: Patient Supplied   Inject  as directed continuous. Patient's Pain Pump (placed and maintained as an outpatient)    Medications/concentrations:   Morphine (20.0 mg/mL)  Fentanyl (124.9 mcg/mL)  Route: Intrathecal  Last changed: 10/5/23  Refill pump before date: 12/29/23  Continuous infusion rates (Drug/Rate):   Morphine: 2.007 mg/day (0.084 mg/hr)  Fentanyl: 12.54 mg/day (0.52 mcg/hr)  Patient activation dose:   Morphine: 0.200 mg  Fentanyl: 1.25 mcg  Patient activation lockout interval: 1 hr  Maximum activations per day: 10/day    MD office: Dr Grant  Phone number: [phone]     TYLENOL/CODEINE #4 300-60 MG tablet  Generic drug: acetaminophen-codeine #4   Take 1 Tablet by mouth 3 times a day as needed.  Dose: 1 Tablet     Vitamin C 1000 MG Tabs   Take 1,000 mg by mouth every evening.  Dose: 1,000 mg              Allergies  Allergies   Allergen Reactions    Ace Inhibitors Swelling     Angioedema       DIET  No orders of the defined types were placed in this encounter.      ACTIVITY  As tolerated.  Weight bearing  as tolerated    CONSULTATIONS  Neurosurgery    PROCEDURES    Right-sided craniotomy for evacuation of subdural hematoma and resection of membranes.    LABORATORY  Lab Results   Component Value Date    SODIUM 136 11/09/2023    POTASSIUM 3.5 (L) 11/09/2023    CHLORIDE 107 11/09/2023    CO2 22 11/09/2023    GLUCOSE 126 (H) 11/09/2023    BUN 13 11/09/2023    CREATININE 0.65 11/09/2023        Lab Results   Component Value Date    WBC 6.0 11/09/2023    HEMOGLOBIN 9.1 (L) 11/09/2023    HEMATOCRIT 28.0 (L) 11/09/2023    PLATELETCT 142 (L) 11/09/2023      CT-HEAD W/O   Final Result         Postsurgical change from right frontal craniotomy and evacuation of the right subdural hematoma.      Postsurgical pneumocephalus and residual blood seen in the right subdural space.      Improved right to left midline shift of 3 mm, previously 6 mm.            MR-BRAIN-W/O   Final Result      1.  Large right frontal-parietal-temporal complex subdural hematoma with multiple loculations and complex membranes.   2.  Mass effect with effacement of sulcal markings and right to left shift of midline structures of about 8 mm.   3.  Mild supratentorial white matter disease consistent with microvascular ischemic change.   4.  Mild pontine ischemic gliosis.   5.  Chronic right maxillary sinusitis.   6.  Dental artifact.      OUTSIDE IMAGES-CT HEAD   Final Result            Total time of the discharge process exceeds 43 minutes.

## 2023-11-11 ENCOUNTER — APPOINTMENT (OUTPATIENT)
Dept: SPEECH THERAPY | Facility: REHABILITATION | Age: 88
DRG: 949 | End: 2023-11-11
Attending: PHYSICAL MEDICINE & REHABILITATION
Payer: MEDICARE

## 2023-11-11 PROCEDURE — 94760 N-INVAS EAR/PLS OXIMETRY 1: CPT

## 2023-11-11 PROCEDURE — 770010 HCHG ROOM/CARE - REHAB SEMI PRIVAT*

## 2023-11-11 PROCEDURE — 700111 HCHG RX REV CODE 636 W/ 250 OVERRIDE (IP): Mod: JZ | Performed by: PHYSICAL MEDICINE & REHABILITATION

## 2023-11-11 PROCEDURE — 97129 THER IVNTJ 1ST 15 MIN: CPT

## 2023-11-11 PROCEDURE — A9270 NON-COVERED ITEM OR SERVICE: HCPCS | Performed by: PHYSICAL MEDICINE & REHABILITATION

## 2023-11-11 PROCEDURE — 700102 HCHG RX REV CODE 250 W/ 637 OVERRIDE(OP): Performed by: PHYSICAL MEDICINE & REHABILITATION

## 2023-11-11 PROCEDURE — 97130 THER IVNTJ EA ADDL 15 MIN: CPT

## 2023-11-11 RX ORDER — BISACODYL 10 MG
10 SUPPOSITORY, RECTAL RECTAL
Status: DISCONTINUED | OUTPATIENT
Start: 2023-11-11 | End: 2023-11-21 | Stop reason: HOSPADM

## 2023-11-11 RX ORDER — POLYETHYLENE GLYCOL 3350 17 G/17G
1 POWDER, FOR SOLUTION ORAL
Status: DISCONTINUED | OUTPATIENT
Start: 2023-11-11 | End: 2023-11-21 | Stop reason: HOSPADM

## 2023-11-11 RX ORDER — AMOXICILLIN 250 MG
2 CAPSULE ORAL ONCE
Status: DISPENSED | OUTPATIENT
Start: 2023-11-11 | End: 2023-11-12

## 2023-11-11 RX ADMIN — OMEPRAZOLE 20 MG: 20 CAPSULE, DELAYED RELEASE ORAL at 08:49

## 2023-11-11 RX ADMIN — HYDROCODONE BITARTRATE AND ACETAMINOPHEN 1 TABLET: 10; 325 TABLET ORAL at 20:37

## 2023-11-11 RX ADMIN — HYDROCODONE BITARTRATE AND ACETAMINOPHEN 1 TABLET: 10; 325 TABLET ORAL at 08:54

## 2023-11-11 RX ADMIN — LEVETIRACETAM 500 MG: 500 TABLET, FILM COATED ORAL at 08:49

## 2023-11-11 RX ADMIN — ATENOLOL 25 MG: 25 TABLET ORAL at 20:29

## 2023-11-11 RX ADMIN — GABAPENTIN 300 MG: 300 CAPSULE ORAL at 08:49

## 2023-11-11 RX ADMIN — ENOXAPARIN SODIUM 40 MG: 100 INJECTION SUBCUTANEOUS at 18:35

## 2023-11-11 RX ADMIN — LEVETIRACETAM 500 MG: 500 TABLET, FILM COATED ORAL at 20:29

## 2023-11-11 RX ADMIN — GABAPENTIN 600 MG: 300 CAPSULE ORAL at 20:29

## 2023-11-11 RX ADMIN — ATORVASTATIN CALCIUM 40 MG: 40 TABLET, FILM COATED ORAL at 20:29

## 2023-11-11 ASSESSMENT — PAIN DESCRIPTION - PAIN TYPE
TYPE: ACUTE PAIN
TYPE: ACUTE PAIN

## 2023-11-11 NOTE — PROGRESS NOTES
NURSING DAILY NOTE    Name: Anita Beard   Date of Admission: 11/9/2023   Admitting Diagnosis: SDH (subdural hematoma) ([facility])  Attending Physician: Deepti Pak M.d.  Allergies: Ace inhibitors    Safety  Patient Assist  mod  Patient Precautions  Fall Risk  Precaution Comments  pain pump for chronic back/neck pain, craniotomy  Bed Transfer Status  Contact Guard Assist  Toilet Transfer Status   Contact Guard Assist  Assistive Devices  Rails, Wheelchair  Oxygen   (refused CPAP, education given, CN notified.)  Diet/Therapeutic Dining  Current Diet Order   Procedures    Diet Order Diet: Regular     Pill Administration  whole and one at a time   Agitated Behavioral Scale     ABS Level of Severity       Fall Risk  Has the patient had a fall this admission?   No  Talisha Hong Fall Risk Scoring  14, MODERATE RISK  Fall Risk Safety Measures  bed alarm, chair alarm, and poor balance    Vitals  Temperature: 36.8 °C (98.2 °F)  Temp src: Oral  Pulse: 72  Respiration: 18  Blood Pressure : 137/49  Blood Pressure MAP (Calculated): 78 MM HG  BP Location: Left, Upper Arm  Patient BP Position: Ramirez's Position     Oxygen  Pulse Oximetry: 95 %  O2 (LPM): 2  O2 Delivery Device:  (refused CPAP, education given, CN notified.)    Bowel and Bladder  Last Bowel Movement  11/09/23  Stool Type  Type 4: Like a sausage or snake, smooth and soft  Bowel Device     Continent  Bladder: Stress incontinence   Bowel: Continent movement  Bladder Function  Urine Void (mL): 200 ml  Number of Times Voided: 1  Urine Color: Yellow  Genitourinary Assessment   Bladder Assessment (WDL):  Within Defined Limits  Urine Color: Yellow  Bladder Device: Bathroom  Time Void: Yes  Bladder Scan: Post Void  $ Bladder Scan Results (mL): 201    Skin  Satnam Score   17  Sensory Interventions   Bed Types: Standard/Trauma Mattress with Overlay  Skin Preventative Measures: Pillows in Use for Support /  Positioning  Moisture Interventions  Moisturizers/Barriers: Barrier Wipes      Pain  Pain Rating Scale  6 - Hard to ignore, avoid usual activities  Pain Location  Head, Shoulder  Pain Location Orientation  Right, Left, Anterior, Posterior, Mid  Pain Interventions   Medication (see MAR)    ADLs    Bathing      Linen Change      Personal Hygiene     Chlorhexidine Bath      Oral Care     Teeth/Dentures     Shave     Nutrition Percentage Eaten     Environmental Precautions  Treaded Slipper Socks on Patient, Personal Belongings, Wastebasket, Call Bell etc. in Easy Reach, Transferred to Stronger Side, Bed in Low Position (pt refuses bed alarm)  Patient Turns/Positioning  Patient Turns Self from Side to Side  Patient Turns Assistance/Tolerance     Bed Positions  Bed Locked, Bed Controls Deactivated  Head of Bed Elevated  Self regulated      Psychosocial/Neurologic Assessment  Psychosocial Assessment  Psychosocial (WDL):  Within Defined Limits  Neurologic Assessment  Neuro (WDL): Within Defined Limits  Level of Consciousness: Alert  EENT (WDL):  WDL Except    Cardio/Pulmonary Assessment  Edema      Respiratory Breath Sounds  RUL Breath Sounds: Clear  RML Breath Sounds: Clear  RLL Breath Sounds: Diminished  NIDIA Breath Sounds: Clear  LLL Breath Sounds: Diminished  Cardiac Assessment   Cardiac (WDL):  Within Defined Limits

## 2023-11-11 NOTE — FLOWSHEET NOTE
11/11/23 0730   Events/Summary/Plan   Events/Summary/Plan 02 pulse ox check   Vital Signs   Pulse 74   Respiration 16   Pulse Oximetry 98 %   $ Pulse Oximetry (Spot Check) Yes   Respiratory Assessment   Level of Consciousness Responds to voice  (sleeping)   Chest Exam   Work Of Breathing / Effort Within Normal Limits   Oxygen   O2 (LPM) 2   O2 Delivery Device Silicone Nasal Cannula

## 2023-11-11 NOTE — PROGRESS NOTES
NURSING DAILY NOTE    Name: Anita Beard   Date of Admission: 11/9/2023   Admitting Diagnosis: SDH (subdural hematoma) ([facility])  Attending Physician: Deepti Pak M.d.  Allergies: Ace inhibitors    Safety  Patient Assist     Patient Precautions  Fall Risk  Precaution Comments  pain pump for chronic back/neck pain, craniotomy  Bed Transfer Status  Contact Guard Assist  Toilet Transfer Status   Contact Guard Assist  Assistive Devices     Oxygen  None - Room Air  Diet/Therapeutic Dining  Current Diet Order   Procedures    Diet Order Diet: Regular     Pill Administration  whole  Agitated Behavioral Scale     ABS Level of Severity       Fall Risk  Has the patient had a fall this admission?   No  Talisha Hong Fall Risk Scoring  14, MODERATE RISK  Fall Risk Safety Measures  bed alarm, chair alarm, and poor balance    Vitals  Temperature: 36.7 °C (98 °F)  Temp src: Oral  Pulse: 69  Respiration: 16  Blood Pressure : (!) 147/64  Blood Pressure MAP (Calculated): 92 MM HG  BP Location: Left, Upper Arm  Patient BP Position: Ramirez's Position     Oxygen  Pulse Oximetry: 95 %  O2 (LPM): 0  O2 Delivery Device: None - Room Air    Bowel and Bladder  Last Bowel Movement  11/09/23  Stool Type  Type 4: Like a sausage or snake, smooth and soft  Bowel Device     Continent  Bladder: Stress incontinence   Bowel: Continent movement  Bladder Function  Urine Void (mL): 200 ml  Urine Color: Yellow  Genitourinary Assessment   Bladder Assessment (WDL):  Within Defined Limits  Urine Color: Yellow  Bladder Device: Bathroom  Time Void: Yes  Bladder Scan: Post Void  $ Bladder Scan Results (mL): 201    Skin  Satnam Score   17  Sensory Interventions   Bed Types: Standard/Trauma Mattress with Overlay  Skin Preventative Measures: Pillows in Use for Support / Positioning  Moisture Interventions  Moisturizers/Barriers: Barrier Wipes      Pain  Pain Rating Scale  2 - Notice Pain, does not  interfere with activities  Pain Location  Head, Shoulder  Pain Location Orientation  Right, Left, Anterior, Posterior, Mid  Pain Interventions   Medication (see MAR)    ADLs    Bathing      Linen Change      Personal Hygiene     Chlorhexidine Bath      Oral Care     Teeth/Dentures     Shave     Nutrition Percentage Eaten     Environmental Precautions  Treaded Slipper Socks on Patient, Personal Belongings, Wastebasket, Call Bell etc. in Easy Reach, Transferred to Stronger Side, Bed in Low Position (pt refuses bed alarm)  Patient Turns/Positioning  Patient Turns Self from Side to Side  Patient Turns Assistance/Tolerance     Bed Positions  Bed Locked, Bed Controls Deactivated  Head of Bed Elevated  Self regulated      Psychosocial/Neurologic Assessment  Psychosocial Assessment  Psychosocial (WDL):  Within Defined Limits  Neurologic Assessment  Neuro (WDL): Within Defined Limits  Level of Consciousness: Alert  EENT (WDL):  WDL Except    Cardio/Pulmonary Assessment  Edema      Respiratory Breath Sounds  RUL Breath Sounds: Clear  RML Breath Sounds: Clear  RLL Breath Sounds: Diminished  NIDIA Breath Sounds: Clear  LLL Breath Sounds: Diminished  Cardiac Assessment   Cardiac (WDL):  Within Defined Limits

## 2023-11-11 NOTE — CARE PLAN
The patient is Stable - Low risk of patient condition declining or worsening    Problem: Knowledge Deficit - Standard  Goal: Patient and family/care givers will demonstrate understanding of plan of care, disease process/condition, diagnostic tests and medications  Outcome: Progressing. Reviewed POC, all questions answered.        Problem: Fall Risk - Rehab  Goal: Patient will remain free from falls  Outcome: Progressing. Call light within reach, pt educated to use for assistance for safe transferring.      Shift Goals  Clinical Goals: Safety  Patient Goals: Participate in therapy, pain control

## 2023-11-11 NOTE — THERAPY
"Speech Language Pathology  Daily Treatment     Patient Name: Anita Beard  Age:  88 y.o., Sex:  female  Medical Record #: [id number]  Today's Date: 11/11/2023     Precautions  Precautions: Fall Risk  Comments: pain pump for chronic back/neck pain, craniotomy    Subjective    Patient pleasant and agreeable to SLP services to address cognitive-linguistic goals this session.     Objective       11/11/23 1101   Treatment Charges   SLP Cognitive Skill Development First 15 Minutes 1   SLP Cognitive Skill Development Additional 15 Minutes 1   SLP Total Time Spent   SLP Individual Total Time Spent (Mins) 30   Precautions   Precautions Fall Risk   Cognition   Functional Memory Activities Supervision (5)   Medication Management  Minimal (4)   Interdisciplinary Plan of Care Collaboration   Patient Position at End of Therapy In Bed;Bed Alarm On;Call Light within Reach;Tray Table within Reach;Phone within Reach;Other (Comments)  (on phone with family member)       Assessment    Introduced patient to daily-log this session. Patient verbalized understanding of its use and expressed intent to implement into her daily routine while admitted to [facility]; reported that she would be willing to adapt a similar external memory aid to her routine after d/c/    Discussed various means of managing medications. Patient recalled discussing the use of a pill box in previous ST sessions, and remarked \"the way she explained it to me seems like it will be better than the way I was doing it\".    Strengths: Able to follow instructions, Alert and oriented, Effective communication skills, Good insight into deficits/needs, Independent prior level of function, Motivated for self care and independence, Supportive family, Willingly participates in therapeutic activities, Pleasant and cooperative  Barriers: Impaired carryover of learning, Impaired functional cognition    Plan    Continue to address medication management, functional memory activities.    Passport " items to be completed:  Express basic needs, understand food/liquid recommendations, consistently follow swallow precautions, manage finances, manage medications, arrive to therapy appointments on time, complete daily memory log entries, solve problems related to safety situations, review education related to hospitalization, complete caregiver training     Speech Therapy Problems (Active)       Problem: Memory STGs       Dates: Start:  11/10/23         Goal: STG-Within one week, patient will recall new information related to her current hospitalization using functional memory strategies with spv.         Dates: Start:  11/10/23               Problem: Problem Solving STGs       Dates: Start:  11/10/23         Goal: STG-Within one week, patient will complete a mock medication sorting task with spv to achieve 100% accuracy.         Dates: Start:  11/10/23               Problem: Speech/Swallowing LTGs       Dates: Start:  11/10/23         Goal: LTG-By discharge, patient will solve complex problems with modified independence.        Dates: Start:  11/10/23

## 2023-11-11 NOTE — CARE PLAN
"  Problem: Fall Risk - Rehab  Goal: Patient will remain free from falls  Note: Woodallvadim Hong Fall risk Assessment Score: 14      Moderate fall risk Interventions  - Bed and strip alarm   - Yellow sign by the door   - Yellow wrist band \"Fall risk\"  - Room near to the nurse station  - Do not leave patient unattended in the bathroom  - Fall risk education provided      Problem: Respiratory  Goal: Patient will understand use and administration of respiratory medications to improve respiratory function  Note: Patient refused CPAP on at bedtime, education given and CN notified. O2 at 2 lpm n/c in place with O2 saturation of 95%. Will monitor.     Problem: Pain - Standard  Goal: Alleviation of pain or a reduction in pain to the patient’s comfort goal  Note: Educate patient of non-pharmacological comfort measures: repositioning, relaxation/breathing technique, cold compress and activities.         The patient is Watcher - Medium risk of patient condition declining or worsening    Shift Goals  Clinical Goals: Safety, pain managment  Patient Goals: Pain control        "

## 2023-11-12 ENCOUNTER — APPOINTMENT (OUTPATIENT)
Dept: PHYSICAL THERAPY | Facility: REHABILITATION | Age: 88
DRG: 949 | End: 2023-11-12
Attending: PHYSICAL MEDICINE & REHABILITATION
Payer: MEDICARE

## 2023-11-12 ENCOUNTER — APPOINTMENT (OUTPATIENT)
Dept: SPEECH THERAPY | Facility: REHABILITATION | Age: 88
DRG: 949 | End: 2023-11-12
Attending: PHYSICAL MEDICINE & REHABILITATION
Payer: MEDICARE

## 2023-11-12 ENCOUNTER — APPOINTMENT (OUTPATIENT)
Dept: OCCUPATIONAL THERAPY | Facility: REHABILITATION | Age: 88
DRG: 949 | End: 2023-11-12
Attending: PHYSICAL MEDICINE & REHABILITATION
Payer: MEDICARE

## 2023-11-12 PROCEDURE — 97116 GAIT TRAINING THERAPY: CPT

## 2023-11-12 PROCEDURE — 97130 THER IVNTJ EA ADDL 15 MIN: CPT

## 2023-11-12 PROCEDURE — 770010 HCHG ROOM/CARE - REHAB SEMI PRIVAT*

## 2023-11-12 PROCEDURE — 700111 HCHG RX REV CODE 636 W/ 250 OVERRIDE (IP): Mod: JZ | Performed by: PHYSICAL MEDICINE & REHABILITATION

## 2023-11-12 PROCEDURE — 94760 N-INVAS EAR/PLS OXIMETRY 1: CPT

## 2023-11-12 PROCEDURE — A9270 NON-COVERED ITEM OR SERVICE: HCPCS | Performed by: PHYSICAL MEDICINE & REHABILITATION

## 2023-11-12 PROCEDURE — 97129 THER IVNTJ 1ST 15 MIN: CPT

## 2023-11-12 PROCEDURE — 97110 THERAPEUTIC EXERCISES: CPT

## 2023-11-12 PROCEDURE — 97535 SELF CARE MNGMENT TRAINING: CPT

## 2023-11-12 PROCEDURE — 700102 HCHG RX REV CODE 250 W/ 637 OVERRIDE(OP): Performed by: PHYSICAL MEDICINE & REHABILITATION

## 2023-11-12 RX ADMIN — OMEPRAZOLE 20 MG: 20 CAPSULE, DELAYED RELEASE ORAL at 08:26

## 2023-11-12 RX ADMIN — HYDROCODONE BITARTRATE AND ACETAMINOPHEN 1 TABLET: 10; 325 TABLET ORAL at 20:38

## 2023-11-12 RX ADMIN — ATORVASTATIN CALCIUM 40 MG: 40 TABLET, FILM COATED ORAL at 20:38

## 2023-11-12 RX ADMIN — GABAPENTIN 300 MG: 300 CAPSULE ORAL at 08:26

## 2023-11-12 RX ADMIN — ATENOLOL 25 MG: 25 TABLET ORAL at 20:38

## 2023-11-12 RX ADMIN — ENOXAPARIN SODIUM 40 MG: 100 INJECTION SUBCUTANEOUS at 17:07

## 2023-11-12 RX ADMIN — LEVETIRACETAM 500 MG: 500 TABLET, FILM COATED ORAL at 08:26

## 2023-11-12 RX ADMIN — LEVETIRACETAM 500 MG: 500 TABLET, FILM COATED ORAL at 20:38

## 2023-11-12 RX ADMIN — GABAPENTIN 600 MG: 300 CAPSULE ORAL at 20:38

## 2023-11-12 ASSESSMENT — PAIN DESCRIPTION - PAIN TYPE: TYPE: ACUTE PAIN

## 2023-11-12 ASSESSMENT — PATIENT HEALTH QUESTIONNAIRE - PHQ9
2. FEELING DOWN, DEPRESSED, IRRITABLE, OR HOPELESS: NOT AT ALL
SUM OF ALL RESPONSES TO PHQ9 QUESTIONS 1 AND 2: 0
1. LITTLE INTEREST OR PLEASURE IN DOING THINGS: NOT AT ALL

## 2023-11-12 ASSESSMENT — GAIT ASSESSMENTS
GAIT LEVEL OF ASSIST: CONTACT GUARD ASSIST
DISTANCE (FEET): 400

## 2023-11-12 ASSESSMENT — ACTIVITIES OF DAILY LIVING (ADL): TOILET_TRANSFER_DESCRIPTION: GRAB BAR

## 2023-11-12 ASSESSMENT — FIBROSIS 4 INDEX: FIB4 SCORE: 2.43

## 2023-11-12 NOTE — CARE PLAN
"  Problem: Fall Risk - Rehab  Goal: Patient will remain free from falls  Note: Talisha Hong Fall risk Assessment Score: 14      Moderate fall risk Interventions  - Bed and strip alarm   - Yellow sign by the door   - Yellow wrist band \"Fall risk\"  - Room near to the nurse station  - Do not leave patient unattended in the bathroom  - Fall risk education provided      Problem: Pain - Standard  Goal: Alleviation of pain or a reduction in pain to the patient’s comfort goal  Note: Educate patient of non-pharmacological comfort measures: repositioning, relaxation/breathing technique, cold compress and activities.       The patient is Stable - Low risk of patient condition declining or worsening    Shift Goals  Clinical Goals: Safety  Patient Goals: Participate in therapy, pain control    "

## 2023-11-12 NOTE — THERAPY
Speech Language Pathology  Daily Treatment     Patient Name: Anita Beard  Age:  88 y.o., Sex:  female  Medical Record #: [id number]  Today's Date: 11/12/2023     Precautions  Precautions: Fall Risk  Comments: pain pump for chronic back/neck pain, craniotomy    Subjective    Patient pleasant and agreeable to SLP services. Patient seen at the bedside this session d/t late breakfast arrival and pt preference.     Objective       11/12/23 0831   Treatment Charges   SLP Cognitive Skill Development First 15 Minutes 1   SLP Cognitive Skill Development Additional 15 Minutes 3   SLP Total Time Spent   SLP Individual Total Time Spent (Mins) 60   Cognition   Functional Memory Activities Supervision (5)   Medication Management  Supervision (5)   Interdisciplinary Plan of Care Collaboration   Patient Position at End of Therapy In Bed;Bed Alarm On;Call Light within Reach;Phone within Reach;Tray Table within Reach       Assessment    Created written medication list of all current scheduled medications at [facility], compared with pt's list of baseline medications from home. Patient is a retired respiratory therapist and demonstrates strong knowledge and understanding of her medications; required 1x verbal cue to recall purpose of Keppra--otherwise independent in recalling reasons for medications as well as dosages and times. Continued discussion of various means of managing medications, including a large, 2-time-a-day pill box.    Reviewed memory log introduced last session; demonstrated its use by assisting the patient in filling out log for this date. Patient was reluctant to agree to daily use of the memory log while at [facility], however, was agreeable when discussed in the context of having a record of what she learns in her therapies, and it's use as a tool to help with differentiation of days that events occurred during her stay.    Strengths: Able to follow instructions, Alert and oriented, Effective communication skills, Good insight  into deficits/needs, Independent prior level of function, Motivated for self care and independence, Supportive family, Willingly participates in therapeutic activities, Pleasant and cooperative  Barriers: Impaired carryover of learning, Impaired functional cognition    Plan    Fx medication sort; continue to address medication management, fx memory activities.    Passport items to be completed:  Express basic needs, understand food/liquid recommendations, consistently follow swallow precautions, manage finances, manage medications, arrive to therapy appointments on time, complete daily memory log entries, solve problems related to safety situations, review education related to hospitalization, complete caregiver training     Speech Therapy Problems (Active)       Problem: Memory STGs       Dates: Start:  11/10/23         Goal: STG-Within one week, patient will recall new information related to her current hospitalization using functional memory strategies with spv.         Dates: Start:  11/10/23               Problem: Problem Solving STGs       Dates: Start:  11/10/23         Goal: STG-Within one week, patient will complete a mock medication sorting task with spv to achieve 100% accuracy.         Dates: Start:  11/10/23               Problem: Speech/Swallowing LTGs       Dates: Start:  11/10/23         Goal: LTG-By discharge, patient will solve complex problems with modified independence.        Dates: Start:  11/10/23

## 2023-11-12 NOTE — THERAPY
Occupational Therapy  Daily Treatment     Patient Name: Anita Beard  Age:  88 y.o., Sex:  female  Medical Record #: [id number]  Today's Date: 11/12/2023     Precautions  Precautions: Fall Risk  Comments: pain pump for chronic back/neck pain, craniotomy         Subjective    Pt seated in w/c upon arrival, pleasant and cooperative, agreeable to therapy       Objective       11/12/23 1231   OT Charge Group   OT Self Care / ADL (Units) 1   OT Therapeutic Exercise (Units) 1   OT Total Time Spent   OT Individual Total Time Spent (Mins) 30   Functional Level of Assist   Grooming Supervision;Standing   Toileting Standby Assist   Toilet Transfers Standby Assist  (with FWW)   Interdisciplinary Plan of Care Collaboration   Patient Position at End of Therapy Call Light within Reach;Tray Table within Reach;Bed Alarm On;In Bed     Functional mobility at FWW throughout indoors: SBA with cues for upright posture     seated UB mobility/stretches: crossy body punches, overhead claps, trunk rotations, cervical rotations, shoulder circles, shoulder extension pectoral stretch 2 sets x 10    Assessment    Pt completed UB exercises to improve overall strength and cardiovascular endurance in order to maximize independence and safety with ADL's and functional mobility tasks. Pt completed to the best of their abilities with no complaints of pain.     Strengths: Able to follow instructions, Alert and oriented, Effective communication skills, Adequate strength, Good insight into deficits/needs, Independent prior level of function, Making steady progress towards goals, Motivated for self care and independence, Pleasant and cooperative, Supportive family, Willingly participates in therapeutic activities  Barriers: Decreased endurance, Fatigue, Impaired activity tolerance, Impaired balance    Plan    Refer to Primary OT POC/goals     Occupational Therapy Goals (Active)       Problem: Dressing       Dates: Start:  11/10/23         Goal: STG-Within  one week, patient will dress LB with SBA        Dates: Start:  11/10/23               Problem: Functional Transfers       Dates: Start:  11/10/23         Goal: STG-Within one week, patient will transfer to toilet with SBA        Dates: Start:  11/10/23               Problem: OT Long Term Goals       Dates: Start:  11/10/23         Goal: LTG-By discharge, patient will complete basic self care tasks with mod I        Dates: Start:  11/10/23            Goal: LTG-By discharge, patient will perform bathroom transfers with mod I        Dates: Start:  11/10/23            Goal: LTG-By discharge, patient will complete basic home management with mod I        Dates: Start:  11/10/23               Problem: Toileting       Dates: Start:  11/10/23         Goal: STG-Within one week, patient will complete toileting tasks with SBA        Dates: Start:  11/10/23

## 2023-11-12 NOTE — PROGRESS NOTES
NURSING DAILY NOTE    Name: Anita Beard   Date of Admission: 11/9/2023   Admitting Diagnosis: SDH (subdural hematoma) ([facility])  Attending Physician: Deepti Pak M.d.  Allergies: Ace inhibitors    Safety  Patient Assist  mod  Patient Precautions  Fall Risk  Precaution Comments  pain pump for chronic back/neck pain, craniotomy  Bed Transfer Status  Contact Guard Assist  Toilet Transfer Status   Contact Guard Assist  Assistive Devices  Rails, Wheelchair  Oxygen  Nasal Cannula (refused CPAP, CN notified)  Diet/Therapeutic Dining  Current Diet Order   Procedures    Diet Order Diet: Regular     Pill Administration  whole and one at a time   Agitated Behavioral Scale     ABS Level of Severity       Fall Risk  Has the patient had a fall this admission?   No  Talisha Hong Fall Risk Scoring  14, MODERATE RISK  Fall Risk Safety Measures  bed alarm, chair alarm, and low vision/ hearing    Vitals  Temperature: 37.1 °C (98.8 °F)  Temp src: Oral  Pulse: 80  Respiration: 20  Blood Pressure : (!) 148/70  Blood Pressure MAP (Calculated): 96 MM HG  BP Location: Left, Upper Arm  Patient BP Position: Supine     Oxygen  Pulse Oximetry: 92 %  O2 (LPM): 2  O2 Delivery Device: Nasal Cannula (refused CPAP, CN notified)    Bowel and Bladder  Last Bowel Movement  11/11/23  Stool Type  Type 5: Soft blob with clear cut edges (passed easily)  Bowel Device     Continent  Bladder: Stress incontinence   Bowel: Continent movement  Bladder Function  Urine Void (mL): 200 ml  Number of Times Voided: 1  Urine Color: Yellow  Genitourinary Assessment   Bladder Assessment (WDL):  Within Defined Limits  Urine Color: Yellow  Bladder Device: Bathroom  Time Void: Yes  Bladder Scan: Post Void  $ Bladder Scan Results (mL): 1    Skin  Satnam Score   17  Sensory Interventions   Bed Types: Standard/Trauma Mattress  Skin Preventative Measures: Pillows in Use for Support / Positioning  Moisture  Interventions  Moisturizers/Barriers: Barrier Wipes      Pain  Pain Rating Scale  0 - No Pain (sleeping)  Pain Location  Head, Shoulder  Pain Location Orientation  Right, Left, Anterior  Pain Interventions   Medication (see MAR)    ADLs    Bathing      Linen Change      Personal Hygiene  Perineal Care, Moist Shannon Wipes  Chlorhexidine Bath      Oral Care     Teeth/Dentures     Shave     Nutrition Percentage Eaten     Environmental Precautions  Treaded Slipper Socks on Patient, Personal Belongings, Wastebasket, Call Bell etc. in Easy Reach, Transferred to Stronger Side, Bed in Low Position (pt refuses bed alarm)  Patient Turns/Positioning  Patient Turns Self from Side to Side  Patient Turns Assistance/Tolerance     Bed Positions  Bed Locked, Bed Controls Deactivated  Head of Bed Elevated  Self regulated      Psychosocial/Neurologic Assessment  Psychosocial Assessment  Psychosocial (WDL):  Within Defined Limits  Neurologic Assessment  Neuro (WDL): Within Defined Limits  Level of Consciousness: Alert  EENT (WDL):  WDL Except    Cardio/Pulmonary Assessment  Edema      Respiratory Breath Sounds  RUL Breath Sounds: Clear  RML Breath Sounds: Clear  RLL Breath Sounds: Diminished  NIDIA Breath Sounds: Clear  LLL Breath Sounds: Diminished  Cardiac Assessment   Cardiac (WDL):  Within Defined Limits

## 2023-11-12 NOTE — THERAPY
Physical Therapy   Daily Treatment     Patient Name: Anita Beard  Age:  88 y.o., Sex:  female  Medical Record #: [id number]  Today's Date: 11/12/2023     Precautions  Precautions: (P) Fall Risk  Comments: pain pump for chronic back/neck pain, craniotomy    Subjective    Pt received in room in bed, requesting to use the bathroom. Very pleasant throughout session.     Objective       11/12/23 1401   PT Charge Group   PT Gait Training (Units) 1   PT Therapeutic Exercise (Units) 3   PT Total Time Spent   PT Individual Total Time Spent (Mins) 60   Precautions   Precautions Fall Risk   Vitals   Pulse 76   Room Air Oximetry 97   Cognition    Level of Consciousness Alert   Sleep/Wake Cycle   Sleep & Rest Awake   Gait Functional Level of Assist    Gait Level Of Assist Contact Guard Assist   Assistive Device   (hurrycane)   Distance (Feet) 400', x300', and 2x50'   Deviation   (intermittent scissoring gait, lateral instability). Ambulatory throughout session.   Transfer Functional Level of Assist   Bed, Chair, Wheelchair Transfer Contact Guard Assist   Toilet Transfers Standby Assist   Toilet Transfer Description Grab bar   Sitting Lower Body Exercises   Sit to Stand   (x6 reps from EOM holding 8# dumbbell with both hands, instructed for slow eccentric lowering)   Nustep Resistance Level 3  (UE+LE propulsion x20 min, 0.37 miles completed)   Standing Lower Body Exercises   Comments standing PWR wt shift with back to door x10 reps to facilitate postural extensors and c-spine rotation   Bed Mobility    Supine to Sit Modified Independent   Sit to Supine Modified Independent   Sit to Stand Standby Assist   Scooting Modified Independent   Rolling Modified Independent   Interdisciplinary Plan of Care Collaboration   Patient Position at End of Therapy In Bed;Bed Alarm On;Call Light within Reach;Tray Table within Reach;Phone within Reach     PT assisted pt in filling out memory log for PT session.    Assessment    Pt requires CGA when  ambulating with hurrycane due to intermittent lateral instability. She tends to focus gaze downward and benefits from cues to look forward. Fatigues with resisted STSs therefore truncated at 6 reps. Will benefit from continued strength, endurance and standing balance intervention to facilitate reduced fall risk.       Strengths: Able to follow instructions, Adequate strength, Effective communication skills, Good insight into deficits/needs, Independent prior level of function, Making steady progress towards goals, Motivated for self care and independence, Pleasant and cooperative, Supportive family, Willingly participates in therapeutic activities  Barriers: Impaired activity tolerance, Visual impairment    Plan    CV endurance, standing balance, LE and core strengthening, gait with 4WW vs hurrycane, stairs    DME  PT DME Recommendations  Assistive Device:  (none anticipated, patient has cane and wheeled walker already)    Physical Therapy Problems (Active)       Problem: Balance       Dates: Start:  11/10/23         Goal: STG-Within one week, patient will improve [place] >51/56       Dates: Start:  11/10/23               Problem: Mobility       Dates: Start:  11/10/23         Goal: STG-Within one week, patient will improve 6 minute walk test > 750 ft with LRAD       Dates: Start:  11/10/23            Goal: STG-Within one week, patient will ascend and descend four to six stairs with single railing and CGA       Dates: Start:  11/10/23               Problem: Mobility Transfers       Dates: Start:  11/10/23         Goal: STG-Within one week, patient will demonstrate fall recovery with CGA       Dates: Start:  11/10/23               Problem: PT-Long Term Goals       Dates: Start:  11/10/23         Goal: LTG-By discharge, patient will improve FGA >24/30       Dates: Start:  11/10/23            Goal: LTG-By discharge, patient will perform home exercise program independently       Dates: Start:  11/10/23            Goal:  LTG-By discharge, patient will ambulate up/down 4-6 stairs with single railing and supervision       Dates: Start:  11/10/23            Goal: LTG-By discharge, patient will transfer in/out of a car with supervision assist       Dates: Start:  11/10/23            Goal: LTG-By discharge, patient will demonstrate fall recovery with supervision assist       Dates: Start:  11/10/23

## 2023-11-12 NOTE — FLOWSHEET NOTE
11/12/23 0741   Events/Summary/Plan   Events/Summary/Plan RA pulse ox check   Vital Signs   Pulse 64   Respiration 18   Pulse Oximetry 97 %   $ Pulse Oximetry (Spot Check) Yes   Respiratory Assessment   Level of Consciousness Alert   Chest Exam   Work Of Breathing / Effort Within Normal Limits   Oxygen   O2 Delivery Device Room air w/o2 available

## 2023-11-12 NOTE — PROGRESS NOTES
NURSING DAILY NOTE    Name: Anita Beard   Date of Admission: 11/9/2023   Admitting Diagnosis: SDH (subdural hematoma) ([facility])  Attending Physician: Deepti Pak M.d.  Allergies: Ace inhibitors    Safety  Patient Assist  mod  Patient Precautions  Fall Risk  Precaution Comments  pain pump for chronic back/neck pain, craniotomy  Bed Transfer Status  Contact Guard Assist  Toilet Transfer Status   Contact Guard Assist  Assistive Devices  Rails, Wheelchair  Oxygen  Room air w/o2 available  Diet/Therapeutic Dining  Current Diet Order   Procedures    Diet Order Diet: Regular     Pill Administration  whole  Agitated Behavioral Scale     ABS Level of Severity       Fall Risk  Has the patient had a fall this admission?   No  Talisha Hong Fall Risk Scoring  14, MODERATE RISK  Fall Risk Safety Measures  bed alarm, chair alarm, and low vision/ hearing    Vitals  Temperature: 37.1 °C (98.8 °F)  Temp src: Oral  Pulse: 78  Respiration: 17  Blood Pressure : 135/82  Blood Pressure MAP (Calculated): 100 MM HG  BP Location: Left, Upper Arm  Patient BP Position: Ramirez's Position     Oxygen  Pulse Oximetry: 92 %  O2 (LPM): 2  O2 Delivery Device: Room air w/o2 available    Bowel and Bladder  Last Bowel Movement  11/11/23  Stool Type  Type 5: Soft blob with clear cut edges (passed easily)  Bowel Device     Continent  Bladder: Stress incontinence   Bowel: Continent movement  Bladder Function  Urine Void (mL): 200 ml  Number of Times Voided: 1  Urine Color: Yellow  Genitourinary Assessment   Bladder Assessment (WDL):  Within Defined Limits  Urine Color: Yellow  Bladder Device: Bathroom  Time Void: Yes  Bladder Scan: Post Void  $ Bladder Scan Results (mL): 201    Skin  Satnam Score   17  Sensory Interventions   Bed Types: Standard/Trauma Mattress  Skin Preventative Measures: Pillows in Use for Support / Positioning  Moisture Interventions  Moisturizers/Barriers: Barrier  Wipes      Pain  Pain Rating Scale  4 - Distracts me, can do usual activities  Pain Location  Head, Shoulder  Pain Location Orientation  Right, Left, Anterior  Pain Interventions   Medication (see MAR)    ADLs    Bathing      Linen Change      Personal Hygiene     Chlorhexidine Bath      Oral Care     Teeth/Dentures     Shave     Nutrition Percentage Eaten     Environmental Precautions  Treaded Slipper Socks on Patient, Personal Belongings, Wastebasket, Call Bell etc. in Easy Reach, Transferred to Stronger Side, Bed in Low Position (pt refuses bed alarm)  Patient Turns/Positioning  Patient Turns Self from Side to Side  Patient Turns Assistance/Tolerance     Bed Positions  Bed Locked, Bed Controls Deactivated  Head of Bed Elevated  Self regulated      Psychosocial/Neurologic Assessment  Psychosocial Assessment  Psychosocial (WDL):  Within Defined Limits  Neurologic Assessment  Neuro (WDL): Within Defined Limits  Level of Consciousness: Responds to voice (sleeping)  EENT (WDL):  WDL Except    Cardio/Pulmonary Assessment  Edema      Respiratory Breath Sounds  RUL Breath Sounds: Clear  RML Breath Sounds: Clear  RLL Breath Sounds: Diminished  NIDIA Breath Sounds: Clear  LLL Breath Sounds: Diminished  Cardiac Assessment   Cardiac (WDL):  Within Defined Limits

## 2023-11-12 NOTE — CARE PLAN
The patient is Stable - Low risk of patient condition declining or worsening    Shift Goals  Clinical Goals: safety  Patient Goals: Participate in therapy, pain control    Progress made toward(s) clinical / shift goals:      Problem: Knowledge Deficit - Standard  Goal: Patient and family/care givers will demonstrate understanding of plan of care, disease process/condition, diagnostic tests and medications  Outcome: Progressing     Problem: Fall Risk - Rehab  Goal: Patient will remain free from falls  Outcome: Progressing       Patient is not progressing towards the following goals:

## 2023-11-13 ENCOUNTER — APPOINTMENT (OUTPATIENT)
Dept: OCCUPATIONAL THERAPY | Facility: REHABILITATION | Age: 88
DRG: 949 | End: 2023-11-13
Attending: PHYSICAL MEDICINE & REHABILITATION
Payer: MEDICARE

## 2023-11-13 ENCOUNTER — APPOINTMENT (OUTPATIENT)
Dept: SPEECH THERAPY | Facility: REHABILITATION | Age: 88
DRG: 949 | End: 2023-11-13
Attending: PHYSICAL MEDICINE & REHABILITATION
Payer: MEDICARE

## 2023-11-13 ENCOUNTER — APPOINTMENT (OUTPATIENT)
Dept: PHYSICAL THERAPY | Facility: REHABILITATION | Age: 88
DRG: 949 | End: 2023-11-13
Attending: PHYSICAL MEDICINE & REHABILITATION
Payer: MEDICARE

## 2023-11-13 LAB
ANION GAP SERPL CALC-SCNC: 9 MMOL/L (ref 7–16)
BASOPHILS # BLD AUTO: 0.4 % (ref 0–1.8)
BASOPHILS # BLD: 0.02 K/UL (ref 0–0.12)
BUN SERPL-MCNC: 8 MG/DL (ref 8–22)
CALCIUM SERPL-MCNC: 8.5 MG/DL (ref 8.5–10.5)
CHLORIDE SERPL-SCNC: 106 MMOL/L (ref 96–112)
CO2 SERPL-SCNC: 26 MMOL/L (ref 20–33)
CREAT SERPL-MCNC: 0.62 MG/DL (ref 0.5–1.4)
EOSINOPHIL # BLD AUTO: 0.39 K/UL (ref 0–0.51)
EOSINOPHIL NFR BLD: 8.2 % (ref 0–6.9)
ERYTHROCYTE [DISTWIDTH] IN BLOOD BY AUTOMATED COUNT: 41.2 FL (ref 35.9–50)
GFR SERPLBLD CREATININE-BSD FMLA CKD-EPI: 86 ML/MIN/1.73 M 2
GLUCOSE SERPL-MCNC: 106 MG/DL (ref 65–99)
HCT VFR BLD AUTO: 30.9 % (ref 37–47)
HGB BLD-MCNC: 9.7 G/DL (ref 12–16)
IMM GRANULOCYTES # BLD AUTO: 0.04 K/UL (ref 0–0.11)
IMM GRANULOCYTES NFR BLD AUTO: 0.8 % (ref 0–0.9)
LYMPHOCYTES # BLD AUTO: 1.13 K/UL (ref 1–4.8)
LYMPHOCYTES NFR BLD: 23.9 % (ref 22–41)
MAGNESIUM SERPL-MCNC: 1.8 MG/DL (ref 1.5–2.5)
MCH RBC QN AUTO: 28 PG (ref 27–33)
MCHC RBC AUTO-ENTMCNC: 31.4 G/DL (ref 32.2–35.5)
MCV RBC AUTO: 89 FL (ref 81.4–97.8)
MONOCYTES # BLD AUTO: 0.54 K/UL (ref 0–0.85)
MONOCYTES NFR BLD AUTO: 11.4 % (ref 0–13.4)
NEUTROPHILS # BLD AUTO: 2.61 K/UL (ref 1.82–7.42)
NEUTROPHILS NFR BLD: 55.3 % (ref 44–72)
NRBC # BLD AUTO: 0 K/UL
NRBC BLD-RTO: 0 /100 WBC (ref 0–0.2)
PLATELET # BLD AUTO: 218 K/UL (ref 164–446)
PMV BLD AUTO: 10.9 FL (ref 9–12.9)
POTASSIUM SERPL-SCNC: 3.9 MMOL/L (ref 3.6–5.5)
RBC # BLD AUTO: 3.47 M/UL (ref 4.2–5.4)
SODIUM SERPL-SCNC: 141 MMOL/L (ref 135–145)
WBC # BLD AUTO: 4.7 K/UL (ref 4.8–10.8)

## 2023-11-13 PROCEDURE — 97110 THERAPEUTIC EXERCISES: CPT

## 2023-11-13 PROCEDURE — 97112 NEUROMUSCULAR REEDUCATION: CPT

## 2023-11-13 PROCEDURE — 97130 THER IVNTJ EA ADDL 15 MIN: CPT

## 2023-11-13 PROCEDURE — 85025 COMPLETE CBC W/AUTO DIFF WBC: CPT

## 2023-11-13 PROCEDURE — 36415 COLL VENOUS BLD VENIPUNCTURE: CPT

## 2023-11-13 PROCEDURE — 700111 HCHG RX REV CODE 636 W/ 250 OVERRIDE (IP): Mod: JZ | Performed by: PHYSICAL MEDICINE & REHABILITATION

## 2023-11-13 PROCEDURE — 700102 HCHG RX REV CODE 250 W/ 637 OVERRIDE(OP): Performed by: PHYSICAL MEDICINE & REHABILITATION

## 2023-11-13 PROCEDURE — 97129 THER IVNTJ 1ST 15 MIN: CPT

## 2023-11-13 PROCEDURE — 97535 SELF CARE MNGMENT TRAINING: CPT

## 2023-11-13 PROCEDURE — A9270 NON-COVERED ITEM OR SERVICE: HCPCS | Performed by: PHYSICAL MEDICINE & REHABILITATION

## 2023-11-13 PROCEDURE — 80048 BASIC METABOLIC PNL TOTAL CA: CPT

## 2023-11-13 PROCEDURE — 97530 THERAPEUTIC ACTIVITIES: CPT

## 2023-11-13 PROCEDURE — 770010 HCHG ROOM/CARE - REHAB SEMI PRIVAT*

## 2023-11-13 PROCEDURE — 99232 SBSQ HOSP IP/OBS MODERATE 35: CPT | Performed by: PHYSICAL MEDICINE & REHABILITATION

## 2023-11-13 PROCEDURE — 97116 GAIT TRAINING THERAPY: CPT

## 2023-11-13 PROCEDURE — 83735 ASSAY OF MAGNESIUM: CPT

## 2023-11-13 RX ORDER — FUROSEMIDE 20 MG/1
20 TABLET ORAL
Status: DISCONTINUED | OUTPATIENT
Start: 2023-11-13 | End: 2023-11-21 | Stop reason: HOSPADM

## 2023-11-13 RX ORDER — HYDROCODONE BITARTRATE AND ACETAMINOPHEN 10; 325 MG/1; MG/1
1 TABLET ORAL 2 TIMES DAILY
Status: DISCONTINUED | OUTPATIENT
Start: 2023-11-13 | End: 2023-11-21 | Stop reason: HOSPADM

## 2023-11-13 RX ORDER — HYDROCODONE BITARTRATE AND ACETAMINOPHEN 10; 325 MG/1; MG/1
1 TABLET ORAL 2 TIMES DAILY
Status: DISCONTINUED | OUTPATIENT
Start: 2023-11-13 | End: 2023-11-13

## 2023-11-13 RX ADMIN — LEVETIRACETAM 500 MG: 500 TABLET, FILM COATED ORAL at 20:44

## 2023-11-13 RX ADMIN — GABAPENTIN 600 MG: 300 CAPSULE ORAL at 20:43

## 2023-11-13 RX ADMIN — GABAPENTIN 300 MG: 300 CAPSULE ORAL at 08:16

## 2023-11-13 RX ADMIN — FUROSEMIDE 20 MG: 20 TABLET ORAL at 17:38

## 2023-11-13 RX ADMIN — LEVETIRACETAM 500 MG: 500 TABLET, FILM COATED ORAL at 08:16

## 2023-11-13 RX ADMIN — HYDROCODONE BITARTRATE AND ACETAMINOPHEN 1 TABLET: 10; 325 TABLET ORAL at 20:44

## 2023-11-13 RX ADMIN — HYDROCODONE BITARTRATE AND ACETAMINOPHEN 1 TABLET: 10; 325 TABLET ORAL at 10:10

## 2023-11-13 RX ADMIN — ENOXAPARIN SODIUM 40 MG: 100 INJECTION SUBCUTANEOUS at 17:38

## 2023-11-13 RX ADMIN — OMEPRAZOLE 20 MG: 20 CAPSULE, DELAYED RELEASE ORAL at 08:17

## 2023-11-13 RX ADMIN — METOPROLOL TARTRATE 25 MG: 25 TABLET, FILM COATED ORAL at 20:43

## 2023-11-13 ASSESSMENT — GAIT ASSESSMENTS
GAIT LEVEL OF ASSIST: CONTACT GUARD ASSIST
ASSISTIVE DEVICE: 4 WHEEL WALKER
DISTANCE (FEET): 450

## 2023-11-13 ASSESSMENT — PATIENT HEALTH QUESTIONNAIRE - PHQ9
2. FEELING DOWN, DEPRESSED, IRRITABLE, OR HOPELESS: NOT AT ALL
1. LITTLE INTEREST OR PLEASURE IN DOING THINGS: NOT AT ALL
SUM OF ALL RESPONSES TO PHQ9 QUESTIONS 1 AND 2: 0

## 2023-11-13 ASSESSMENT — ACTIVITIES OF DAILY LIVING (ADL)
BED_CHAIR_WHEELCHAIR_TRANSFER_DESCRIPTION: ADAPTIVE EQUIPMENT;INCREASED TIME;SET-UP OF EQUIPMENT;SUPERVISION FOR SAFETY
TOILET_TRANSFER_DESCRIPTION: ADAPTIVE EQUIPMENT;GRAB BAR;INCREASED TIME;SUPERVISION FOR SAFETY
TOILET_TRANSFER_DESCRIPTION: GRAB BAR
TOILETING_LEVEL_OF_ASSIST_DESCRIPTION: GRAB BAR;ADAPTIVE EQUIPMENT;INCREASED TIME;SUPERVISION FOR SAFETY
BED_CHAIR_WHEELCHAIR_TRANSFER_DESCRIPTION: ADAPTIVE EQUIPMENT;INCREASED TIME;INITIAL PREPARATION FOR TASK;SUPERVISION FOR SAFETY

## 2023-11-13 ASSESSMENT — PAIN DESCRIPTION - PAIN TYPE
TYPE: ACUTE PAIN
TYPE: ACUTE PAIN

## 2023-11-13 NOTE — CARE PLAN
"  Problem: Fall Risk - Rehab  Goal: Patient will remain free from falls  Note: Talisha Hong Fall risk Assessment Score: 14    Moderate fall risk Interventions  - Bed and strip alarm   - Yellow sign by the door   - Yellow wrist band \"Fall risk\"  - Room near to the nurse station  - Do not leave patient unattended in the bathroom  - Fall risk education provided      Problem: Pain - Standard  Goal: Alleviation of pain or a reduction in pain to the patient’s comfort goal  Note: Educate patient of non-pharmacological comfort measures: repositioning, relaxation/breathing technique, cold compress and activities.   Patient refused CPAP, on O2 at 2 lpm n/c. CN notified. Will monitor.      The patient is Watcher - Medium risk of patient condition declining or worsening    Shift Goals  Clinical Goals: safety  Patient Goals: Participate in therapy, pain control      "

## 2023-11-13 NOTE — PROGRESS NOTES
"  Physical Medicine & Rehabilitation Progress Note    Encounter Date: 11/13/2023    Chief Complaint: Decreased mobility, weakness    Interval Events (Subjective):  Patient sitting up in room. She would like to switch SBP medications. She also has swelling. She reports taking Lasix at home. Discussed will start Lasix and switch to metoprolol. Denies SOB.     Objective:  VITAL SIGNS: BP (!) 142/75   Pulse 63   Temp 36.9 °C (98.4 °F) (Oral)   Resp 18   Ht 1.6 m (5' 3\")   Wt 71 kg (156 lb 8.4 oz)   SpO2 95%   BMI 27.73 kg/m²   Gen: NAD  Psych: Mood and affect appropriate  CV: RRR, 1+ edema  Resp: CTAB, no upper airway sounds  Abd: NTND  Neuro: AOx4, following commands    Laboratory Values:  Recent Results (from the past 72 hour(s))   CBC WITH DIFFERENTIAL    Collection Time: 11/13/23  5:28 AM   Result Value Ref Range    WBC 4.7 (L) 4.8 - 10.8 K/uL    RBC 3.47 (L) 4.20 - 5.40 M/uL    Hemoglobin 9.7 (L) 12.0 - 16.0 g/dL    Hematocrit 30.9 (L) 37.0 - 47.0 %    MCV 89.0 81.4 - 97.8 fL    MCH 28.0 27.0 - 33.0 pg    MCHC 31.4 (L) 32.2 - 35.5 g/dL    RDW 41.2 35.9 - 50.0 fL    Platelet Count 218 164 - 446 K/uL    MPV 10.9 9.0 - 12.9 fL    Neutrophils-Polys 55.30 44.00 - 72.00 %    Lymphocytes 23.90 22.00 - 41.00 %    Monocytes 11.40 0.00 - 13.40 %    Eosinophils 8.20 (H) 0.00 - 6.90 %    Basophils 0.40 0.00 - 1.80 %    Immature Granulocytes 0.80 0.00 - 0.90 %    Nucleated RBC 0.00 0.00 - 0.20 /100 WBC    Neutrophils (Absolute) 2.61 1.82 - 7.42 K/uL    Lymphs (Absolute) 1.13 1.00 - 4.80 K/uL    Monos (Absolute) 0.54 0.00 - 0.85 K/uL    Eos (Absolute) 0.39 0.00 - 0.51 K/uL    Baso (Absolute) 0.02 0.00 - 0.12 K/uL    Immature Granulocytes (abs) 0.04 0.00 - 0.11 K/uL    NRBC (Absolute) 0.00 K/uL   Basic Metabolic Panel    Collection Time: 11/13/23  5:28 AM   Result Value Ref Range    Sodium 141 135 - 145 mmol/L    Potassium 3.9 3.6 - 5.5 mmol/L    Chloride 106 96 - 112 mmol/L    Co2 26 20 - 33 mmol/L    Glucose 106 (H) 65 - " 99 mg/dL    Bun 8 8 - 22 mg/dL    Creatinine 0.62 0.50 - 1.40 mg/dL    Calcium 8.5 8.5 - 10.5 mg/dL    Anion Gap 9.0 7.0 - 16.0   MAGNESIUM    Collection Time: 23  5:28 AM   Result Value Ref Range    Magnesium 1.8 1.5 - 2.5 mg/dL   ESTIMATED GFR    Collection Time: 23  5:28 AM   Result Value Ref Range    GFR (CKD-EPI) 86 >60 mL/min/1.73 m 2       Medications:  Scheduled Medications   Medication Dose Frequency    HYDROcodone/acetaminophen  1 Tablet BID    omeprazole  20 mg DAILY    atenolol  25 mg Nightly    atorvastatin  40 mg Q EVENING    enoxaparin (LOVENOX) injection  40 mg DAILY AT 1800    gabapentin  300 mg QAM    And    gabapentin  600 mg QHS    levETIRAcetam  500 mg BID     PRN medications: [] senna-docusate **AND** polyethylene glycol/lytes **AND** magnesium hydroxide **AND** bisacodyl, Respiratory Therapy Consult, hydrALAZINE, acetaminophen, mag hydrox-al hydrox-simeth, ondansetron **OR** ondansetron, traZODone, sodium chloride, midazolam, LORazepam, HYDROcodone/acetaminophen    Diet:  Current Diet Order   Procedures    Diet Order Diet: Regular       Medical Decision Making and Plan:   R sided SDH - Patient with 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 Alves  -PT and OT for mobility and ADLs. Per guidelines, 15 hours per week between PT, OT and/or SLP.  -Follow-up NSG     HTN - Patient on Atenolol 25 mg QHS. Switch to Metoprolol 25 mg BID     HLD - Patient on Atorvastatin 40 mg QHS     Anemia - Check AM CBC - 8.9, worsening. Recheck  0- 9.7, will monitor      Hypokalemia - Check AM CMP - 3.6, improved, recheck  - 3.9     Chronic pain - Patient with pain pump.     Pain - Patient on APAP/Oxycodone. Schedule Norco BID     Skin - Patient at risk for skin breakdown due to debility in areas including sacrum, achilles, elbows and head in addition to other sites. Nursing to assess skin daily.      GI Ppx - Patient on Prilosec for GERD prophylaxis.  Patient on Senna-docusate for constipation prophylaxis.      DVT Ppx - Patient Lovenox on transfer. Continue Lovenox  ____________________________________    T. Reymundo Pak MD/PhD  [facility] - Physical Medicine & Rehabilitation   [facility] - Brain Injury Medicine   ____________________________________

## 2023-11-13 NOTE — PROGRESS NOTES
NURSING DAILY NOTE    Name: Anita Beard   Date of Admission: 11/9/2023   Admitting Diagnosis: SDH (subdural hematoma) ([facility])  Attending Physician: Deepti Pak M.d.  Allergies: Ace inhibitors    Safety  Patient Assist  mod  Patient Precautions  Fall Risk  Precaution Comments  pain pump for chronic back/neck pain, craniotomy  Bed Transfer Status  Contact Guard Assist  Toilet Transfer Status   Standby Assist  Assistive Devices  Rails, Wheelchair  Oxygen  Room air w/o2 available  Diet/Therapeutic Dining  Current Diet Order   Procedures    Diet Order Diet: Regular     Pill Administration  whole and one at a time   Agitated Behavioral Scale     ABS Level of Severity       Fall Risk  Has the patient had a fall this admission?   No  Talisha Hong Fall Risk Scoring  14, MODERATE RISK  Fall Risk Safety Measures  bed alarm and chair alarm    Vitals  Temperature: 36.6 °C (97.9 °F)  Temp src: Oral  Pulse: 76  Respiration: 18  Blood Pressure : 132/78  Blood Pressure MAP (Calculated): 96 MM HG  BP Location: Left, Upper Arm  Patient BP Position: Supine     Oxygen  Pulse Oximetry: 96 %  O2 (LPM): 2  O2 Delivery Device: Room air w/o2 available    Bowel and Bladder  Last Bowel Movement  11/11/23  Stool Type  Type 5: Soft blob with clear cut edges (passed easily)  Bowel Device     Continent  Bladder: Stress incontinence   Bowel: Continent movement  Bladder Function  Urine Void (mL): 200 ml  Number of Times Voided: 1  Urine Color: Yellow  Genitourinary Assessment   Bladder Assessment (WDL):  Within Defined Limits  Urine Color: Yellow  Bladder Device: Bathroom  Time Void: Yes  Bladder Scan: Post Void  $ Bladder Scan Results (mL): 1    Skin  Satnam Score   17  Sensory Interventions   Bed Types: Standard/Trauma Mattress  Skin Preventative Measures: Pillows in Use for Support / Positioning  Moisture Interventions  Moisturizers/Barriers: Barrier Wipes      Pain  Pain Rating  Scale  0 - No Pain  Pain Location  Head, Shoulder  Pain Location Orientation  Right, Left, Anterior  Pain Interventions   Medication (see MAR)    ADLs    Bathing      Linen Change      Personal Hygiene  Change Shannon Pads, Moist Shannon Wipes, Perineal Care  Chlorhexidine Bath      Oral Care     Teeth/Dentures     Shave     Nutrition Percentage Eaten  *  * Meal *  *, Lunch, Between 50-75% Consumed  Environmental Precautions  Treaded Slipper Socks on Patient, Personal Belongings, Wastebasket, Call Bell etc. in Easy Reach, Transferred to Stronger Side, Bed in Low Position (pt refuses bed alarm)  Patient Turns/Positioning  Patient Turns Self from Side to Side  Patient Turns Assistance/Tolerance     Bed Positions  Bed Locked, Bed Controls Deactivated  Head of Bed Elevated  Self regulated      Psychosocial/Neurologic Assessment  Psychosocial Assessment  Psychosocial (WDL):  Within Defined Limits  Neurologic Assessment  Neuro (WDL): Within Defined Limits  Level of Consciousness: Alert  EENT (WDL):  WDL Except    Cardio/Pulmonary Assessment  Edema      Respiratory Breath Sounds  RUL Breath Sounds: Clear  RML Breath Sounds: Clear  RLL Breath Sounds: Diminished  NIDIA Breath Sounds: Clear  LLL Breath Sounds: Diminished  Cardiac Assessment   Cardiac (WDL):  Within Defined Limits

## 2023-11-13 NOTE — PROGRESS NOTES
NURSING DAILY NOTE    Name: Anita Beard   Date of Admission: 11/9/2023   Admitting Diagnosis: SDH (subdural hematoma) ([facility])  Attending Physician: Deepti Pak M.d.  Allergies: Ace inhibitors    Safety  Patient Assist  mod  Patient Precautions  Fall Risk  Precaution Comments  pain pump for chronic back/neck pain, craniotomy  Bed Transfer Status  Contact Guard Assist  Toilet Transfer Status   Standby Assist  Assistive Devices  Rails, Wheelchair  Oxygen  Nasal Cannula  Diet/Therapeutic Dining  Current Diet Order   Procedures    Diet Order Diet: Regular     Pill Administration  whole and one at a time   Agitated Behavioral Scale     ABS Level of Severity       Fall Risk  Has the patient had a fall this admission?   No  Talisha Hong Fall Risk Scoring  14, MODERATE RISK  Fall Risk Safety Measures  bed alarm and chair alarm    Vitals  Temperature: 36.9 °C (98.4 °F)  Temp src: Oral  Pulse: 74  Respiration: 20  Blood Pressure : 134/57  Blood Pressure MAP (Calculated): 83 MM HG  BP Location: Left, Upper Arm  Patient BP Position: Sitting     Oxygen  Pulse Oximetry: 96 %  O2 (LPM): 2  O2 Delivery Device: Nasal Cannula    Bowel and Bladder  Last Bowel Movement  11/13/23  Stool Type  Type 4: Like a sausage or snake, smooth and soft  Bowel Device     Continent  Bladder: Stress incontinence   Bowel: Continent movement  Bladder Function  Urine Void (mL): 200 ml  Number of Times Voided: 1  Urine Color: Yellow  Genitourinary Assessment   Bladder Assessment (WDL):  Within Defined Limits  Urine Color: Yellow  Bladder Device: Bathroom  Time Void: Yes  Bladder Scan: Post Void  $ Bladder Scan Results (mL): 1    Skin  Satnam Score   17  Sensory Interventions   Bed Types: Standard/Trauma Mattress  Skin Preventative Measures: Pillows in Use for Support / Positioning  Moisture Interventions  Moisturizers/Barriers: Barrier Wipes      Pain  Pain Rating Scale  0 - No Pain  (sleeping)  Pain Location  Head, Shoulder  Pain Location Orientation  Right, Left, Anterior  Pain Interventions   Medication (see MAR)    ADLs    Bathing      Linen Change      Personal Hygiene  Perineal Care, Moist Shannon Wipes  Chlorhexidine Bath      Oral Care  Brushed Teeth  Teeth/Dentures     Shave     Nutrition Percentage Eaten  *  * Meal *  *, Dinner, Between 50-75% Consumed  Environmental Precautions  Treaded Slipper Socks on Patient, Personal Belongings, Wastebasket, Call Bell etc. in Easy Reach, Transferred to Stronger Side, Bed in Low Position (pt refuses bed alarm)  Patient Turns/Positioning  Patient Turns Self from Side to Side  Patient Turns Assistance/Tolerance     Bed Positions  Bed Locked, Bed Controls Deactivated  Head of Bed Elevated  Self regulated      Psychosocial/Neurologic Assessment  Psychosocial Assessment  Psychosocial (WDL):  Within Defined Limits  Neurologic Assessment  Neuro (WDL): Within Defined Limits  Level of Consciousness: Alert  EENT (WDL):  WDL Except    Cardio/Pulmonary Assessment  Edema      Respiratory Breath Sounds  RUL Breath Sounds: Clear  RML Breath Sounds: Clear  RLL Breath Sounds: Diminished  NIDIA Breath Sounds: Clear  LLL Breath Sounds: Diminished  Cardiac Assessment   Cardiac (WDL):  Within Defined Limits

## 2023-11-13 NOTE — DOCUMENTATION QUERY
Critical access hospital                                                                       Query Response Note      PATIENT:               AV BOYD  ACCT #:                  [id number]  MRN:                     [id number]  :                      1935  ADMIT DATE:       11/3/2023 9:59 PM  DISCH DATE:        2023 12:00 PM  RESPONDING  PROVIDER #:        [id number]           QUERY TEXT:    Patient admitted for SDH and found to have altered mental status.  Please clarify the relationship between the diagnosis of dementia and altered mental status based on the clinical indicators documented.    The patient's Clinical Indicators include:   PN -  slow to wake up from anesthesia and has taken several days in the past before mentation has cleared   PN - Dx Altered mental status - likely underlying dementia...Multifactorial: Subdural hematoma, recent intervention, underlying dementia, possible opiate withdrawal    Treatment - Craniotomy for evacuation of subdural and resection of membranes; Decrease Precedex and change to IV Fentanyl; IV LR and NS infusions    Risk factors - SDH, history of return to baseline mentation after anesthesia, dementia, pain medication    Thank you,  Valarie Ovalle BSN  Clinical   Connect via [employer]  Options provided:   -- Altered Mental Status is related to toxic encephalopathy superimposed on dementia   -- Altered Mental Status is related to SDH superimposed on dementia   -- Altered Mental Status is related to toxic encephalopathy and SDH superimposed on dementia   -- Dementia only, no further specification]   [[Other explanation, (please specify the other explanation)   -- Unable to determine      Query created by: Valarie Ovalle on 2023 2:43 PM    RESPONSE TEXT:    Altered Mental Status is related to SDH superimposed on dementia          Electronically signed by:   KELSEY MCLAUGHLIN MD 11/13/2023 2:43 PM

## 2023-11-13 NOTE — THERAPY
Speech Language Pathology  Daily Treatment     Patient Name: Anita Beard  Age:  88 y.o., Sex:  female  Medical Record #: [id number]  Today's Date: 11/13/2023     Precautions  Precautions: Fall Risk  Comments: pain pump for chronic back/neck pain, craniotomy    Subjective    Patient reported neck pain and asked if the nurse could give her pain medication.   Nurse notified.  Patient requested if she could have her pain medication scheduled instead of PRN. Patient's daughter present during therapy. The daughter reports that she is traveling from [place] and it is expensive to get and stay here ( she reported staying in [place] for duration of patients acute stay to advocate for her mom) and hopes that family training will be on day of discharge or day just prior to discharge so that she will only need to stay one night.      Objective       11/13/23 1001   Treatment Charges   SLP Cognitive Skill Development First 15 Minutes 1   SLP Cognitive Skill Development Additional 15 Minutes 1   SLP Total Time Spent   SLP Individual Total Time Spent (Mins) 30   Cognition   Prospective Memory Minimal (4)   Functional Memory Activities Supervision (5)   Interdisciplinary Plan of Care Collaboration   IDT Collaboration with  Family / Caregiver;Nursing   Patient Position at End of Therapy Seated;Chair Alarm On;Call Light within Reach;Family / Friend in Room   Collaboration Comments Patient reported neck pain and asked if the nurse could give her pain medication.   Nurse notified. Patient's daughter present during therapy.  Educated patient and daughter on use of memory strategies and memory log to record what she is learning to change to be safe at home and prevent falls.         Assessment    Patient reported that she had had a chance to fill out her memory log.  Continued education of patient and her daughter on use of memory log and memory strategies to help recall new training that will help patient safety at home and prevent falls.   Patient needed min cues to recall and identify new training she has received for sit to stand safety when strengthening on mat with weights.      Strengths: Able to follow instructions, Alert and oriented, Effective communication skills, Good insight into deficits/needs, Independent prior level of function, Motivated for self care and independence, Supportive family, Willingly participates in therapeutic activities, Pleasant and cooperative  Barriers: Impaired carryover of learning, Impaired functional cognition    Plan    Target med sort, recall of new training and use of external memory aids to support memory.    Passport items to be completed:  Express basic needs, understand food/liquid recommendations, consistently follow swallow precautions, manage finances, manage medications, arrive to therapy appointments on time, complete daily memory log entries, solve problems related to safety situations, review education related to hospitalization, complete caregiver training     Speech Therapy Problems (Active)       Problem: Memory STGs       Dates: Start:  11/10/23         Goal: STG-Within one week, patient will recall new information related to her current hospitalization using functional memory strategies with spv.         Dates: Start:  11/10/23               Problem: Problem Solving STGs       Dates: Start:  11/10/23         Goal: STG-Within one week, patient will complete a mock medication sorting task with spv to achieve 100% accuracy.         Dates: Start:  11/10/23               Problem: Speech/Swallowing LTGs       Dates: Start:  11/10/23         Goal: LTG-By discharge, patient will solve complex problems with modified independence.        Dates: Start:  11/10/23

## 2023-11-13 NOTE — THERAPY
Occupational Therapy  Daily Treatment     Patient Name: Anita Beard  Age:  88 y.o., Sex:  female  Medical Record #: [id number]  Today's Date: 11/13/2023     Precautions  Precautions: Fall Risk  Comments: pain pump for chronic back/neck pain, craniotomy         Subjective    Pt seen 2x today for therapy: 8:30-9:30 and 11-11:30 am      Objective       11/13/23 0701   OT Charge Group   OT Self Care / ADL (Units) 4   OT Total Time Spent   OT Individual Total Time Spent (Mins) 60   Functional Level of Assist   Grooming Supervision;Standing   Grooming Description Adaptive equipment;Standing at sink;Supervision for safety  (FWW)   Toileting Supervision   Toileting Description Grab bar;Adaptive equipment;Increased time;Supervision for safety  (FWW)   Bed, Chair, Wheelchair Transfer Standby Assist   Bed Chair Wheelchair Transfer Description Adaptive equipment;Increased time;Set-up of equipment;Supervision for safety  (FWW)   Toilet Transfers Supervised   Toilet Transfer Description Adaptive equipment;Grab bar;Increased time;Supervision for safety  (FWW)   Bed Mobility    Supine to Sit Modified Independent   Scooting Modified Independent   Rolling Modified Independent   Interdisciplinary Plan of Care Collaboration   IDT Collaboration with  Family / Caregiver   Patient Position at End of Therapy Seated;Chair Alarm On;Self Releasing Lap Belt Applied;Tray Table within Reach;Call Light within Reach;Phone within Reach;Family / Friend in Room   Collaboration Comments daughter present at end of session        11/13/23 1101   OT Charge Group   OT Therapy Activity (Units) 2   OT Total Time Spent   OT Individual Total Time Spent (Mins) 30   Functional Level of Assist   Bed, Chair, Wheelchair Transfer Standby Assist   Bed Chair Wheelchair Transfer Description Adaptive equipment;Increased time;Initial preparation for task;Supervision for safety  (FWW)   IADL Treatments   IADL Treatments Kitchen mobility education   Kitchen Mobility  Education Educated pt on safety within the kitchen. Pt reported having a small kitchen but similar height cabinets. Pt engaged in reaching into high/low cabinets with use of counter for support. Discussed making sure to close all cabinets once getting item out- 2/2 hitting head often on cabinets. Pt completed kitchen mobility wiht SBA   Bed Mobility    Supine to Sit Modified Independent   Scooting Modified Independent   Interdisciplinary Plan of Care Collaboration   Patient Position at End of Therapy Seated;Chair Alarm On;Self Releasing Lap Belt Applied;Call Light within Reach;Tray Table within Reach;Phone within Reach       Assessment    Morning Session: Pt in bed upon arrival eating breakfast. Pt required increased time to finish breakfast. Pt reported stiffness this morning due to medication for cholesterol- discussed having pt talk to doctor about this. Pt required increased time for bed mobility and to ambulate to bathroom. Pt completed LBD0- putting on socks/shoes with use of shoe horn sitting at EOB with supervision and increased time. PT completed toileting from FWW level with supervision and increased time. Pt c/o SOB when donning shoes at EOB- educated pt on using figure 4 positioning rather than leaning down to don shoes. Noted improvement with SOB with this positioning. Educated pt on IDT conferences tomorrow and how they work and how they will figure out a tentative d/c date for pt- pt wanting to d/c home soon.    Afternoon Session: Pt completed functional mobility from room to therapy kitchen and back using FWW and SBA ~250 ft x 2. Pt engaged in kitchen mobility education and completed mobility with SBA using counter tops for support. Discussed safety with reaching into high/low cabinets and using counter top for support, putting commonly used items on counter top, sitting if fatigued, having daughter present initially at home for support, etc... Pt reported some fatigue at end of session and SOB- went  away with seated rest break.     Strengths: Able to follow instructions, Alert and oriented, Effective communication skills, Adequate strength, Good insight into deficits/needs, Independent prior level of function, Making steady progress towards goals, Motivated for self care and independence, Pleasant and cooperative, Supportive family, Willingly participates in therapeutic activities  Barriers: Decreased endurance, Fatigue, Impaired activity tolerance, Impaired balance    Plan    ADL's with FWW and use of AE as needed, IADL's at FWW level, standing balance, endurance/strengthening, functional mobility         Occupational Therapy Goals (Active)       Problem: Dressing       Dates: Start:  11/10/23         Goal: STG-Within one week, patient will dress LB with SBA        Dates: Start:  11/10/23               Problem: Functional Transfers       Dates: Start:  11/10/23         Goal: STG-Within one week, patient will transfer to toilet with SBA        Dates: Start:  11/10/23               Problem: OT Long Term Goals       Dates: Start:  11/10/23         Goal: LTG-By discharge, patient will complete basic self care tasks with mod I        Dates: Start:  11/10/23            Goal: LTG-By discharge, patient will perform bathroom transfers with mod I        Dates: Start:  11/10/23            Goal: LTG-By discharge, patient will complete basic home management with mod I        Dates: Start:  11/10/23               Problem: Toileting       Dates: Start:  11/10/23         Goal: STG-Within one week, patient will complete toileting tasks with SBA        Dates: Start:  11/10/23

## 2023-11-14 ENCOUNTER — APPOINTMENT (OUTPATIENT)
Dept: OCCUPATIONAL THERAPY | Facility: REHABILITATION | Age: 88
DRG: 949 | End: 2023-11-14
Attending: PHYSICAL MEDICINE & REHABILITATION
Payer: MEDICARE

## 2023-11-14 ENCOUNTER — APPOINTMENT (OUTPATIENT)
Dept: PHYSICAL THERAPY | Facility: REHABILITATION | Age: 88
DRG: 949 | End: 2023-11-14
Attending: PHYSICAL MEDICINE & REHABILITATION
Payer: MEDICARE

## 2023-11-14 ENCOUNTER — APPOINTMENT (OUTPATIENT)
Dept: SPEECH THERAPY | Facility: REHABILITATION | Age: 88
DRG: 949 | End: 2023-11-14
Attending: PHYSICAL MEDICINE & REHABILITATION
Payer: MEDICARE

## 2023-11-14 LAB
APPEARANCE UR: CLEAR
BACTERIA #/AREA URNS HPF: NEGATIVE /HPF
BILIRUB UR QL STRIP.AUTO: NEGATIVE
COLOR UR: YELLOW
EPI CELLS #/AREA URNS HPF: NORMAL /HPF
GLUCOSE UR STRIP.AUTO-MCNC: NEGATIVE MG/DL
HYALINE CASTS #/AREA URNS LPF: NORMAL /LPF
KETONES UR STRIP.AUTO-MCNC: NEGATIVE MG/DL
LEUKOCYTE ESTERASE UR QL STRIP.AUTO: ABNORMAL
MICRO URNS: ABNORMAL
NITRITE UR QL STRIP.AUTO: NEGATIVE
PH UR STRIP.AUTO: 6.5 [PH] (ref 5–8)
PROT UR QL STRIP: NEGATIVE MG/DL
RBC # URNS HPF: NORMAL /HPF
RBC UR QL AUTO: NEGATIVE
SP GR UR STRIP.AUTO: 1.01
UROBILINOGEN UR STRIP.AUTO-MCNC: 0.2 MG/DL
WBC #/AREA URNS HPF: NORMAL /HPF

## 2023-11-14 PROCEDURE — 97129 THER IVNTJ 1ST 15 MIN: CPT

## 2023-11-14 PROCEDURE — 97530 THERAPEUTIC ACTIVITIES: CPT

## 2023-11-14 PROCEDURE — 97116 GAIT TRAINING THERAPY: CPT

## 2023-11-14 PROCEDURE — 97130 THER IVNTJ EA ADDL 15 MIN: CPT

## 2023-11-14 PROCEDURE — 81001 URINALYSIS AUTO W/SCOPE: CPT

## 2023-11-14 PROCEDURE — A9270 NON-COVERED ITEM OR SERVICE: HCPCS | Performed by: PHYSICAL MEDICINE & REHABILITATION

## 2023-11-14 PROCEDURE — 99232 SBSQ HOSP IP/OBS MODERATE 35: CPT | Performed by: PHYSICAL MEDICINE & REHABILITATION

## 2023-11-14 PROCEDURE — 700102 HCHG RX REV CODE 250 W/ 637 OVERRIDE(OP): Performed by: PHYSICAL MEDICINE & REHABILITATION

## 2023-11-14 PROCEDURE — 700111 HCHG RX REV CODE 636 W/ 250 OVERRIDE (IP): Mod: JZ | Performed by: PHYSICAL MEDICINE & REHABILITATION

## 2023-11-14 PROCEDURE — 770010 HCHG ROOM/CARE - REHAB SEMI PRIVAT*

## 2023-11-14 PROCEDURE — 94760 N-INVAS EAR/PLS OXIMETRY 1: CPT

## 2023-11-14 RX ADMIN — GABAPENTIN 300 MG: 300 CAPSULE ORAL at 08:14

## 2023-11-14 RX ADMIN — GABAPENTIN 600 MG: 300 CAPSULE ORAL at 20:37

## 2023-11-14 RX ADMIN — FUROSEMIDE 20 MG: 20 TABLET ORAL at 17:13

## 2023-11-14 RX ADMIN — LEVETIRACETAM 500 MG: 500 TABLET, FILM COATED ORAL at 20:38

## 2023-11-14 RX ADMIN — HYDROCODONE BITARTRATE AND ACETAMINOPHEN 1 TABLET: 10; 325 TABLET ORAL at 08:14

## 2023-11-14 RX ADMIN — HYDROCODONE BITARTRATE AND ACETAMINOPHEN 1 TABLET: 10; 325 TABLET ORAL at 12:54

## 2023-11-14 RX ADMIN — METOPROLOL TARTRATE 25 MG: 25 TABLET, FILM COATED ORAL at 08:14

## 2023-11-14 RX ADMIN — LEVETIRACETAM 500 MG: 500 TABLET, FILM COATED ORAL at 08:14

## 2023-11-14 RX ADMIN — ATORVASTATIN CALCIUM 40 MG: 40 TABLET, FILM COATED ORAL at 20:38

## 2023-11-14 RX ADMIN — ENOXAPARIN SODIUM 40 MG: 100 INJECTION SUBCUTANEOUS at 17:13

## 2023-11-14 RX ADMIN — METOPROLOL TARTRATE 25 MG: 25 TABLET, FILM COATED ORAL at 20:38

## 2023-11-14 ASSESSMENT — PATIENT HEALTH QUESTIONNAIRE - PHQ9
1. LITTLE INTEREST OR PLEASURE IN DOING THINGS: NOT AT ALL
1. LITTLE INTEREST OR PLEASURE IN DOING THINGS: NOT AT ALL
SUM OF ALL RESPONSES TO PHQ9 QUESTIONS 1 AND 2: 0
SUM OF ALL RESPONSES TO PHQ9 QUESTIONS 1 AND 2: 0
2. FEELING DOWN, DEPRESSED, IRRITABLE, OR HOPELESS: NOT AT ALL
2. FEELING DOWN, DEPRESSED, IRRITABLE, OR HOPELESS: NOT AT ALL

## 2023-11-14 ASSESSMENT — GAIT ASSESSMENTS
ASSISTIVE DEVICE: FRONT WHEEL WALKER
DEVIATION: TRENDELENBERG;INCREASED BASE OF SUPPORT
GAIT LEVEL OF ASSIST: MINIMAL ASSIST
DISTANCE (FEET): 220
ASSISTIVE DEVICE: 4 WHEEL WALKER
DEVIATION: DECREASED BASE OF SUPPORT;BRADYKINETIC;DECREASED HEEL STRIKE;DECREASED TOE OFF
DISTANCE (FEET): 250
GAIT LEVEL OF ASSIST: CONTACT GUARD ASSIST

## 2023-11-14 ASSESSMENT — PAIN DESCRIPTION - PAIN TYPE
TYPE: ACUTE PAIN
TYPE: ACUTE PAIN

## 2023-11-14 ASSESSMENT — ACTIVITIES OF DAILY LIVING (ADL)
TOILET_TRANSFER_DESCRIPTION: GRAB BAR;INCREASED TIME;SUPERVISION FOR SAFETY;VERBAL CUEING;INITIAL PREPARATION FOR TASK
BED_CHAIR_WHEELCHAIR_TRANSFER_DESCRIPTION: INCREASED TIME;SUPERVISION FOR SAFETY;VERBAL CUEING

## 2023-11-14 NOTE — CARE PLAN
Problem: Balance  Goal: STG-Within one week, patient will improve [place] >51/56  Outcome: Not Met     Problem: Mobility  Goal: STG-Within one week, patient will improve 6 minute walk test > 750 ft with LRAD  Outcome: Not Met     Problem: Mobility Transfers  Goal: STG-Within one week, patient will demonstrate fall recovery with CGA  Outcome: Not Met     Problem: Mobility  Goal: STG-Within one week, patient will ascend and descend four to six stairs with single railing and CGA  Outcome: Met

## 2023-11-14 NOTE — THERAPY
Speech Language Pathology  Daily Treatment     Patient Name: Anita Beard  Age:  88 y.o., Sex:  female  Medical Record #: [id number]  Today's Date: 11/14/2023     Precautions  Precautions: Fall Risk  Comments: pain pump for chronic back/neck pain, craniotomy    Subjective    Pt was pleasant and cooperative during this ST session      Objective       11/14/23 1031   Treatment Charges   SLP Cognitive Skill Development First 15 Minutes 1   SLP Cognitive Skill Development Additional 15 Minutes 1   SLP Total Time Spent   SLP Individual Total Time Spent (Mins) 30         Assessment    Pt completed a mock medication sorting task and sorted 7 medications with 4 errors.  2 missed pills, and 2 extra pills were observed in the pill box, pt benefited from mod cues to problem solve what the errors were and how to correct them.  Pt reported frustration with the mistakes she made and would like to practice this task again tomorrow.      Strengths: Able to follow instructions, Alert and oriented, Effective communication skills, Good insight into deficits/needs, Independent prior level of function, Motivated for self care and independence, Supportive family, Willingly participates in therapeutic activities, Pleasant and cooperative  Barriers: Impaired carryover of learning, Impaired functional cognition    Plan    Continue medication management task         Speech Therapy Problems (Active)       Problem: Memory STGs       Dates: Start:  11/10/23         Goal: STG-Within one week, patient will recall new information related to her current hospitalization using functional memory strategies with spv.         Dates: Start:  11/10/23               Problem: Problem Solving STGs       Dates: Start:  11/10/23         Goal: STG-Within one week, patient will complete a mock medication sorting task with spv to achieve 100% accuracy.         Dates: Start:  11/10/23               Problem: Speech/Swallowing LTGs       Dates: Start:  11/10/23          Goal: LTG-By discharge, patient will solve complex problems with modified independence.        Dates: Start:  11/10/23

## 2023-11-14 NOTE — CARE PLAN
Problem: Dressing  Goal: STG-Within one week, patient will dress LB with SBA   Outcome: Progressing     Problem: Toileting  Goal: STG-Within one week, patient will complete toileting tasks with SBA   Outcome: Progressing     Problem: Functional Transfers  Goal: STG-Within one week, patient will transfer to toilet with SBA   Outcome: Progressing

## 2023-11-14 NOTE — PROGRESS NOTES
Physical Medicine & Rehabilitation Progress Note    Encounter Date: 11/14/2023    Chief Complaint: Decreased mobility, weakness    Interval Events (Subjective):  Patient sitting up in room. She reports therapy is going well. She reports her pain is under better control. Discussed will have IDT later today.     _____________________________________  Interdisciplinary Team Conference   Most recent IDT on 11/14/2023    IDeepti M.D./Ph.D., was present and led the interdisciplinary team conference on 11/14/2023.  I led the IDT conference and agree with the IDT conference documentation and plan of care as noted below.     Nursing:  Diet Current Diet Order   Procedures    Diet Order Diet: Regular       Eating ADL Supervision      % of Last Meal  Oral Nutrition: *  * Meal *  *, Breakfast, Less than 25% Consumed   Sleep    Bowel Last BM: 11/13/23   Bladder    Barriers to Discharge Home:  Staples    Physical Therapy:  Bed Mobility    Transfers Standby Assist  Adaptive equipment, Increased time, Initial preparation for task, Supervision for safety (FWW)   Mobility Contact Guard Assist   Stairs    Barriers to Discharge Home:  Confusion, distractible     Occupational Therapy:  Grooming Supervision, Standing   Bathing Minimal Assist   UB Dressing Supervision   LB Dressing Minimal Assist   Toileting Supervision   Shower & Transfer Eun   Barriers to Discharge Home:  Sequencing    Speech-Language Pathology:  Comprehension:  Independent  Comprehension Description:     Expression:  Independent  Expression Description:     Social Interaction:  Independent  Social Interaction Description:     Problem Solving:  Supervision  Problem Solving Description:  Verbal cueing, Therapy schedule, Increased time, Bed/chair alarm, Seat belt  Memory:  Supervision  Memory Description:  Verbal cueing, Therapy schedule, Seat belt, Increased time, Bed/chair alarm  Barriers to Discharge Home:  Med management; finances    Rec  "Therapy:  To eval    Case Management:  Continues to work on disposition and DME needs.      Discharge Date/Disposition:  11/22/23  _____________________________________     Objective:  VITAL SIGNS: /59   Pulse 66   Temp 37.2 °C (98.9 °F) (Oral)   Resp 16   Ht 1.6 m (5' 3\")   Wt 71 kg (156 lb 8.4 oz)   SpO2 94%   BMI 27.73 kg/m²   Gen: NAD  Psych: Mood and affect appropriate  CV: RRR, 1+ edema  Resp: CTAB, no upper airway sounds  Abd: NTND  Neuro: AOx4, following commands  Unchanged from 11/13/23    Laboratory Values:  Recent Results (from the past 72 hour(s))   CBC WITH DIFFERENTIAL    Collection Time: 11/13/23  5:28 AM   Result Value Ref Range    WBC 4.7 (L) 4.8 - 10.8 K/uL    RBC 3.47 (L) 4.20 - 5.40 M/uL    Hemoglobin 9.7 (L) 12.0 - 16.0 g/dL    Hematocrit 30.9 (L) 37.0 - 47.0 %    MCV 89.0 81.4 - 97.8 fL    MCH 28.0 27.0 - 33.0 pg    MCHC 31.4 (L) 32.2 - 35.5 g/dL    RDW 41.2 35.9 - 50.0 fL    Platelet Count 218 164 - 446 K/uL    MPV 10.9 9.0 - 12.9 fL    Neutrophils-Polys 55.30 44.00 - 72.00 %    Lymphocytes 23.90 22.00 - 41.00 %    Monocytes 11.40 0.00 - 13.40 %    Eosinophils 8.20 (H) 0.00 - 6.90 %    Basophils 0.40 0.00 - 1.80 %    Immature Granulocytes 0.80 0.00 - 0.90 %    Nucleated RBC 0.00 0.00 - 0.20 /100 WBC    Neutrophils (Absolute) 2.61 1.82 - 7.42 K/uL    Lymphs (Absolute) 1.13 1.00 - 4.80 K/uL    Monos (Absolute) 0.54 0.00 - 0.85 K/uL    Eos (Absolute) 0.39 0.00 - 0.51 K/uL    Baso (Absolute) 0.02 0.00 - 0.12 K/uL    Immature Granulocytes (abs) 0.04 0.00 - 0.11 K/uL    NRBC (Absolute) 0.00 K/uL   Basic Metabolic Panel    Collection Time: 11/13/23  5:28 AM   Result Value Ref Range    Sodium 141 135 - 145 mmol/L    Potassium 3.9 3.6 - 5.5 mmol/L    Chloride 106 96 - 112 mmol/L    Co2 26 20 - 33 mmol/L    Glucose 106 (H) 65 - 99 mg/dL    Bun 8 8 - 22 mg/dL    Creatinine 0.62 0.50 - 1.40 mg/dL    Calcium 8.5 8.5 - 10.5 mg/dL    Anion Gap 9.0 7.0 - 16.0   MAGNESIUM    Collection Time: " 23  5:28 AM   Result Value Ref Range    Magnesium 1.8 1.5 - 2.5 mg/dL   ESTIMATED GFR    Collection Time: 23  5:28 AM   Result Value Ref Range    GFR (CKD-EPI) 86 >60 mL/min/1.73 m 2       Medications:  Scheduled Medications   Medication Dose Frequency    metoprolol tartrate  25 mg BID    furosemide  20 mg AFTER DINNER    HYDROcodone/acetaminophen  1 Tablet BID    omeprazole  20 mg DAILY    atorvastatin  40 mg Q EVENING    enoxaparin (LOVENOX) injection  40 mg DAILY AT 1800    gabapentin  300 mg QAM    And    gabapentin  600 mg QHS    levETIRAcetam  500 mg BID     PRN medications: [] senna-docusate **AND** polyethylene glycol/lytes **AND** magnesium hydroxide **AND** bisacodyl, Respiratory Therapy Consult, hydrALAZINE, acetaminophen, mag hydrox-al hydrox-simeth, ondansetron **OR** ondansetron, traZODone, sodium chloride, midazolam, LORazepam, HYDROcodone/acetaminophen    Diet:  Current Diet Order   Procedures    Diet Order Diet: Regular       Medical Decision Making and Plan:   R sided SDH - Patient with 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 Alves  -PT and OT for mobility and ADLs. Per guidelines, 15 hours per week between PT, OT and/or SLP.  -Follow-up NSG     HTN - Patient on Atenolol 25 mg QHS. Switch to Metoprolol 25 mg BID. Continue Metoprolol      HLD - Patient on Atorvastatin 40 mg QHS     Anemia - Check AM CBC - 8.9, worsening. Recheck  0- 9.7, will monitor      Hypokalemia - Check AM CMP - 3.6, improved, recheck  - 3.9     Chronic pain - Patient with pain pump.     Pain - Patient on APAP/Oxycodone. Schedule Norco BID. Continue scheduled Norco, monitor for constipation     Skin - Patient at risk for skin breakdown due to debility in areas including sacrum, achilles, elbows and head in addition to other sites. Nursing to assess skin daily.      GI Ppx - Patient on Prilosec for GERD prophylaxis. Patient on Senna-docusate for constipation  prophylaxis.      DVT Ppx - Patient Lovenox on transfer. Continue Lovenox as limited ambulation  ____________________________________    T. Reymundo Pak MD/PhD  [facility] - Physical Medicine & Rehabilitation   [facility] - Brain Injury Medicine   ____________________________________

## 2023-11-14 NOTE — CARE PLAN
The patient is Stable - Low risk of patient condition declining or worsening    Shift Goals  Clinical Goals: safety  Patient Goals: Participate in therapy, pain control    Progress made toward(s) clinical / shift goals:      Problem: Knowledge Deficit - Standard  Goal: Patient and family/care givers will demonstrate understanding of plan of care, disease process/condition, diagnostic tests and medications  Outcome: Progressing     Problem: Fall Risk - Rehab  Goal: Patient will remain free from falls  Outcome: Progressing  Patient use call light for assistance.        Patient is not progressing towards the following goals:

## 2023-11-14 NOTE — PROGRESS NOTES
NURSING DAILY NOTE    Name: Anita Beard   Date of Admission: 11/9/2023   Admitting Diagnosis: SDH (subdural hematoma) ([facility])  Attending Physician: Deepti Pak M.d.  Allergies: Ace inhibitors    Safety  Patient Assist  mod  Patient Precautions  Fall Risk  Precaution Comments  pain pump for chronic back/neck pain, craniotomy  Bed Transfer Status  Standby Assist  Toilet Transfer Status   Standby Assist  Assistive Devices  Wheelchair  Oxygen  Nasal Cannula  Diet/Therapeutic Dining  Current Diet Order   Procedures    Diet Order Diet: Regular     Pill Administration  whole and one at a time   Agitated Behavioral Scale     ABS Level of Severity       Fall Risk  Has the patient had a fall this admission?   No  Talisha Hong Fall Risk Scoring  14, MODERATE RISK  Fall Risk Safety Measures  bed alarm and chair alarm    Vitals  Temperature: 36.9 °C (98.4 °F)  Temp src: Oral  Pulse: 63  Respiration: 18  Blood Pressure : (!) 142/75  Blood Pressure MAP (Calculated): 97 MM HG  BP Location: Left, Upper Arm  Patient BP Position: Supine     Oxygen  Pulse Oximetry: 95 %  O2 (LPM): 2  O2 Delivery Device: Nasal Cannula    Bowel and Bladder  Last Bowel Movement  11/13/23  Stool Type  Type 4: Like a sausage or snake, smooth and soft  Bowel Device     Continent  Bladder: Stress incontinence   Bowel: Continent movement  Bladder Function  Urine Void (mL): 200 ml  Number of Times Voided: 1  Urine Color: Unable To Evaluate  Genitourinary Assessment   Bladder Assessment (WDL):  Within Defined Limits  Urine Color: Unable To Evaluate  Bladder Device: Bathroom  Time Void: Yes  Bladder Scan: Post Void  $ Bladder Scan Results (mL): 1    Skin  Satnam Score   17  Sensory Interventions   Bed Types: Standard/Trauma Mattress  Skin Preventative Measures: Pillows in Use for Support / Positioning  Moisture Interventions  Moisturizers/Barriers: Barrier Wipes      Pain  Pain Rating Scale  4 -  Distracts me, can do usual activities  Pain Location  Head, Shoulder  Pain Location Orientation  Right, Left, Anterior  Pain Interventions   Medication (see MAR)    ADLs    Bathing      Linen Change      Personal Hygiene  Perineal Care, Moist Shannon Wipes  Chlorhexidine Bath      Oral Care  Brushed Teeth  Teeth/Dentures     Shave     Nutrition Percentage Eaten  *  * Meal *  *, Breakfast, Less than 25% Consumed  Environmental Precautions  Treaded Slipper Socks on Patient, Personal Belongings, Wastebasket, Call Bell etc. in Easy Reach, Transferred to Stronger Side, Bed in Low Position (pt refuses bed alarm)  Patient Turns/Positioning  Patient Turns Self from Side to Side  Patient Turns Assistance/Tolerance     Bed Positions  Bed Locked, Bed Controls Deactivated  Head of Bed Elevated  Self regulated      Psychosocial/Neurologic Assessment  Psychosocial Assessment  Psychosocial (WDL):  Within Defined Limits  Neurologic Assessment  Neuro (WDL): Within Defined Limits  Level of Consciousness: Alert  EENT (WDL):  WDL Except    Cardio/Pulmonary Assessment  Edema      Respiratory Breath Sounds  RUL Breath Sounds: Clear  RML Breath Sounds: Clear  RLL Breath Sounds: Diminished  NIDIA Breath Sounds: Clear  LLL Breath Sounds: Diminished  Cardiac Assessment   Cardiac (WDL):  Within Defined Limits

## 2023-11-14 NOTE — CARE PLAN
Problem: Fall Risk - Rehab  Goal: Patient will remain free from falls  Note: Patient remains free from falls this shift. Patient was educated on using the call light to prevent falls. Patients bed is in the lowest position. The patients belongings are placed in near proximity to the patient.      Problem: Pain - Standard  Goal: Alleviation of pain or a reduction in pain to the patient’s comfort goal  Flowsheets (Taken 11/14/2023 0814)  Non Verbal Scale:   Calm   Unlabored Breathing  Pain Rating Scale (NPRS): 5   The patient is Stable - Low risk of patient condition declining or worsening

## 2023-11-14 NOTE — CARE PLAN
Problem: Problem Solving STGs  Goal: STG-Within one week, patient will complete a mock medication sorting task with spv to achieve 100% accuracy.    Outcome: Not Met     Problem: Memory STGs  Goal: STG-Within one week, patient will recall new information related to her current hospitalization using functional memory strategies with spv.    Outcome: Not Met

## 2023-11-14 NOTE — CARE PLAN
The patient is Stable - Low risk of patient condition declining or worsening    Shift Goals  Clinical Goals: no decline in neuro, keep pain at a level of 3 or 4 or less, or low enough so that pt can sleep, safe medication administration  Patient Goals: rest so that she can participate in therapy during the day  Family Goals: ricki    Progress made toward(s) clinical / shift goals:  Pt's neuro is assessed this shift, no changes in neuro from start of shift to end of shift, or from previously documented assessments. Pt reports pain is well controlled and less than 4 this shift. Pt is able to sleep this shift. Meds are given safely to patient one at a time. No safety issues with meds.    Patient is not progressing towards the following goals:

## 2023-11-14 NOTE — THERAPY
Physical Therapy   Daily Treatment     Patient Name: Anita Beard  Age:  88 y.o., Sex:  female  Medical Record #: [id number]  Today's Date: 11/14/2023     Precautions  Precautions: Fall Risk  Comments: pain pump for chronic back/neck pain, craniotomy    Subjective    Pt was seated in w/c upon arrival and agreeable to treatment.       Objective       11/14/23 1301   PT Charge Group   PT Gait Training (Units) 1   PT Therapeutic Activities (Units) 1   PT Total Time Spent   PT Individual Total Time Spent (Mins) 30   Precautions   Precautions Fall Risk   Gait Functional Level of Assist    Gait Level Of Assist Contact Guard Assist   Assistive Device Front Wheel Walker   Distance (Feet) 220   # of Times Distance was Traveled 1   Deviation Decreased Base Of Support;Bradykinetic;Decreased Heel Strike;Decreased Toe Off  (VCing for proximity to FWW, FW trunk lean, intermittent lateral sway to R)   Wheelchair Functional Level of Assist   Wheelchair Assist Supervised   Distance Wheelchair (Feet or Distance) 150   Wheelchair Description Extra time;Supervision for safety   Interdisciplinary Plan of Care Collaboration   Patient Position at End of Therapy Seated;Call Light within Reach;Tray Table within Reach;Phone within Reach;Chair Alarm On     Gait training completed with focus on safety and use of FWW (unable to locate 4WW).  Completed discussion of D/C planning. Gathered pt's laundry from dryer.     Assessment    Pt continues to demonstrate mild balance deviations with ambulation with self corrected LOB noted x 1 occurrence.  Pt with VCing for safety.   Strengths: Able to follow instructions, Adequate strength, Effective communication skills, Good insight into deficits/needs, Independent prior level of function, Making steady progress towards goals, Motivated for self care and independence, Pleasant and cooperative, Supportive family, Willingly participates in therapeutic activities  Barriers: Impaired activity tolerance, Visual  impairment    Plan    CV endurance, standing balance, LE and core strengthening, gait with 4WW vs hurrycane, stairs       DME  PT DME Recommendations  Assistive Device:  (none anticipated, patient has cane and wheeled walker already)     Passport items to be completed:  Get in/out of bed safely, in/out of a vehicle, safely use mobility device, walk or wheel around home/community, navigate up and down stairs, show how to get up/down from the ground, ensure home is accessible, demonstrate HEP, complete caregiver training    Physical Therapy Problems (Active)       Problem: Balance       Dates: Start:  11/10/23         Goal: STG-Within one week, patient will improve [place] >51/56       Dates: Start:  11/10/23               Problem: Mobility       Dates: Start:  11/10/23         Goal: STG-Within one week, patient will improve 6 minute walk test > 750 ft with LRAD       Dates: Start:  11/10/23               Problem: Mobility Transfers       Dates: Start:  11/10/23         Goal: STG-Within one week, patient will demonstrate fall recovery with CGA       Dates: Start:  11/10/23               Problem: PT-Long Term Goals       Dates: Start:  11/10/23         Goal: LTG-By discharge, patient will improve FGA >24/30       Dates: Start:  11/10/23            Goal: LTG-By discharge, patient will perform home exercise program independently       Dates: Start:  11/10/23            Goal: LTG-By discharge, patient will ambulate up/down 4-6 stairs with single railing and supervision       Dates: Start:  11/10/23            Goal: LTG-By discharge, patient will transfer in/out of a car with supervision assist       Dates: Start:  11/10/23            Goal: LTG-By discharge, patient will demonstrate fall recovery with supervision assist       Dates: Start:  11/10/23

## 2023-11-14 NOTE — THERAPY
Physical Therapy   Daily Treatment     Patient Name: Anita Beard  Age:  88 y.o., Sex:  female  Medical Record #: [id number]  Today's Date: 11/14/2023     Precautions  Precautions: Fall Risk  Comments: pain pump for chronic back/neck pain, craniotomy    Subjective    Patient is found seated up in chair, is just finishing breakfast. Wants to change her clothes and start her laundry before therapy.      Objective       11/14/23 0831   PT Charge Group   PT Gait Training (Units) 1   PT Therapeutic Activities (Units) 3   PT Total Time Spent   PT Individual Total Time Spent (Mins) 60   Gait Functional Level of Assist    Gait Level Of Assist Minimal Assist   Assistive Device 4 Wheel Walker   Distance (Feet) 250   # of Times Distance was Traveled 2   Deviation Trendelenberg;Increased Base Of Support  (lateral instability, walker gets away from her. Initially wants walker set high, then reports arm fatigue with gait. Reports improvement with walker handles being dropped)   Transfer Functional Level of Assist   Bed, Chair, Wheelchair Transfer Standby Assist   Bed Chair Wheelchair Transfer Description Increased time;Supervision for safety;Verbal cueing   Toilet Transfers Standby Assist   Toilet Transfer Description Grab bar;Increased time;Supervision for safety;Verbal cueing;Initial preparation for task   Interdisciplinary Plan of Care Collaboration   Patient Position at End of Therapy Seated;Chair Alarm On;Call Light within Reach;Tray Table within Reach;Phone within Reach;Self Releasing Lap Belt Applied         Assessment    Patient required mod/max assist to sequence toileting (she got distracted and wanted to label her personal toiletries with her name while sitting on the toilet with the initial intention to urinate), dressing (attempted to put shoes on before pants, required several attempts to get her bra on right side up/out). There were 10 minutes left at end of session, attempted ambulation with four wheeled walker. She  reports feeling like she stands up better with handles up higher, requires frequent cues to maintain upright posture. She has difficulty modulating her speed with four wheeled walker, fatigues after 250 ft compared to yesterday where she was able to ambulate outdoors without any problems with 4ww.     Strengths: Able to follow instructions, Adequate strength, Effective communication skills, Good insight into deficits/needs, Independent prior level of function, Making steady progress towards goals, Motivated for self care and independence, Pleasant and cooperative, Supportive family, Willingly participates in therapeutic activities  Barriers: Impaired activity tolerance, Visual impairment    Plan    CV endurance, standing balance, LE and core strengthening, gait with 4WW vs hurrycane, stairs      DME  PT DME Recommendations  Assistive Device:  (none anticipated, patient has cane and wheeled walker already)    Passport items to be completed:  Get in/out of bed safely, in/out of a vehicle, safely use mobility device, walk or wheel around home/community, navigate up and down stairs, show how to get up/down from the ground, ensure home is accessible, demonstrate HEP, complete caregiver training    Physical Therapy Problems (Active)       Problem: Balance       Dates: Start:  11/10/23         Goal: STG-Within one week, patient will improve [place] >51/56       Dates: Start:  11/10/23               Problem: Mobility       Dates: Start:  11/10/23         Goal: STG-Within one week, patient will improve 6 minute walk test > 750 ft with LRAD       Dates: Start:  11/10/23               Problem: Mobility Transfers       Dates: Start:  11/10/23         Goal: STG-Within one week, patient will demonstrate fall recovery with CGA       Dates: Start:  11/10/23               Problem: PT-Long Term Goals       Dates: Start:  11/10/23         Goal: LTG-By discharge, patient will improve FGA >24/30       Dates: Start:  11/10/23             Goal: LTG-By discharge, patient will perform home exercise program independently       Dates: Start:  11/10/23            Goal: LTG-By discharge, patient will ambulate up/down 4-6 stairs with single railing and supervision       Dates: Start:  11/10/23            Goal: LTG-By discharge, patient will transfer in/out of a car with supervision assist       Dates: Start:  11/10/23            Goal: LTG-By discharge, patient will demonstrate fall recovery with supervision assist       Dates: Start:  11/10/23

## 2023-11-14 NOTE — THERAPY
Physical Therapy   Daily Treatment     Patient Name: Anita Beard  Age:  88 y.o., Sex:  female  Medical Record #: [id number]  Today's Date: 11/13/2023     Precautions  Precautions: Fall Risk  Comments: pain pump for chronic back/neck pain, craniotomy    Subjective    Pt up in wc, willing to participate.     Objective       11/13/23 1331   PT Charge Group   PT Gait Training (Units) 2   PT Therapeutic Exercise (Units) 1   PT Neuromuscular Re-Education / Balance (Units) 1   PT Total Time Spent   PT Individual Total Time Spent (Mins) 60   Gait Functional Level of Assist    Gait Level Of Assist Contact Guard Assist   Assistive Device 4 Wheel Walker  (vs hurry cane)   Distance (Feet) 450  (450 ft x 1 outdoors, 200 ft x 2 with hurrycane/SPC indoors)   # of Times Distance was Traveled 1   Deviation   (lateral instability/ stagger.)   Transfer Functional Level of Assist   Toilet Transfers Standby Assist   Toilet Transfer Description Grab bar   Neuro-Muscular Treatments   Neuro-Muscular Treatments Facilitation;Postural Changes;Postural Facilitation;Sequencing;Tactile Cuing;Verbal Cuing;Weight Shift Right;Weight Shift Left;Anterior weight shift   Comments static standing balance stable ground, normal stance/ feet together/ stagger stance with arms crossed x 30 sec each, repeat on Airex foam pad with light CGA and intermittent protective extension/ UE support at // bars  for postural sway. Tandem walking with light UE support/ no UE support at // bars 20 ft x 4         Assessment    Pt reported some shortness of breath with outdoor ambulation due to uneven terrains/ slopes, otherwise tolerated gait/ endurance and balance activity well with intermittent seated rests.    Strengths: Able to follow instructions, Adequate strength, Effective communication skills, Good insight into deficits/needs, Independent prior level of function, Making steady progress towards goals, Motivated for self care and independence, Pleasant and cooperative,  Supportive family, Willingly participates in therapeutic activities  Barriers: Impaired activity tolerance, Visual impairment    Plan    CV endurance, standing balance, LE and core strengthening, gait with 4WW vs hurrycane, stairs     DME  PT DME Recommendations  Assistive Device:  (none anticipated, patient has cane and wheeled walker already)    Passport items to be completed:  Get in/out of bed safely, in/out of a vehicle, safely use mobility device, walk or wheel around home/community, navigate up and down stairs, show how to get up/down from the ground, ensure home is accessible, demonstrate HEP, complete caregiver training    Physical Therapy Problems (Active)       Problem: Balance       Dates: Start:  11/10/23         Goal: STG-Within one week, patient will improve [place] >51/56       Dates: Start:  11/10/23               Problem: Mobility       Dates: Start:  11/10/23         Goal: STG-Within one week, patient will improve 6 minute walk test > 750 ft with LRAD       Dates: Start:  11/10/23            Goal: STG-Within one week, patient will ascend and descend four to six stairs with single railing and CGA       Dates: Start:  11/10/23               Problem: Mobility Transfers       Dates: Start:  11/10/23         Goal: STG-Within one week, patient will demonstrate fall recovery with CGA       Dates: Start:  11/10/23               Problem: PT-Long Term Goals       Dates: Start:  11/10/23         Goal: LTG-By discharge, patient will improve FGA >24/30       Dates: Start:  11/10/23            Goal: LTG-By discharge, patient will perform home exercise program independently       Dates: Start:  11/10/23            Goal: LTG-By discharge, patient will ambulate up/down 4-6 stairs with single railing and supervision       Dates: Start:  11/10/23            Goal: LTG-By discharge, patient will transfer in/out of a car with supervision assist       Dates: Start:  11/10/23            Goal: LTG-By discharge, patient will  demonstrate fall recovery with supervision assist       Dates: Start:  11/10/23

## 2023-11-15 ENCOUNTER — APPOINTMENT (OUTPATIENT)
Dept: SPEECH THERAPY | Facility: REHABILITATION | Age: 88
DRG: 949 | End: 2023-11-15
Attending: PHYSICAL MEDICINE & REHABILITATION
Payer: MEDICARE

## 2023-11-15 ENCOUNTER — APPOINTMENT (OUTPATIENT)
Dept: OCCUPATIONAL THERAPY | Facility: REHABILITATION | Age: 88
DRG: 949 | End: 2023-11-15
Attending: PHYSICAL MEDICINE & REHABILITATION
Payer: MEDICARE

## 2023-11-15 ENCOUNTER — APPOINTMENT (OUTPATIENT)
Dept: PHYSICAL THERAPY | Facility: REHABILITATION | Age: 88
DRG: 949 | End: 2023-11-15
Attending: PHYSICAL MEDICINE & REHABILITATION
Payer: MEDICARE

## 2023-11-15 PROCEDURE — 97110 THERAPEUTIC EXERCISES: CPT

## 2023-11-15 PROCEDURE — 97535 SELF CARE MNGMENT TRAINING: CPT

## 2023-11-15 PROCEDURE — 97112 NEUROMUSCULAR REEDUCATION: CPT

## 2023-11-15 PROCEDURE — 99232 SBSQ HOSP IP/OBS MODERATE 35: CPT | Performed by: PHYSICAL MEDICINE & REHABILITATION

## 2023-11-15 PROCEDURE — 700102 HCHG RX REV CODE 250 W/ 637 OVERRIDE(OP): Performed by: PHYSICAL MEDICINE & REHABILITATION

## 2023-11-15 PROCEDURE — A9270 NON-COVERED ITEM OR SERVICE: HCPCS | Performed by: PHYSICAL MEDICINE & REHABILITATION

## 2023-11-15 PROCEDURE — 94760 N-INVAS EAR/PLS OXIMETRY 1: CPT

## 2023-11-15 PROCEDURE — 97129 THER IVNTJ 1ST 15 MIN: CPT

## 2023-11-15 PROCEDURE — 700111 HCHG RX REV CODE 636 W/ 250 OVERRIDE (IP): Mod: JZ | Performed by: PHYSICAL MEDICINE & REHABILITATION

## 2023-11-15 PROCEDURE — 97130 THER IVNTJ EA ADDL 15 MIN: CPT

## 2023-11-15 PROCEDURE — 770010 HCHG ROOM/CARE - REHAB SEMI PRIVAT*

## 2023-11-15 RX ADMIN — METOPROLOL TARTRATE 25 MG: 25 TABLET, FILM COATED ORAL at 07:36

## 2023-11-15 RX ADMIN — LEVETIRACETAM 500 MG: 500 TABLET, FILM COATED ORAL at 07:36

## 2023-11-15 RX ADMIN — HYDROCODONE BITARTRATE AND ACETAMINOPHEN 1 TABLET: 10; 325 TABLET ORAL at 20:59

## 2023-11-15 RX ADMIN — METOPROLOL TARTRATE 37.5 MG: 25 TABLET, FILM COATED ORAL at 20:53

## 2023-11-15 RX ADMIN — GABAPENTIN 600 MG: 300 CAPSULE ORAL at 20:53

## 2023-11-15 RX ADMIN — HYDROCODONE BITARTRATE AND ACETAMINOPHEN 1 TABLET: 10; 325 TABLET ORAL at 07:36

## 2023-11-15 RX ADMIN — HYDROCODONE BITARTRATE AND ACETAMINOPHEN 1 TABLET: 10; 325 TABLET ORAL at 13:32

## 2023-11-15 RX ADMIN — FUROSEMIDE 20 MG: 20 TABLET ORAL at 17:16

## 2023-11-15 RX ADMIN — GABAPENTIN 300 MG: 300 CAPSULE ORAL at 07:36

## 2023-11-15 RX ADMIN — LEVETIRACETAM 500 MG: 500 TABLET, FILM COATED ORAL at 20:53

## 2023-11-15 RX ADMIN — ENOXAPARIN SODIUM 40 MG: 100 INJECTION SUBCUTANEOUS at 17:16

## 2023-11-15 ASSESSMENT — ACTIVITIES OF DAILY LIVING (ADL)
TOILET_TRANSFER_DESCRIPTION: GRAB BAR;INCREASED TIME
TOILET_TRANSFER_DESCRIPTION: GRAB BAR
TOILETING_LEVEL_OF_ASSIST_DESCRIPTION: GRAB BAR;INCREASED TIME;INITIAL PREPARATION FOR TASK;SET-UP OF EQUIPMENT;SUPERVISION FOR SAFETY

## 2023-11-15 ASSESSMENT — PAIN DESCRIPTION - PAIN TYPE: TYPE: ACUTE PAIN

## 2023-11-15 NOTE — PROGRESS NOTES
NURSING DAILY NOTE    Name: Anita Beard   Date of Admission: 11/9/2023   Admitting Diagnosis: SDH (subdural hematoma) ([facility])  Attending Physician: Deepti Pak M.d.  Allergies: Ace inhibitors    Safety  Patient Assist  minimum assist  Patient Precautions  Fall Risk  Precaution Comments  pain pump for chronic back/neck pain, craniotomy  Bed Transfer Status  Standby Assist  Toilet Transfer Status   Standby Assist  Assistive Devices  Wheelchair  Oxygen  None - Room Air (C pap noc)  Diet/Therapeutic Dining  Current Diet Order   Procedures    Diet Order Diet: Regular     Pill Administration  whole  Agitated Behavioral Scale     ABS Level of Severity       Fall Risk  Has the patient had a fall this admission?   No  Talisha Hong Fall Risk Scoring  16, HIGH RISK  Fall Risk Safety Measures  bed alarm, chair alarm, poor balance, and low vision/ hearing    Vitals  Temperature: 36.7 °C (98 °F)  Temp src: Oral  Pulse: 66  Respiration: 18  Blood Pressure : 117/78  Blood Pressure MAP (Calculated): 91 MM HG  BP Location: Right, Upper Arm  Patient BP Position: Sitting     Oxygen  Pulse Oximetry: 95 %  O2 (LPM): 0  O2 Delivery Device: None - Room Air (C pap noc)    Bowel and Bladder  Last Bowel Movement  11/13/23  Stool Type  Type 4: Like a sausage or snake, smooth and soft  Bowel Device     Continent  Bladder: Stress incontinence   Bowel: Continent movement  Bladder Function  Urine Void (mL): 200 ml  Number of Times Voided: 1  Urine Color: Yellow  Genitourinary Assessment   Bladder Assessment (WDL):  Within Defined Limits  Solis Catheter: Not Applicable  Urine Color: Yellow  Bladder Device: Bathroom  Time Void: Yes  Bladder Scan: Post Void  $ Bladder Scan Results (mL): 1    Skin  Satnam Score   18  Sensory Interventions   Bed Types: Standard/Trauma Mattress  Skin Preventative Measures: Pillows in Use for Support / Positioning  Moisture  "Interventions  Moisturizers/Barriers: Barrier Wipes      Pain  Pain Rating Scale  5 - Interrupts some activities  Pain Location  Head, Shoulder  Pain Location Orientation  Right, Left, Anterior  Pain Interventions   Medication (see MAR)    ADLs    Bathing    (Nurse noted regarding shower refusal.)  Linen Change      Personal Hygiene  Perineal Care, Moist Shannon Wipes  Chlorhexidine Bath      Oral Care  Brushed Teeth  Teeth/Dentures     Shave     Nutrition Percentage Eaten  *  * Meal *  *, Lunch, Less than 25% Consumed  Environmental Precautions  Treaded Slipper Socks on Patient, Personal Belongings, Wastebasket, Call Bell etc. in Easy Reach, Transferred to Stronger Side, Report Given to Other Health Care Providers Regarding Fall Risk, Communication Sign for Patients & Families, Bed in Low Position, Mobility Assessed & Appropriate Sign Placed  Patient Turns/Positioning  Patient Turns Self from Side to Side  Patient Turns Assistance/Tolerance     Bed Positions  Bed Controls On, Bed Locked  Head of Bed Elevated  Self regulated      Psychosocial/Neurologic Assessment  Psychosocial Assessment  Psychosocial (WDL):  Within Defined Limits  Neurologic Assessment  Neuro (WDL): Within Defined Limits  Level of Consciousness: Alert  Orientation Level: Oriented X4  Cognition: Appropriate judgement, Appropriate safety awareness, Appropriate attention/concentration, Appropriate for developmental age, Follows commands  Speech: Clear  Pupil Assesment: No  Motor Function/Sensation Assessment: Sensation  RLE Sensation: Other (Comment) (neuropathy/\"burning\" at baseline)  LLE Sensation: Other (Comment) (neuropathy/\"burning\" at baseline)  EENT (WDL):  WDL Except    Cardio/Pulmonary Assessment  Edema      Respiratory Breath Sounds  RUL Breath Sounds: Clear  RML Breath Sounds: Clear  RLL Breath Sounds: Clear  NIDIA Breath Sounds: Clear  LLL Breath Sounds: Clear  Cardiac Assessment   Cardiac (WDL):  Within Defined Limits      "

## 2023-11-15 NOTE — PROGRESS NOTES
NURSING DAILY NOTE    Name: Anita Beard   Date of Admission: 11/9/2023   Admitting Diagnosis: SDH (subdural hematoma) ([facility])  Attending Physician: Deepti Pak M.d.  Allergies: Ace inhibitors    Safety  Patient Assist  minimum assist  Patient Precautions  Fall Risk  Precaution Comments  pain pump for chronic back/neck pain, craniotomy  Bed Transfer Status  Standby Assist  Toilet Transfer Status   Standby Assist  Assistive Devices  Wheelchair  Oxygen  None - Room Air  Diet/Therapeutic Dining  Current Diet Order   Procedures    Diet Order Diet: Regular     Pill Administration  whole  Agitated Behavioral Scale     ABS Level of Severity       Fall Risk  Has the patient had a fall this admission?   No  Talisha Hong Fall Risk Scoring  16, HIGH RISK  Fall Risk Safety Measures  bed alarm, chair alarm, and seatbelt alarm    Vitals  Temperature: 36.7 °C (98 °F)  Temp src: Oral  Pulse: 87  Respiration: 16  Blood Pressure : 117/78  Blood Pressure MAP (Calculated): 91 MM HG  BP Location: Right, Upper Arm  Patient BP Position: Sitting     Oxygen  Pulse Oximetry: 96 %  O2 (LPM): 0  O2 Delivery Device: None - Room Air    Bowel and Bladder  Last Bowel Movement  11/13/23  Stool Type  Type 4: Like a sausage or snake, smooth and soft  Bowel Device     Continent  Bladder: Stress incontinence   Bowel: Continent movement  Bladder Function  Urine Void (mL): 200 ml  Number of Times Voided: 1  Urine Color: Yellow  Genitourinary Assessment   Bladder Assessment (WDL):  Within Defined Limits  Solis Catheter: Not Applicable  Urine Color: Yellow  Bladder Device: Bathroom  Time Void: Yes  Bladder Scan: Post Void  $ Bladder Scan Results (mL): 1    Skin  Satnam Score   18  Sensory Interventions   Bed Types: Standard/Trauma Mattress  Skin Preventative Measures: Pillows in Use for Support / Positioning  Moisture Interventions  Moisturizers/Barriers: Barrier Wipes      Pain  Pain  "Rating Scale  0 - No Pain  Pain Location  Head, Shoulder  Pain Location Orientation  Right, Left, Anterior  Pain Interventions   Declines    ADLs    Bathing    (Nurse noted regarding shower refusal.)  Linen Change      Personal Hygiene  Perineal Care, Moist Shannon Wipes  Chlorhexidine Bath      Oral Care  Brushed Teeth  Teeth/Dentures     Shave     Nutrition Percentage Eaten  *  * Meal *  *, Lunch, Less than 25% Consumed  Environmental Precautions  Treaded Slipper Socks on Patient, Personal Belongings, Wastebasket, Call Bell etc. in Easy Reach, Transferred to Stronger Side, Report Given to Other Health Care Providers Regarding Fall Risk, Communication Sign for Patients & Families, Bed in Low Position, Mobility Assessed & Appropriate Sign Placed  Patient Turns/Positioning  Patient Turns Self from Side to Side  Patient Turns Assistance/Tolerance     Bed Positions  Bed Controls On, Bed Locked  Head of Bed Elevated  Self regulated      Psychosocial/Neurologic Assessment  Psychosocial Assessment  Psychosocial (WDL):  Within Defined Limits  Neurologic Assessment  Neuro (WDL): Within Defined Limits  Level of Consciousness: Alert  Orientation Level: Oriented X4  Cognition: Appropriate judgement, Appropriate safety awareness, Appropriate attention/concentration, Appropriate for developmental age, Follows commands  Speech: Clear  Pupil Assesment: No  Motor Function/Sensation Assessment: Sensation  RLE Sensation: Other (Comment) (neuropathy/\"burning\" at baseline)  LLE Sensation: Other (Comment) (neuropathy/\"burning\" at baseline)  EENT (WDL):  WDL Except    Cardio/Pulmonary Assessment  Edema      Respiratory Breath Sounds  RUL Breath Sounds: Clear  RML Breath Sounds: Clear  RLL Breath Sounds: Clear  NIDIA Breath Sounds: Clear  LLL Breath Sounds: Clear  Cardiac Assessment   Cardiac (WDL):  Within Defined Limits       "

## 2023-11-15 NOTE — FLOWSHEET NOTE
11/14/23 1742   Events/Summary/Plan   Events/Summary/Plan SpO2 check   Vital Signs   Pulse 87   Respiration 16   Pulse Oximetry 96 %   $ Pulse Oximetry (Spot Check) Yes   Respiratory Assessment   Respiratory Pattern Within Normal Limits   Level of Consciousness Alert   Chest Exam   Work Of Breathing / Effort Within Normal Limits   Oxygen   O2 Delivery Device None - Room Air   Non-Invasive Ventilation HENRY Group   Nocturnal CPAP or BIPAP CPAP - Home Unit  (DID not use CPAP last night.  Must have nose strips which were brought in today.)

## 2023-11-15 NOTE — CARE PLAN
Problem: Fall Risk - Rehab  Goal: Patient will remain free from falls  Note: Patient remains free from falls this shift. Patient was educated on using the call light to prevent falls. Patients bed is in the lowest position. The patients belongings are placed in near proximity to the patient.      Problem: Risk for Aspiration  Goal: Patient's risk for aspiration will be absent or decrease  Note: Patient refused to get out of bed for breakfast, charge RN was able to convince patient to get out of bed.    The patient is Stable - Low risk of patient condition declining or worsening

## 2023-11-15 NOTE — THERAPY
Occupational Therapy  Daily Treatment     Patient Name: Anita Beard  Age:  88 y.o., Sex:  female  Medical Record #: [id number]  Today's Date: 11/14/2023     Precautions  Precautions: (P) Fall Risk  Comments: (P) pain pump for chronic back/neck pain, craniotomy         Subjective    Pt upright in w/c and pleasant at approach, voicing dislike of current walker.      Objective       11/14/23 1401   OT Charge Group   Charges Yes   OT Therapy Activity (Units) 4   OT Total Time Spent   OT Individual Total Time Spent (Mins) 60   Precautions   Precautions Fall Risk   Comments pain pump for chronic back/neck pain, craniotomy   Interdisciplinary Plan of Care Collaboration   IDT Collaboration with  Nursing   Patient Position at End of Therapy In Bed;Bed Alarm On;Call Light within Reach;Tray Table within Reach   Collaboration Comments location     Theract:  ~35 minutes to complete large piece puzzle at table top in standing with intermittent verbal cues for piece location and orientation. SBA for standing balance. Increased functional standing tolerance, balance, and functional cognition.     Ambulation from back gym>room with 2ww and SBA with intermittent VC for direction    Assessment    Pt demonstrates increased problem solving and attention skills as previously reported with fewer v/cs needed for distractibility.     Strengths: Able to follow instructions, Alert and oriented, Effective communication skills, Adequate strength, Good insight into deficits/needs, Independent prior level of function, Making steady progress towards goals, Motivated for self care and independence, Pleasant and cooperative, Supportive family, Willingly participates in therapeutic activities  Barriers: Decreased endurance, Fatigue, Impaired activity tolerance, Impaired balance    Plan    Continue ADL training, functional strength, balance, and endurance    DME       Occupational Therapy Goals (Active)       Problem: Dressing       Dates: Start:   11/10/23         Goal: STG-Within one week, patient will dress LB with SBA        Dates: Start:  11/10/23               Problem: Functional Transfers       Dates: Start:  11/10/23         Goal: STG-Within one week, patient will transfer to toilet with SBA        Dates: Start:  11/10/23               Problem: OT Long Term Goals       Dates: Start:  11/10/23         Goal: LTG-By discharge, patient will complete basic self care tasks with mod I        Dates: Start:  11/10/23            Goal: LTG-By discharge, patient will perform bathroom transfers with mod I        Dates: Start:  11/10/23            Goal: LTG-By discharge, patient will complete basic home management with mod I        Dates: Start:  11/10/23               Problem: Toileting       Dates: Start:  11/10/23         Goal: STG-Within one week, patient will complete toileting tasks with SBA        Dates: Start:  11/10/23

## 2023-11-15 NOTE — PROGRESS NOTES
"  Physical Medicine & Rehabilitation Progress Note    Encounter Date: 11/15/2023    Chief Complaint: Decreased mobility, weakness    Interval Events (Subjective):  Patient sitting up in room. Per nursing, request for CT Head, she is in agreement. Discussed would follow-up with NSG. Denies pain.     _____________________________________  Interdisciplinary Team Conference   Most recent IDT on 11/14/2023    Discharge Date/Disposition:  11/22/23  _____________________________________     Objective:  VITAL SIGNS: /58   Pulse 66   Temp 36.8 °C (98.3 °F) (Oral)   Resp 16   Ht 1.6 m (5' 3\")   Wt 71 kg (156 lb 8.4 oz)   SpO2 97%   BMI 27.73 kg/m²   Gen: NAD  Psych: Mood and affect appropriate  CV: RRR, 0 edema  Resp: CTAB, no upper airway sounds  Abd: NTND  Neuro: AOx4, following commands    Laboratory Values:  Recent Results (from the past 72 hour(s))   CBC WITH DIFFERENTIAL    Collection Time: 11/13/23  5:28 AM   Result Value Ref Range    WBC 4.7 (L) 4.8 - 10.8 K/uL    RBC 3.47 (L) 4.20 - 5.40 M/uL    Hemoglobin 9.7 (L) 12.0 - 16.0 g/dL    Hematocrit 30.9 (L) 37.0 - 47.0 %    MCV 89.0 81.4 - 97.8 fL    MCH 28.0 27.0 - 33.0 pg    MCHC 31.4 (L) 32.2 - 35.5 g/dL    RDW 41.2 35.9 - 50.0 fL    Platelet Count 218 164 - 446 K/uL    MPV 10.9 9.0 - 12.9 fL    Neutrophils-Polys 55.30 44.00 - 72.00 %    Lymphocytes 23.90 22.00 - 41.00 %    Monocytes 11.40 0.00 - 13.40 %    Eosinophils 8.20 (H) 0.00 - 6.90 %    Basophils 0.40 0.00 - 1.80 %    Immature Granulocytes 0.80 0.00 - 0.90 %    Nucleated RBC 0.00 0.00 - 0.20 /100 WBC    Neutrophils (Absolute) 2.61 1.82 - 7.42 K/uL    Lymphs (Absolute) 1.13 1.00 - 4.80 K/uL    Monos (Absolute) 0.54 0.00 - 0.85 K/uL    Eos (Absolute) 0.39 0.00 - 0.51 K/uL    Baso (Absolute) 0.02 0.00 - 0.12 K/uL    Immature Granulocytes (abs) 0.04 0.00 - 0.11 K/uL    NRBC (Absolute) 0.00 K/uL   Basic Metabolic Panel    Collection Time: 11/13/23  5:28 AM   Result Value Ref Range    Sodium 141 135 - " 145 mmol/L    Potassium 3.9 3.6 - 5.5 mmol/L    Chloride 106 96 - 112 mmol/L    Co2 26 20 - 33 mmol/L    Glucose 106 (H) 65 - 99 mg/dL    Bun 8 8 - 22 mg/dL    Creatinine 0.62 0.50 - 1.40 mg/dL    Calcium 8.5 8.5 - 10.5 mg/dL    Anion Gap 9.0 7.0 - 16.0   MAGNESIUM    Collection Time: 23  5:28 AM   Result Value Ref Range    Magnesium 1.8 1.5 - 2.5 mg/dL   ESTIMATED GFR    Collection Time: 23  5:28 AM   Result Value Ref Range    GFR (CKD-EPI) 86 >60 mL/min/1.73 m 2   URINALYSIS    Collection Time: 23  3:37 PM    Specimen: Urine, Cath   Result Value Ref Range    Color Yellow     Character Clear     Specific Gravity 1.008 <1.035    Ph 6.5 5.0 - 8.0    Glucose Negative Negative mg/dL    Ketones Negative Negative mg/dL    Protein Negative Negative mg/dL    Bilirubin Negative Negative    Urobilinogen, Urine 0.2 Negative    Nitrite Negative Negative    Leukocyte Esterase Trace (A) Negative    Occult Blood Negative Negative    Micro Urine Req Microscopic    URINE MICROSCOPIC (W/UA)    Collection Time: 23  3:37 PM   Result Value Ref Range    WBC 0-2 /hpf    RBC 0-2 /hpf    Bacteria Negative None /hpf    Epithelial Cells Few /hpf    Hyaline Cast 0-2 /lpf       Medications:  Scheduled Medications   Medication Dose Frequency    metoprolol tartrate  25 mg BID    furosemide  20 mg AFTER DINNER    HYDROcodone/acetaminophen  1 Tablet BID    omeprazole  20 mg DAILY    atorvastatin  40 mg Q EVENING    enoxaparin (LOVENOX) injection  40 mg DAILY AT 1800    gabapentin  300 mg QAM    And    gabapentin  600 mg QHS    levETIRAcetam  500 mg BID     PRN medications: [] senna-docusate **AND** polyethylene glycol/lytes **AND** magnesium hydroxide **AND** bisacodyl, Respiratory Therapy Consult, hydrALAZINE, acetaminophen, mag hydrox-al hydrox-simeth, ondansetron **OR** ondansetron, traZODone, sodium chloride, midazolam, LORazepam, HYDROcodone/acetaminophen    Diet:  Current Diet Order   Procedures    Diet Order  Diet: Regular       Medical Decision Making and Plan:   R sided SDH - Patient with 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 Alves  -PT and OT for mobility and ADLs. Per guidelines, 15 hours per week between PT, OT and/or SLP.  -Follow-up NSG. Repeat CT head 11/15/23     HTN - Patient on Atenolol 25 mg QHS. Switch to Metoprolol 25 mg BID. Continue Metoprolol -> increase to 37.5 as elevated HR and SBP     HLD - Patient on Atorvastatin 40 mg QHS     Anemia - Check AM CBC - 8.9, worsening. Recheck 11/13 0- 9.7, will monitor      Hypokalemia - Check AM CMP - 3.6, improved, recheck 11/13 - 3.9     Chronic pain - Patient with pain pump.     Pain - Patient on APAP/Oxycodone. Schedule Norco BID. Continue scheduled Norco, monitor for constipation     Skin - Patient at risk for skin breakdown due to debility in areas including sacrum, achilles, elbows and head in addition to other sites. Nursing to assess skin daily.      GI Ppx - Patient on Prilosec for GERD prophylaxis. Patient on Senna-docusate for constipation prophylaxis.      DVT Ppx - Patient Lovenox on transfer. Continue Lovenox as limited ambulation  ____________________________________    T. Reymundo Pak MD/PhD  [facility] - Physical Medicine & Rehabilitation   [facility] - Brain Injury Medicine   ____________________________________

## 2023-11-15 NOTE — THERAPY
Occupational Therapy  Daily Treatment     Patient Name: Anita Beard  Age:  88 y.o., Sex:  female  Medical Record #: [id number]  Today's Date: 11/15/2023     Precautions  Precautions: Fall Risk  Comments: pain pump for chronic back/neck pain, craniotomy         Subjective    Pt was agreeable for an OT tx session.     Objective       11/15/23 1001   OT Charge Group   OT Self Care / ADL (Units) 2   OT Therapeutic Exercise (Units) 2   OT Total Time Spent   OT Individual Total Time Spent (Mins) 60   Pain   Intervention Emotional Support   Pain 0 - 10 Group   Location Head   Pain Rating Scale (NPRS) 3   Description Aching   Comfort Goal Comfort with Movement   Functional Level of Assist   Grooming Independent   Grooming Description Seated in wheelchair at sink   Toileting Supervision   Toileting Description Grab bar;Increased time;Initial preparation for task;Set-up of equipment;Supervision for safety   Toilet Transfers Supervised  (Performed twice by pt at beginning of session and at end of session.)   Toilet Transfer Description Grab bar;Increased time   Sitting Upper Body Exercises   Bicep Curls 3 sets of 10;Bilateral;Weight (See Comments for lbs)  (3 lb dumbbell.)   Pronation / Supination 3 sets of 10;Bilateral;Weight (See Comments for lbs)  (3 lb dumbbell.)   Wrist Flexion / Extension 3 sets of 10;Bilateral;Weight (See Comments for lbs)  (3 lb dumbbell.)   Upper Extremity Bike Level 3 Resistance  (10 minutes.)   Interdisciplinary Plan of Care Collaboration   IDT Collaboration with  Certified Nursing Assistant   Patient Position at End of Therapy Other (Comments)  (Notified CNA that pt was left on toilet. Pt was asked to use call button when she was finished on toilet.)     Pt performed therapeutic ex's to increase strength/activity tolerance for ADL's/IADL's.    Assessment    Pt tolerated OT tx well. No new c/o pain verbalized during session.  Strengths: Able to follow instructions, Alert and oriented, Effective  communication skills, Adequate strength, Good insight into deficits/needs, Independent prior level of function, Making steady progress towards goals, Motivated for self care and independence, Pleasant and cooperative, Supportive family, Willingly participates in therapeutic activities  Barriers: Decreased endurance, Fatigue, Impaired activity tolerance, Impaired balance    Plan  Continue ADL training, functional strength, balance, and endurance.           DME       Passport items to be completed:      Occupational Therapy Goals (Active)       Problem: Dressing       Dates: Start:  11/10/23         Goal: STG-Within one week, patient will dress LB with SBA        Dates: Start:  11/10/23               Problem: Functional Transfers       Dates: Start:  11/10/23         Goal: STG-Within one week, patient will transfer to toilet with SBA        Dates: Start:  11/10/23               Problem: OT Long Term Goals       Dates: Start:  11/10/23         Goal: LTG-By discharge, patient will complete basic self care tasks with mod I        Dates: Start:  11/10/23            Goal: LTG-By discharge, patient will perform bathroom transfers with mod I        Dates: Start:  11/10/23            Goal: LTG-By discharge, patient will complete basic home management with mod I        Dates: Start:  11/10/23               Problem: Toileting       Dates: Start:  11/10/23         Goal: STG-Within one week, patient will complete toileting tasks with SBA        Dates: Start:  11/10/23

## 2023-11-15 NOTE — CARE PLAN
"The patient is Stable - Low risk of patient condition declining or worsening    Shift Goals  Clinical Goals: no decline in neuro, keep pain at a level of 3 or 4 or less, or low enough so that pt can sleep, safe medication administration  Patient Goals: rest so that she can participate in therapy during the day  Family Goals: ricki    Problem: Fall Risk - Rehab  Goal: Patient will remain free from falls  Outcome: Progressing  Note: Talisha Hong Fall risk Assessment Score: 16    High fall risk Interventions   - Alarming seatbelt  - Wander guard  - Bed and strip alarm   - Yellow sign by the door   - Yellow wrist band \"Fall risk\"  - Room near to the nurse station  - Do not leave patient unattended in the bathroom  - Fall risk education provided        Problem: Bladder / Voiding  Goal: Patient will establish and maintain regular urinary output  Outcome: Progressing  Note: Pt, able to verbalize needs and concerns, Used call light when needing assistance. Pt establish bowel and bladder control. Call light w/in reach.         "

## 2023-11-15 NOTE — FLOWSHEET NOTE
11/15/23 0637   Events/Summary/Plan   Events/Summary/Plan 02 pulse ox check   Vital Signs   Pulse 67   Respiration 16   Pulse Oximetry 97 %   $ Pulse Oximetry (Spot Check) Yes   Respiratory Assessment   Level of Consciousness Alert   Chest Exam   Work Of Breathing / Effort Within Normal Limits   Oxygen   O2 (LPM) 2   O2 Delivery Device Silicone Nasal Cannula

## 2023-11-15 NOTE — THERAPY
"Occupational Therapy  Daily Treatment     Patient Name: Anita Beard  Age:  88 y.o., Sex:  female  Medical Record #: [id number]  Today's Date: 11/15/2023     Precautions  Precautions: (P) Fall Risk  Comments: (P) pain pump for chronic back/neck pain, craniotomy         Subjective    \"  I need to use the bathroom.\"     Objective       11/15/23 0831   OT Charge Group   OT Self Care / ADL (Units) 2   OT Total Time Spent   OT Individual Total Time Spent (Mins) 30   Precautions   Precautions Fall Risk   Comments pain pump for chronic back/neck pain, craniotomy   Functional Level of Assist   Grooming Independent  (seated at sink hand washing  brush hair)   Lower Body Dressing Supervision  (doff slippers   don socks and shoes  using shoe horn)   Toileting Supervision   Toilet Transfers Supervised   Toilet Transfer Description Grab bar   Sitting Upper Body Exercises   Other Exercise Nuep  x 3 minutes  workload 3.   SBA transfer to and from [facility]     stopped early due to  feet  kept sliding out of her slippers   Interdisciplinary Plan of Care Collaboration   Patient Position at End of Therapy Seated;Chair Alarm On;Self Releasing Lap Belt Applied  (hand off to  ST for tx)         Assessment     Completed tx no complaints    Participates to the best of her ability    Limited by  decreased strength/endurance balance and cognition   Strengths: Able to follow instructions, Alert and oriented, Effective communication skills, Adequate strength, Good insight into deficits/needs, Independent prior level of function, Making steady progress towards goals, Motivated for self care and independence, Pleasant and cooperative, Supportive family, Willingly participates in therapeutic activities  Barriers: Decreased endurance, Fatigue, Impaired activity tolerance, Impaired balance    Plan      ADL IADL  related mobility , transfer and cognition   strength/endurance building   balance activity      DME       Passport items to be " completed:      Occupational Therapy Goals (Active)       Problem: Dressing       Dates: Start:  11/10/23         Goal: STG-Within one week, patient will dress LB with SBA        Dates: Start:  11/10/23               Problem: Functional Transfers       Dates: Start:  11/10/23         Goal: STG-Within one week, patient will transfer to toilet with SBA        Dates: Start:  11/10/23               Problem: OT Long Term Goals       Dates: Start:  11/10/23         Goal: LTG-By discharge, patient will complete basic self care tasks with mod I        Dates: Start:  11/10/23            Goal: LTG-By discharge, patient will perform bathroom transfers with mod I        Dates: Start:  11/10/23            Goal: LTG-By discharge, patient will complete basic home management with mod I        Dates: Start:  11/10/23               Problem: Toileting       Dates: Start:  11/10/23         Goal: STG-Within one week, patient will complete toileting tasks with SBA        Dates: Start:  11/10/23

## 2023-11-15 NOTE — THERAPY
Speech Language Pathology  Daily Treatment     Patient Name: Anita Beard  Age:  88 y.o., Sex:  female  Medical Record #: [id number]  Today's Date: 11/15/2023     Precautions  Precautions: Fall Risk  Comments: pain pump for chronic back/neck pain, craniotomy    Subjective    Patient was willing to participate in ST.      Objective       11/15/23 0902   Treatment Charges   SLP Cognitive Skill Development First 15 Minutes 1   SLP Cognitive Skill Development Additional 15 Minutes 1   SLP Total Time Spent   SLP Individual Total Time Spent (Mins) 30   Cognition   Medication Management  Supervision (5)   Interdisciplinary Plan of Care Collaboration   Patient Position at End of Therapy Seated;Chair Alarm On;Call Light within Reach;Tray Table within Reach         Assessment    Completed functional pill sorting task with 87% accuracy. Min cues needed to self correct errors.     Strengths: Able to follow instructions, Alert and oriented, Pleasant and cooperative, Willingly participates in therapeutic activities, Motivated for self care and independence  Barriers: Impaired carryover of learning, Impaired functional cognition    Plan    Functional pill sorting task, problem solving related to med mgmt.       Speech Therapy Problems (Active)       Problem: Memory STGs       Dates: Start:  11/10/23         Goal: STG-Within one week, patient will recall new information related to her current hospitalization using functional memory strategies with spv.         Dates: Start:  11/10/23               Problem: Problem Solving STGs       Dates: Start:  11/10/23         Goal: STG-Within one week, patient will complete a mock medication sorting task with spv to achieve 100% accuracy.         Dates: Start:  11/10/23               Problem: Speech/Swallowing LTGs       Dates: Start:  11/10/23         Goal: LTG-By discharge, patient will solve complex problems with modified independence.        Dates: Start:  11/10/23

## 2023-11-16 ENCOUNTER — APPOINTMENT (OUTPATIENT)
Dept: OCCUPATIONAL THERAPY | Facility: REHABILITATION | Age: 88
DRG: 949 | End: 2023-11-16
Attending: PHYSICAL MEDICINE & REHABILITATION
Payer: MEDICARE

## 2023-11-16 ENCOUNTER — APPOINTMENT (OUTPATIENT)
Dept: SPEECH THERAPY | Facility: REHABILITATION | Age: 88
DRG: 949 | End: 2023-11-16
Attending: PHYSICAL MEDICINE & REHABILITATION
Payer: MEDICARE

## 2023-11-16 ENCOUNTER — APPOINTMENT (OUTPATIENT)
Dept: RADIOLOGY | Facility: MEDICAL CENTER | Age: 88
End: 2023-11-16
Attending: PHYSICAL MEDICINE & REHABILITATION
Payer: COMMERCIAL

## 2023-11-16 ENCOUNTER — APPOINTMENT (OUTPATIENT)
Dept: PHYSICAL THERAPY | Facility: REHABILITATION | Age: 88
DRG: 949 | End: 2023-11-16
Attending: PHYSICAL MEDICINE & REHABILITATION
Payer: MEDICARE

## 2023-11-16 PROCEDURE — 700111 HCHG RX REV CODE 636 W/ 250 OVERRIDE (IP): Mod: JZ | Performed by: PHYSICAL MEDICINE & REHABILITATION

## 2023-11-16 PROCEDURE — 99232 SBSQ HOSP IP/OBS MODERATE 35: CPT | Performed by: PHYSICAL MEDICINE & REHABILITATION

## 2023-11-16 PROCEDURE — 770010 HCHG ROOM/CARE - REHAB SEMI PRIVAT*

## 2023-11-16 PROCEDURE — 94760 N-INVAS EAR/PLS OXIMETRY 1: CPT

## 2023-11-16 PROCEDURE — 97110 THERAPEUTIC EXERCISES: CPT

## 2023-11-16 PROCEDURE — 97116 GAIT TRAINING THERAPY: CPT

## 2023-11-16 PROCEDURE — 700102 HCHG RX REV CODE 250 W/ 637 OVERRIDE(OP): Performed by: PHYSICAL MEDICINE & REHABILITATION

## 2023-11-16 PROCEDURE — 70450 CT HEAD/BRAIN W/O DYE: CPT

## 2023-11-16 PROCEDURE — 97130 THER IVNTJ EA ADDL 15 MIN: CPT

## 2023-11-16 PROCEDURE — 97530 THERAPEUTIC ACTIVITIES: CPT

## 2023-11-16 PROCEDURE — 97129 THER IVNTJ 1ST 15 MIN: CPT

## 2023-11-16 PROCEDURE — A9270 NON-COVERED ITEM OR SERVICE: HCPCS | Performed by: PHYSICAL MEDICINE & REHABILITATION

## 2023-11-16 RX ADMIN — HYDROCODONE BITARTRATE AND ACETAMINOPHEN 1 TABLET: 10; 325 TABLET ORAL at 14:29

## 2023-11-16 RX ADMIN — ATORVASTATIN CALCIUM 40 MG: 40 TABLET, FILM COATED ORAL at 20:34

## 2023-11-16 RX ADMIN — HYDROCODONE BITARTRATE AND ACETAMINOPHEN 1 TABLET: 10; 325 TABLET ORAL at 07:55

## 2023-11-16 RX ADMIN — HYDROCODONE BITARTRATE AND ACETAMINOPHEN 1 TABLET: 10; 325 TABLET ORAL at 20:36

## 2023-11-16 RX ADMIN — GABAPENTIN 300 MG: 300 CAPSULE ORAL at 07:55

## 2023-11-16 RX ADMIN — METOPROLOL TARTRATE 37.5 MG: 25 TABLET, FILM COATED ORAL at 07:55

## 2023-11-16 RX ADMIN — LEVETIRACETAM 500 MG: 500 TABLET, FILM COATED ORAL at 07:55

## 2023-11-16 RX ADMIN — ENOXAPARIN SODIUM 40 MG: 100 INJECTION SUBCUTANEOUS at 17:08

## 2023-11-16 RX ADMIN — GABAPENTIN 600 MG: 300 CAPSULE ORAL at 20:33

## 2023-11-16 RX ADMIN — LEVETIRACETAM 500 MG: 500 TABLET, FILM COATED ORAL at 20:34

## 2023-11-16 RX ADMIN — FUROSEMIDE 20 MG: 20 TABLET ORAL at 17:08

## 2023-11-16 ASSESSMENT — PAIN SCALES - WONG BAKER
WONGBAKER_NUMERICALRESPONSE: HURTS JUST A LITTLE BIT
WONGBAKER_NUMERICALRESPONSE: DOESN'T HURT AT ALL

## 2023-11-16 ASSESSMENT — GAIT ASSESSMENTS
ASSISTIVE DEVICE: 4 WHEEL WALKER
DISTANCE (FEET): 675
GAIT LEVEL OF ASSIST: CONTACT GUARD ASSIST

## 2023-11-16 ASSESSMENT — PAIN SCALES - PAIN ASSESSMENT IN ADVANCED DEMENTIA (PAINAD)
CONSOLABILITY: NO NEED TO CONSOLE
BODYLANGUAGE: RELAXED
BREATHING: NORMAL
TOTALSCORE: 0
FACIALEXPRESSION: SAD, FRIGHTENED, FROWN
TOTALSCORE: 2
BREATHING: NORMAL
BODYLANGUAGE: TENSE, DISTRESSED PACING, FIDGETING
FACIALEXPRESSION: SMILING OR INEXPRESSIVE
CONSOLABILITY: NO NEED TO CONSOLE

## 2023-11-16 ASSESSMENT — PATIENT HEALTH QUESTIONNAIRE - PHQ9
SUM OF ALL RESPONSES TO PHQ9 QUESTIONS 1 AND 2: 0
1. LITTLE INTEREST OR PLEASURE IN DOING THINGS: NOT AT ALL
1. LITTLE INTEREST OR PLEASURE IN DOING THINGS: NOT AT ALL
2. FEELING DOWN, DEPRESSED, IRRITABLE, OR HOPELESS: NOT AT ALL
2. FEELING DOWN, DEPRESSED, IRRITABLE, OR HOPELESS: NOT AT ALL
SUM OF ALL RESPONSES TO PHQ9 QUESTIONS 1 AND 2: 0

## 2023-11-16 ASSESSMENT — PAIN DESCRIPTION - PAIN TYPE: TYPE: ACUTE PAIN

## 2023-11-16 NOTE — CARE PLAN
The patient is Stable - Low risk of patient condition declining or worsening    Shift Goals  Clinical Goals:   Patient Goals:   Family Goals:     Progress made toward(s) clinical / shift goals:    Problem: Knowledge Deficit - Standard  Goal: Patient and family/care givers will demonstrate understanding of plan of care, disease process/condition, diagnostic tests and medications  Outcome: Progressing   Patient educated on the POC and medication administered. All questions and concerns addressed at this time  Problem: Fall Risk - Rehab  Goal: Patient will remain free from falls  Outcome: Progressing   Bed is locked and in low position. Call light and belongings within reach  Problem: Bladder / Voiding  Goal: Patient will establish and maintain regular urinary output  Outcome: Progressing   Patient has been having continent voids this shift   Problem: Pain - Standard  Goal: Alleviation of pain or a reduction in pain to the patient’s comfort goal  Outcome: Progressing   Use of 0-10 pain scale. Patient is able to describe pain and discomfort appropriately. PRN norco administered.     Patient is not progressing towards the following goals:

## 2023-11-16 NOTE — PROGRESS NOTES
Assumed care of patient. Bedside report received from Destinee OLSEN. Patient is in bed. Fall precautions in place. Call light and belongings within reach. Updated on POC, all questions and concerns answered at this time. No other needs at this time. '

## 2023-11-16 NOTE — CARE PLAN
Problem: Fall Risk - Rehab  Goal: Patient will remain free from falls  Note: Patient remains free from falls this shift. Patient was educated on using the call light to prevent falls. Patients bed is in the lowest position. The patients belongings are placed in near proximity to the patient.      Problem: Hemodynamics  Goal: Patient's hemodynamics, fluid balance and neurologic status will be stable or improve  Note: Patient sent for STAT CT scan with CNA.    The patient is Stable - Low risk of patient condition declining or worsening

## 2023-11-16 NOTE — FLOWSHEET NOTE
11/16/23 0655   Events/Summary/Plan   Events/Summary/Plan RA pulse ox check   Vital Signs   Pulse (!) 57   Respiration 18   Pulse Oximetry 91 %   $ Pulse Oximetry (Spot Check) Yes   Respiratory Assessment   Level of Consciousness Responds to voice  (sleeping)   Chest Exam   Work Of Breathing / Effort Within Normal Limits   Oxygen   O2 Delivery Device Room air w/o2 available

## 2023-11-16 NOTE — THERAPY
Missed Therapy    Patient Name: Anita Beard  Age:  88 y.o., Sex:  female  Medical Record #: [id number]  Today's Date: 11/16/2023 11/16/23 1301   Therapy Missed   Missed Therapy (Minutes) 60   Reason For Missed Therapy Medical - Patient on Hold from Therapy;Medical - Patient  in Procedure  (Per scheduling, pt KJ for CT scan)

## 2023-11-16 NOTE — THERAPY
Speech Language Pathology  Daily Treatment     Patient Name: Anita Beard  Age:  88 y.o., Sex:  female  Medical Record #: [id number]  Today's Date: 11/16/2023     Precautions  Precautions: Fall Risk  Comments: pain pump for chronic back/neck pain, craniotomy    Subjective    Assumed care of patient from the dining room. Pt appeared in good spirits, requesting to move d/c date up by 1 day to allow transportation from daughter.      Objective       11/16/23 0834   Treatment Charges   SLP Cognitive Skill Development First 15 Minutes 1   SLP Cognitive Skill Development Additional 15 Minutes 1   SLP Total Time Spent   SLP Individual Total Time Spent (Mins) 30   Cognition   Medication Management  Supervision (5)   Interdisciplinary Plan of Care Collaboration   Patient Position at End of Therapy Seated;Chair Alarm On;Self Releasing Lap Belt Applied;Call Light within Reach         Assessment    Fxl med sort attempted again this date, pt sorting with 100% accuracy provided SPV. Good insight to current medications and use of pill organizer for home. At this time, it is recommended pt continue to manage meds independently at home.     Strengths: Able to follow instructions, Alert and oriented, Pleasant and cooperative, Willingly participates in therapeutic activities, Motivated for self care and independence  Barriers: Impaired carryover of learning, Impaired functional cognition    Plan    Confirm change to d/c date. Continue to target exec function, complex attention, memory    Passport items to be completed:  arrive to therapy appointments on time, complete daily memory log entries, solve problems related to safety situations, review education related to hospitalization, complete caregiver training     Speech Therapy Problems (Active)       Problem: Memory STGs       Dates: Start:  11/10/23         Goal: STG-Within one week, patient will recall new information related to her current hospitalization using functional memory  strategies with spv.         Dates: Start:  11/10/23               Problem: Problem Solving STGs       Dates: Start:  11/10/23         Goal: STG-Within one week, patient will complete a mock medication sorting task with spv to achieve 100% accuracy.         Dates: Start:  11/10/23               Problem: Speech/Swallowing LTGs       Dates: Start:  11/10/23         Goal: LTG-By discharge, patient will solve complex problems with modified independence.        Dates: Start:  11/10/23

## 2023-11-16 NOTE — THERAPY
"Physical Therapy   Daily Treatment     Patient Name: Anita Beard  Age:  88 y.o., Sex:  female  Medical Record #: [id number]  Today's Date: 11/15/2023     Precautions  Precautions: Fall Risk  Comments: pain pump for chronic back/neck pain, craniotomy    Subjective    Patient reports she's tired, but is willing to participate. Wants to use the bathroom first. Regarding balloon toss \"this is the best thing I've done since I've been here\"     Objective       11/15/23 1415   PT Charge Group   PT Therapeutic Exercise (Units) 2   PT Neuromuscular Re-Education / Balance (Units) 2   PT Total Time Spent   PT Individual Total Time Spent (Mins) 60   Sitting Lower Body Exercises   Sit to Stand 1 set of 10   Nustep Resistance Level 5  (11 min 656 steps)   Standing Lower Body Exercises   Hamstring Curl 1 set of 10;Bilateral    Hip Extension 1 set of 10;Bilateral    Hip Abduction 1 set of 10;Bilateral   Heel Rise 2 sets of 10;Bilateral   Bed Mobility    Sit to Supine Modified Independent   Sit to Stand Standby Assist   Interdisciplinary Plan of Care Collaboration   Patient Position at End of Therapy In Bed;Bed Alarm On;Call Light within Reach;Phone within Reach;Tray Table within Reach     Standing balance: balloon toss 3 x 1 min with seated rest break between. Patient had one posterior loss of balance requiring seated in wheelchair to recover. Did not demonstrate stepping response, reached out for parallel bars only.     Assessment    Patient's dynamic balance is limited with posterior weight shifts. She is a fall risk, will benefit from further balance training.     Strengths: Able to follow instructions, Adequate strength, Effective communication skills, Good insight into deficits/needs, Independent prior level of function, Making steady progress towards goals, Motivated for self care and independence, Pleasant and cooperative, Supportive family, Willingly participates in therapeutic activities  Barriers: Impaired activity " tolerance, Visual impairment    Plan    Dynamic balance, dual tasking, gait with 4ww vs FWW    DME  PT DME Recommendations  Assistive Device:  (none anticipated, patient has cane and wheeled walker already)    Passport items to be completed:  Get in/out of bed safely, in/out of a vehicle, safely use mobility device, walk or wheel around home/community, navigate up and down stairs, show how to get up/down from the ground, ensure home is accessible, demonstrate HEP, complete caregiver training    Physical Therapy Problems (Active)       Problem: Balance       Dates: Start:  11/10/23         Goal: STG-Within one week, patient will improve [place] >51/56       Dates: Start:  11/10/23               Problem: Mobility       Dates: Start:  11/10/23         Goal: STG-Within one week, patient will improve 6 minute walk test > 750 ft with LRAD       Dates: Start:  11/10/23               Problem: Mobility Transfers       Dates: Start:  11/10/23         Goal: STG-Within one week, patient will demonstrate fall recovery with CGA       Dates: Start:  11/10/23               Problem: PT-Long Term Goals       Dates: Start:  11/10/23         Goal: LTG-By discharge, patient will improve FGA >24/30       Dates: Start:  11/10/23            Goal: LTG-By discharge, patient will perform home exercise program independently       Dates: Start:  11/10/23            Goal: LTG-By discharge, patient will ambulate up/down 4-6 stairs with single railing and supervision       Dates: Start:  11/10/23            Goal: LTG-By discharge, patient will transfer in/out of a car with supervision assist       Dates: Start:  11/10/23            Goal: LTG-By discharge, patient will demonstrate fall recovery with supervision assist       Dates: Start:  11/10/23

## 2023-11-16 NOTE — THERAPY
Occupational Therapy  Daily Treatment     Patient Name: Anita Beard  Age:  88 y.o., Sex:  female  Medical Record #: [id number]  Today's Date: 11/16/2023     Precautions  Precautions: (P) Fall Risk  Comments: (P) pain pump for chronic back/neck pain, craniotomy         Subjective    Pt standing at sink with nursing, pleasant and agreeable. OT took over for nursing.     Objective       11/16/23 1431   OT Charge Group   Charges Yes   OT Therapy Activity (Units) 2   OT Total Time Spent   OT Individual Total Time Spent (Mins) 30   Precautions   Precautions Fall Risk   Comments pain pump for chronic back/neck pain, craniotomy   Interdisciplinary Plan of Care Collaboration   IDT Collaboration with  Nursing   Patient Position at End of Therapy In Bed;Call Light within Reach   Collaboration Comments meds during session     Theract:   -NuStep with BUE/BLE on res 5 for 10 min no RB to address act tolerance and cardio/vascular endurance  -kitchen mobility task to locate and collect 10 cones from various high/low cabinets, including the fridge and oven. V/cs needed for walker safety during kitchen mobility. Able to locate all 10    Assessment    Pt with slow and steady progress towards OT POC. Demos good functional ambulation with FWW, being able to ambulate <> room/gym for therapy session. No LOB or near falls. Able to self correct incorrect walker usage with min to no verbal cues for prompting.     Strengths: Able to follow instructions, Alert and oriented, Effective communication skills, Adequate strength, Good insight into deficits/needs, Independent prior level of function, Making steady progress towards goals, Motivated for self care and independence, Pleasant and cooperative, Supportive family, Willingly participates in therapeutic activities  Barriers: Decreased endurance, Fatigue, Impaired activity tolerance, Impaired balance    Plan    Continue progressing strength and endurance, home safety and carry over    DME            Occupational Therapy Goals (Active)       Problem: Dressing       Dates: Start:  11/10/23         Goal: STG-Within one week, patient will dress LB with SBA        Dates: Start:  11/10/23               Problem: Functional Transfers       Dates: Start:  11/10/23         Goal: STG-Within one week, patient will transfer to toilet with SBA        Dates: Start:  11/10/23               Problem: OT Long Term Goals       Dates: Start:  11/10/23         Goal: LTG-By discharge, patient will complete basic self care tasks with mod I        Dates: Start:  11/10/23            Goal: LTG-By discharge, patient will perform bathroom transfers with mod I        Dates: Start:  11/10/23            Goal: LTG-By discharge, patient will complete basic home management with mod I        Dates: Start:  11/10/23               Problem: Toileting       Dates: Start:  11/10/23         Goal: STG-Within one week, patient will complete toileting tasks with SBA        Dates: Start:  11/10/23

## 2023-11-16 NOTE — PROGRESS NOTES
NURSING DAILY NOTE    Name: Anita Beard   Date of Admission: 11/9/2023   Admitting Diagnosis: SDH (subdural hematoma) (Formerly [facility])  Attending Physician: Deepti Pak M.d.  Allergies: Ace inhibitors    Safety  Patient Assist  minimum assist  Patient Precautions  Fall Risk  Precaution Comments  pain pump for chronic back/neck pain, craniotomy  Bed Transfer Status  Standby Assist  Toilet Transfer Status   Supervised (Performed twice by pt at beginning of session and at end of session.)  Assistive Devices  Wheelchair  Oxygen  Silicone Nasal Cannula  Diet/Therapeutic Dining  Current Diet Order   Procedures    Diet Order Diet: Regular     Pill Administration  whole  Agitated Behavioral Scale     ABS Level of Severity       Fall Risk  Has the patient had a fall this admission?   No  Talisha Hong Fall Risk Scoring  16, HIGH RISK  Fall Risk Safety Measures  bed alarm and chair alarm    Vitals  Temperature: 36.8 °C (98.3 °F)  Temp src: Oral  Pulse: 66  Respiration: 16  Blood Pressure : 138/58  Blood Pressure MAP (Calculated): 85 MM HG  BP Location: Left, Upper Arm  Patient BP Position: Supine     Oxygen  Pulse Oximetry: 97 %  O2 (LPM): 2  O2 Delivery Device: Silicone Nasal Cannula    Bowel and Bladder  Last Bowel Movement  11/15/23  Stool Type  Not observed (flushed before it could be observed)  Bowel Device     Continent  Bladder: Stress incontinence   Bowel: Continent movement  Bladder Function  Urine Void (mL): 200 ml  Number of Times Voided: 1  Urine Color: Unable To Evaluate  Genitourinary Assessment   Bladder Assessment (WDL):  Within Defined Limits  Solis Catheter: Not Applicable  Urine Color: Unable To Evaluate  Bladder Device: Bathroom  Time Void: Yes  Bladder Scan: Post Void  $ Bladder Scan Results (mL): 1    Skin  Astnam Score   18  Sensory Interventions   Bed Types: Standard/Trauma Mattress  Skin Preventative Measures: Pillows in Use for Support /  "Positioning  Moisture Interventions  Moisturizers/Barriers: Barrier Wipes      Pain  Pain Rating Scale  7 - Focus of attention, prevents doing daily activities  Pain Location  Head  Pain Location Orientation  Right, Left, Anterior  Pain Interventions   Emotional Support    ADLs    Bathing    (Nurse noted regarding shower refusal.)  Linen Change      Personal Hygiene  Perineal Care, Moist Shannon Wipes  Chlorhexidine Bath      Oral Care  Brushed Teeth  Teeth/Dentures     Shave     Nutrition Percentage Eaten  *  * Meal *  *, Lunch, Between 50-75% Consumed (60%)  Environmental Precautions  Treaded Slipper Socks on Patient, Personal Belongings, Wastebasket, Call Bell etc. in Easy Reach, Transferred to Stronger Side, Report Given to Other Health Care Providers Regarding Fall Risk, Communication Sign for Patients & Families, Bed in Low Position, Mobility Assessed & Appropriate Sign Placed  Patient Turns/Positioning  Patient Turns Self from Side to Side  Patient Turns Assistance/Tolerance     Bed Positions  Bed Controls On, Bed Locked  Head of Bed Elevated  Self regulated      Psychosocial/Neurologic Assessment  Psychosocial Assessment  Psychosocial (WDL):  Within Defined Limits  Neurologic Assessment  Neuro (WDL): Within Defined Limits  Level of Consciousness: Alert  Orientation Level: Oriented X4  Cognition: Appropriate judgement, Appropriate safety awareness, Appropriate attention/concentration, Appropriate for developmental age, Follows commands  Speech: Clear  Pupil Assesment: No  Motor Function/Sensation Assessment: Sensation  RLE Sensation: Other (Comment) (neuropathy/\"burning\" at baseline)  LLE Sensation: Other (Comment) (neuropathy/\"burning\" at baseline)  EENT (WDL):  WDL Except    Cardio/Pulmonary Assessment  Edema      Respiratory Breath Sounds  RUL Breath Sounds: Clear  RML Breath Sounds: Clear  RLL Breath Sounds: Clear  NIDIA Breath Sounds: Clear  LLL Breath Sounds: Clear  Cardiac Assessment   Cardiac (WDL):  " Within Defined Limits

## 2023-11-16 NOTE — THERAPY
Physical Therapy   Daily Treatment     Patient Name: Anita Beard  Age:  88 y.o., Sex:  female  Medical Record #: [id number]  Today's Date: 11/16/2023     Precautions  Precautions: Fall Risk  Comments: pain pump for chronic back/neck pain, craniotomy    Subjective    Patient is found seated up in wheelchair in bathroom, has clothing and shoes in her lap. Reports wanting to change clothes, use the toilet. Is willing to change clothes after toileting and after therapy.      Objective       11/16/23 0915   PT Charge Group   PT Gait Training (Units) 3   PT Therapeutic Exercise (Units) 1   PT Total Time Spent   PT Individual Total Time Spent (Mins) 60   Gait Functional Level of Assist    Gait Level Of Assist Contact Guard Assist   Assistive Device 4 Wheel Walker   Distance (Feet) 675  (300, 375. Emphasis during gait on head turns and maintaining gait path without meandering/ losses of balance)   Deviation   (cues to improve posture)   Stairs Functional Level of Assist   Level of Assist with Stairs Contact Guard Assist   # of Stairs Climbed 30   Stairs Description Extra time;Hand rails;Supervision for safety  (demonstrates no buckling with step over step)   Sitting Lower Body Exercises   Nustep Resistance Level 5  (10 min 364 steps)   Bed Mobility    Sit to Supine Modified Independent   Interdisciplinary Plan of Care Collaboration   Patient Position at End of Therapy In Bed;Bed Alarm On;Call Light within Reach;Tray Table within Reach;Phone within Reach         Assessment    Patient demonstrates meandering gait path when ambulating with head turns. Demonstrates good   Strengths: Able to follow instructions, Adequate strength, Effective communication skills, Good insight into deficits/needs, Independent prior level of function, Making steady progress towards goals, Motivated for self care and independence, Pleasant and cooperative, Supportive family, Willingly participates in therapeutic activities  Barriers: Impaired activity  tolerance, Visual impairment    Plan    Dynamic balance, dual tasking, gait with 4ww vs FWW    DME  PT DME Recommendations  Assistive Device:  (none anticipated, patient has cane and wheeled walker already)    Passport items to be completed:  Get in/out of bed safely, in/out of a vehicle, safely use mobility device, walk or wheel around home/community, navigate up and down stairs, show how to get up/down from the ground, ensure home is accessible, demonstrate HEP, complete caregiver training    Physical Therapy Problems (Active)       Problem: Balance       Dates: Start:  11/10/23         Goal: STG-Within one week, patient will improve [place] >51/56       Dates: Start:  11/10/23               Problem: Mobility       Dates: Start:  11/10/23         Goal: STG-Within one week, patient will improve 6 minute walk test > 750 ft with LRAD       Dates: Start:  11/10/23               Problem: Mobility Transfers       Dates: Start:  11/10/23         Goal: STG-Within one week, patient will demonstrate fall recovery with CGA       Dates: Start:  11/10/23               Problem: PT-Long Term Goals       Dates: Start:  11/10/23         Goal: LTG-By discharge, patient will improve FGA >24/30       Dates: Start:  11/10/23            Goal: LTG-By discharge, patient will perform home exercise program independently       Dates: Start:  11/10/23            Goal: LTG-By discharge, patient will ambulate up/down 4-6 stairs with single railing and supervision       Dates: Start:  11/10/23            Goal: LTG-By discharge, patient will transfer in/out of a car with supervision assist       Dates: Start:  11/10/23            Goal: LTG-By discharge, patient will demonstrate fall recovery with supervision assist       Dates: Start:  11/10/23

## 2023-11-16 NOTE — PROGRESS NOTES
"  Physical Medicine & Rehabilitation Progress Note    Encounter Date: 11/16/2023    Chief Complaint: Decreased mobility, weakness    Interval Events (Subjective):  Patient sitting up in room. She reports therapy is going well. She would like to go home a day early if possible. Discussed would review with team at [facility].     _____________________________________  Interdisciplinary Team Conference   Most recent IDT on 11/14/2023    Discharge Date/Disposition:  11/22/23  _____________________________________     Objective:  VITAL SIGNS: /70   Pulse 74   Temp 36.1 °C (97 °F)   Resp 18   Ht 1.6 m (5' 3\")   Wt 71 kg (156 lb 8.4 oz)   SpO2 93%   BMI 27.73 kg/m²   Gen: NAD  Psych: Mood and affect appropriate  CV: RRR, 0 edema  Resp: CTAB, no upper airway sounds  Abd: NTND  Neuro: AOx4, following commands  Unchanged from 11/15/23    Laboratory Values:  Recent Results (from the past 72 hour(s))   URINALYSIS    Collection Time: 11/14/23  3:37 PM    Specimen: Urine, Cath   Result Value Ref Range    Color Yellow     Character Clear     Specific Gravity 1.008 <1.035    Ph 6.5 5.0 - 8.0    Glucose Negative Negative mg/dL    Ketones Negative Negative mg/dL    Protein Negative Negative mg/dL    Bilirubin Negative Negative    Urobilinogen, Urine 0.2 Negative    Nitrite Negative Negative    Leukocyte Esterase Trace (A) Negative    Occult Blood Negative Negative    Micro Urine Req Microscopic    URINE MICROSCOPIC (W/UA)    Collection Time: 11/14/23  3:37 PM   Result Value Ref Range    WBC 0-2 /hpf    RBC 0-2 /hpf    Bacteria Negative None /hpf    Epithelial Cells Few /hpf    Hyaline Cast 0-2 /lpf       Medications:  Scheduled Medications   Medication Dose Frequency    metoprolol tartrate  37.5 mg BID    furosemide  20 mg AFTER DINNER    HYDROcodone/acetaminophen  1 Tablet BID    omeprazole  20 mg DAILY    atorvastatin  40 mg Q EVENING    enoxaparin (LOVENOX) injection  40 mg DAILY AT 1800    gabapentin  300 mg QAM    And "    gabapentin  600 mg QHS    levETIRAcetam  500 mg BID     PRN medications: [] senna-docusate **AND** polyethylene glycol/lytes **AND** magnesium hydroxide **AND** bisacodyl, Respiratory Therapy Consult, hydrALAZINE, acetaminophen, mag hydrox-al hydrox-simeth, ondansetron **OR** ondansetron, traZODone, sodium chloride, midazolam, LORazepam, HYDROcodone/acetaminophen    Diet:  Current Diet Order   Procedures    Diet Order Diet: Regular       Medical Decision Making and Plan:   R sided SDH - Patient with 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 Alves  -PT and OT for mobility and ADLs. Per guidelines, 15 hours per week between PT, OT and/or SLP.  -Follow-up NSG. Repeat CT head 23 - pending     HTN - Patient on Atenolol 25 mg QHS. Switch to Metoprolol 25 mg BID. Continue Metoprolol -> increase to 37.5 as elevated HR and SBP. Ongoing elevation increase to 50 mg. Started on Lasix 20 mg      HLD - Patient on Atorvastatin 40 mg QHS     Anemia - Check AM CBC - 8.9, worsening. Recheck  0- 9.7, will monitor      Hypokalemia - Check AM CMP - 3.6, improved, recheck  - 3.9     Chronic pain - Patient with pain pump.     Pain - Patient on APAP/Oxycodone. Schedule Norco BID. Monitor for constipation. Continue Scheduled Norco BID     Skin - Patient at risk for skin breakdown due to debility in areas including sacrum, achilles, elbows and head in addition to other sites. Nursing to assess skin daily.      GI Ppx - Patient on Prilosec for GERD prophylaxis. Patient on Senna-docusate for constipation prophylaxis.      DVT Ppx - Patient Lovenox on transfer. Continue Lovenox as limited ambulation  ____________________________________    T. Reymundo Pak MD/PhD  [facility] - Physical Medicine & Rehabilitation   [facility] - Brain Injury Medicine   ____________________________________

## 2023-11-16 NOTE — DISCHARGE PLANNING
CM  Update:  Pt lives alone in a 5th wheel (5 steps to enter; +grab bars)  on her dtr 's property in [place], [place]: it is 100 yards from her  dtr's home; pt uses a spc or a 3ww as needed.      Dc disposition:  pt will stay at her dtr Cata Morton's home @ dc; dtr able to assist w/ transition home.  Will need follow up with Dr Alves / Neurosurgeon and PCP    Will need home health w/  [facility] in [place] [place].

## 2023-11-16 NOTE — PROGRESS NOTES
NURSING DAILY NOTE    Name: Anita Beard   Date of Admission: 11/9/2023   Admitting Diagnosis: SDH (subdural hematoma) ([facility])  Attending Physician: Deepti Pak M.d.  Allergies: Ace inhibitors    Safety  Patient Assist  minimum assist  Patient Precautions  Fall Risk  Precaution Comments  pain pump for chronic back/neck pain, craniotomy  Bed Transfer Status  Standby Assist  Toilet Transfer Status   Supervised (Performed twice by pt at beginning of session and at end of session.)  Assistive Devices  Rails, Walker - front wheel  Oxygen  CPAP  Diet/Therapeutic Dining  Current Diet Order   Procedures    Diet Order Diet: Regular     Pill Administration  whole  Agitated Behavioral Scale     ABS Level of Severity       Fall Risk  Has the patient had a fall this admission?   No  Talisha Hong Fall Risk Scoring  16, HIGH RISK  Fall Risk Safety Measures  bed alarm and chair alarm    Vitals  Temperature: 36.4 °C (97.6 °F)  Temp src: Oral  Pulse: 68  Respiration: 18  Blood Pressure : 123/55 (notified RN Daphney)  Blood Pressure MAP (Calculated): 78 MM HG  BP Location: Left, Upper Arm  Patient BP Position: Supine     Oxygen  Pulse Oximetry: 94 %  O2 (LPM): 0  O2 Delivery Device: CPAP    Bowel and Bladder  Last Bowel Movement  11/15/23  Stool Type  Not observed (flushed before it could be observed)  Bowel Device     Continent  Bladder: Stress incontinence   Bowel: Continent movement  Bladder Function  Urine Void (mL): 200 ml  Number of Times Voided: 1  Urine Color: Yellow  Genitourinary Assessment   Bladder Assessment (WDL):  Within Defined Limits  Solis Catheter: Not Applicable  Urine Color: Yellow  Bladder Device: Bathroom  Time Void: Yes  Bladder Scan: Post Void  $ Bladder Scan Results (mL): 1    Skin  Satnam Score   18  Sensory Interventions   Bed Types: Standard/Trauma Mattress  Skin Preventative Measures: Pillows in Use for Support / Positioning  Moisture  "Interventions  Moisturizers/Barriers: Barrier Wipes      Pain  Pain Rating Scale  6 - Hard to ignore, avoid usual activities  Pain Location  Head, Neck  Pain Location Orientation  Right, Left, Anterior  Pain Interventions   Medication (see MAR)    ADLs    Bathing   Patient Refused Bathing (notified RN Marni)  Linen Change      Personal Hygiene  Perineal Care, Moist Shannon Wipes  Chlorhexidine Bath      Oral Care  Brushed Teeth  Teeth/Dentures     Shave     Nutrition Percentage Eaten  *  * Meal *  *, Lunch, Between 50-75% Consumed (60%)  Environmental Precautions  Treaded Slipper Socks on Patient, Personal Belongings, Wastebasket, Call Bell etc. in Easy Reach, Transferred to Stronger Side, Report Given to Other Health Care Providers Regarding Fall Risk, Bed in Low Position, Communication Sign for Patients & Families, Mobility Assessed & Appropriate Sign Placed  Patient Turns/Positioning  Patient Turns Self from Side to Side  Patient Turns Assistance/Tolerance     Bed Positions  Bed Controls On, Bed Locked  Head of Bed Elevated  Self regulated      Psychosocial/Neurologic Assessment  Psychosocial Assessment  Psychosocial (WDL):  Within Defined Limits  Neurologic Assessment  Neuro (WDL): Within Defined Limits  Level of Consciousness: Alert  Orientation Level: Oriented X4  Cognition: Appropriate judgement, Appropriate safety awareness, Appropriate attention/concentration, Appropriate for developmental age, Follows commands  Speech: Clear  Pupil Assesment: No  Motor Function/Sensation Assessment: Sensation  RLE Sensation: Other (Comment) (neuropathy/\"burning\" at baseline)  LLE Sensation: Other (Comment) (neuropathy/\"burning\" at baseline)  EENT (WDL):  WDL Except    Cardio/Pulmonary Assessment  Edema      Respiratory Breath Sounds  RUL Breath Sounds: Clear  RML Breath Sounds: Clear  RLL Breath Sounds: Clear  NIDIA Breath Sounds: Clear  LLL Breath Sounds: Clear  Cardiac Assessment   Cardiac (WDL):  Within Defined Limits         "

## 2023-11-17 ENCOUNTER — APPOINTMENT (OUTPATIENT)
Dept: PHYSICAL THERAPY | Facility: REHABILITATION | Age: 88
DRG: 949 | End: 2023-11-17
Attending: PHYSICAL MEDICINE & REHABILITATION
Payer: MEDICARE

## 2023-11-17 ENCOUNTER — APPOINTMENT (OUTPATIENT)
Dept: OCCUPATIONAL THERAPY | Facility: REHABILITATION | Age: 88
DRG: 949 | End: 2023-11-17
Attending: PHYSICAL MEDICINE & REHABILITATION
Payer: MEDICARE

## 2023-11-17 ENCOUNTER — APPOINTMENT (OUTPATIENT)
Dept: SPEECH THERAPY | Facility: REHABILITATION | Age: 88
DRG: 949 | End: 2023-11-17
Attending: PHYSICAL MEDICINE & REHABILITATION
Payer: MEDICARE

## 2023-11-17 PROCEDURE — 97110 THERAPEUTIC EXERCISES: CPT

## 2023-11-17 PROCEDURE — 700111 HCHG RX REV CODE 636 W/ 250 OVERRIDE (IP): Mod: JZ | Performed by: PHYSICAL MEDICINE & REHABILITATION

## 2023-11-17 PROCEDURE — 94760 N-INVAS EAR/PLS OXIMETRY 1: CPT

## 2023-11-17 PROCEDURE — 770010 HCHG ROOM/CARE - REHAB SEMI PRIVAT*

## 2023-11-17 PROCEDURE — 97130 THER IVNTJ EA ADDL 15 MIN: CPT

## 2023-11-17 PROCEDURE — 99232 SBSQ HOSP IP/OBS MODERATE 35: CPT | Performed by: PHYSICAL MEDICINE & REHABILITATION

## 2023-11-17 PROCEDURE — 97116 GAIT TRAINING THERAPY: CPT

## 2023-11-17 PROCEDURE — A9270 NON-COVERED ITEM OR SERVICE: HCPCS | Performed by: PHYSICAL MEDICINE & REHABILITATION

## 2023-11-17 PROCEDURE — 97129 THER IVNTJ 1ST 15 MIN: CPT

## 2023-11-17 PROCEDURE — 97535 SELF CARE MNGMENT TRAINING: CPT

## 2023-11-17 PROCEDURE — 700102 HCHG RX REV CODE 250 W/ 637 OVERRIDE(OP): Performed by: PHYSICAL MEDICINE & REHABILITATION

## 2023-11-17 PROCEDURE — 97530 THERAPEUTIC ACTIVITIES: CPT | Mod: CO

## 2023-11-17 RX ADMIN — LEVETIRACETAM 500 MG: 500 TABLET, FILM COATED ORAL at 08:17

## 2023-11-17 RX ADMIN — METOPROLOL TARTRATE 50 MG: 25 TABLET, FILM COATED ORAL at 20:04

## 2023-11-17 RX ADMIN — HYDROCODONE BITARTRATE AND ACETAMINOPHEN 1 TABLET: 10; 325 TABLET ORAL at 08:16

## 2023-11-17 RX ADMIN — GABAPENTIN 300 MG: 300 CAPSULE ORAL at 08:17

## 2023-11-17 RX ADMIN — METOPROLOL TARTRATE 50 MG: 25 TABLET, FILM COATED ORAL at 10:00

## 2023-11-17 RX ADMIN — HYDROCODONE BITARTRATE AND ACETAMINOPHEN 1 TABLET: 10; 325 TABLET ORAL at 14:05

## 2023-11-17 RX ADMIN — HYDROCODONE BITARTRATE AND ACETAMINOPHEN 1 TABLET: 10; 325 TABLET ORAL at 20:06

## 2023-11-17 RX ADMIN — ENOXAPARIN SODIUM 40 MG: 100 INJECTION SUBCUTANEOUS at 17:17

## 2023-11-17 RX ADMIN — GABAPENTIN 600 MG: 300 CAPSULE ORAL at 20:05

## 2023-11-17 RX ADMIN — LEVETIRACETAM 500 MG: 500 TABLET, FILM COATED ORAL at 20:04

## 2023-11-17 ASSESSMENT — PATIENT HEALTH QUESTIONNAIRE - PHQ9
1. LITTLE INTEREST OR PLEASURE IN DOING THINGS: NOT AT ALL
2. FEELING DOWN, DEPRESSED, IRRITABLE, OR HOPELESS: NOT AT ALL
1. LITTLE INTEREST OR PLEASURE IN DOING THINGS: NOT AT ALL
2. FEELING DOWN, DEPRESSED, IRRITABLE, OR HOPELESS: NOT AT ALL
SUM OF ALL RESPONSES TO PHQ9 QUESTIONS 1 AND 2: 0
SUM OF ALL RESPONSES TO PHQ9 QUESTIONS 1 AND 2: 0

## 2023-11-17 ASSESSMENT — ACTIVITIES OF DAILY LIVING (ADL)
TOILETING_LEVEL_OF_ASSIST_DESCRIPTION: GRAB BAR;INCREASED TIME;SUPERVISION FOR SAFETY
TOILET_TRANSFER_DESCRIPTION: GRAB BAR;INCREASED TIME

## 2023-11-17 ASSESSMENT — PAIN SCALES - PAIN ASSESSMENT IN ADVANCED DEMENTIA (PAINAD)
TOTALSCORE: 0
BODYLANGUAGE: RELAXED
CONSOLABILITY: NO NEED TO CONSOLE
BREATHING: NORMAL
CONSOLABILITY: NO NEED TO CONSOLE
FACIALEXPRESSION: SAD, FRIGHTENED, FROWN
BODYLANGUAGE: TENSE, DISTRESSED PACING, FIDGETING
TOTALSCORE: 2
BREATHING: NORMAL
FACIALEXPRESSION: SMILING OR INEXPRESSIVE

## 2023-11-17 ASSESSMENT — PAIN DESCRIPTION - PAIN TYPE: TYPE: ACUTE PAIN

## 2023-11-17 ASSESSMENT — GAIT ASSESSMENTS
DISTANCE (FEET): 500
DISTANCE (FEET): 125
GAIT LEVEL OF ASSIST: SUPERVISED
ASSISTIVE DEVICE: 4 WHEEL WALKER
GAIT LEVEL OF ASSIST: SUPERVISED
ASSISTIVE DEVICE: 4 WHEEL WALKER

## 2023-11-17 NOTE — PROGRESS NOTES
NURSING DAILY NOTE    Name: Anita Beard   Date of Admission: 11/9/2023   Admitting Diagnosis: SDH (subdural hematoma) ([facility])  Attending Physician: Deepti Pak M.d.  Allergies: Ace inhibitors    Safety  Patient Assist  sba w/ fww  Patient Precautions  Fall Risk  Precaution Comments  pain pump for chronic back/neck pain, craniotomy  Bed Transfer Status  Supervised  Toilet Transfer Status   Supervised  Assistive Devices  Rails, Wheelchair  Oxygen  None - Room Air  Diet/Therapeutic Dining  Current Diet Order   Procedures    Diet Order Diet: Regular     Pill Administration  whole  Agitated Behavioral Scale     ABS Level of Severity       Fall Risk  Has the patient had a fall this admission?   No  Talisha Hong Fall Risk Scoring  15, HIGH RISK  Fall Risk Safety Measures  bed alarm, chair alarm, and seatbelt alarm    Vitals  Temperature: 36.8 °C (98.2 °F)  Temp src: Oral  Pulse: 94  Respiration: 18  Blood Pressure : 129/77  Blood Pressure MAP (Calculated): 94 MM HG  BP Location: Right, Upper Arm  Patient BP Position: Sitting     Oxygen  Pulse Oximetry: 96 %  O2 (LPM): 0  O2 Delivery Device: None - Room Air    Bowel and Bladder  Last Bowel Movement  11/15/23  Stool Type  Not observed (flushed before it could be observed)  Bowel Device  Bathroom  Continent  Bladder: Stress incontinence   Bowel: Continent movement  Bladder Function  Urine Void (mL):  (large)  Number of Times Voided: 1  Urine Color: Unable To Evaluate  Genitourinary Assessment   Bladder Assessment (WDL):  Within Defined Limits  Solis Catheter: Not Applicable  Urine Color: Unable To Evaluate  Bladder Device: Bathroom  Time Void: Yes  Bladder Scan: Post Void  $ Bladder Scan Results (mL): 1    Skin  Satnam Score   18  Sensory Interventions   Bed Types: Standard/Trauma Mattress  Skin Preventative Measures: Pillows in Use for Support / Positioning  Moisture Interventions  Moisturizers/Barriers:  Barrier Wipes      Pain  Pain Rating Scale  6 - Hard to ignore, avoid usual activities  Pain Location  Generalized  Pain Location Orientation  Right, Left  Pain Interventions   Medication (see MAR)    ADLs    Bathing   Patient Refused Bathing (notified RN Marni)  Linen Change      Personal Hygiene  Perineal Care  Chlorhexidine Bath      Oral Care  Brushed Teeth  Teeth/Dentures     Shave     Nutrition Percentage Eaten  Lunch, Less than 25% Consumed  Environmental Precautions  Treaded Slipper Socks on Patient  Patient Turns/Positioning  Patient Turns Self from Side to Side  Patient Turns Assistance/Tolerance     Bed Positions  Bed Controls On, Bed Locked  Head of Bed Elevated  Self regulated      Psychosocial/Neurologic Assessment  Psychosocial Assessment  Psychosocial (WDL):  Within Defined Limits  Neurologic Assessment  Neuro (WDL): Within Defined Limits  Level of Consciousness: Alert  Orientation Level: Oriented X4  Cognition: Appropriate judgement  Speech: Clear  Pupil Assesment: No  Motor Function/Sensation Assessment: Sensation  RLE Sensation: Other (Comment) (neuropathy)  LLE Sensation: Other (Comment) (neuropathy)  EENT (WDL):  WDL Except    Cardio/Pulmonary Assessment  Edema   RLE Edema: 2+  LLE Edema: 2+  Respiratory Breath Sounds  RUL Breath Sounds: Clear  RML Breath Sounds: Clear  RLL Breath Sounds: Clear  NIDIA Breath Sounds: Clear  LLL Breath Sounds: Clear  Cardiac Assessment   Cardiac (WDL):  Within Defined Limits

## 2023-11-17 NOTE — CARE PLAN
Problem: Skin Integrity  Goal: Skin integrity is maintained or improved  Outcome: Progressing  Patient encouraged to turn to sides to prevent pressure areas.     Problem: Fall Risk - Rehab  Goal: Patient will remain free from falls  Outcome: Progressing  Patient reminded to call for assist with needs/transfers.   The patient is Stable - Low risk of patient condition declining or worsening    Shift Goals  Clinical Goals: safety  Patient Goals: safety, pain management, sleep/rest  Family Goals: ricki

## 2023-11-17 NOTE — PROGRESS NOTES
NURSING DAILY NOTE    Name: Anita Beard   Date of Admission: 11/9/2023   Admitting Diagnosis: SDH (subdural hematoma) ([facility])  Attending Physician: Deepti Pak M.d.  Allergies: Ace inhibitors    Safety  Patient Assist  sba w/ fww  Patient Precautions  Fall Risk  Precaution Comments  pain pump for chronic back/neck pain, craniotomy  Bed Transfer Status  Standby Assist  Toilet Transfer Status   Supervised (Performed twice by pt at beginning of session and at end of session.)  Assistive Devices  Walker - front wheel  Oxygen  Room air w/o2 available  Diet/Therapeutic Dining  Current Diet Order   Procedures    Diet Order Diet: Regular     Pill Administration  whole  Agitated Behavioral Scale     ABS Level of Severity       Fall Risk  Has the patient had a fall this admission?   No  Talisha Hong Fall Risk Scoring  15, HIGH RISK  Fall Risk Safety Measures  bed alarm, chair alarm, and low vision/ hearing    Vitals  Temperature: 36.1 °C (97 °F)  Temp src: Oral  Pulse: 74  Respiration: 18  Blood Pressure : 132/70  Blood Pressure MAP (Calculated): 91 MM HG  BP Location: Left, Upper Arm  Patient BP Position: Supine     Oxygen  Pulse Oximetry: 93 %  O2 (LPM): 0  O2 Delivery Device: Room air w/o2 available    Bowel and Bladder  Last Bowel Movement  11/15/23  Stool Type  Not observed (flushed before it could be observed)  Bowel Device  Bathroom  Continent  Bladder: Stress incontinence   Bowel: Continent movement  Bladder Function  Urine Void (mL): 200 ml  Number of Times Voided: 1  Urine Color: Yellow  Genitourinary Assessment   Bladder Assessment (WDL):  Within Defined Limits  Solis Catheter: Not Applicable  Urine Color: Yellow  Bladder Device: Bathroom  Time Void: Yes  Bladder Scan: Post Void  $ Bladder Scan Results (mL): 1    Skin  Satnam Score   18  Sensory Interventions   Bed Types: Standard/Trauma Mattress  Skin Preventative Measures: Pillows in Use for  "Support / Positioning  Moisture Interventions  Moisturizers/Barriers: Barrier Wipes      Pain  Pain Rating Scale  5 - Interrupts some activities  Pain Location  Head, Neck  Pain Location Orientation  Right, Left, Anterior  Pain Interventions   Medication (see MAR)    ADLs    Bathing   Patient Refused Bathing (notified RN Marni)  Linen Change      Personal Hygiene  Perineal Care  Chlorhexidine Bath      Oral Care  Brushed Teeth  Teeth/Dentures     Shave     Nutrition Percentage Eaten  Lunch, Between 50-75% Consumed  Environmental Precautions  Treaded Slipper Socks on Patient, Personal Belongings, Wastebasket, Call Bell etc. in Easy Reach, Transferred to Stronger Side, Report Given to Other Health Care Providers Regarding Fall Risk, Bed in Low Position, Communication Sign for Patients & Families, Mobility Assessed & Appropriate Sign Placed  Patient Turns/Positioning  Patient Turns Self from Side to Side  Patient Turns Assistance/Tolerance     Bed Positions  Bed Controls On, Bed Locked  Head of Bed Elevated  Self regulated      Psychosocial/Neurologic Assessment  Psychosocial Assessment  Psychosocial (WDL):  Within Defined Limits  Neurologic Assessment  Neuro (WDL): Within Defined Limits  Level of Consciousness: Alert  Orientation Level: Oriented X4  Cognition: Appropriate judgement, Appropriate safety awareness, Appropriate attention/concentration, Appropriate for developmental age, Follows commands  Speech: Clear  Pupil Assesment: No  Motor Function/Sensation Assessment: Sensation  RLE Sensation: Other (Comment) (neuropathy/\"burning\" at baseline)  LLE Sensation: Other (Comment) (neuropathy/\"burning\" at baseline)  EENT (WDL):  WDL Except    Cardio/Pulmonary Assessment  Edema   RLE Edema: 2+  LLE Edema: 2+  Respiratory Breath Sounds  RUL Breath Sounds: Clear  RML Breath Sounds: Clear  RLL Breath Sounds: Clear  NIDIA Breath Sounds: Clear  LLL Breath Sounds: Clear  Cardiac Assessment   Cardiac (WDL):  Within Defined " Limits

## 2023-11-17 NOTE — THERAPY
Occupational Therapy  Daily Treatment     Patient Name: Anita Beard  Age:  88 y.o., Sex:  female  Medical Record #: [id number]  Today's Date: 11/17/2023     Precautions  Precautions: Fall Risk  Comments: pain pump for chronic back/neck pain, craniotomy         Subjective    Pt stated that she was not a big breakfast eater and that she was ready to participate in OT session.      Objective       11/17/23 0831   OT Charge Group   OT Self Care / ADL (Units) 1   OT Therapy Activity (Units) 1   OT Total Time Spent   OT Individual Total Time Spent (Mins) 30   Functional Level of Assist   Grooming Supervision   Grooming Description Standing at sink;Supervision for safety  (Pt brushed teeth and washed hands; wc was placed behind.)   Toileting Supervision   Toileting Description Grab bar;Increased time;Supervision for safety   Toilet Transfers Supervised   Toilet Transfer Description Grab bar;Increased time   Balance   Sitting Balance (Static) Good   Sitting Balance (Dynamic) Good   Standing Balance (Static) Fair   Standing Balance (Dynamic) Fair   Weight Shift Sitting Good   Weight Shift Standing Fair     Pt was able place kitchen plates and bowls from countertop into proper overhead cabinets with out VC for placement. Pt was able to demonstrated fixing bed linen while in room prior to laying down without any assistance.     Assessment    Needed VC for head positional awareness and safety while placing dishes overhead while cabinets are open. Demonstrated overall F static and dynamic standing balance during G/H tasks and home safety. No LOB was observed.      Strengths: Able to follow instructions, Alert and oriented, Effective communication skills, Adequate strength, Good insight into deficits/needs, Independent prior level of function, Making steady progress towards goals, Motivated for self care and independence, Pleasant and cooperative, Supportive family, Willingly participates in therapeutic activities  Barriers:  Decreased endurance, Fatigue, Impaired activity tolerance, Impaired balance    Plan    Continue home safety and endurance.   Caregiver training when available   DME       Passport items to be completed:  Perform bathroom transfers, complete dressing, complete feeding, get ready for the day, prepare a simple meal, participate in household tasks, adapt home for safety needs, demonstrate home exercise program, complete caregiver training     Occupational Therapy Goals (Active)       Problem: Dressing       Dates: Start:  11/10/23         Goal: STG-Within one week, patient will dress LB with SBA        Dates: Start:  11/10/23               Problem: Functional Transfers       Dates: Start:  11/10/23         Goal: STG-Within one week, patient will transfer to toilet with SBA        Dates: Start:  11/10/23               Problem: OT Long Term Goals       Dates: Start:  11/10/23         Goal: LTG-By discharge, patient will complete basic self care tasks with mod I        Dates: Start:  11/10/23            Goal: LTG-By discharge, patient will perform bathroom transfers with mod I        Dates: Start:  11/10/23            Goal: LTG-By discharge, patient will complete basic home management with mod I        Dates: Start:  11/10/23               Problem: Toileting       Dates: Start:  11/10/23         Goal: STG-Within one week, patient will complete toileting tasks with SBA        Dates: Start:  11/10/23

## 2023-11-17 NOTE — CARE PLAN
"The patient is Stable - Low risk of patient condition declining or worsening    Shift Goals  Clinical Goals: safety  Patient Goals: safety, pain management, sleep/rest  Family Goals: ricki    Problem: Fall Risk - Rehab  Goal: Patient will remain free from falls  Outcome: Progressing  Note: Talisha Hong Fall risk Assessment Score: 15    High fall risk Interventions   - Alarming seatbelt  - Bed and strip alarm   - Yellow sign by the door   - Yellow wrist band \"Fall risk\"  - Room near to the nurse station  - Do not leave patient unattended in the bathroom  - Fall risk education provided  Patient uses call light consistently and appropriately this shift.  Waits for assistance when needed and does not attempt self transfer.  Able to verbalize needs.  Will continue to monitor.     Problem: Pain - Standard  Goal: Alleviation of pain or a reduction in pain to the patient’s comfort goal  Outcome: Progressing  Note: Patient able to verbalize pain level and verbalize an acceptable level of pain.     "

## 2023-11-17 NOTE — THERAPY
Occupational Therapy  Daily Treatment     Patient Name: Anita Beard  Age:  88 y.o., Sex:  female  Medical Record #: [id number]  Today's Date: 11/17/2023     Precautions  Precautions: (P) Fall Risk  Comments: (P) pain pump for chronic back/neck pain, craniotomy         Subjective  Patient supine in bed upon arrival. Agreeable to OT session, requested shower.      Objective     11/17/23 1001   OT Charge Group   OT Self Care / ADL (Units) 4   OT Total Time Spent   OT Individual Total Time Spent (Mins) 60   Precautions   Precautions Fall Risk   Comments pain pump for chronic back/neck pain, craniotomy   Vitals   O2 Delivery Device None - Room Air   Functional Level of Assist   Grooming Supervision  (Seated at sink )   Bathing Standby Assist  (SBA to bathe in standing, washed feet while sitting )   Upper Body Dressing Supervision  (To don/doff bra and crew neck sweatshirt while seated )   Lower Body Dressing Standby Assist  (To don/doff brief, socks, shoes, and elastic waist pants from w/c with GB )   Bed, Chair, Wheelchair Transfer Standby Assist  (EOB > w/c)   Tub / Shower Transfers Standby Assist  (W/c <> shower chair via GB)   Interdisciplinary Plan of Care Collaboration   IDT Collaboration with  Physician   Patient Position at End of Therapy Seated;Chair Alarm On;Self Releasing Lap Belt Applied  (finishing grooming task)   Collaboration Comments Dr. Pak rounds         Assessment  Patient had good tolerance to session. She made progress towards her self-care LTG/STG, completing a shower and dressing with sup-SBA. She used w/c for fxl mobility from bed > bathroom but demo'd ability to complete routine at 4WW level.     Strengths: Able to follow instructions, Alert and oriented, Effective communication skills, Adequate strength, Good insight into deficits/needs, Independent prior level of function, Making steady progress towards goals, Motivated for self care and independence, Pleasant and cooperative, Supportive  family, Willingly participates in therapeutic activities  Barriers: Decreased endurance, Fatigue, Impaired activity tolerance, Impaired balance    Plan  Continue home safety and endurance.   Caregiver training when available     DME       Passport items to be completed:  Perform bathroom transfers, complete dressing, complete feeding, get ready for the day, prepare a simple meal, participate in household tasks, adapt home for safety needs, demonstrate home exercise program, complete caregiver training     Occupational Therapy Goals (Active)       Problem: Dressing       Dates: Start:  11/10/23         Goal: STG-Within one week, patient will dress LB with SBA        Dates: Start:  11/10/23               Problem: Functional Transfers       Dates: Start:  11/10/23         Goal: STG-Within one week, patient will transfer to toilet with SBA        Dates: Start:  11/10/23               Problem: OT Long Term Goals       Dates: Start:  11/10/23         Goal: LTG-By discharge, patient will complete basic self care tasks with mod I        Dates: Start:  11/10/23            Goal: LTG-By discharge, patient will perform bathroom transfers with mod I        Dates: Start:  11/10/23            Goal: LTG-By discharge, patient will complete basic home management with mod I        Dates: Start:  11/10/23               Problem: Toileting       Dates: Start:  11/10/23         Goal: STG-Within one week, patient will complete toileting tasks with SBA        Dates: Start:  11/10/23

## 2023-11-17 NOTE — THERAPY
Physical Therapy   Daily Treatment     Patient Name: Anita Beard  Age:  88 y.o., Sex:  female  Medical Record #: [id number]  Today's Date: 11/17/2023     Precautions  Precautions: Fall Risk  Comments: pain pump for chronic back/neck pain, craniotomy    Subjective    Pt is pleasant and cooperative throughout session.      Objective       11/17/23 1300   PT Charge Group   PT Gait Training (Units) 2   PT Therapeutic Exercise (Units) 2   PT Total Time Spent   PT Individual Total Time Spent (Mins) 60   Pain 0 - 10 Group   Location Generalized   Pain Rating Scale (NPRS) 3   Gait Functional Level of Assist    Gait Level Of Assist Supervised   Assistive Device 4 Wheel Walker   Distance (Feet) 500   # of Times Distance was Traveled 3   Transfer Functional Level of Assist   Bed, Chair, Wheelchair Transfer Supervised   Bed Mobility    Sit to Stand Supervised   Interdisciplinary Plan of Care Collaboration   Patient Position at End of Therapy Seated;Chair Alarm On;Phone within Reach;Tray Table within Reach;Call Light within Reach     Pt ambulated 3 bouts of 500' with 4WW and supervision. Ambulation performed on indoor even, outdoor ramped, and outdoor uneven surfaces. No losses of balance noted and pt denied symptoms throughout. Seated rest between bouts.     Nu step BUE/BLE lvl 3 12 mins.     Standing on airex pad. Pt initially struggled to stand statically requiring MOD A due to several losses of balance. Able to progress to standing ladder ball toss x several bouts with consistent CGA    Pt requested to use bathroom. She was brought back to her room via w/c. Pt remained seated in w/c with chair alarm on in bathroom with RN.       Assessment    Pt tolerated session well without complaints.   Strengths: Able to follow instructions, Adequate strength, Effective communication skills, Good insight into deficits/needs, Independent prior level of function, Making steady progress towards goals, Motivated for self care and independence,  Pleasant and cooperative, Supportive family, Willingly participates in therapeutic activities  Barriers: Impaired activity tolerance, Visual impairment    Plan    Continue to progress pt's functional independence as per plan of care.     DME  PT DME Recommendations  Assistive Device: 4-Wheel Walker    Passport items to be completed:  Get in/out of bed safely, in/out of a vehicle, safely use mobility device, walk or wheel around home/community, navigate up and down stairs, show how to get up/down from the ground, ensure home is accessible, demonstrate HEP, complete caregiver training    Physical Therapy Problems (Active)       Problem: Balance       Dates: Start:  11/10/23         Goal: STG-Within one week, patient will improve [place] >51/56       Dates: Start:  11/10/23               Problem: Mobility       Dates: Start:  11/10/23         Goal: STG-Within one week, patient will improve 6 minute walk test > 750 ft with LRAD       Dates: Start:  11/10/23               Problem: Mobility Transfers       Dates: Start:  11/10/23         Goal: STG-Within one week, patient will demonstrate fall recovery with CGA       Dates: Start:  11/10/23               Problem: PT-Long Term Goals       Dates: Start:  11/10/23         Goal: LTG-By discharge, patient will improve FGA >24/30       Dates: Start:  11/10/23            Goal: LTG-By discharge, patient will perform home exercise program independently       Dates: Start:  11/10/23            Goal: LTG-By discharge, patient will ambulate up/down 4-6 stairs with single railing and supervision       Dates: Start:  11/10/23            Goal: LTG-By discharge, patient will transfer in/out of a car with supervision assist       Dates: Start:  11/10/23            Goal: LTG-By discharge, patient will demonstrate fall recovery with supervision assist       Dates: Start:  11/10/23

## 2023-11-17 NOTE — THERAPY
Speech Language Pathology  Daily Treatment     Patient Name: Anita Beard  Age:  88 y.o., Sex:  female  Medical Record #: [id number]  Today's Date: 11/17/2023     Precautions  Precautions: Fall Risk  Comments: pain pump for chronic back/neck pain, craniotomy    Subjective    Pt was pleasant and cooperative during this ST session      Objective       11/17/23 0731   Treatment Charges   SLP Cognitive Skill Development First 15 Minutes 1   SLP Cognitive Skill Development Additional 15 Minutes 1   SLP Total Time Spent   SLP Individual Total Time Spent (Mins) 30         Assessment    Pt completed a task requiring her to locate errors in a 2x daily pill box with 2-4 medications.  She was able to locate and correct errors in a pill box with 2-3 medications independently with 100% accuracy, but required min-mod cues for task attention and problem solving to locate errors in a pill box with 4 medications.  She benefited from cues to focus on one day and one medication at a time vs looking at all pills for the entire week to locate errors.      Strengths: Able to follow instructions, Alert and oriented, Pleasant and cooperative, Willingly participates in therapeutic activities, Motivated for self care and independence  Barriers: Impaired carryover of learning, Impaired functional cognition    Plan    Continue to target exec function, complex attention, memory           Speech Therapy Problems (Active)       Problem: Memory STGs       Dates: Start:  11/10/23         Goal: STG-Within one week, patient will recall new information related to her current hospitalization using functional memory strategies with spv.         Dates: Start:  11/10/23               Problem: Problem Solving STGs       Dates: Start:  11/10/23         Goal: STG-Within one week, patient will complete a mock medication sorting task with spv to achieve 100% accuracy.         Dates: Start:  11/10/23               Problem: Speech/Swallowing LTGs       Dates: Start:   11/10/23         Goal: LTG-By discharge, patient will solve complex problems with modified independence.        Dates: Start:  11/10/23

## 2023-11-17 NOTE — PROGRESS NOTES
NURSING DAILY NOTE    Name: Anita Beard   Date of Admission: 11/9/2023   Admitting Diagnosis: SDH (subdural hematoma) ([facility])  Attending Physician: Deepti Pak M.d.  Allergies: Ace inhibitors    Safety  Patient Assist  sba w/ fww  Patient Precautions  Fall Risk  Precaution Comments  pain pump for chronic back/neck pain, craniotomy  Bed Transfer Status  Standby Assist  Toilet Transfer Status   Supervised (Performed twice by pt at beginning of session and at end of session.)  Assistive Devices  Rails, Wheelchair  Oxygen  CPAP  Diet/Therapeutic Dining  Current Diet Order   Procedures    Diet Order Diet: Regular     Pill Administration  whole  Agitated Behavioral Scale     ABS Level of Severity       Fall Risk  Has the patient had a fall this admission?   No  Talisha Hong Fall Risk Scoring  15, HIGH RISK  Fall Risk Safety Measures  bed alarm, chair alarm, poor balance, and low vision/ hearing    Vitals  Temperature: 36.5 °C (97.7 °F)  Temp src: Oral  Pulse: 70  Respiration: 18  Blood Pressure : 109/73  Blood Pressure MAP (Calculated): 85 MM HG  BP Location: Left, Upper Arm  Patient BP Position: Supine     Oxygen  Pulse Oximetry: 96 %  O2 (LPM): 0  O2 Delivery Device: CPAP    Bowel and Bladder  Last Bowel Movement  11/15/23  Stool Type  Not observed (flushed before it could be observed)  Bowel Device  Bathroom  Continent  Bladder: Stress incontinence   Bowel: Continent movement  Bladder Function  Urine Void (mL):  (large)  Number of Times Voided: 1  Urine Color: Yellow  Genitourinary Assessment   Bladder Assessment (WDL):  Within Defined Limits  Solis Catheter: Not Applicable  Urine Color: Yellow  Bladder Device: Bathroom  Time Void: Yes  Bladder Scan: Post Void  $ Bladder Scan Results (mL): 1    Skin  Satnam Score   18  Sensory Interventions   Bed Types: Standard/Trauma Mattress  Skin Preventative Measures: Pillows in Use for Support /  "Positioning  Moisture Interventions  Moisturizers/Barriers: Barrier Wipes      Pain  Pain Rating Scale  0 - No Pain  Pain Location  Generalized  Pain Location Orientation  Right, Left  Pain Interventions   Medication (see MAR)    ADLs    Bathing   Patient Refused Bathing (notified RN Marni)  Linen Change      Personal Hygiene  Perineal Care  Chlorhexidine Bath      Oral Care  Brushed Teeth  Teeth/Dentures     Shave     Nutrition Percentage Eaten  Between 50-75% Consumed, Dinner  Environmental Precautions  Treaded Slipper Socks on Patient, Personal Belongings, Wastebasket, Call Bell etc. in Easy Reach, Transferred to Stronger Side, Report Given to Other Health Care Providers Regarding Fall Risk, Bed in Low Position, Communication Sign for Patients & Families, Mobility Assessed & Appropriate Sign Placed  Patient Turns/Positioning  Patient Turns Self from Side to Side  Patient Turns Assistance/Tolerance     Bed Positions  Bed Controls On, Bed Locked  Head of Bed Elevated  Self regulated      Psychosocial/Neurologic Assessment  Psychosocial Assessment  Psychosocial (WDL):  Within Defined Limits  Neurologic Assessment  Neuro (WDL): Within Defined Limits  Level of Consciousness: Alert  Orientation Level: Oriented X4  Cognition: Appropriate judgement, Appropriate safety awareness, Appropriate attention/concentration, Appropriate for developmental age, Follows commands  Speech: Clear  Pupil Assesment: No  Motor Function/Sensation Assessment: Sensation  RLE Sensation: Other (Comment) (neuropathy/\"burning\" at baseline)  LLE Sensation: Other (Comment) (neuropathy/\"burning\" at baseline)  EENT (WDL):  WDL Except    Cardio/Pulmonary Assessment  Edema   RLE Edema: 2+  LLE Edema: 2+  Respiratory Breath Sounds  RUL Breath Sounds: Clear  RML Breath Sounds: Clear  RLL Breath Sounds: Clear  NIDIA Breath Sounds: Clear  LLL Breath Sounds: Clear  Cardiac Assessment   Cardiac (WDL):  Within Defined Limits        "

## 2023-11-17 NOTE — PROGRESS NOTES
"  Physical Medicine & Rehabilitation Progress Note    Encounter Date: 11/17/2023    Chief Complaint: Decreased mobility, weakness    Interval Events (Subjective):  Patient sitting up in room.  She reports she is doing well. Discussed about early discharge.     _____________________________________  Interdisciplinary Team Conference   Most recent IDT on 11/14/2023    Discharge Date/Disposition:  11/22/23 -> 11/21/23  _____________________________________     Objective:  VITAL SIGNS: /77   Pulse 94   Temp 36.8 °C (98.2 °F) (Oral)   Resp 18   Ht 1.6 m (5' 3\")   Wt 71 kg (156 lb 8.4 oz)   SpO2 96%   BMI 27.73 kg/m²   Gen: NAD  Psych: Mood and affect appropriate  CV: RRR, 0 edema  Resp: CTAB, no upper airway sounds  Abd: NTND  Neuro: AOx4, following commands    Laboratory Values:  Recent Results (from the past 72 hour(s))   URINALYSIS    Collection Time: 11/14/23  3:37 PM    Specimen: Urine, Cath   Result Value Ref Range    Color Yellow     Character Clear     Specific Gravity 1.008 <1.035    Ph 6.5 5.0 - 8.0    Glucose Negative Negative mg/dL    Ketones Negative Negative mg/dL    Protein Negative Negative mg/dL    Bilirubin Negative Negative    Urobilinogen, Urine 0.2 Negative    Nitrite Negative Negative    Leukocyte Esterase Trace (A) Negative    Occult Blood Negative Negative    Micro Urine Req Microscopic    URINE MICROSCOPIC (W/UA)    Collection Time: 11/14/23  3:37 PM   Result Value Ref Range    WBC 0-2 /hpf    RBC 0-2 /hpf    Bacteria Negative None /hpf    Epithelial Cells Few /hpf    Hyaline Cast 0-2 /lpf       Medications:  Scheduled Medications   Medication Dose Frequency    metoprolol tartrate  50 mg BID    furosemide  20 mg AFTER DINNER    HYDROcodone/acetaminophen  1 Tablet BID    omeprazole  20 mg DAILY    atorvastatin  40 mg Q EVENING    enoxaparin (LOVENOX) injection  40 mg DAILY AT 1800    gabapentin  300 mg QAM    And    gabapentin  600 mg QHS    levETIRAcetam  500 mg BID     PRN " medications: [] senna-docusate **AND** polyethylene glycol/lytes **AND** magnesium hydroxide **AND** bisacodyl, Respiratory Therapy Consult, hydrALAZINE, acetaminophen, mag hydrox-al hydrox-simeth, ondansetron **OR** ondansetron, traZODone, sodium chloride, midazolam, LORazepam, HYDROcodone/acetaminophen    Diet:  Current Diet Order   Procedures    Diet Order Diet: Regular       Medical Decision Making and Plan:   R sided SDH - Patient with 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 Alves  -PT and OT for mobility and ADLs. Per guidelines, 15 hours per week between PT, OT and/or SLP.  -Follow-up NSG. Repeat CT head 23 - pending     HTN - Patient on Atenolol 25 mg QHS. Switch to Metoprolol 25 mg BID. Continue Metoprolol -> increase to 37.5 as elevated HR and SBP. Ongoing elevation increase to 50 mg. Started on Lasix 20 mg, daily     HLD - Patient on Atorvastatin 40 mg QHS     Anemia - Check AM CBC - 8.9, worsening. Recheck  0- 9.7, will monitor      Hypokalemia - Check AM CMP - 3.6, improved, recheck  - 3.9     Chronic pain - Patient with pain pump.     Pain - Patient on APAP/Oxycodone. Schedule Norco BID. Monitor for constipation. Continue scheduled Norco BID     Skin - Patient at risk for skin breakdown due to debility in areas including sacrum, achilles, elbows and head in addition to other sites. Nursing to assess skin daily.      GI Ppx - Patient on Prilosec for GERD prophylaxis. Patient on Senna-docusate for constipation prophylaxis.      DVT Ppx - Patient Lovenox on transfer. Continue Lovenox as limited mobility  ____________________________________    T. Reymundo Pak MD/PhD  [facility] - Physical Medicine & Rehabilitation   [facility] - Brain Injury Medicine   ____________________________________

## 2023-11-17 NOTE — DISCHARGE PLANNING
Cm  Met with pt; dc planned for 11/22 wed; she prefers dc on 11/21 as her dtr is driving from [place] / [employer] and then back to [place] all in the same day;  pcp is Dr Barcenas; I have updated [facility].     PT confirms she lives in a 5th wheel and is concerned 4ww will not fit inside.  Discussed w/ MD; dc date confirmed fro 11/21  Tc to pt's [facility] confirming dc on 11/21; they prefer [id number]; she will pick her mom up at 1100am; advised dtr recommendation is for pt to stay @ her house before returning to 5 wheel where pt lives independently;  PT declining 4ww; she has a 3wheeled walker she wants to use instead.     Follow up w/ pcp/ neurosurgeon/[facility].       Plan:  dc 11/21 Tuesday.

## 2023-11-18 PROCEDURE — A9270 NON-COVERED ITEM OR SERVICE: HCPCS | Performed by: PHYSICAL MEDICINE & REHABILITATION

## 2023-11-18 PROCEDURE — 700102 HCHG RX REV CODE 250 W/ 637 OVERRIDE(OP): Performed by: PHYSICAL MEDICINE & REHABILITATION

## 2023-11-18 PROCEDURE — 700111 HCHG RX REV CODE 636 W/ 250 OVERRIDE (IP): Mod: JZ | Performed by: PHYSICAL MEDICINE & REHABILITATION

## 2023-11-18 PROCEDURE — 94760 N-INVAS EAR/PLS OXIMETRY 1: CPT

## 2023-11-18 PROCEDURE — 770010 HCHG ROOM/CARE - REHAB SEMI PRIVAT*

## 2023-11-18 RX ADMIN — HYDROCODONE BITARTRATE AND ACETAMINOPHEN 1 TABLET: 10; 325 TABLET ORAL at 20:25

## 2023-11-18 RX ADMIN — HYDROCODONE BITARTRATE AND ACETAMINOPHEN 1 TABLET: 10; 325 TABLET ORAL at 05:19

## 2023-11-18 RX ADMIN — ENOXAPARIN SODIUM 40 MG: 100 INJECTION SUBCUTANEOUS at 17:30

## 2023-11-18 RX ADMIN — METOPROLOL TARTRATE 50 MG: 25 TABLET, FILM COATED ORAL at 08:18

## 2023-11-18 RX ADMIN — FUROSEMIDE 20 MG: 20 TABLET ORAL at 17:30

## 2023-11-18 RX ADMIN — GABAPENTIN 600 MG: 300 CAPSULE ORAL at 20:25

## 2023-11-18 RX ADMIN — LEVETIRACETAM 500 MG: 500 TABLET, FILM COATED ORAL at 08:18

## 2023-11-18 RX ADMIN — LEVETIRACETAM 500 MG: 500 TABLET, FILM COATED ORAL at 20:24

## 2023-11-18 RX ADMIN — METOPROLOL TARTRATE 50 MG: 25 TABLET, FILM COATED ORAL at 20:24

## 2023-11-18 RX ADMIN — HYDROCODONE BITARTRATE AND ACETAMINOPHEN 1 TABLET: 10; 325 TABLET ORAL at 08:18

## 2023-11-18 RX ADMIN — GABAPENTIN 300 MG: 300 CAPSULE ORAL at 08:18

## 2023-11-18 RX ADMIN — POLYETHYLENE GLYCOL 3350 1 PACKET: 17 POWDER, FOR SOLUTION ORAL at 05:13

## 2023-11-18 RX ADMIN — HYDROCODONE BITARTRATE AND ACETAMINOPHEN 1 TABLET: 10; 325 TABLET ORAL at 13:50

## 2023-11-18 ASSESSMENT — PAIN SCALES - PAIN ASSESSMENT IN ADVANCED DEMENTIA (PAINAD)
FACIALEXPRESSION: SMILING OR INEXPRESSIVE
TOTALSCORE: 0
BREATHING: NORMAL
FACIALEXPRESSION: SAD, FRIGHTENED, FROWN
CONSOLABILITY: NO NEED TO CONSOLE
BODYLANGUAGE: RELAXED
FACIALEXPRESSION: SMILING OR INEXPRESSIVE
BREATHING: NORMAL
BREATHING: NORMAL
CONSOLABILITY: NO NEED TO CONSOLE
CONSOLABILITY: NO NEED TO CONSOLE
TOTALSCORE: 2
BODYLANGUAGE: TENSE, DISTRESSED PACING, FIDGETING
TOTALSCORE: 0
BODYLANGUAGE: RELAXED

## 2023-11-18 ASSESSMENT — PAIN SCALES - WONG BAKER
WONGBAKER_NUMERICALRESPONSE: DOESN'T HURT AT ALL
WONGBAKER_NUMERICALRESPONSE: DOESN'T HURT AT ALL
WONGBAKER_NUMERICALRESPONSE: HURTS JUST A LITTLE BIT

## 2023-11-18 ASSESSMENT — PAIN DESCRIPTION - PAIN TYPE: TYPE: ACUTE PAIN

## 2023-11-18 NOTE — FLOWSHEET NOTE
11/18/23 0720   Events/Summary/Plan   Events/Summary/Plan 02 pulse ox check   Vital Signs   Pulse 63   Respiration 18   Pulse Oximetry 97 %   $ Pulse Oximetry (Spot Check) Yes   Respiratory Assessment   Level of Consciousness Responds to voice  (sleeping)   Chest Exam   Work Of Breathing / Effort Within Normal Limits   Oxygen   O2 (LPM) 2   O2 Delivery Device Silicone Nasal Cannula

## 2023-11-18 NOTE — FLOWSHEET NOTE
11/17/23 1850   Events/Summary/Plan   Events/Summary/Plan SpO2 check   Vital Signs   Pulse 82   Respiration 16   Pulse Oximetry 96 %   $ Pulse Oximetry (Spot Check) Yes   Oxygen   O2 Delivery Device None - Room Air   Non-Invasive Ventilation HENRY [employer]   Nocturnal CPAP or BIPAP CPAP - Home Unit  (1:52 hrs cpap use last night)

## 2023-11-18 NOTE — PROGRESS NOTES
NURSING DAILY NOTE    Name: Anita Beard   Date of Admission: 11/9/2023   Admitting Diagnosis: SDH (subdural hematoma) ([facility])  Attending Physician: Deepti Pak M.d.  Allergies: Ace inhibitors    Safety  Patient Assist  sba w/ fww  Patient Precautions  Fall Risk  Precaution Comments  pain pump for chronic back/neck pain, craniotomy  Bed Transfer Status  Supervised  Toilet Transfer Status   Supervised  Assistive Devices  Wheelchair  Oxygen  None - Room Air  Diet/Therapeutic Dining  Current Diet Order   Procedures    Diet Order Diet: Regular     Pill Administration  whole  Agitated Behavioral Scale     ABS Level of Severity       Fall Risk  Has the patient had a fall this admission?   No  Talisha Hong Fall Risk Scoring  15, HIGH RISK  Fall Risk Safety Measures  bed alarm and chair alarm    Vitals  Temperature: 36.2 °C (97.1 °F)  Temp src: Oral  Pulse: 75  Respiration: 18  Blood Pressure : 126/52  Blood Pressure MAP (Calculated): 77 MM HG  BP Location: Left, Upper Arm  Patient BP Position: Supine     Oxygen  Pulse Oximetry: 91 %  O2 (LPM): 0  O2 Delivery Device: None - Room Air    Bowel and Bladder  Last Bowel Movement  11/15/23  Stool Type  Not observed (flushed before it could be observed)  Bowel Device  Bathroom  Continent  Bladder: Stress incontinence   Bowel: Continent movement  Bladder Function  Urine Void (mL):  (lagre)  Number of Times Voided: 1  Urine Color: Yellow  Genitourinary Assessment   Bladder Assessment (WDL):  Within Defined Limits  Solis Catheter: Not Applicable  Urine Color: Yellow  Bladder Device: Bathroom  Time Void: Yes  Bladder Scan: Post Void  $ Bladder Scan Results (mL): 1    Skin  Satnam Score   18  Sensory Interventions   Bed Types: Standard/Trauma Mattress  Skin Preventative Measures: Pillows in Use for Support / Positioning  Moisture Interventions  Moisturizers/Barriers: Barrier Wipes      Pain  Pain Rating Scale  0 - No  Pain  Pain Location  Generalized  Pain Location Orientation  Right, Left  Pain Interventions   Medication (see MAR)    ADLs    Bathing   Patient Refused Bathing (notified RN Marni)  Linen Change      Personal Hygiene  Perineal Care  Chlorhexidine Bath      Oral Care  Brushed Teeth  Teeth/Dentures     Shave     Nutrition Percentage Eaten  Lunch, Less than 25% Consumed  Environmental Precautions  Treaded Slipper Socks on Patient  Patient Turns/Positioning  Patient Turns Self from Side to Side  Patient Turns Assistance/Tolerance     Bed Positions  Bed Controls On, Bed Locked  Head of Bed Elevated  Self regulated      Psychosocial/Neurologic Assessment  Psychosocial Assessment  Psychosocial (WDL):  Within Defined Limits  Neurologic Assessment  Neuro (WDL): Within Defined Limits  Level of Consciousness: Alert  Orientation Level: Oriented X4  Cognition: Appropriate judgement  Speech: Clear  Pupil Assesment: No  Motor Function/Sensation Assessment: Sensation  RLE Sensation: Other (Comment) (neuropathy)  LLE Sensation: Other (Comment) (neuropathy)  EENT (WDL):  WDL Except    Cardio/Pulmonary Assessment  Edema   RLE Edema: 2+  LLE Edema: 2+  Respiratory Breath Sounds  RUL Breath Sounds: Clear  RML Breath Sounds: Clear  RLL Breath Sounds: Clear  NIDIA Breath Sounds: Clear  LLL Breath Sounds: Clear  Cardiac Assessment   Cardiac (WDL):  Within Defined Limits

## 2023-11-18 NOTE — CARE PLAN
"The patient is Stable - Low risk of patient condition declining or worsening    Shift Goals  Clinical Goals: safety  Patient Goals: safety, sleep/rest, pain management  Family Goals: ricki      Problem: Fall Risk - Rehab  Goal: Patient will remain free from falls  Outcome: Progressing  Note: Talisha Hong Fall risk Assessment Score: 15Patient uses call light consistently and appropriately this shift.  Waits for assistance when needed and does not attempt self transfer.  Able to verbalize needs.  Will continue to monitor.    High fall risk Interventions   - Alarming seatbelt  - Bed and strip alarm   - Yellow sign by the door   - Yellow wrist band \"Fall risk\"  - Room near to the nurse station  - Do not leave patient unattended in the bathroom  - Fall risk education provided       Problem: Pain - Standard  Goal: Alleviation of pain or a reduction in pain to the patient’s comfort goal  Outcome: Progressing  Note: Patient able to verbalize pain level and verbalize an acceptable level of pain.       "

## 2023-11-18 NOTE — CARE PLAN
The patient is Stable - Low risk of patient condition declining or worsening    Problem: Knowledge Deficit - Standard  Goal: Patient and family/care givers will demonstrate understanding of plan of care, disease process/condition, diagnostic tests and medications  Outcome: Progressing. Reviewed POC, all questions answered.        Problem: Fall Risk - Rehab  Goal: Patient will remain free from falls  Outcome: Progressing. Call light within reach, pt educated to use for assistance for safe transferring.        Shift Goals  Clinical Goals: Safety  Patient Goals: Participate in therapy, pain control  Family Goals: ricki

## 2023-11-19 ENCOUNTER — APPOINTMENT (OUTPATIENT)
Dept: PHYSICAL THERAPY | Facility: REHABILITATION | Age: 88
DRG: 949 | End: 2023-11-19
Attending: PHYSICAL MEDICINE & REHABILITATION
Payer: MEDICARE

## 2023-11-19 PROCEDURE — 99232 SBSQ HOSP IP/OBS MODERATE 35: CPT | Performed by: PHYSICAL MEDICINE & REHABILITATION

## 2023-11-19 PROCEDURE — 700102 HCHG RX REV CODE 250 W/ 637 OVERRIDE(OP): Performed by: PHYSICAL MEDICINE & REHABILITATION

## 2023-11-19 PROCEDURE — 97116 GAIT TRAINING THERAPY: CPT

## 2023-11-19 PROCEDURE — 94760 N-INVAS EAR/PLS OXIMETRY 1: CPT

## 2023-11-19 PROCEDURE — 700111 HCHG RX REV CODE 636 W/ 250 OVERRIDE (IP): Mod: JZ | Performed by: PHYSICAL MEDICINE & REHABILITATION

## 2023-11-19 PROCEDURE — 770010 HCHG ROOM/CARE - REHAB SEMI PRIVAT*

## 2023-11-19 PROCEDURE — A9270 NON-COVERED ITEM OR SERVICE: HCPCS | Performed by: PHYSICAL MEDICINE & REHABILITATION

## 2023-11-19 PROCEDURE — 97530 THERAPEUTIC ACTIVITIES: CPT

## 2023-11-19 PROCEDURE — 97110 THERAPEUTIC EXERCISES: CPT

## 2023-11-19 PROCEDURE — 94660 CPAP INITIATION&MGMT: CPT

## 2023-11-19 RX ORDER — SENNOSIDES A AND B 8.6 MG/1
8.6 TABLET, FILM COATED ORAL
Status: DISCONTINUED | OUTPATIENT
Start: 2023-11-19 | End: 2023-11-21 | Stop reason: HOSPADM

## 2023-11-19 RX ADMIN — GABAPENTIN 300 MG: 300 CAPSULE ORAL at 08:40

## 2023-11-19 RX ADMIN — HYDROCODONE BITARTRATE AND ACETAMINOPHEN 1 TABLET: 10; 325 TABLET ORAL at 08:41

## 2023-11-19 RX ADMIN — GABAPENTIN 600 MG: 300 CAPSULE ORAL at 20:30

## 2023-11-19 RX ADMIN — LEVETIRACETAM 500 MG: 500 TABLET, FILM COATED ORAL at 20:32

## 2023-11-19 RX ADMIN — METOPROLOL TARTRATE 50 MG: 25 TABLET, FILM COATED ORAL at 20:32

## 2023-11-19 RX ADMIN — ENOXAPARIN SODIUM 40 MG: 100 INJECTION SUBCUTANEOUS at 18:21

## 2023-11-19 RX ADMIN — HYDROCODONE BITARTRATE AND ACETAMINOPHEN 1 TABLET: 10; 325 TABLET ORAL at 14:55

## 2023-11-19 RX ADMIN — HYDROCODONE BITARTRATE AND ACETAMINOPHEN 1 TABLET: 10; 325 TABLET ORAL at 20:32

## 2023-11-19 RX ADMIN — LEVETIRACETAM 500 MG: 500 TABLET, FILM COATED ORAL at 08:40

## 2023-11-19 RX ADMIN — SENNOSIDES 8.6 MG: 8.6 TABLET, FILM COATED ORAL at 20:32

## 2023-11-19 RX ADMIN — OMEPRAZOLE 20 MG: 20 CAPSULE, DELAYED RELEASE ORAL at 08:40

## 2023-11-19 ASSESSMENT — GAIT ASSESSMENTS
GAIT LEVEL OF ASSIST: STANDBY ASSIST
ASSISTIVE DEVICE: FRONT WHEEL WALKER
DISTANCE (FEET): 500

## 2023-11-19 ASSESSMENT — PAIN DESCRIPTION - PAIN TYPE: TYPE: ACUTE PAIN

## 2023-11-19 ASSESSMENT — ACTIVITIES OF DAILY LIVING (ADL): BED_CHAIR_WHEELCHAIR_TRANSFER_DESCRIPTION: SET-UP OF EQUIPMENT;INCREASED TIME;VERBAL CUEING

## 2023-11-19 NOTE — PROGRESS NOTES
NURSING DAILY NOTE    Name: Anita Beard   Date of Admission: 11/9/2023   Admitting Diagnosis: SDH (subdural hematoma) ([facility])  Attending Physician: Deepti Pak M.d.  Allergies: Ace inhibitors    Safety  Patient Assist  sba w/ fww  Patient Precautions  Fall Risk  Precaution Comments  pain pump for chronic back/neck pain, craniotomy  Bed Transfer Status  Supervised  Toilet Transfer Status   Supervised  Assistive Devices  Walker - front wheel  Oxygen  Silicone Nasal Cannula  Diet/Therapeutic Dining  Current Diet Order   Procedures    Diet Order Diet: Regular     Pill Administration  whole  Agitated Behavioral Scale     ABS Level of Severity       Fall Risk  Has the patient had a fall this admission?   No  Talisha Hong Fall Risk Scoring  15, HIGH RISK  Fall Risk Safety Measures  bed alarm and chair alarm    Vitals  Temperature: 36.3 °C (97.4 °F)  Temp src: Oral  Pulse: 93  Respiration: 18  Blood Pressure : 120/70  Blood Pressure MAP (Calculated): 87 MM HG  BP Location: Left, Upper Arm  Patient BP Position: Supine     Oxygen  Pulse Oximetry: 95 %  O2 (LPM): 2  O2 Delivery Device: Silicone Nasal Cannula    Bowel and Bladder  Last Bowel Movement  11/18/23  Stool Type  Type 2: Sausage shaped, but lumpy  Bowel Device  Bathroom  Continent  Bladder: Stress incontinence   Bowel: Continent movement  Bladder Function  Urine Void (mL):  (large)  Number of Times Voided: 1  Urine Color: Yellow  Genitourinary Assessment   Bladder Assessment (WDL):  Within Defined Limits  Solis Catheter: Not Applicable  Urine Color: Yellow  Bladder Device: Bathroom  Time Void: Yes  Bladder Scan: Post Void  $ Bladder Scan Results (mL): 1    Skin  Santam Score   18  Sensory Interventions   Bed Types: Standard/Trauma Mattress  Skin Preventative Measures: Pillows in Use for Support / Positioning  Moisture Interventions  Moisturizers/Barriers: Barrier Wipes      Pain  Pain Rating Scale  6  - Hard to ignore, avoid usual activities  Pain Location  Generalized, Neck  Pain Location Orientation  Posterior  Pain Interventions   Medication (see MAR)    ADLs    Bathing   Patient Refused Bathing (notified RN Marni)  Linen Change      Personal Hygiene  Perineal Care  Chlorhexidine Bath      Oral Care  Brushed Teeth  Teeth/Dentures     Shave     Nutrition Percentage Eaten  Breakfast, Between % Consumed  Environmental Precautions  Treaded Slipper Socks on Patient  Patient Turns/Positioning  Patient Turns Self from Side to Side  Patient Turns Assistance/Tolerance     Bed Positions  Bed Controls On, Bed Locked  Head of Bed Elevated  Self regulated      Psychosocial/Neurologic Assessment  Psychosocial Assessment  Psychosocial (WDL):  Within Defined Limits  Neurologic Assessment  Neuro (WDL): Within Defined Limits  Level of Consciousness: Responds to voice (sleeping)  Orientation Level: Oriented X4  Cognition: Appropriate judgement, Appropriate safety awareness, Appropriate attention/concentration, Appropriate for developmental age, Follows commands  Speech: Clear  Pupil Assesment: No  Motor Function/Sensation Assessment: Sensation  RLE Sensation: Other (Comment) (neuropathy)  LLE Sensation: Other (Comment) (neuropathy)  EENT (WDL):  WDL Except    Cardio/Pulmonary Assessment  Edema   RLE Edema: 2+  LLE Edema: 2+  Respiratory Breath Sounds  RUL Breath Sounds: Clear  RML Breath Sounds: Clear  RLL Breath Sounds: Clear  NIDIA Breath Sounds: Clear  LLL Breath Sounds: Clear  Cardiac Assessment   Cardiac (WDL):  Within Defined Limits

## 2023-11-19 NOTE — CARE PLAN
"The patient is Stable - Low risk of patient condition declining or worsening    Shift Goals  Clinical Goals: safety  Patient Goals: safety, pain management  Family Goals: ricki        Problem: Fall Risk - Rehab  Goal: Patient will remain free from falls  Outcome: Progressing  Note: Talisha Hong Fall risk Assessment Score: 15    High fall risk Interventions   - Alarming seatbelt  - Bed and strip alarm   - Yellow sign by the door   - Yellow wrist band \"Fall risk\"  - Room near to the nurse station  - Do not leave patient unattended in the bathroom  - Fall risk education provided  Patient uses call light consistently and appropriately this shift.  Waits for assistance when needed and does not attempt self transfer.  Able to verbalize needs.  Will continue to monitor.     Problem: Pain - Standard  Goal: Alleviation of pain or a reduction in pain to the patient’s comfort goal  Outcome: Progressing  Note: Patient able to verbalize pain level and verbalize an acceptable level of pain.     "

## 2023-11-19 NOTE — THERAPY
"Physical Therapy   Daily Treatment     Patient Name: Anita Beard  Age:  88 y.o., Sex:  female  Medical Record #: [id number]  Today's Date: 11/19/2023     Precautions  Precautions: Fall Risk  Comments: pain pump for chronic back/neck pain, craniotomy    Subjective    Patient reports she using no AD at home, 3WW for community ambulation;reports she is confident with stair training; prefers to use FWW in room over SPC because \"it is easier\"     Objective       11/19/23 1031   PT Charge Group   PT Gait Training (Units) 2   PT Therapeutic Exercise (Units) 1   PT Therapeutic Activities (Units) 1   PT Total Time Spent   PT Individual Total Time Spent (Mins) 60   Precautions   Precautions Fall Risk   Comments pain pump for chronic back/neck pain, craniotomy   Gait Functional Level of Assist    Gait Level Of Assist Standby Assist   Assistive Device Front Wheel Walker   Distance (Feet) 500   # of Times Distance was Traveled 2   Deviation   (cues to keep FWW closer, reports she is able to steer better by advancing walker)   Transfer Functional Level of Assist   Bed, Chair, Wheelchair Transfer Standby Assist   Bed Chair Wheelchair Transfer Description Set-up of equipment;Increased time;Verbal cueing   Sitting Lower Body Exercises   Sitting Lower Body Exercises   (used as cool-down)   Ankle Pumps 1 set of 10;Bilateral   Hip Abduction 1 set of 10;Bilateral;Medium Resistance Theraband   Hip Adduction 1 set of 10;Bilateral   Long Arc Quad 1 set of 10;Bilateral   Marching Reciprocal;1 set of 10   Hamstring Curl 1 set of 10;Bilateral;Medium Resistance Theraband   Nustep Resistance Level 3  (12min for endurance)   Interdisciplinary Plan of Care Collaboration   Patient Position at End of Therapy Seated;Chair Alarm On         Assessment    Demonstrates 500ft x 2 FWW SBA with no LOB, cues to stay closer to FWW but patient reports she can \"steer better with FWW out in front\"    Strengths: Able to follow instructions, Adequate strength, " Effective communication skills, Good insight into deficits/needs, Independent prior level of function, Making steady progress towards goals, Motivated for self care and independence, Pleasant and cooperative, Supportive family, Willingly participates in therapeutic activities  Barriers: Impaired activity tolerance, Visual impairment    Plan    Ambulation with FWW vs SPC  Endurance work  LE strengthening    Prepare for discharge in 2 days    DME  PT DME Recommendations  Assistive Device: 4-Wheel Walker    Passport items to be completed:  Get in/out of bed safely, in/out of a vehicle, safely use mobility device, walk or wheel around home/community, navigate up and down stairs, show how to get up/down from the ground, ensure home is accessible, demonstrate HEP, complete caregiver training    Physical Therapy Problems (Active)       Problem: Balance       Dates: Start:  11/10/23         Goal: STG-Within one week, patient will improve [place] >51/56       Dates: Start:  11/10/23               Problem: Mobility       Dates: Start:  11/10/23         Goal: STG-Within one week, patient will improve 6 minute walk test > 750 ft with LRAD       Dates: Start:  11/10/23               Problem: Mobility Transfers       Dates: Start:  11/10/23         Goal: STG-Within one week, patient will demonstrate fall recovery with CGA       Dates: Start:  11/10/23               Problem: PT-Long Term Goals       Dates: Start:  11/10/23         Goal: LTG-By discharge, patient will improve FGA >24/30       Dates: Start:  11/10/23            Goal: LTG-By discharge, patient will perform home exercise program independently       Dates: Start:  11/10/23            Goal: LTG-By discharge, patient will ambulate up/down 4-6 stairs with single railing and supervision       Dates: Start:  11/10/23            Goal: LTG-By discharge, patient will transfer in/out of a car with supervision assist       Dates: Start:  11/10/23            Goal: LTG-By discharge,  patient will demonstrate fall recovery with supervision assist       Dates: Start:  11/10/23

## 2023-11-19 NOTE — FLOWSHEET NOTE
11/19/23 0916   Events/Summary/Plan   Events/Summary/Plan spot check done pt using cpap at noc tolerating it well   Vital Signs   Pulse 74   Respiration 16   Pulse Oximetry 96 %   $ Pulse Oximetry (Spot Check) Yes   Respiratory Assessment   Respiratory Pattern Within Normal Limits   Level of Consciousness Alert   Chest Exam   Work Of Breathing / Effort Within Normal Limits   Oxygen   O2 (LPM) 0   FiO2% 21 %   O2 Delivery Device Room air w/o2 available   Non-Invasive Ventilation [facility]   Nocturnal CPAP or BIPAP CPAP - Home Unit   $ System Evaluation Yes   Cleanliness and Damage Inspection Performed Yes

## 2023-11-19 NOTE — PROGRESS NOTES
NURSING DAILY NOTE    Name: Anita Beard   Date of Admission: 11/9/2023   Admitting Diagnosis: SDH (subdural hematoma) ([facility])  Attending Physician: Deepti Pak M.d.  Allergies: Ace inhibitors    Safety  Patient Assist  sba  Patient Precautions  Fall Risk  Precaution Comments  pain pump for chronic back/neck pain, craniotomy  Bed Transfer Status  Supervised  Toilet Transfer Status   Supervised  Assistive Devices  Rails, Wheelchair  Oxygen  Silicone Nasal Cannula  Diet/Therapeutic Dining  Current Diet Order   Procedures    Diet Order Diet: Regular     Pill Administration  whole  Agitated Behavioral Scale     ABS Level of Severity       Fall Risk  Has the patient had a fall this admission?   No  Talisha Hong Fall Risk Scoring  15, HIGH RISK  Fall Risk Safety Measures  bed alarm and chair alarm    Vitals  Temperature: 36.6 °C (97.9 °F)  Temp src: Oral  Pulse: 71  Respiration: 18  Blood Pressure : 119/62  Blood Pressure MAP (Calculated): 81 MM HG  BP Location: Left, Upper Arm  Patient BP Position: Supine     Oxygen  Pulse Oximetry: 97 %  O2 (LPM): 2  O2 Delivery Device: Silicone Nasal Cannula    Bowel and Bladder  Last Bowel Movement  11/18/23  Stool Type  Type 5: Soft blob with clear cut edges (passed easily)  Bowel Device  Bathroom  Continent  Bladder: Stress incontinence   Bowel: Continent movement  Bladder Function  Urine Void (mL):  (moderate)  Number of Times Voided: 1  Urine Color: Yellow  Genitourinary Assessment   Bladder Assessment (WDL):  Within Defined Limits  Solis Catheter: Not Applicable  Urine Color: Yellow  Bladder Device: Bathroom  Time Void: Yes  Bladder Scan: Post Void  $ Bladder Scan Results (mL): 1    Skin  Satnam Score   18  Sensory Interventions   Bed Types: Standard/Trauma Mattress  Skin Preventative Measures: Pillows in Use for Support / Positioning  Moisture Interventions  Moisturizers/Barriers: Barrier Wipes      Pain  Pain  Rating Scale  0 - No Pain  Pain Location  Generalized  Pain Location Orientation  Right, Left, Posterior  Pain Interventions   Medication (see MAR)    ADLs    Bathing   Patient Refused Bathing (notified RN Marni)  Linen Change      Personal Hygiene  Perineal Care  Chlorhexidine Bath      Oral Care  Brushed Teeth  Teeth/Dentures     Shave     Nutrition Percentage Eaten  Breakfast, Between % Consumed  Environmental Precautions  Bed in Low Position, Treaded Slipper Socks on Patient  Patient Turns/Positioning  Patient Turns Self from Side to Side  Patient Turns Assistance/Tolerance     Bed Positions  Bed Controls On  Head of Bed Elevated  Self regulated      Psychosocial/Neurologic Assessment  Psychosocial Assessment  Psychosocial (WDL):  Within Defined Limits  Neurologic Assessment  Neuro (WDL): Within Defined Limits  Level of Consciousness: Responds to voice (sleeping)  Orientation Level: Oriented X4  Cognition: Appropriate judgement, Appropriate safety awareness, Appropriate attention/concentration, Appropriate for developmental age, Follows commands  Speech: Clear  Pupil Assesment: No  Motor Function/Sensation Assessment: Sensation  RLE Sensation: Other (Comment) (neuropathy)  LLE Sensation: Other (Comment) (neuropathy)  EENT (WDL):  WDL Except    Cardio/Pulmonary Assessment  Edema   RLE Edema: 2+  LLE Edema: 2+  Respiratory Breath Sounds  RUL Breath Sounds: Clear  RML Breath Sounds: Clear  RLL Breath Sounds: Clear  NIDIA Breath Sounds: Clear  LLL Breath Sounds: Clear  Cardiac Assessment   Cardiac (WDL):  Within Defined Limits

## 2023-11-19 NOTE — PROGRESS NOTES
Physical Medicine & Rehabilitation Progress Note  Encounter Date: 2023    Chief Complaint:   Constipation     Interval Events (Subjective):  Constipation.  Patient reports this is typically improved with daily senna.  Denies current abdominal pain.    Objective:  VITAL SIGNS: VITAL SIGNS:   Vitals:    23 0518 23 0850 23 0916 23 1710   BP: 119/62 104/62  132/52   Pulse: 71 68 74 65   Resp: 18  16 18   Temp: 36.6 °C (97.9 °F)   36.3 °C (97.4 °F)   TempSrc: Oral   Oral   SpO2: 97%  96% 92%   Weight:       Height:             Gen: NAD  Psych: Mood and affect appropriate  CV: RRR, 0 edema  Resp: CTAB, no upper airway sounds  Abd: NTND  Neuro: Awake and alert.  Follows commands    Laboratory Values:  No results found for this or any previous visit (from the past 72 hour(s)).    Medications:  Scheduled Medications   Medication Dose Frequency    metoprolol tartrate  50 mg BID    furosemide  20 mg AFTER DINNER    HYDROcodone/acetaminophen  1 Tablet BID    omeprazole  20 mg DAILY    atorvastatin  40 mg Q EVENING    enoxaparin (LOVENOX) injection  40 mg DAILY AT 1800    gabapentin  300 mg QAM    And    gabapentin  600 mg QHS    levETIRAcetam  500 mg BID     PRN medications: sennosides, [] senna-docusate **AND** polyethylene glycol/lytes **AND** magnesium hydroxide **AND** bisacodyl, Respiratory Therapy Consult, hydrALAZINE, acetaminophen, mag hydrox-al hydrox-simeth, ondansetron **OR** ondansetron, traZODone, sodium chloride, midazolam, LORazepam, HYDROcodone/acetaminophen    Bowel:  Last Documented BM Date:Last BM: 23  Last Documented BM Description: Stool Description: Medium;Brown;Soft    Bladder:  Last Documented Urine Void (mL):  (moderate)  Last Documented Bladder Scan:    Last Documented Bladder Scan Results (mL):      Physical Therapy:  Bed Mobility    Transfers Standby Assist  Set-up of equipment;Increased time;Verbal cueing   Mobility Standby Assist   Stairs    Barriers to  Discharge Home:      Occupational Therapy:  Grooming     Bathing     UB Dressing     LB Dressing     Toileting     Shower & Transfer     Barriers to Discharge Home:    Diet:  Current Diet Order   Procedures    Diet Order Diet: Regular     Medical Decision Making and Plan:  Right-sided subdural hematoma status postcraniotomy on 11/4/2023  Continue comprehensive inpatient rehabilitation    Hypertension  Labs reviewed.  Vitals reviewed.  Continue metoprolol    Pain  Continue Norco.  Continue gabapentin.    GI prophylaxis continue Prilosec    DVT prophylaxis continue Lovenox    Constipation  Restart daily senna  _____________________________________    Jarad Rae MD  [facility] - Physical Medicine & Rehabilitation     _____________________________________

## 2023-11-20 ENCOUNTER — APPOINTMENT (OUTPATIENT)
Dept: OCCUPATIONAL THERAPY | Facility: REHABILITATION | Age: 88
DRG: 949 | End: 2023-11-20
Attending: PHYSICAL MEDICINE & REHABILITATION
Payer: MEDICARE

## 2023-11-20 ENCOUNTER — APPOINTMENT (OUTPATIENT)
Dept: PHYSICAL THERAPY | Facility: REHABILITATION | Age: 88
DRG: 949 | End: 2023-11-20
Attending: PHYSICAL MEDICINE & REHABILITATION
Payer: MEDICARE

## 2023-11-20 ENCOUNTER — APPOINTMENT (OUTPATIENT)
Dept: SPEECH THERAPY | Facility: REHABILITATION | Age: 88
DRG: 949 | End: 2023-11-20
Attending: PHYSICAL MEDICINE & REHABILITATION
Payer: MEDICARE

## 2023-11-20 PROCEDURE — 700111 HCHG RX REV CODE 636 W/ 250 OVERRIDE (IP): Mod: JZ | Performed by: PHYSICAL MEDICINE & REHABILITATION

## 2023-11-20 PROCEDURE — 94760 N-INVAS EAR/PLS OXIMETRY 1: CPT

## 2023-11-20 PROCEDURE — 97110 THERAPEUTIC EXERCISES: CPT

## 2023-11-20 PROCEDURE — 97530 THERAPEUTIC ACTIVITIES: CPT

## 2023-11-20 PROCEDURE — 97535 SELF CARE MNGMENT TRAINING: CPT

## 2023-11-20 PROCEDURE — 770010 HCHG ROOM/CARE - REHAB SEMI PRIVAT*

## 2023-11-20 PROCEDURE — 700102 HCHG RX REV CODE 250 W/ 637 OVERRIDE(OP): Performed by: PHYSICAL MEDICINE & REHABILITATION

## 2023-11-20 PROCEDURE — 97116 GAIT TRAINING THERAPY: CPT

## 2023-11-20 PROCEDURE — A9270 NON-COVERED ITEM OR SERVICE: HCPCS | Performed by: PHYSICAL MEDICINE & REHABILITATION

## 2023-11-20 PROCEDURE — 99233 SBSQ HOSP IP/OBS HIGH 50: CPT | Performed by: PHYSICAL MEDICINE & REHABILITATION

## 2023-11-20 PROCEDURE — 97129 THER IVNTJ 1ST 15 MIN: CPT

## 2023-11-20 PROCEDURE — 97112 NEUROMUSCULAR REEDUCATION: CPT

## 2023-11-20 PROCEDURE — 97130 THER IVNTJ EA ADDL 15 MIN: CPT

## 2023-11-20 RX ORDER — FUROSEMIDE 20 MG/1
20 TABLET ORAL
Qty: 60 TABLET | Refills: 0 | Status: SHIPPED | OUTPATIENT
Start: 2023-11-20

## 2023-11-20 RX ORDER — METOPROLOL TARTRATE 50 MG/1
50 TABLET, FILM COATED ORAL 2 TIMES DAILY
Qty: 60 TABLET | Refills: 2 | Status: SHIPPED | OUTPATIENT
Start: 2023-11-20

## 2023-11-20 RX ORDER — ATORVASTATIN CALCIUM 40 MG/1
40 TABLET, FILM COATED ORAL EVERY EVENING
Qty: 30 TABLET | Refills: 2 | Status: SHIPPED | OUTPATIENT
Start: 2023-11-20

## 2023-11-20 RX ORDER — GABAPENTIN 300 MG/1
300-600 CAPSULE ORAL 2 TIMES DAILY
Qty: 90 CAPSULE | Refills: 2 | Status: SHIPPED | OUTPATIENT
Start: 2023-11-20

## 2023-11-20 RX ADMIN — GABAPENTIN 300 MG: 300 CAPSULE ORAL at 08:59

## 2023-11-20 RX ADMIN — HYDROCODONE BITARTRATE AND ACETAMINOPHEN 1 TABLET: 10; 325 TABLET ORAL at 20:40

## 2023-11-20 RX ADMIN — ENOXAPARIN SODIUM 40 MG: 100 INJECTION SUBCUTANEOUS at 18:09

## 2023-11-20 RX ADMIN — OMEPRAZOLE 20 MG: 20 CAPSULE, DELAYED RELEASE ORAL at 08:59

## 2023-11-20 RX ADMIN — LEVETIRACETAM 500 MG: 500 TABLET, FILM COATED ORAL at 20:40

## 2023-11-20 RX ADMIN — GABAPENTIN 600 MG: 300 CAPSULE ORAL at 20:38

## 2023-11-20 RX ADMIN — HYDROCODONE BITARTRATE AND ACETAMINOPHEN 1 TABLET: 10; 325 TABLET ORAL at 15:14

## 2023-11-20 RX ADMIN — LEVETIRACETAM 500 MG: 500 TABLET, FILM COATED ORAL at 09:00

## 2023-11-20 RX ADMIN — METOPROLOL TARTRATE 50 MG: 25 TABLET, FILM COATED ORAL at 08:59

## 2023-11-20 RX ADMIN — HYDROCODONE BITARTRATE AND ACETAMINOPHEN 1 TABLET: 10; 325 TABLET ORAL at 08:59

## 2023-11-20 RX ADMIN — METOPROLOL TARTRATE 50 MG: 25 TABLET, FILM COATED ORAL at 20:40

## 2023-11-20 ASSESSMENT — ACTIVITIES OF DAILY LIVING (ADL)
TOILETING_LEVEL_OF_ASSIST_DESCRIPTION: GRAB BAR;INCREASED TIME
TOILET_TRANSFER_DESCRIPTION: GRAB BAR;INCREASED TIME;SUPERVISION FOR SAFETY

## 2023-11-20 ASSESSMENT — GAIT ASSESSMENTS
DISTANCE (FEET): 500
ASSISTIVE DEVICE: 4 WHEEL WALKER
GAIT LEVEL OF ASSIST: STANDBY ASSIST
DISTANCE (FEET): 500
ASSISTIVE DEVICE: 4 WHEEL WALKER
GAIT LEVEL OF ASSIST: MODIFIED INDEPENDENT

## 2023-11-20 ASSESSMENT — BALANCE ASSESSMENTS
LOOK OVER LEFT AND RIGHT SHOULDERS WHILE STANDING: 2
SITTING UNSUPPORTED: 4
LONG VERSION TOTAL SCORE (MAX 56): 42
STANDING TO SITTING: 3
LONG VERSION TOTAL SCORE (MAX 56): 42
STANDING UNSUPPORTED WITH EYES CLOSED: 4
STANDING ON ONE LEG: 1
STANDING UNSUPPORTED WITH FEET TOGETHER: 4
STANDING UNSUPPORTED: 4
TURN 360 DEGREES: 3
SITTING TO STANDING: 3
TRANSFERS: 3
MEDICARE IMPAIRMENT PERCENTAGE: 25
REACHING FORWARD WITH OUTSTRETCHED ARM WHILE STANDING: 3
PICK UP OBJECT FROM THE FLOOR FROM A STANDING POSITION: 4
STANDING UNSUPPORTED ONE FOOT IN FRONT: 2
PLACE ALTERNATE FOOT ON STEP OR STOOL WHILE STANDING UNSUPPORTED: 2

## 2023-11-20 ASSESSMENT — BRIEF INTERVIEW FOR MENTAL STATUS (BIMS)
WHAT YEAR IS IT: CORRECT
COGNITIVE PATTERN ASSESSMENT USED: BIMS
WHAT DAY OF THE WEEK IS IT: CORRECT
ASKED TO RECALL BLUE: YES, NO CUE REQUIRED
BIMS SUMMARY SCORE: 15
ASKED TO RECALL BED: YES, NO CUE REQUIRED
WHAT MONTH IS IT: ACCURATE WITHIN 5 DAYS
INITIAL REPETITION OF BED BLUE SOCK - FIRST ATTEMPT: 3
ASKED TO RECALL SOCK: YES, NO CUE REQUIRED

## 2023-11-20 ASSESSMENT — PAIN DESCRIPTION - PAIN TYPE: TYPE: ACUTE PAIN

## 2023-11-20 NOTE — THERAPY
Physical Therapy   Discharge Summary     Patient Name: Anita Beard  Age:  88 y.o., Sex:  female  Medical Record #: [id number]  Today's Date: 11/20/2023     Precautions  Precautions: Fall Risk  Comments: pain pump for chronic back/neck pain, craniotomy    Subjective    Pt is pleasant and cooperative     Objective       11/20/23 1300   PT Charge Group   PT Gait Training (Units) 2   PT Therapeutic Exercise (Units) 1   PT Neuromuscular Re-Education / Balance (Units) 1   PT Total Time Spent   PT Individual Total Time Spent (Mins) 60   Gait Functional Level of Assist    Gait Level Of Assist Modified Independent   Assistive Device 4 Wheel Walker   Distance (Feet) 500   # of Times Distance was Traveled 3   Stairs Functional Level of Assist   Level of Assist with Stairs Modified Independent   # of Stairs Climbed 12   Transfer Functional Level of Assist   Bed, Chair, Wheelchair Transfer Independent   Bed Mobility    Supine to Sit Independent   Sit to Supine Independent   Sit to Stand Independent   Rolling Independent   Outcome Measures   Outcome Measures Utilized [place] Balance Scale   [place] Balance Scale   Sitting Unsupported (Score 0-4) 4   Change Of Positon: Sitting To Standing (Score 0-4) 3   Change Of Positon: Standing To Sitting (Score 0-4) 3   Transfers (Score 0-4) 3   Standing Unsupported (Score 0-4) 4   Standing With Eyes Closed (Score 0-4) 4   Standing With Feet Together (Score 0-4) 4   Tandem Standing (Score 0-4) 2   Standing On One Leg (Score 0-4) 1   Turning Trunk (Feet Fixed) (Score 0-4) 2   Retrieving Objects From Floor (Score 0-4) 4   Turning 360 Degrees (Score 0-4) 3   Stool Stepping (Score 0-4) 2   Reaching Forward While Standing (Score 0-4) 3   [place] Balance Total Score (0-56) 42   Interdisciplinary Plan of Care Collaboration   Patient Position at End of Therapy Seated;Chair Alarm On;Phone within Reach;Tray Table within Reach;Call Light within Reach   Roll Left and Right   Assistance Needed Independent   CARE  Score - Roll Left and Right 6   Sit to Lying   Assistance Needed Independent   CARE Score - Sit to Lying 6   Lying to Sitting on Side of Bed   Assistance Needed Independent   CARE Score - Lying to Sitting on Side of Bed 6   Sit to Stand   Assistance Needed Independent   CARE Score - Sit to Stand 6   Chair/Bed-to-Chair Transfer   Assistance Needed Independent   CARE Score - Chair/Bed-to-Chair Transfer 6   Car Transfer   Assistance Needed Independent   CARE Score - Car Transfer 6   Walk 10 Feet   Assistance Needed Independent   CARE Score - Walk 10 Feet 6   Walk 50 Feet with Two Turns   Assistance Needed Independent   CARE Score - Walk 50 Feet with Two Turns 6   Walk 150 Feet   Assistance Needed Independent   CARE Score - Walk 150 Feet 6   Walking 10 Feet on Uneven Surfaces   Assistance Needed Independent   CARE Score - Walking 10 Feet on Uneven Surfaces 6   1 Step (Curb)   Assistance Needed Independent   CARE Score - 1 Step (Curb) 6   4 Steps   Assistance Needed Independent   CARE Score - 4 Steps 6   12 Steps   Assistance Needed Independent   CARE Score - 12 Steps 6   Picking Up Object   Assistance Needed Independent   CARE Score - Picking Up Object 6   Wheel 50 Feet with Two Turns   Assistance Needed Independent   CARE Score - Wheel 50 Feet with Two Turns 6   Type of Wheelchair/Scooter Manual   Wheel 150 Feet   Assistance Needed Independent   CARE Score - Wheel 150 Feet 6   Type of Wheelchair/Scooter Manual   P.T. Discharge Summary   Discharge Location Home   Patient Discharging with Assist of Family   Level of Supervision Required Upon Discharge No Supervision   Recommended Equipment for Discharge 4-Wheeled Walker   Recommeded Services Upon Discharge Home Health Physical Therapy   Long Term Goals Met yes   Criteria for Termination of Services Maximum Function Achieved for Inpatient Rehabilitation       Pt ambulated multiple bouts of over 500' with 4WW independently.     Pt performed ramp with 4WW independently.      Pt performed curb with 4WW independently.     Car transfer with 4WW independently.     Pt performed stairs independently with handrails.     Pt picked object from floor independently.     Floor<>mat transfer with supervision.     Muñoz performed as below. Pt educated on high falls risk.     Pt remained seated in w/c with chair alarm on and all needs in reach.     Assessment    Pt tolerated session well and is appropriate for home discharge tomorrow.   Strengths: Able to follow instructions, Adequate strength, Effective communication skills, Good insight into deficits/needs, Independent prior level of function, Making steady progress towards goals, Motivated for self care and independence, Pleasant and cooperative, Supportive family, Willingly participates in therapeutic activities  Barriers: Impaired activity tolerance, Visual impairment    Plan    Discharge home independently tomorrow.     Passport items to be completed:  Get in/out of bed safely, in/out of a vehicle, safely use mobility device, walk or wheel around home/community, navigate up and down stairs, show how to get up/down from the ground, ensure home is accessible, demonstrate HEP, complete caregiver training    Physical Therapy Problems (Active)       Problem: Balance       Dates: Start:  11/10/23         Goal: STG-Within one week, patient will improve Muñoz >51/56       Dates: Start:  11/10/23               Problem: Mobility       Dates: Start:  11/10/23         Goal: STG-Within one week, patient will improve 6 minute walk test > 750 ft with LRAD       Dates: Start:  11/10/23               Problem: Mobility Transfers       Dates: Start:  11/10/23         Goal: STG-Within one week, patient will demonstrate fall recovery with CGA       Dates: Start:  11/10/23               Problem: PT-Long Term Goals       Dates: Start:  11/10/23         Goal: LTG-By discharge, patient will improve FGA >24/30       Dates: Start:  11/10/23            Goal: LTG-By  discharge, patient will perform home exercise program independently       Dates: Start:  11/10/23            Goal: LTG-By discharge, patient will ambulate up/down 4-6 stairs with single railing and supervision       Dates: Start:  11/10/23            Goal: LTG-By discharge, patient will transfer in/out of a car with supervision assist       Dates: Start:  11/10/23            Goal: LTG-By discharge, patient will demonstrate fall recovery with supervision assist       Dates: Start:  11/10/23

## 2023-11-20 NOTE — THERAPY
Physical Therapy   Daily Treatment     Patient Name: Anita Beard  Age:  88 y.o., Sex:  female  Medical Record #: [id number]  Today's Date: 11/20/2023     Precautions  Precautions: Fall Risk  Comments: pain pump for chronic back/neck pain, craniotomy    Subjective    Patient distracted she could not find hearing aid in room     Objective       11/20/23 0931   PT Charge Group   PT Gait Training (Units) 1   PT Therapeutic Activities (Units) 1   PT Total Time Spent   PT Individual Total Time Spent (Mins) 30   Precautions   Precautions Fall Risk   Comments pain pump for chronic back/neck pain, craniotomy   Gait Functional Level of Assist    Gait Level Of Assist Standby Assist   Assistive Device 4 Wheel Walker   Distance (Feet) 500   # of Times Distance was Traveled 2   Interdisciplinary Plan of Care Collaboration   IDT Collaboration with  Physical Therapist   Collaboration Comments treatment/discharge planning   PT DME Recommendations   Assistive Device 4-Wheel Walker         Assessment    Able to ambulate with 4WW SBA, requires verbal cues for brake sequencing    Strengths: Able to follow instructions, Adequate strength, Effective communication skills, Good insight into deficits/needs, Independent prior level of function, Making steady progress towards goals, Motivated for self care and independence, Pleasant and cooperative, Supportive family, Willingly participates in therapeutic activities  Barriers: Impaired activity tolerance, Visual impairment    Plan    Prepare for discharge  Reinforce 4WW safety    DME  PT DME Recommendations  Assistive Device: 4-Wheel Walker    Passport items to be completed:  Get in/out of bed safely, in/out of a vehicle, safely use mobility device, walk or wheel around home/community, navigate up and down stairs, show how to get up/down from the ground, ensure home is accessible, demonstrate HEP, complete caregiver training    Physical Therapy Problems (Active)       Problem: Balance        Dates: Start:  11/10/23         Goal: STG-Within one week, patient will improve [place] >51/56       Dates: Start:  11/10/23               Problem: Mobility       Dates: Start:  11/10/23         Goal: STG-Within one week, patient will improve 6 minute walk test > 750 ft with LRAD       Dates: Start:  11/10/23               Problem: Mobility Transfers       Dates: Start:  11/10/23         Goal: STG-Within one week, patient will demonstrate fall recovery with CGA       Dates: Start:  11/10/23               Problem: PT-Long Term Goals       Dates: Start:  11/10/23         Goal: LTG-By discharge, patient will improve FGA >24/30       Dates: Start:  11/10/23            Goal: LTG-By discharge, patient will perform home exercise program independently       Dates: Start:  11/10/23            Goal: LTG-By discharge, patient will ambulate up/down 4-6 stairs with single railing and supervision       Dates: Start:  11/10/23            Goal: LTG-By discharge, patient will transfer in/out of a car with supervision assist       Dates: Start:  11/10/23            Goal: LTG-By discharge, patient will demonstrate fall recovery with supervision assist       Dates: Start:  11/10/23

## 2023-11-20 NOTE — CARE PLAN
The patient is Stable - Low risk of patient condition declining or worsening    Shift Goals  Clinical Goals: safety, pain  Patient Goals: safety, pain  Family Goals: ircki    Progress made toward(s) clinical / shift goals:      Problem: Fall Risk - Rehab  Goal: Patient will remain free from falls  Outcome: Progressing     Problem: Mobility  Goal: Patient's capacity to carry out activities will improve  Outcome: Progressing       Patient is not progressing towards the following goals:

## 2023-11-20 NOTE — PROGRESS NOTES
"  Physical Medicine & Rehabilitation Progress Note    Encounter Date: 2023    Chief Complaint: Decreased mobility, weakness    Interval Events (Subjective):  Patient sitting up in room. She reports therapy is going well. Discussed about staple removal. Discussed about discharge medications. Denies NVD.     _____________________________________  Interdisciplinary Team Conference   Most recent IDT on 2023    Discharge Date/Disposition:  23 -> 23  _____________________________________     Objective:  VITAL SIGNS: BP (!) 141/59   Pulse 69   Temp 36.6 °C (97.8 °F) (Oral)   Resp 18   Ht 1.6 m (5' 3\")   Wt 71 kg (156 lb 8.4 oz)   SpO2 92%   BMI 27.73 kg/m²   Gen: NAD  Psych: Mood and affect appropriate  CV: RRR, 0 edema  Resp: CTAB, no upper airway sounds  Abd: NTND  Neuro: AOx4, following commands    Laboratory Values:  No results found for this or any previous visit (from the past 72 hour(s)).      Medications:  Scheduled Medications   Medication Dose Frequency    metoprolol tartrate  50 mg BID    furosemide  20 mg AFTER DINNER    HYDROcodone/acetaminophen  1 Tablet BID    omeprazole  20 mg DAILY    atorvastatin  40 mg Q EVENING    enoxaparin (LOVENOX) injection  40 mg DAILY AT 1800    gabapentin  300 mg QAM    And    gabapentin  600 mg QHS    levETIRAcetam  500 mg BID     PRN medications: sennosides, [] senna-docusate **AND** polyethylene glycol/lytes **AND** magnesium hydroxide **AND** bisacodyl, Respiratory Therapy Consult, hydrALAZINE, acetaminophen, mag hydrox-al hydrox-simeth, ondansetron **OR** ondansetron, traZODone, sodium chloride, midazolam, LORazepam, HYDROcodone/acetaminophen    Diet:  Current Diet Order   Procedures    Diet Order Diet: Regular       Medical Decision Making and Plan:   R sided SDH - Patient with 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 Alves  -PT and OT for mobility and ADLs. Per guidelines, 15 hours per week between PT, " OT and/or SLP.  -Follow-up NSG. Repeat CT head 11/16/23 - pending     HTN - Patient on Atenolol 25 mg QHS. Switch to Metoprolol 25 mg BID. Continue Metoprolol -> increase to 37.5 as elevated HR and SBP. Ongoing elevation increase to 50 mg. Continue Lasix and Metoprolol 50 mg     HLD - Patient on Atorvastatin 40 mg QHS     Anemia - Check AM CBC - 8.9, worsening. Recheck 11/13 0- 9.7, will monitor      Hypokalemia - Check AM CMP - 3.6, improved, recheck 11/13 - 3.9     Chronic pain - Patient with pain pump.     Pain - Patient on APAP/Oxycodone. Schedule Norco BID. Monitor for constipation. Continue scheduled [place] BID  I discussed the risks and benefits of using opiate medications for pain control.  I discussed the risk of addiction, potential for overdose, and respiratory depression (and the potential need for opiate antagonist therapy if this occurs).  I encouraged the patient to take this medication sparingly with the expressed goal of weaning off the medication as soon as is clinically appropriate.  I informed the patient that we are only able to provide up to a 14 day supply of these medications at discharge and that they will be responsible for requesting any refills needed from their primary care provider or their surgeon.  We discussed the need to safely secure these medications to prevent theft, inadvertent ingestion, or misuse.  Any unused medication should be immediately disposed of through a sanctioned medication disposal program.  We discussed adjunctive pain medications and conservative therapies at length.I answered the patient's questions regarding this treatment, and the patient indicated understanding and willingness to proceed.     Skin - Patient at risk for skin breakdown due to debility in areas including sacrum, achilles, elbows and head in addition to other sites. Nursing to assess skin daily.      GI Ppx - Patient on Prilosec for GERD prophylaxis. Patient on Senna-docusate for constipation  prophylaxis.      DVT Ppx - Patient Lovenox on transfer. Continue Lovenox as limited mobility  ____________________________________    T. Reymundo Pak MD/PhD  [facility] - Physical Medicine & Rehabilitation   [facility] - Brain Injury Medicine   ____________________________________    Total time:  50 minutes. Time spent included pre-rounding review of vitals and tests, unit/floor time, face-to-face time with the patient including physical examination, care coordination, counseling of patient and/or family, ordering medications/procedures/tests, discussion with CM, PT, OT, SLP and/or other healthcare providers, and documentation in the electronic medical record. Topics discussed included discharge planning, discharge medications, opiate counseling, and follow-up PCP.

## 2023-11-20 NOTE — DISCHARGE SUMMARY
Physical Medicine & Rehabilitation Discharge Summary    Admission Date: 11/9/2023    Discharge Date: 11/21/2023    Attending Provider: Deepti Pak MD/PhD    Admission Diagnosis:   Active Hospital Problems    Diagnosis     *SDH (subdural hematoma) (HCC)     Altered mental status     Acute blood loss anemia     Chronic pain syndrome     Subdural hematoma (HCC)     Atrial fibrillation with rapid ventricular response (HCC)        Discharge Diagnosis:  Active Hospital Problems    Diagnosis     *SDH (subdural hematoma) (HCC)     Altered mental status     Acute blood loss anemia     Chronic pain syndrome     Subdural hematoma (HCC)     Atrial fibrillation with rapid ventricular response (HCC)        HPI per Admission History & Physical:  The patient is a 88 y.o.  female with a past medical history of HTN, chronic pain s/p pain pump, and remote history of a fib not on AC, ;  who presented on 11/3/2023  9:59 PM as a transfer from OSH with concern for SDH. Per documentation, patient presented to OSH with issues with memory and AMS. CT head obtained at OSH showed SDH with right to left 8mm midline shift. Patient was transferred to [facility], Neurosurgery was consulted, and patient was taken to the OR on 11/4 for right sided craniotomy for evacuation of SDH and resection of membranes performed by Dr. Alves. Post op, patient has required precedex drip which is being weaned. Patient' required fentanyl post op, but this has been weaned after patient's pain pump was interrogated by [employer] and confirmed patient's pain pump is in working order. Patient's hospital course has been notable for leukocytosis and ABLA.      Patient was admitted to [facility] on 11/9/2023.     Hospital Course by Problem List:   R sided SDH - Patient with 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 Alves  -PT and OT for mobility and ADLs. Per guidelines, 15 hours per week between PT, OT and/or  SLP.  -Follow-up NSG. Repeat CT head 11/16/23 - pending     HTN - Patient on Atenolol 25 mg QHS. Switch to Metoprolol 25 mg BID. Continue Metoprolol -> increase to 37.5 as elevated HR and SBP. Ongoing elevation increase to 50 mg. Continue Lasix and Metoprolol 50 mg     HLD - Patient on Atorvastatin 40 mg QHS     Anemia - Check AM CBC - 8.9, worsening. Recheck 11/13 0- 9.7, will monitor      Hypokalemia - Check AM CMP - 3.6, improved, recheck 11/13 - 3.9     Chronic pain - Patient with pain pump.     Pain - Patient on APAP/Oxycodone. Schedule Norco BID. Monitor for constipation. Continue scheduled [place] BID  I discussed the risks and benefits of using opiate medications for pain control.  I discussed the risk of addiction, potential for overdose, and respiratory depression (and the potential need for opiate antagonist therapy if this occurs).  I encouraged the patient to take this medication sparingly with the expressed goal of weaning off the medication as soon as is clinically appropriate.  I informed the patient that we are only able to provide up to a 14 day supply of these medications at discharge and that they will be responsible for requesting any refills needed from their primary care provider or their surgeon.  We discussed the need to safely secure these medications to prevent theft, inadvertent ingestion, or misuse.  Any unused medication should be immediately disposed of through a sanctioned medication disposal program.  We discussed adjunctive pain medications and conservative therapies at length.I answered the patient's questions regarding this treatment, and the patient indicated understanding and willingness to proceed.     Skin - Patient at risk for skin breakdown due to debility in areas including sacrum, achilles, elbows and head in addition to other sites. Nursing to assess skin daily.      GI Ppx - Patient on Prilosec for GERD prophylaxis. Patient on Senna-docusate for constipation prophylaxis.       DVT Ppx - Patient Lovenox on transfer. Continue Lovenox as limited mobility    Functional Status at Discharge  Eating:  Supervision  Eating Description:     Grooming:  Modified Independent  Grooming Description:  Increased time, Seated in wheelchair at sink  Bathing:  Standby Assist (SBA to bathe in standing, washed feet while sitting )  Bathing Description:     Upper Body Dressing:  Modified Independent  Upper Body Dressing Description:  Increased time  Lower Body Dressing:  Supervision  Lower Body Dressing Description:  Increased time, Initial preparation for task     Walk:  Standby Assist  Distance Walked:  500  Number of Times Distance Was Traveled:  2  Assistive Device:  4 Wheel Walker  Gait Deviation:   (cues to keep FWW closer, reports she is able to steer better by advancing walker)  Wheelchair:  Supervised  Distance Propelled:  150   Wheelchair Description:  Extra time, Supervision for safety  Stairs Contact Guard Assist  Stairs Description Extra time, Hand rails, Supervision for safety (demonstrates no buckling with step over step)     Comprehension:  Independent  Comprehension Description:     Expression:  Independent  Expression Description:     Social Interaction:  Independent  Social Interaction Description:     Problem Solving:  Supervision  Problem Solving Description:  Verbal cueing, Therapy schedule, Increased time, Bed/chair alarm, Seat belt  Memory:  Supervision  Memory Description:  Verbal cueing, Therapy schedule, Seat belt, Increased time, Bed/chair alarm  Progress since Admit: Pt has made solid progress since admission. Pt scoring within typical functioning on initiall assessment, however, skilled ST services recommended while at [facility] given pt's PLOF and independence with IADLs. Pt has been able to complete mock IADL tasks here and is cleared to resume independently at home at d/c. No further ST services recommended at this time, pt to d/c home with support of family.  Discharge Location  : Home  Patient Discharging with Assist of: Spouse / Significant Other;Family   Level of Supervision Required: Intermittent Supervision  Recommended Services Upon Discharge: No Follow-Up Speech Therapy Recommended  Long Term Goals Met: 1/1  Long Term Goals Not Met: 0/1  Reason(s) for Goals Not Met: NA  Criteria for Termination of Services: Maximum Function Achieved for Inpatient Rehabilitation    Deepti AVILA M.D., personally performed a complete drug regimen review and no potential clinically significant medication issues were identified.   Discharge Medication:     Medication List        START taking these medications        Instructions   furosemide 20 MG Tabs  Commonly known as: Lasix   Take 1 Tablet by mouth 1/2 hour after dinner.  Dose: 20 mg     metoprolol tartrate 50 MG Tabs  Commonly known as: Lopressor   Take 1 Tablet by mouth 2 times a day.  Dose: 50 mg            CONTINUE taking these medications        Instructions   alendronate 70 MG Tabs  Commonly known as: Fosamax   Take 70 mg by mouth every 7 days.  Dose: 70 mg     atorvastatin 40 MG Tabs  Commonly known as: Lipitor   Take 1 Tablet by mouth every evening.  Dose: 40 mg     gabapentin 300 MG Caps  Commonly known as: Neurontin   Take 1-2 Capsules by mouth 2 times a day. 300 mg in the morning, 600 mg in the evening  Dose: 300-600 mg     MAGNESIUM PO   Take 350 mg by mouth every evening.  Dose: 350 mg     Pain Pump Vashti  Commonly known as: Patient Supplied   Inject  as directed continuous. Patient's Pain Pump (placed and maintained as an outpatient)    Medications/concentrations:   Morphine (20.0 mg/mL)  Fentanyl (124.9 mcg/mL)  Route: Intrathecal  Last changed: 10/5/23  Refill pump before date: 12/29/23  Continuous infusion rates (Drug/Rate):   Morphine: 2.007 mg/day (0.084 mg/hr)  Fentanyl: 12.54 mg/day (0.52 mcg/hr)  Patient activation dose:   Morphine: 0.200 mg  Fentanyl: 1.25 mcg  Patient activation lockout interval: 1 hr  Maximum  activations per day: 10/day    MD office: Dr Grant  Phone number: [phone]     TYLENOL/CODEINE #4 300-60 MG tablet  Generic drug: acetaminophen-codeine #4   Take 1 Tablet by mouth 3 times a day as needed.  Dose: 1 Tablet     Vitamin C 1000 MG Tabs   Take 1,000 mg by mouth every evening.  Dose: 1,000 mg            STOP taking these medications      atenolol 25 MG Tabs  Commonly known as: Tenormin     docusate calcium 240 MG Caps  Commonly known as: Surfak     enoxaparin 40 MG/0.4ML Sosy inj  Commonly known as: Lovenox     fluticasone 50 MCG/ACT nasal spray  Commonly known as: Flonase     levETIRAcetam 500 MG Tabs  Commonly known as: Keppra     loratadine 10 MG Tabs  Commonly known as: Claritin     phosphorus 250 MG tablet  Commonly known as: K-Phos-Neutral              Discharge Diet:  Current Diet Order   Procedures    Diet Order Diet: Regular       Discharge Activity:  As tolerated     Disposition:  Patient to discharge home with family support and community resources.    Equipment:  FWW    Follow-up & Discharge Instructions:  Follow up with your primary care provider (PCP) within 7-10 days of discharge to review your medications and take over your care.     If you develop chest pain, fever, chills, change in neurologic function (weakness, sensation changes, vision changes), or other concerning sxs, seek immediate medical attention or call 911.      Future Appointments   Date Time Provider Department Center   11/20/2023  1:00 PM Rodriguez Rhodes, PT [facility] None       Condition on Discharge:  Good    More than 31 minutes was spent on discharging this patient, including face-to-face time, prescription management, and the dictation of this note.    Deepti Pak M.D.    Date of Service: 11/21/2023

## 2023-11-20 NOTE — CARE PLAN
Problem: Speech/Swallowing LTGs  Goal: LTG-By discharge, patient will solve complex problems with modified independence.   Outcome: Met     Problem: Problem Solving STGs  Goal: STG-Within one week, patient will complete a mock medication sorting task with spv to achieve 100% accuracy.    Outcome: Met     Problem: Memory STGs  Goal: STG-Within one week, patient will recall new information related to her current hospitalization using functional memory strategies with spv.    Outcome: Met

## 2023-11-20 NOTE — CARE PLAN
"  Problem: Fall Risk - Rehab  Goal: Patient will remain free from falls  Outcome: Progressing   Talisha Hong Fall risk Assessment Score: 15    High fall risk Interventions   - Alarming seatbelt  - Wander guard  - Bed and strip alarm   - Yellow sign by the door   - Yellow wrist band \"Fall risk\"  - Room near to the nurse station  - Do not leave patient unattended in the bathroom  - Fall risk education provided          Problem: Pain - Standard  Goal: Alleviation of pain or a reduction in pain to the patient’s comfort goal  Outcome: Progressing   Patient is able to rate pain on a scale of 1-10.   "

## 2023-11-20 NOTE — THERAPY
Speech Language Pathology  Daily Treatment     Patient Name: Anita Beard  Age:  88 y.o., Sex:  female  Medical Record #: [id number]  Today's Date: 11/20/2023     Precautions  Precautions: Fall Risk  Comments: pain pump for chronic back/neck pain, craniotomy    Subjective    Assumed care from PT, pt pleasant and cooperative. Ready for d/c tomorrow.     Objective       11/20/23 1004   Treatment Charges   SLP Cognitive Skill Development First 15 Minutes 1   SLP Cognitive Skill Development Additional 15 Minutes 1   SLP Total Time Spent   SLP Individual Total Time Spent (Mins) 30   Cognition   Executive Functioning / Organization Supervision (5)   Interdisciplinary Plan of Care Collaboration   IDT Collaboration with  Physical Therapist   Patient Position at End of Therapy In Bed;Call Light within Reach;Tray Table within Reach;Phone within Reach   Collaboration Comments assumed care from PT         Assessment    Follow up outcome assessment testing not completed this date, as pt scored within typical functioning on initial assessment. Executive function task completed with pt requiring MIN cues to organize information initially, otherwise completed with 94% accuracy.     Strengths: Able to follow instructions, Alert and oriented, Pleasant and cooperative, Willingly participates in therapeutic activities, Motivated for self care and independence  Barriers: Impaired carryover of learning, Impaired functional cognition    Plan    Anticipated d/c scheduled for 11/21/23.     Passport items to be completed:  complete caregiver training     Speech Therapy Problems (Active)       Problem: Memory STGs       Dates: Start:  11/10/23         Goal: STG-Within one week, patient will recall new information related to her current hospitalization using functional memory strategies with spv.         Dates: Start:  11/10/23               Problem: Problem Solving STGs       Dates: Start:  11/10/23         Goal: STG-Within one week, patient will  complete a mock medication sorting task with spv to achieve 100% accuracy.         Dates: Start:  11/10/23               Problem: Speech/Swallowing LTGs       Dates: Start:  11/10/23         Goal: LTG-By discharge, patient will solve complex problems with modified independence.        Dates: Start:  11/10/23

## 2023-11-20 NOTE — DISCHARGE PLANNING
cm  Information reviewed w/ pt and dtr;   Pt is declining 4ww as she states she plan on using her 3www in her 5th whee.  Tc to [facility]; they have accepted pt.     Follow up with:    Cata Barcenas M.D.  [address] [facility] [address] [phone]   Please follow up with your Primary Care MD.     [facility]  [address] [facility] [address] [phone]  [facility] will call you to scheudle home visits with a RN/PT/OT.     Johan Alves III, M.D.  [address] [id number] [phone]   Please follow up with your NEUROSURGEON     Dtr to provide transportation home.   Reviewed signed copy of IMM and answered all questions.    Dc date /disposition:  11/21/home w/ home health

## 2023-11-20 NOTE — THERAPY
Occupational Therapy  Daily Treatment     Patient Name: Anita Beard  Age:  88 y.o., Sex:  female  Medical Record #: [id number]  Today's Date: 11/20/2023     Precautions  Precautions: Fall Risk  Comments: pain pump for chronic back/neck pain, craniotomy         Subjective    Pt was agreeable for an OT tx session this a.m.     Objective       11/20/23 0701   OT Charge Group   OT Self Care / ADL (Units) 4   OT Total Time Spent   OT Individual Total Time Spent (Mins) 60   Precautions   Precautions Fall Risk   Comments pain pump for chronic back/neck pain, craniotomy   Pain 0 - 10 Group   Pain Rating Scale (NPRS) 0   Functional Level of Assist   Grooming Modified Independent   Grooming Description Increased time;Seated in wheelchair at sink   Upper Body Dressing Modified Independent   Upper Body Dressing Description Increased time   Lower Body Dressing Supervision   Lower Body Dressing Description Increased time;Initial preparation for task   Toileting Supervision   Toileting Description Grab bar;Increased time   Toilet Transfers Supervised   Toilet Transfer Description Grab bar;Increased time;Supervision for safety   Interdisciplinary Plan of Care Collaboration   IDT Collaboration with  Certified Nursing Assistant   Patient Position at End of Therapy Seated;Chair Alarm On;Call Light within Reach;Tray Table within Reach;Phone within Reach     Pt performed ADL's in room and bathroom. Pt improved with UB ADL's to Mod I and Supervision with LB ADL's and toilet t/f's.    Assessment  Pt tolerated OT tx well with no c/o pain or discomfort.  Strengths: Able to follow instructions, Alert and oriented, Effective communication skills, Adequate strength, Good insight into deficits/needs, Independent prior level of function, Making steady progress towards goals, Motivated for self care and independence, Pleasant and cooperative, Supportive family, Willingly participates in therapeutic activities  Barriers: Decreased endurance, Fatigue,  Impaired activity tolerance, Impaired balance    Plan      Pt may be discharged home with family tomorrow.  DME       Passport items to be completed:      Occupational Therapy Goals (Active)       Problem: Dressing       Dates: Start:  11/10/23         Goal: STG-Within one week, patient will dress LB with SBA        Dates: Start:  11/10/23               Problem: Functional Transfers       Dates: Start:  11/10/23         Goal: STG-Within one week, patient will transfer to toilet with SBA        Dates: Start:  11/10/23               Problem: OT Long Term Goals       Dates: Start:  11/10/23         Goal: LTG-By discharge, patient will complete basic self care tasks with mod I        Dates: Start:  11/10/23            Goal: LTG-By discharge, patient will perform bathroom transfers with mod I        Dates: Start:  11/10/23            Goal: LTG-By discharge, patient will complete basic home management with mod I        Dates: Start:  11/10/23               Problem: Toileting       Dates: Start:  11/10/23         Goal: STG-Within one week, patient will complete toileting tasks with SBA        Dates: Start:  11/10/23

## 2023-11-21 VITALS
HEIGHT: 63 IN | HEART RATE: 81 BPM | SYSTOLIC BLOOD PRESSURE: 134 MMHG | RESPIRATION RATE: 17 BRPM | TEMPERATURE: 97.9 F | DIASTOLIC BLOOD PRESSURE: 55 MMHG | OXYGEN SATURATION: 100 % | WEIGHT: 156.53 LBS | BODY MASS INDEX: 27.73 KG/M2

## 2023-11-21 PROCEDURE — 700102 HCHG RX REV CODE 250 W/ 637 OVERRIDE(OP): Performed by: PHYSICAL MEDICINE & REHABILITATION

## 2023-11-21 PROCEDURE — 99239 HOSP IP/OBS DSCHRG MGMT >30: CPT | Performed by: PHYSICAL MEDICINE & REHABILITATION

## 2023-11-21 PROCEDURE — A9270 NON-COVERED ITEM OR SERVICE: HCPCS | Performed by: PHYSICAL MEDICINE & REHABILITATION

## 2023-11-21 PROCEDURE — 94760 N-INVAS EAR/PLS OXIMETRY 1: CPT

## 2023-11-21 RX ADMIN — GABAPENTIN 300 MG: 300 CAPSULE ORAL at 09:00

## 2023-11-21 RX ADMIN — HYDROCODONE BITARTRATE AND ACETAMINOPHEN 1 TABLET: 10; 325 TABLET ORAL at 08:59

## 2023-11-21 RX ADMIN — METOPROLOL TARTRATE 50 MG: 25 TABLET, FILM COATED ORAL at 08:59

## 2023-11-21 RX ADMIN — LEVETIRACETAM 500 MG: 500 TABLET, FILM COATED ORAL at 09:00

## 2023-11-21 ASSESSMENT — BRIEF INTERVIEW FOR MENTAL STATUS (BIMS)
ASKED TO RECALL SOCK: YES, NO CUE REQUIRED
BIMS SUMMARY SCORE: 15
ASKED TO RECALL BLUE: YES, NO CUE REQUIRED
INITIAL REPETITION OF BED BLUE SOCK - FIRST ATTEMPT: 3
WHAT YEAR IS IT: CORRECT
WHAT MONTH IS IT: ACCURATE WITHIN 5 DAYS
ASKED TO RECALL BED: YES, NO CUE REQUIRED
WHAT DAY OF THE WEEK IS IT: CORRECT

## 2023-11-21 ASSESSMENT — PATIENT HEALTH QUESTIONNAIRE - PHQ9
1. LITTLE INTEREST OR PLEASURE IN DOING THINGS: NOT AT ALL
SUM OF ALL RESPONSES TO PHQ9 QUESTIONS 1 AND 2: 0
2. FEELING DOWN, DEPRESSED, IRRITABLE, OR HOPELESS: NOT AT ALL

## 2023-11-21 ASSESSMENT — ACTIVITIES OF DAILY LIVING (ADL)
TOILET_TRANSFER_LEVEL_OF_ASSIST: REQUIRES SUPERVISION WITH TOILET TRANSFER
TOILETING_LEVEL_OF_ASSIST: REQUIRES SUPERVISION WITH TOILETING
TOILETING_LEVEL_OF_ASSIST_DESCRIPTION: GRAB BAR;INCREASED TIME
TUB_SHOWER_TRANSFER_DESCRIPTION: GRAB BAR;SHOWER BENCH
SHOWER_TRANSFER_LEVEL_OF_ASSIST: REQUIRES SUPERVISION WITH SHOWER TRANSFER

## 2023-11-21 NOTE — THERAPY
Occupational Therapy   Discharge Summary     Patient Name: Anita Beard  Age:  88 y.o., Sex:  female  Medical Record #: [id number]  Today's Date: 11/21/2023     Precautions  Precautions: Fall Risk  Comments: pain pump for chronic back/neck pain, craniotomy         Subjective    Pt was discharged today.     Objective       11/21/23 1512   Precautions   Comments pain pump for chronic back/neck pain, craniotomy   Functional Level of Assist   Eating Modified Independent   Grooming Modified Independent   Bathing Standby Assist   Upper Body Dressing Modified Independent   Lower Body Dressing Supervision   Lower Body Dressing Description Increased time;Initial preparation for task   Toileting Supervision   Toileting Description Grab bar;Increased time   Toilet Transfers Supervised   Tub / Shower Transfers Standby Assist   Tub Shower Transfer Description Grab bar;Shower bench   Comprehension Independent   Expression Independent   Problem Solving Modified Independent   Memory Modified Independent   IADL Treatments   IADL Treatments Kitchen mobility education   Kitchen Mobility Education Educated pt on safety within the kitchen. Pt reported having a small kitchen but similar height cabinets. Pt engaged in reaching into high/low cabinets with use of counter for support. Discussed making sure to close all cabinets once getting item out- 2/2 hitting head often on cabinets. Pt completed kitchen mobility wiht SBA   Bed Mobility    Scooting Modified Independent   Rolling Independent   Eating   Assistance Needed Independent   Physical Assistance Level No physical assistance   CARE Score - Eating 6   Oral Hygiene   Assistance Needed Independent   Physical Assistance Level No physical assistance   CARE Score - Oral Hygiene 6   Toileting Hygiene   Assistance Needed Supervision   Physical Assistance Level No physical assistance   CARE Score - Toileting Hygiene 4   Shower/Bathe Self   Assistance Needed Supervision   Physical Assistance Level  "No physical assistance   CARE Score - Shower/Bathe Self 4   Upper Body Dressing   Assistance Needed Independent   Physical Assistance Level No physical assistance   CARE Score - Upper Body Dressing 6   Lower Body Dressing   Assistance Needed Supervision   Physical Assistance Level No physical assistance   CARE Score - Lower Body Dressing 4   Putting On/Taking Off Footwear   Assistance Needed Supervision   Physical Assistance Level No physical assistance   CARE Score - Putting On/Taking Off Footwear 4   Toilet Transfer   Assistance Needed Supervision   Physical Assistance Level No physical assistance   CARE Score - Toilet Transfer 4   Cognitive Pattern Assessment   Cognitive Pattern Assessment Used BIMS   Brief Interview for Mental Status (BIMS)   Repetition of Three Words (First Attempt) 3   Temporal Orientation: Year Correct   Temporal Orientation: Month Accurate within 5 days   Temporal Orientation: Day Correct   Recall: \"Sock\" Yes, no cue required   Recall: \"Blue\" Yes, no cue required   Recall: \"Bed\" Yes, no cue required   BIMS Summary Score 15   Confusion Assessment Method (CAM)   Is there evidence of an acute change in mental status from the patient's baseline? No   Inattention Behavior not present   Disorganized thinking Behavior not present   Altered level of consciousness Behavior not present   Discharge Summary    Discharge Location  Home   Patient Discharging with Assist of Family    Level of Supervision Required Intermittent Supervision   Recommended Equipment for Discharge 3 in 1 Commode   Recommended Services Upon Discharge Home Health Occupational Therapy   Long Term Goals Met 0   Long Term Goals Not Met 3   Reason(s) for Goals Not Met Supervision recommended for safety while standing to pull up pants or on wet surfaces in shower.   Discharge Instructions to Patient   Level of Assist Required for Eating Able to Complete Eating without Assist   Level of Assist Required for Grooming Able to Complete " Grooming without Assist   Level of Assist Required for Dressing Requires Supervision with Dressing  (Recommended for LB dressing only.)   Level of Assist Required for Toileting Requires Supervision with Toileting  (Recommended while standing to pull up pants.)   Level of Assist Required for Toilet Transfer Requires Supervision with Toilet Transfer   Equipment for Toilet Transfer Commode over Toilet   Level of Assist Required for Bathing Requires Supervision with Bathing  (Recommended for safety.)   Level of Assist Required for Shower Transfer Requires Supervision with Shower Transfer  (Recommended for safety.)   Level of Assist Required for Home Mgmt Requires Supervision with Home Management  (Recommended for satety.)   Level of Assist Required for Meal Prep Requires Supervision with Meal Preparation   Driving   (Recommended for safety.)       Assessment    Pt tolerated OT tx well.  Strengths: Able to follow instructions, Alert and oriented, Effective communication skills, Adequate strength, Good insight into deficits/needs, Independent prior level of function, Making steady progress towards goals, Motivated for self care and independence, Pleasant and cooperative, Supportive family, Willingly participates in therapeutic activities  Barriers: Decreased endurance, Fatigue, Impaired activity tolerance, Impaired balance    Plan    Pt discharged home with family.    Passport items to be completed:      Occupational Therapy Goals (Active)       Problem: Dressing       Dates: Start:  11/10/23         Goal: STG-Within one week, patient will dress LB with SBA        Dates: Start:  11/10/23   Goal met            Problem: Functional Transfers       Dates: Start:  11/10/23         Goal: STG-Within one week, patient will transfer to toilet with SBA        Dates: Start:  11/10/23   Goal met.            Problem: OT Long Term Goals       Dates: Start:  11/10/23         Goal: LTG-By discharge, patient will complete basic self care  tasks with mod I        Dates: Start:  11/10/23   Goal not met.         Goal: LTG-By discharge, patient will perform bathroom transfers with mod I        Dates: Start:  11/10/23   Goal not met.         Goal: LTG-By discharge, patient will complete basic home management with mod I        Dates: Start:  11/10/23   Goal not met.            Problem: Toileting       Dates: Start:  11/10/23         Goal: STG-Within one week, patient will complete toileting tasks with SBA        Dates: Start:  11/10/23   Goal met.

## 2023-11-21 NOTE — PROGRESS NOTES
Patient discharged to home per order.  Discharge instructions reviewed with patient and family; they verbalize understanding and signed copies placed in chart.  Patient has all belongings; signed copy of form in chart.  Patient left facility at 1130 via w/c accompanied by rehab staff and family.

## 2023-11-21 NOTE — PROGRESS NOTES
NURSING DAILY NOTE    Name: Anita Beard   Date of Admission: 11/9/2023   Admitting Diagnosis: SDH (subdural hematoma) ([facility])  Attending Physician: Deepti Pak M.d.  Allergies: Ace inhibitors    Safety  Patient Assist  sba  Patient Precautions  Fall Risk  Precaution Comments  pain pump for chronic back/neck pain, craniotomy  Bed Transfer Status  Independent  Toilet Transfer Status   Supervised  Assistive Devices  Rails, Wheelchair  Oxygen  None - Room Air  Diet/Therapeutic Dining  Current Diet Order   Procedures    Diet Order Diet: Regular     Pill Administration  whole  Agitated Behavioral Scale     ABS Level of Severity       Fall Risk  Has the patient had a fall this admission?   No  Talisha Hong Fall Risk Scoring  15, HIGH RISK  Fall Risk Safety Measures  bed alarm and chair alarm    Vitals  Temperature: 36.6 °C (97.8 °F)  Temp src: Oral  Pulse: 83  Respiration: 18  Blood Pressure : (!) 156/81  Blood Pressure MAP (Calculated): 106 MM HG  BP Location: Right, Upper Arm  Patient BP Position: Supine     Oxygen  Pulse Oximetry: 94 %  O2 (LPM): 0  FiO2%: 21 %  O2 Delivery Device: None - Room Air    Bowel and Bladder  Last Bowel Movement  11/20/23 (Per report patient had loose stool.)  Stool Type  Type 1: Separate hard lumps (hard to pass)  Bowel Device  Bathroom  Continent  Bladder: Stress incontinence   Bowel: Continent movement  Bladder Function  Urine Void (mL):  (moderate)  Number of Times Voided: 1  Urine Color: Yellow  Genitourinary Assessment   Bladder Assessment (WDL):  Within Defined Limits  Solis Catheter: Not Applicable  Urine Color: Yellow  Bladder Device: Bathroom  Time Void: Yes  Bladder Scan: Post Void  $ Bladder Scan Results (mL): 1    Skin  Satnam Score   18  Sensory Interventions   Bed Types: Standard/Trauma Mattress  Skin Preventative Measures: Pillows in Use for Support / Positioning  Moisture Interventions  Moisturizers/Barriers:  Barrier Wipes      Pain  Pain Rating Scale  7 - Focus of attention, prevents doing daily activities  Pain Location  Generalized  Pain Location Orientation  Right, Left, Posterior  Pain Interventions   Medication (see MAR)    ADLs    Bathing   Patient Refused Bathing  Linen Change      Personal Hygiene  Perineal Care  Chlorhexidine Bath      Oral Care  Brushed Teeth  Teeth/Dentures     Shave     Nutrition Percentage Eaten  Breakfast, Less than 25% Consumed  Environmental Precautions  Bed in Low Position, Treaded Slipper Socks on Patient  Patient Turns/Positioning  Patient Turns Self from Side to Side  Patient Turns Assistance/Tolerance     Bed Positions  Bed Controls On  Head of Bed Elevated  Self regulated      Psychosocial/Neurologic Assessment  Psychosocial Assessment  Psychosocial (WDL):  Within Defined Limits  Neurologic Assessment  Neuro (WDL): Within Defined Limits  Level of Consciousness: Alert  Orientation Level: Oriented X4  Cognition: Appropriate judgement, Appropriate safety awareness, Appropriate attention/concentration, Appropriate for developmental age, Follows commands  Speech: Clear  Pupil Assesment: No  Motor Function/Sensation Assessment: Sensation  RLE Sensation: Other (Comment) (neuropathy)  LLE Sensation: Other (Comment) (neuropathy)  EENT (WDL):  WDL Except    Cardio/Pulmonary Assessment  Edema   RLE Edema: 2+  LLE Edema: 2+  Respiratory Breath Sounds  RUL Breath Sounds: Clear  RML Breath Sounds: Clear  RLL Breath Sounds: Clear  NIDIA Breath Sounds: Clear  LLL Breath Sounds: Clear  Cardiac Assessment   Cardiac (WDL):  Within Defined Limits

## 2023-11-21 NOTE — CARE PLAN
"  Problem: Pain - Standard  Goal: Alleviation of pain or a reduction in pain to the patient’s comfort goal  Outcome: Progressing   Patient is able to rate pain on a scale of 1-10.           Problem: Fall Risk - Rehab  Goal: Patient will remain free from falls  Outcome: Progressing   Talisha Hong Fall risk Assessment Score: 15    High fall risk Interventions   - Alarming seatbelt  - Wander guard  - Bed and strip alarm   - Yellow sign by the door   - Yellow wrist band \"Fall risk\"  - Room near to the nurse station  - Do not leave patient unattended in the bathroom  - Fall risk education provided             "

## 2023-11-21 NOTE — FLOWSHEET NOTE
11/20/23 1620   Events/Summary/Plan   Events/Summary/Plan CPAP Check   Vital Signs   Pulse 70   Respiration 16   Pulse Oximetry 95 %   $ Pulse Oximetry (Spot Check) Yes   Respiratory Assessment   Respiratory Pattern Within Normal Limits   Level of Consciousness Alert   Chest Exam   Work Of Breathing / Effort Within Normal Limits   Oxygen   O2 Delivery Device None - Room Air   Non-Invasive Ventilation [facility]   Nocturnal CPAP or BIPAP BIPAP - Home Unit  (2:40 hrs CPAP use last night)

## 2023-11-21 NOTE — PROGRESS NOTES
NURSING DAILY NOTE    Name: Anita Beard   Date of Admission: 11/9/2023   Admitting Diagnosis: SDH (subdural hematoma) ([facility])  Attending Physician: Deepti Pak M.d.  Allergies: Ace inhibitors    Safety  Patient Assist  CGA  Patient Precautions  Fall Risk  Precaution Comments  pain pump for chronic back/neck pain, craniotomy  Bed Transfer Status  Independent  Toilet Transfer Status   Supervised  Assistive Devices  Rails, Wheelchair  Oxygen  None - Room Air  Diet/Therapeutic Dining  Current Diet Order   Procedures    Diet Order Diet: Regular     Pill Administration  whole  Agitated Behavioral Scale     ABS Level of Severity       Fall Risk  Has the patient had a fall this admission?   No  Talisha Hong Fall Risk Scoring  15, HIGH RISK  Fall Risk Safety Measures  bed alarm, chair alarm, and poor balance    Vitals  Temperature: 36.4 °C (97.6 °F)  Temp src: Oral  Pulse: 65  Respiration: 18  Blood Pressure : 105/69  Blood Pressure MAP (Calculated): 81 MM HG  BP Location: Right, Forearm  Patient BP Position: Ramirez's Position     Oxygen  Pulse Oximetry: 100 %  O2 (LPM): 0  FiO2%: 21 %  O2 Delivery Device: None - Room Air    Bowel and Bladder  Last Bowel Movement  11/18/23  Stool Type  Type 1: Separate hard lumps (hard to pass)  Bowel Device  Bathroom  Continent  Bladder: Stress incontinence   Bowel: Continent movement  Bladder Function  Urine Void (mL):  (moderate)  Number of Times Voided: 1  Urine Color: Yellow  Genitourinary Assessment   Bladder Assessment (WDL):  Within Defined Limits  Solis Catheter: Not Applicable  Urine Color: Yellow  Bladder Device: Bathroom  Time Void: Yes  Bladder Scan: Post Void  $ Bladder Scan Results (mL): 1    Skin  Satnam Score   18  Sensory Interventions   Bed Types: Standard/Trauma Mattress  Skin Preventative Measures: Pillows in Use for Support / Positioning  Moisture Interventions  Moisturizers/Barriers: Barrier  Wipes      Pain  Pain Rating Scale  6 - Hard to ignore, avoid usual activities  Pain Location  Generalized  Pain Location Orientation  Right, Left, Posterior  Pain Interventions   Medication (see MAR)    ADLs    Bathing   Patient Refused Bathing  Linen Change      Personal Hygiene  Perineal Care  Chlorhexidine Bath      Oral Care  Brushed Teeth  Teeth/Dentures     Shave     Nutrition Percentage Eaten  Breakfast, Less than 25% Consumed  Environmental Precautions  Bed in Low Position, Treaded Slipper Socks on Patient  Patient Turns/Positioning  Patient Turns Self from Side to Side  Patient Turns Assistance/Tolerance     Bed Positions  Bed Controls On  Head of Bed Elevated  Self regulated      Psychosocial/Neurologic Assessment  Psychosocial Assessment  Psychosocial (WDL):  Within Defined Limits  Neurologic Assessment  Neuro (WDL): Within Defined Limits  Level of Consciousness: Alert  Orientation Level: Oriented X4  Cognition: Appropriate judgement, Appropriate safety awareness, Appropriate attention/concentration, Appropriate for developmental age, Follows commands  Speech: Clear  Pupil Assesment: No  Motor Function/Sensation Assessment: Sensation  RLE Sensation: Other (Comment) (neuropathy)  LLE Sensation: Other (Comment) (neuropathy)  EENT (WDL):  WDL Except    Cardio/Pulmonary Assessment  Edema   RLE Edema: 2+  LLE Edema: 2+  Respiratory Breath Sounds  RUL Breath Sounds: Clear  RML Breath Sounds: Clear  RLL Breath Sounds: Clear  NIDIA Breath Sounds: Clear  LLL Breath Sounds: Clear  Cardiac Assessment   Cardiac (WDL):  Within Defined Limits

## 2023-11-21 NOTE — THERAPY
Physical Therapy   Daily Treatment     Patient Name: Anita Beard  Age:  88 y.o., Sex:  female  Medical Record #: [id number]  Today's Date: 11/21/2023     Precautions  Precautions: Fall Risk  Comments: pain pump for chronic back/neck pain, craniotomy    Subjective    Late entry for 11/17/23     Objective       11/17/23 1231   PT Charge Group   PT Gait Training (Units) 1   PT Therapeutic Exercise (Units) 1   PT Total Time Spent   PT Individual Total Time Spent (Mins) 30   Gait Functional Level of Assist    Gait Level Of Assist Supervised   Assistive Device 4 Wheel Walker   Distance (Feet) 125   # of Times Distance was Traveled 1   Stairs Functional Level of Assist   Level of Assist with Stairs Contact Guard Assist   # of Stairs Climbed 12   Stairs Description Extra time;Hand rails;Supervision for safety   Sitting Lower Body Exercises   Long Arc Quad 2 sets of 10;Bilateral   Hamstring Curl 2 sets of 10;Bilateral;Light Resistance Theraband   Sit to Stand 1 set of 10   Standing Lower Body Exercises   Hip Extension 2 sets of 10;Bilateral    Hip Abduction 2 sets of 10;Bilateral   Heel Rise 2 sets of 10;Bilateral   Interdisciplinary Plan of Care Collaboration   Patient Position at End of Therapy Seated;Chair Alarm On;Call Light within Reach;Tray Table within Reach;Phone within Reach         Assessment    Patient is improving activity tolerance and general strength, is expected to be safe to return to home by anticipated discharge date.     Strengths: Able to follow instructions, Adequate strength, Effective communication skills, Good insight into deficits/needs, Independent prior level of function, Making steady progress towards goals, Motivated for self care and independence, Pleasant and cooperative, Supportive family, Willingly participates in therapeutic activities  Barriers: Impaired activity tolerance, Visual impairment    Plan    Strength, balance, cardiovascular endurance as able    DME  PT DME  Recommendations  Assistive Device: 4-Wheel Walker    Passport items to be completed:    Physical Therapy Problems (Active)       There are no active problems.

## 2023-11-21 NOTE — FLOWSHEET NOTE
11/21/23 0717   Events/Summary/Plan   Events/Summary/Plan 02 pulse ox check   Vital Signs   Pulse 74   Respiration 16   Pulse Oximetry 99 %   $ Pulse Oximetry (Spot Check) Yes   Respiratory Assessment   Level of Consciousness Alert   Chest Exam   Work Of Breathing / Effort Within Normal Limits   Oxygen   O2 (LPM) 2   O2 Delivery Device Silicone Nasal Cannula

## 2023-11-21 NOTE — CARE PLAN
Problem: Balance  Goal: STG-Within one week, patient will improve [place] >51/56  Outcome: Discharged - Not Met     Problem: Mobility  Goal: STG-Within one week, patient will improve 6 minute walk test > 750 ft with LRAD  Outcome: Discharged - Not Met     Problem: Mobility Transfers  Goal: STG-Within one week, patient will demonstrate fall recovery with CGA  Outcome: Discharged - Not Met

## 2023-11-22 ENCOUNTER — TELEPHONE (OUTPATIENT)
Dept: HEALTH INFORMATION MANAGEMENT | Facility: OTHER | Age: 88
End: 2023-11-22
Payer: MEDICARE

## 2024-04-26 NOTE — THERAPY
Physical Therapy   Initial Evaluation     Patient Name: Anita Beard  Age:  88 y.o., Sex:  female  Medical Record #: [id number]  Today's Date: 11/10/2023     Subjective    Patient is found seated up in chair, has her toiletries in her lap and is looking to organize them in the bathroom. Placed in drawer near sink. Agreeable to participate.      Objective       11/10/23 1231   PT Charge Group   PT Gait Training (Units) 1   PT Evaluation PT Evaluation Mod   PT Total Time Spent   PT Individual Total Time Spent (Mins) 60   Prior Living Situation   Prior Services Home-Independent   Housing / Facility Mobile Home   Steps Into Home 4   Steps In Home 1   Rail Both Rail (Steps into Home)  (grab bars in shower)   Equipment Owned Tub / Shower Seat;Grab Bar(s) In Tub / Shower;Single Point Cane  (3 wheeled walker)   Lives with - Patient's Self Care Capacity Alone and Able to Care For Self  (lives in mobile home on daughter's property)   Prior Level of Functional Mobility   Bed Mobility Independent   Transfer Status Independent   Ambulation Independent   Distance Ambulation (Feet)   (community ambulator, uses 3 wheeled walker for long distances)   Assistive Devices Used   (3 wheeled walker)   Stairs Independent   Prior Functioning: Everyday Activities   Self Care Independent   Indoor Mobility (Ambulation) Independent   Stairs Independent   Functional Cognition Independent   Prior Device Use Walker   Strength Lower Body   Lower Body Strength  X   Gross Strength Generalized Weakness, Equal Bilaterally   Sensation Lower Body   Lower Extremity Sensation   X   Rt Lower Extremity Light Touch Impaired   Lt Lower Extremity Light Touch Impaired   Comments peripheral neuropathy   Balance Assessment   Sitting Balance (Static) Good   Sitting Balance (Dynamic) Good   Standing Balance (Static) Fair   Standing Balance (Dynamic) Fair -   Weight Shift Sitting Good   Weight Shift Standing Fair   Comments [place] 45/56   Bed Mobility    Supine to Sit  Please notify patient that I reviewed his chart and agree with PA.  She is going to increase dose of Norco for now, and I also agree with PA (and disagree with pharmacist) it is absolutely okay to take the Tylenol WITH the Norco - Norco only has 325mg of Tylenol in it, so an additional tablet of 325mg of Tylenol with the Norco is a good option.  Also agree as long as the Tylenol dose remains 4000mg/day or less we are in good shape.  Would also continue Naproxen as prescribed as the Norco is only a pain killer/masks the pain, whereas the Naproxen is important to help promote healing in the form of anti-inflammation as well as pain control.      I appreciate the rib fractures are awful.  Having to breathe 12-20x/minute every minute of every hour of every day with fractured ribs is awful.  Often controlling the pain is difficult if not impossible.  Would recommend throwing many different options at it, not just narcotics.  Consider heat to the painful area (no more than 20 minutes at a time) and OTC Lidocaine patches as well.   Standby Assist   Sit to Supine Standby Assist   Sit to Stand Contact Guard Assist   Scooting Contact Guard Assist   Coordination Lower Body    Coordination Lower Body  WDL   Roll Left and Right   Assistance Needed Supervision   Physical Assistance Level No physical assistance   CARE Score - Roll Left and Right 4   Roll Left and Right Discharge Goal   Discharge Goal 6   Sit to Lying   Assistance Needed Supervision   Physical Assistance Level No physical assistance   CARE Score - Sit to Lying 4   Sit to Lying Discharge Goal   Discharge Goal 6   Lying to Sitting on Side of Bed   Assistance Needed Supervision   Physical Assistance Level No physical assistance   CARE Score - Lying to Sitting on Side of Bed 4   Lying to Sitting on Side of Bed Discharge Goal   Discharge Goal 6   Sit to Stand   Assistance Needed Incidental touching   Physical Assistance Level No physical assistance   CARE Score - Sit to Stand 4   Sit to Stand Discharge Goal   Discharge Goal 6   Chair/Bed-to-Chair Transfer   Assistance Needed Incidental touching   Physical Assistance Level No physical assistance   CARE Score - Chair/Bed-to-Chair Transfer 4   Chair/Bed-to-Chair Transfer Discharge Goal   Discharge Goal 6   Car Transfer   Reason if not Attempted Environmental limitations   CARE Score - Car Transfer 10   Car Transfer Discharge Goal   Discharge Goal 6   Walk 10 Feet   Assistance Needed Supervision   Physical Assistance Level No physical assistance   CARE Score - Walk 10 Feet 4   Walk 10 Feet Discharge Goal   Discharge Goal 6   Walk 50 Feet with Two Turns   Assistance Needed Supervision   Physical Assistance Level No physical assistance   CARE Score - Walk 50 Feet with Two Turns 4   Walk 50 Feet with Two Turns Discharge Goal   Discharge Goal 6   Walk 150 Feet   Assistance Needed Supervision   Physical Assistance Level No physical assistance   CARE Score - Walk 150 Feet 4   Walk 150 Feet Discharge Goal   Discharge Goal 6   Walking 10 Feet on Uneven  Surfaces   Reason if not Attempted Environmental limitations   CARE Score - Walking 10 Feet on Uneven Surfaces 10   Walking 10 Feet on Uneven Surfaces Discharge Goal   Discharge Goal 6   1 Step (Curb)   Assistance Needed Supervision   Physical Assistance Level No physical assistance   CARE Score - 1 Step (Curb) 4   1 Step (Curb) Discharge Goal   Discharge Goal 6   4 Steps   Assistance Needed Supervision   Physical Assistance Level No physical assistance   CARE Score - 4 Steps 4   4 Steps Discharge Goal   Discharge Goal 6   12 Steps   Assistance Needed Supervision   Physical Assistance Level No physical assistance   CARE Score - 12 Steps 4   12 Steps Discharge Goal   Discharge Goal 6   Picking Up Object   Assistance Needed Supervision   Physical Assistance Level No physical assistance   CARE Score - Picking Up Object 4   Picking Up Object Discharge Goal   Discharge Goal 6   Wheel 50 Feet with Two Turns   Assistance Needed Independent   Physical Assistance Level No physical assistance   CARE Score - Wheel 50 Feet with Two Turns 6   Type of Wheelchair/Scooter Manual   Wheel 50 Feet with Two Turns Discharge Goal   Discharge Goal 6   Wheel 150 Feet   Assistance Needed Independent   Physical Assistance Level No physical assistance   CARE Score - Wheel 150 Feet 6   Type of Wheelchair/Scooter Manual   Wheel 150 Feet Discharge Goal   Discharge Goal 6   Gait Functional Level of Assist    Gait Level Of Assist Contact Guard Assist   Assistive Device Front Wheel Walker   Distance (Feet) 530   # of Times Distance was Traveled 1   Deviation   (intermittently meandering gait path, seems to have some L visual deficits due to difficulty navigating obstacles on L. 6 minute walk test 530 ft)   Stairs Functional Level of Assist   Level of Assist with Stairs Contact Guard Assist   # of Stairs Climbed 20   Stairs Description Extra time;Hand rails;Supervision for safety;Verbal cueing  (has two near losses of balance posteriorly due to not  fully getting foot on next step.)   Transfer Functional Level of Assist   Bed, Chair, Wheelchair Transfer Contact Guard Assist   Bed Chair Wheelchair Transfer Description Increased time;Supervision for safety;Verbal cueing   Toilet Transfers Contact Guard Assist   Toilet Transfer Description Grab bar;Supervision for safety;Verbal cueing   Problem List    Problems Impaired Transfers;Impaired Ambulation;Functional Strength Deficit;Impaired Balance;Impaired Vision;Decreased Activity Tolerance;Safety Awareness Deficits / Cognition   Precautions   Precautions Fall Risk   Comments pain pump for chronic back/neck pain, craniotomy   Current Discharge Plan   Current Discharge Plan Return to Prior Living Situation   Interdisciplinary Plan of Care Collaboration   IDT Collaboration with  Occupational Therapist   Patient Position at End of Therapy Seated;Chair Alarm On;Self Releasing Lap Belt Applied;Call Light within Reach;Tray Table within Reach;Phone within Reach   Collaboration Comments CLOF, POC   PT DME Recommendations   Assistive Device   (none anticipated, patient has cane and wheeled walker already)   Physical Therapist Assigned   Assigned PT / Treatment Time / Comments EB 60-90   Benefit   Therapy Benefit Patient Would Benefit from Inpatient Rehabilitation Physical Therapy to Maximize Functional [place] with ADLs, IADLs and Mobility.   Strengths & Barriers   Strengths Able to follow instructions;Adequate strength;Effective communication skills;Good insight into deficits/needs;Independent prior level of function;Making steady progress towards goals;Motivated for self care and independence;Pleasant and cooperative;Supportive family;Willingly participates in therapeutic activities   Barriers Impaired activity tolerance;Visual impairment     [place] 45/56 with most deficits noted in single limb stance, toe taps.    6 minute walk test 530 ft with FWW.     Assessment  Patient is 88 y.o. female with a diagnosis of past  medical history of HTN, chronic pain s/p pain pump, and remote history of a fib not on AC, ;  who presented on 11/3/2023  9:59 PM as a transfer from OSH with concern for SDH. Per documentation, patient presented to OSH with issues with memory and AMS. CT head obtained at OSH showed SDH with right to left 8mm midline shift. Patient was transferred to [facility], Neurosurgery was consulted, and patient was taken to the OR on 11/4 for right sided craniotomy for evacuation of SDH and resection of membranes performed by Dr. Alves. Post op, patient has required precedex drip which is being weaned. Patient' required fentanyl post op, but this has been weaned after patient's pain pump was interrogated by [employer] and confirmed patient's pain pump is in working order. Patient's hospital course has been notable for leukocytosis and ABLA.    Additional factors influencing patient status / progress (ie: cognitive factors, co-morbidities, social support, etc): [place], supportive family, no falls history, independent prior level of function.      Plan  Recommend Physical Therapy  minutes per day 5-7 days per week for 7-10 days for the following treatments:  PT Group Therapy, PT Gait Training, PT Self Care/Home Eval, PT Therapeutic Exercises, PT Neuro Re-Ed/Balance, PT Therapeutic Activity, and PT Evaluation.    Passport items to be completed:  Get in/out of bed safely, in/out of a vehicle, safely use mobility device, walk or wheel around home/community, navigate up and down stairs, show how to get up/down from the ground, ensure home is accessible, demonstrate HEP, complete caregiver training    Goals:  Long term and short term goals have been discussed with patient and they are in agreement.    Physical Therapy Problems (Active)       Problem: Balance       Dates: Start:  11/10/23         Goal: STG-Within one week, patient will improve [place] >51/56       Dates: Start:  11/10/23               Problem: Mobility        Dates: Start:  11/10/23         Goal: STG-Within one week, patient will improve 6 minute walk test > 750 ft with LRAD       Dates: Start:  11/10/23            Goal: STG-Within one week, patient will ascend and descend four to six stairs with single railing and CGA       Dates: Start:  11/10/23               Problem: Mobility Transfers       Dates: Start:  11/10/23         Goal: STG-Within one week, patient will demonstrate fall recovery with CGA       Dates: Start:  11/10/23               Problem: PT-Long Term Goals       Dates: Start:  11/10/23         Goal: LTG-By discharge, patient will improve FGA >24/30       Dates: Start:  11/10/23            Goal: LTG-By discharge, patient will perform home exercise program independently       Dates: Start:  11/10/23            Goal: LTG-By discharge, patient will ambulate up/down 4-6 stairs with single railing and supervision       Dates: Start:  11/10/23            Goal: LTG-By discharge, patient will transfer in/out of a car with supervision assist       Dates: Start:  11/10/23            Goal: LTG-By discharge, patient will demonstrate fall recovery with supervision assist       Dates: Start:  11/10/23

## (undated) DEVICE — SPONGE GAUZESTER 4 X 4 4PLY - (128PK/CA)

## (undated) DEVICE — PERFORATER DISP TIP DGR-0

## (undated) DEVICE — PIN HEAD MAYFIELD DISP. (3EA/PK 12PK/BX)

## (undated) DEVICE — GOWN SURGEONS LARGE - (32/CA)

## (undated) DEVICE — SUCTION INSTRUMENT YANKAUER BULBOUS TIP W/O VENT (50EA/CA)

## (undated) DEVICE — COVER FOOT UNIVERSAL DISP. - (25EA/CA)

## (undated) DEVICE — LACTATED RINGERS INJ. 500 ML - (24EA/CA)

## (undated) DEVICE — KIT SURGIFLO W/OUT THROMBIN - (6EA/CA)

## (undated) DEVICE — SET EXTENSION WITH 2 PORTS (48EA/CA) ***PART #2C8610 IS A SUBSTITUTE*****

## (undated) DEVICE — SCALP CLIP RANEY 20-1037 (10EA/PK 20PK/CA)

## (undated) DEVICE — TRAY SURESTEP FOLEY TEMP SENSING 16FR (10EA/CA) ORDER  #18764 FOR TEMP FOLEY ONLY

## (undated) DEVICE — SUTURE GENERAL

## (undated) DEVICE — MIDAS LUBRICATOR DIFFUSER PACK (4EA/CA)

## (undated) DEVICE — BLADE CLIPPER FITS 2501 CLIPPER (BLUE)  (20EA/CA)

## (undated) DEVICE — BLADE SURGICAL CLIPPER - (50EA/CA)

## (undated) DEVICE — COVER LIGHT HANDLE ALC PLUS DISP (18EA/BX)

## (undated) DEVICE — FORCEP BIPOLAR ISOCOOL 8.5 1.0MM TIP"

## (undated) DEVICE — GLOVE BIOGEL SZ 8 SURGICAL PF LTX - (50PR/BX 4BX/CA)

## (undated) DEVICE — SUTURE 2-0 VICRYL PLUS CT-1 - 8 X 18 INCH(12/BX)

## (undated) DEVICE — DRAPE T CRANIOTOMY W/POUCH - (9/CA)

## (undated) DEVICE — ELECTRODE DUAL RETURN W/ CORD - (50/PK)

## (undated) DEVICE — GOWN WARMING STANDARD FLEX - (30/CA)

## (undated) DEVICE — TUBE CONNECT SUCTION CLEAR 120 X 1/4" (50EA/CA)"

## (undated) DEVICE — SUTURE 4-0 NUROLON CR/8 TF - (12/BX) ETHICON

## (undated) DEVICE — TOOL MR8 F1/7CM TAPER 1.5MM DIAMETER (1/EA)

## (undated) DEVICE — TOWEL STOP TIMEOUT SAFETY FLAG (40EA/CA)

## (undated) DEVICE — Device

## (undated) DEVICE — SET LEADWIRE 5 LEAD BEDSIDE DISPOSABLE ECG (1SET OF 5/EA)

## (undated) DEVICE — LACTATED RINGERS INJ 1000 ML - (14EA/CA 60CA/PF)

## (undated) DEVICE — TRAY SKIN SCRUB PVP WET (20EA/CA) PART #DYND70356 DISCONTINUED

## (undated) DEVICE — SODIUM CHL IRRIGATION 0.9% 1000ML (12EA/CA)

## (undated) DEVICE — STAPLER SKIN DISP - (6/BX 10BX/CA) VISISTAT

## (undated) DEVICE — GLOVE BIOGEL INDICATOR SZ 8 SURGICAL PF LTX - (50/BX 4BX/CA)

## (undated) DEVICE — TUBING CLEARLINK DUO-VENT - C-FLO (48EA/CA)

## (undated) DEVICE — PACK CRANI - (1EA/CA)

## (undated) DEVICE — CANISTER SUCTION 3000ML MECHANICAL FILTER AUTO SHUTOFF MEDI-VAC NONSTERILE LF DISP  (40EA/CA)

## (undated) DEVICE — TUBING C&T SET FLYING LEADS DRAIN TUBING (10EA/BX)

## (undated) DEVICE — SUTURE 3-0 VICRYL PLUS SH - 8X 18 INCH (12/BX)

## (undated) DEVICE — DRAPE STRLE REG TOWEL 18X24 - (10/BX 4BX/CA)"